# Patient Record
Sex: MALE | Race: WHITE | NOT HISPANIC OR LATINO | Employment: OTHER | URBAN - METROPOLITAN AREA
[De-identification: names, ages, dates, MRNs, and addresses within clinical notes are randomized per-mention and may not be internally consistent; named-entity substitution may affect disease eponyms.]

---

## 2017-01-27 DIAGNOSIS — K21.9 GASTROESOPHAGEAL REFLUX DISEASE: ICD-10-CM

## 2017-01-27 DIAGNOSIS — E61.1 IRON DEFICIENCY: ICD-10-CM

## 2017-01-27 DIAGNOSIS — R52 PAIN: ICD-10-CM

## 2017-01-27 DIAGNOSIS — G25.81 RESTLESS LEGS SYNDROME (RLS): ICD-10-CM

## 2017-01-27 DIAGNOSIS — E87.20 ACIDOSIS: ICD-10-CM

## 2017-01-27 DIAGNOSIS — I10 HTN (HYPERTENSION): ICD-10-CM

## 2017-01-27 DIAGNOSIS — E78.00 HYPERCHOLESTEREMIA: ICD-10-CM

## 2017-01-27 DIAGNOSIS — I12.9 RENAL HYPERTENSION, STAGE 1-4 OR UNSPECIFIED CHRONIC KIDNEY DISEASE: Primary | ICD-10-CM

## 2017-01-27 DIAGNOSIS — Z94.0 KIDNEY REPLACED BY TRANSPLANT: ICD-10-CM

## 2017-01-27 DIAGNOSIS — E29.1 TESTICULAR HYPOFUNCTION: ICD-10-CM

## 2017-01-30 DIAGNOSIS — E61.1 IRON DEFICIENCY: Primary | ICD-10-CM

## 2017-01-30 RX ORDER — SODIUM BICARBONATE 650 MG/1
650 TABLET ORAL
Qty: 180 TABLET | Refills: 3 | Status: SHIPPED | OUTPATIENT
Start: 2017-01-30 | End: 2018-02-16

## 2017-01-30 RX ORDER — GABAPENTIN 300 MG/1
300 CAPSULE ORAL AT BEDTIME
Qty: 270 CAPSULE | Refills: 3 | Status: SHIPPED | OUTPATIENT
Start: 2017-01-30

## 2017-01-30 RX ORDER — LOSARTAN POTASSIUM 100 MG/1
100 TABLET ORAL DAILY
Qty: 90 TABLET | Refills: 3 | Status: SHIPPED | OUTPATIENT
Start: 2017-01-30 | End: 2018-04-17

## 2017-01-30 RX ORDER — FUROSEMIDE 20 MG
TABLET ORAL
Qty: 360 TABLET | Refills: 3 | Status: SHIPPED | OUTPATIENT
Start: 2017-01-30 | End: 2017-12-11

## 2017-01-30 RX ORDER — VERAPAMIL HYDROCHLORIDE 180 MG/1
180 TABLET, EXTENDED RELEASE ORAL DAILY
Qty: 90 TABLET | Refills: 3 | Status: SHIPPED | OUTPATIENT
Start: 2017-01-30 | End: 2018-03-03

## 2017-01-30 RX ORDER — ACETAMINOPHEN 325 MG/1
650 TABLET ORAL EVERY 6 HOURS PRN
Qty: 250 TABLET | Refills: 11 | Status: SHIPPED | OUTPATIENT
Start: 2017-01-30 | End: 2023-05-19 | Stop reason: DRUGHIGH

## 2017-01-30 RX ORDER — PANTOPRAZOLE SODIUM 40 MG/1
40 TABLET, DELAYED RELEASE ORAL DAILY
Qty: 90 TABLET | Refills: 3 | Status: SHIPPED | OUTPATIENT
Start: 2017-01-30 | End: 2018-02-15

## 2017-01-30 RX ORDER — FERROUS SULFATE 325(65) MG
1 TABLET ORAL
Qty: 90 TABLET | Refills: 3 | Status: SHIPPED | OUTPATIENT
Start: 2017-01-30 | End: 2020-01-27

## 2017-01-30 RX ORDER — FERROUS SULFATE 325(65) MG
TABLET ORAL
Qty: 90 TABLET | Refills: 3 | Status: SHIPPED | OUTPATIENT
Start: 2017-01-30 | End: 2018-01-17

## 2017-01-30 NOTE — TELEPHONE ENCOUNTER
Last Office Visit with Nephrologist:  12/20/16.  Medication refilled per Nephrology Clinic protocol.     Jerilyn Flores RN

## 2017-09-28 ENCOUNTER — TELEPHONE (OUTPATIENT)
Dept: TRANSPLANT | Facility: CLINIC | Age: 62
End: 2017-09-28

## 2017-09-28 NOTE — TELEPHONE ENCOUNTER
Carlos,    Thank you for taking the time to speak with me today. I wanted to follow up with you about all your questions regarding the new nephrologist.    I found out that you continue to have a post-kidney coordinator here who will order you the labs they require. Her name is Aria Marie. Her phone number is: [phone].     Your wait-list coordinator for a new kidney is Radha De La Rosa. She will be checking in on you yearly. Her number is: [phone]    Your new [facility] Nephrologist is Dr. Goodwin. His number to make your initial appointment is: [phone]. Please call and make an appointment. We have a partnership with medical records, and they will be able to see all your records from there.     Finally, our nephrologist see post kidney transplant patients in [place]. Dr. Jackson Bunch is our nephrologist who sees the post kidney patients. Please schedule an appointment with him in the new year. I will send his , Pratibha, a message today asking her to reach out to you to make that appointment.      I hope this clarifies all the confusion. If you have any further questions, I am happy to help you. My information is included in my signature.         Zaira Lozada RN, BSN  Transplant Coordinator - Kidney   Solid Organ Transplant [address]WB [address]  [address] [address]  Phone [phone]  Fax [phone]  Pager [phone]  [email or web address]

## 2017-09-28 NOTE — TELEPHONE ENCOUNTER
Spoke with Carlos to day on a well check phone call. He stated he is feeling well this past year since he saw Dr. Pantoja. He stated that he is confused as to who or how he is to be seen by Dr. Goodwin at [facility]. He stated he looked him up on the web, however he emailed Dr. Pantoja to see if our center had forwarded his records. He did not get a response. He also stated he is having a left total knee replacement 10/10/2017, with a pre op the 9th. If we want him to have follow up labs, we can email or mail him the order. I stated I would get back to him about the above items. He said email is good too.

## 2017-09-29 ENCOUNTER — TELEPHONE (OUTPATIENT)
Dept: TRANSPLANT | Facility: CLINIC | Age: 62
End: 2017-09-29

## 2017-11-01 ENCOUNTER — TELEPHONE (OUTPATIENT)
Dept: TRANSPLANT | Facility: CLINIC | Age: 62
End: 2017-11-01

## 2017-11-01 DIAGNOSIS — Z94.0 KIDNEY REPLACED BY TRANSPLANT: ICD-10-CM

## 2017-11-01 RX ORDER — SULFAMETHOXAZOLE AND TRIMETHOPRIM 400; 80 MG/1; MG/1
TABLET ORAL
Qty: 40 TABLET | Refills: 2 | Status: SHIPPED | OUTPATIENT
Start: 2017-11-01 | End: 2018-07-21

## 2017-11-01 NOTE — TELEPHONE ENCOUNTER
"Call from Carlos who is concerned about a company called [employer] asking the patient to sign a release form to allow the [facility] to get records from [facility].  Instructed patient to phone [place] [facility] to better understand the request for records.  Patient also has an appt. In [place] in Feb. 2018 and will phone Pratibha Duenas about his appointment.  Patient stated, \"Pratibha may have requested the records.\"   "

## 2017-12-09 DIAGNOSIS — Z94.0 KIDNEY TRANSPLANTED: Primary | ICD-10-CM

## 2017-12-11 DIAGNOSIS — Z94.0 KIDNEY REPLACED BY TRANSPLANT: ICD-10-CM

## 2017-12-11 RX ORDER — FUROSEMIDE 20 MG
TABLET ORAL
Qty: 360 TABLET | Refills: 1 | Status: SHIPPED | OUTPATIENT
Start: 2017-12-11 | End: 2018-06-05

## 2017-12-11 RX ORDER — PREDNISONE 5 MG/1
5 TABLET ORAL DAILY
Qty: 90 TABLET | Refills: 3 | Status: SHIPPED | OUTPATIENT
Start: 2017-12-11 | End: 2018-12-01

## 2017-12-13 DIAGNOSIS — Z94.0 KIDNEY REPLACED BY TRANSPLANT: ICD-10-CM

## 2017-12-13 RX ORDER — MYCOPHENOLATE MOFETIL 250 MG/1
CAPSULE ORAL
Qty: 540 CAPSULE | Refills: 3 | Status: SHIPPED | OUTPATIENT
Start: 2017-12-13 | End: 2018-12-15

## 2018-01-13 ENCOUNTER — EXTERNAL HOSPITAL ADMISSION (OUTPATIENT)
Dept: TRANSPLANT | Facility: CLINIC | Age: 63
End: 2018-01-13

## 2018-01-17 ENCOUNTER — EXTERNAL ORDER RESULTS (OUTPATIENT)
Dept: TRANSPLANT | Facility: CLINIC | Age: 63
End: 2018-01-17

## 2018-01-17 DIAGNOSIS — E61.1 IRON DEFICIENCY: ICD-10-CM

## 2018-01-17 RX ORDER — FERROUS SULFATE 325(65) MG
TABLET ORAL
Qty: 90 TABLET | Refills: 3 | Status: SHIPPED | OUTPATIENT
Start: 2018-01-17 | End: 2019-01-12

## 2018-02-15 DIAGNOSIS — K21.9 GASTROESOPHAGEAL REFLUX DISEASE: ICD-10-CM

## 2018-02-15 RX ORDER — PANTOPRAZOLE SODIUM 40 MG/1
TABLET, DELAYED RELEASE ORAL
Qty: 30 TABLET | Refills: 1 | Status: SHIPPED | OUTPATIENT
Start: 2018-02-15

## 2018-02-16 ENCOUNTER — OFFICE VISIT (OUTPATIENT)
Dept: NEPHROLOGY | Facility: CLINIC | Age: 63
End: 2018-02-16
Payer: COMMERCIAL

## 2018-02-16 VITALS
WEIGHT: 270.8 LBS | HEART RATE: 76 BPM | BODY MASS INDEX: 39.99 KG/M2 | DIASTOLIC BLOOD PRESSURE: 82 MMHG | SYSTOLIC BLOOD PRESSURE: 162 MMHG

## 2018-02-16 DIAGNOSIS — E87.20 ACIDOSIS: ICD-10-CM

## 2018-02-16 DIAGNOSIS — I15.1 HYPERTENSION SECONDARY TO OTHER RENAL DISORDERS: ICD-10-CM

## 2018-02-16 DIAGNOSIS — E87.20 METABOLIC ACIDOSIS: ICD-10-CM

## 2018-02-16 DIAGNOSIS — E66.3 OVERWEIGHT: ICD-10-CM

## 2018-02-16 DIAGNOSIS — Z48.298 AFTERCARE FOLLOWING ORGAN TRANSPLANT: ICD-10-CM

## 2018-02-16 DIAGNOSIS — D84.9 IMMUNOSUPPRESSED STATUS (H): ICD-10-CM

## 2018-02-16 DIAGNOSIS — E55.9 VITAMIN D DEFICIENCY: ICD-10-CM

## 2018-02-16 DIAGNOSIS — Z94.0 KIDNEY REPLACED BY TRANSPLANT: Primary | ICD-10-CM

## 2018-02-16 DIAGNOSIS — G47.33 OSA (OBSTRUCTIVE SLEEP APNEA): ICD-10-CM

## 2018-02-16 RX ORDER — SULFAMETHOXAZOLE AND TRIMETHOPRIM 400; 80 MG/1; MG/1
1 TABLET ORAL
COMMUNITY
End: 2020-06-30

## 2018-02-16 RX ORDER — SODIUM BICARBONATE 650 MG/1
650 TABLET ORAL DAILY
Qty: 180 TABLET | Refills: 3 | COMMUNITY
Start: 2018-02-16 | End: 2019-02-15

## 2018-02-16 NOTE — PROGRESS NOTES
Per verbal orders of Dr Bunch, faxed [place] lab orders for a Vitamin D level with next set of labs.

## 2018-02-16 NOTE — LETTER
2/16/2018       RE: Gurjit Squires  [address]  [address]   [facility] [address]     Dear Colleague,    Thank you for referring your patient, Gurjit Squires, to the [facility] at [facility]. Please see a copy of my visit note below.    Assessment and Plan:  1. LDKT - baseline Cr ~ 1.8-2.2, which has remained stable.  Mild proteinuria.  No DSA when last checked.  Will make no changes in immunosuppression.  Patient has been on the kidney transplant waiting list since 2007 due to early kidney dysfunction and a couple of rejections, but kidney function has been stable and relatively good for the last several years.  With patient's very stable kidney function and last GFR over 45, would recommend he be removed from the kidney transplant waiting list.  2. HTN - okay control at target of less than 140/90.  No changes.  3. Elevated blood glucose - patient's blood glucose has been running ~ 100-120s.  Recommend increased exercise and weight loss.  4. Vitamin D deficiency - will check vitamin D level with next labs and continue on cholecalciferol.  5. Metabolic acidosis - normal bicarbonate level and with some edema, will decrease sodium bicarbonate supplement to once daily.  6. Overweight - significant increase in weight in the last year.  Recommend increased exercise and watching caloric intake.  7. Skin cancer risk - no new skin lesions.  Recommend regular follow up with Dermatology.  8. Medical noncompliance - patient has had infrequent labs.  Discussed importance of obtaining regular labs, taking medications and attending medical appointments.  9. Recommend return visit in 12 months.    Assessment and plan was discussed with patient and he voiced his understanding and agreement.    Reason for Visit:  Mr. Squires is here for routine follow up.    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         Transplant Hx:       Tx: LDKT  Date: 7/29/04       Present Maintenance IS: Mycophenolate mofetil and Prednisone       Baseline Creatinine: 1.8-2.2       Recent DSA: No  Date last checked: 1/2010       Biopsy: 1/28/10; borderline acute cellular-mediated rejection, mild interstitial fibrosis and tubular atrophy.         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  Mild interstitial fibrosis and tubular atrophy.         11/3/05; unremarkable.    Mr. Squires reports feeling good overall with some medical complaints.  Patient was last seen just over a year ago by Dr. Pantoja.  He recently underwent a left knee replacement and is doing well following this.  His energy level is okay, but not quite normal, although no recent change.  Patient retired last year, but recently started getting some hours part time.  He is active and does get some exercise.  Denies any chest pain or shortness of breath with exertion.  Appetite is \"too good\" and he has gained about 15-20 lbs in the last year.  No nausea, vomiting or diarrhea.  No fever, sweats or chills.  Some leg swelling by the end of the day.    Home BP: 130/80s..      ROS:   A comprehensive review of systems was obtained and negative, except as noted in the HPI or PMH.    Active Medical Problems:  Patient Active Problem List   Diagnosis     Testicular hypofunction     Kidney replaced by transplant     Erectile dysfunction     Hypertension secondary to other renal disorders     Immunosuppressed status (H)     Metabolic acidosis     Vitamin D deficiency     VANESA (obstructive sleep apnea)     Overweight     Aftercare following organ transplant       Personal Hx:  Social History     Social History     Marital status:      Spouse name: N/A     Number of children: N/A     Years of education: N/A     Occupational History     Not on file.     Social History Main Topics     Smoking " status: Never Smoker     Smokeless tobacco: Never Used     Alcohol use Yes      Comment: social     Drug use: No     Sexual activity: Yes     Partners: Female     Birth control/ protection: None     Other Topics Concern     Not on file     Social History Narrative       Allergies:  No Known Allergies    Medications:  Prior to Admission medications    Medication Sig Start Date End Date Taking? Authorizing Provider   sulfamethoxazole-trimethoprim (BACTRIM) 400-80 MG per tablet Take 1 tablet by mouth three times a week   Yes Reported, Patient   VERAPAMIL HCL PO Take 120 mg by mouth daily   Yes Reported, Patient   sodium bicarbonate 650 MG tablet Take 1 tablet (650 mg) by mouth daily 2/16/18  Yes Radu Bunch MD   pantoprazole (PROTONIX) 40 MG EC tablet TAKE 1 TABLET BY MOUTH DAILY  Patient taking differently: 20 mg daily 2/15/18  Yes Radu Bunch MD   ferrous sulfate (IRON) 325 (65 FE) MG tablet TAKE ONE TABLET BY MOUTH ONE TIME DAILY AT BREAKFAST 1/17/18  Yes Radu Bunch MD   mycophenolate (GENERIC EQUIVALENT) 250 MG capsule TAKE 3 CAPSULES (750 MG) BY MOUTH 2 TIMES DAILY 12/13/17  Yes Radu Bunch MD   predniSONE (DELTASONE) 5 MG tablet Take 1 tablet (5 mg) by mouth daily 12/11/17  Yes Radu Bunch MD   furosemide (LASIX) 20 MG tablet TAKE 2 TABLETS BY MOUTH IN THE MORNING AND IN THE EVENING 12/11/17  Yes Radu Bunch MD   losartan (COZAAR) 100 MG tablet Take 1 tablet (100 mg) by mouth daily 1/30/17  Yes Everette Pantoja MD   gabapentin (NEURONTIN) 300 MG capsule Take 1 capsule (300 mg) by mouth At Bedtime 1/30/17  Yes Everette Pantoja MD   ferrous sulfate (IRON) 325 (65 FE) MG tablet Take 1 tablet (325 mg) by mouth daily (with breakfast) 1/30/17  Yes Everette Pantoja MD   acetaminophen (TYLENOL) 325 MG tablet Take 2 tablets (650 mg) by mouth every 6 hours as needed for pain  Patient taking differently: Take 1,000 mg by mouth daily  1/30/17  Yes Scarlett  MD Everette   cholecalciferol (VITAMIN D) 1000 UNIT tablet Take 1 tablet (1,000 Units) by mouth daily 1/30/17  Yes Everette Pantoja MD   TraMADol HCl 200 MG TB24 Take 1 tablet by mouth daily   Yes Reported, Patient   Multiple Vitamins-Minerals (ONE-A-DAY MENS 50+ ADVANTAGE PO) Take 1 tablet by mouth every evening   Yes Reported, Patient   clonazePAM (KLONOPIN) 0.5 MG tablet Take 1 tablet (0.5 mg) by mouth daily  Patient taking differently: Take 0.25 mg by mouth every evening  7/10/15  Yes Doctor, MD Natalia   allopurinol (ZYLOPRIM) 100 MG tablet Take 3 tablets (300 mg) by mouth daily 7/10/15  Yes Doctor, MD Natalia   omega-3 fatty acids (FISH OIL) 1200 MG capsule Take 1 capsule by mouth 3 times daily. 5/21/12  Yes Everette Pantoja MD   Fexofenadine HCl (ALLEGRA PO) Take 1 tablet by mouth daily.    Yes Reported, Patient   sulfamethoxazole-trimethoprim (BACTRIM/SEPTRA) 400-80 MG per tablet TAKE 1 TABLET BY MOUTH EVERY MONDAY, WEDNESDAY, AND FRIDAY MORNING  Patient not taking: Reported on 2/16/2018 11/1/17   Radu Bunch MD   verapamil (CALAN-SR) 180 MG CR tablet Take 1 tablet (180 mg) by mouth daily  Patient not taking: Reported on 2/16/2018 1/30/17   Everette Pantoja MD   sulfamethoxazole-trimethoprim (BACTRIM DS,SEPTRA DS) 800-160 MG per tablet Take 1 tablet by mouth 2 times daily  Patient not taking: Reported on 2/16/2018 10/5/16   Nikki Lao MD   oxyCODONE (ROXICODONE) 5 MG immediate release tablet Take 1 tablet (5 mg) by mouth every 4 hours as needed for pain (maximum 6 tablets per day)  Patient not taking: Reported on 2/16/2018 10/4/16   Jaquan Matthew MD       Vitals:  /82 (BP Location: Left arm)  Pulse 76  Wt 122.8 kg (270 lb 12.8 oz)  BMI 39.99 kg/m2    Exam:   GENERAL APPEARANCE: alert and no distress  HENT: mouth without ulcers or lesions  LYMPHATICS: no cervical or supraclavicular nodes  RESP: lungs clear to auscultation - no rales, rhonchi or wheezes  CV: regular rhythm,  normal rate, no rub, no murmur  EDEMA: trace LE edema bilaterally  ABDOMEN: soft, nondistended, nontender, bowel sounds normal  MS: extremities normal - no gross deformities noted, no evidence of inflammation in joints, no muscle tenderness  SKIN: no rash  TX KIDNEY: normal    Results:   Recent Results (from the past 1008 hour(s))   Basic metabolic panel    Collection Time: 01/12/18  7:37 AM   Result Value Ref Range    Glucose (External) 115 (H) 74 - 106 mg/dL    Urea Nitrogen (External) 31 (H) 7 - 17 mg/dL    Creatinine (External) 1.80 (H) 0.80 - 1.50 mg/dL    BUN/Creatinine Ratio (External) 17.2 15.0 - 20.0    Sodium (External) 140 137 - 145 meq/L    Potassium (External) 4.3 3.5 - 5.1 meq/L    Chloride (External) 102 98 - 107 meq/L    CO2 (External) 26 22 - 30 meq/L    Anion Gap (External) 16 6 - 20 meq/L    Calcium (External) 9.1 8.4 - 10.2 mg/dL    GFR Estimated (External) 38 mL/min/1.73m2    GFR Est if Black (External) 47 mL/min/1.73m2   CBC with platelets    Collection Time: 01/12/18  7:37 AM   Result Value Ref Range    WBC Count (External) 7.5 4.0 - 11.0 K/uL    RBC Count (External) 4.92 4.40 - 5.80 M/uL    Hemoglobin (External) 15.4 13.5 - 17.5 g/dL    Hematocrit (External) 47.5 40.0 - 50.0 %    MCV (External) 96.5 80.0 - 98.0 fL    MCH (External) 31.3 25.5 - 34.0 pg    MCHC (External) 32.4 31.5 - 36.5 g/dL    RDW (External) 13.2 11.5 - 15.5 %    Platelet Count (External) 173 140 - 400 K/uL   Mycophenolic acid    Collection Time: 01/12/18  7:41 AM   Result Value Ref Range    Mycophenolic Acid  (External) 8.6 (H) 1.0 - 3.5 mcg/mL    MPA Glucuronide (External) 75 35 - 100 mcg/mL           Again, thank you for allowing me to participate in the care of your patient.      Sincerely,    Radu Bunch MD

## 2018-02-16 NOTE — LETTER
PHYSICIAN ORDERS      DATE & TIME ISSUED: 2018 11:51 AM  PATIENT NAME: Gurjit Squires   : 1955     [facility] MR# [if applicable]: [id number]     DIAGNOSIS:  Kidney Transplant  ICD-10 CODE: Z94.0     Please add Vitamin D at next lab visit.    Any questions please call: [phone]    Please fax these results to [phone].    .

## 2018-02-16 NOTE — LETTER
2/16/2018      RE: Gurjit Squires  [address] [place] [address]  [address]   [facility] [address]       Assessment and Plan:  1. LDKT - baseline Cr ~ 1.8-2.2, which has remained stable.  Mild proteinuria.  No DSA when last checked.  Will make no changes in immunosuppression.  Patient has been on the kidney transplant waiting list since 2007 due to early kidney dysfunction and a couple of rejections, but kidney function has been stable and relatively good for the last several years.  With patient's very stable kidney function and last GFR over 45, would recommend he be removed from the kidney transplant waiting list.  2. HTN - okay control at target of less than 140/90.  No changes.  3. Elevated blood glucose - patient's blood glucose has been running ~ 100-120s.  Recommend increased exercise and weight loss.  4. Vitamin D deficiency - will check vitamin D level with next labs and continue on cholecalciferol.  5. Metabolic acidosis - normal bicarbonate level and with some edema, will decrease sodium bicarbonate supplement to once daily.  6. Overweight - significant increase in weight in the last year.  Recommend increased exercise and watching caloric intake.  7. Skin cancer risk - no new skin lesions.  Recommend regular follow up with Dermatology.  8. Medical noncompliance - patient has had infrequent labs.  Discussed importance of obtaining regular labs, taking medications and attending medical appointments.  9. Recommend return visit in 12 months.    Assessment and plan was discussed with patient and he voiced his understanding and agreement.    Reason for Visit:  Mr. Squires is here for routine follow up.    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         Transplant Hx:       Tx: LDKT  Date: 7/29/04       Present Maintenance IS: Mycophenolate mofetil and Prednisone       Baseline Creatinine: 1.8-2.2       Recent DSA: No  Date last  "checked: 1/2010       Biopsy: 1/28/10; borderline acute cellular-mediated rejection, mild interstitial fibrosis and tubular atrophy.         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  Mild interstitial fibrosis and tubular atrophy.         11/3/05; unremarkable.    Mr. Squires reports feeling good overall with some medical complaints.  Patient was last seen just over a year ago by Dr. Pantoja.  He recently underwent a left knee replacement and is doing well following this.  His energy level is okay, but not quite normal, although no recent change.  Patient retired last year, but recently started getting some hours part time.  He is active and does get some exercise.  Denies any chest pain or shortness of breath with exertion.  Appetite is \"too good\" and he has gained about 15-20 lbs in the last year.  No nausea, vomiting or diarrhea.  No fever, sweats or chills.  Some leg swelling by the end of the day.    Home BP: 130/80s..      ROS:   A comprehensive review of systems was obtained and negative, except as noted in the HPI or PMH.    Active Medical Problems:  Patient Active Problem List   Diagnosis     Testicular hypofunction     Kidney replaced by transplant     Erectile dysfunction     Hypertension secondary to other renal disorders     Immunosuppressed status (H)     Metabolic acidosis     Vitamin D deficiency     VANESA (obstructive sleep apnea)     Overweight     Aftercare following organ transplant       Personal Hx:  Social History     Social History     Marital status:      Spouse name: N/A     Number of children: N/A     Years of education: N/A     Occupational History     Not on file.     Social History Main Topics     Smoking status: Never Smoker     Smokeless tobacco: Never Used     Alcohol use Yes      Comment: social     Drug use: No     Sexual activity: Yes     Partners: Female     Birth control/ protection: None     Other Topics " Concern     Not on file     Social History Narrative       Allergies:  No Known Allergies    Medications:  Prior to Admission medications    Medication Sig Start Date End Date Taking? Authorizing Provider   sulfamethoxazole-trimethoprim (BACTRIM) 400-80 MG per tablet Take 1 tablet by mouth three times a week   Yes Reported, Patient   VERAPAMIL HCL PO Take 120 mg by mouth daily   Yes Reported, Patient   sodium bicarbonate 650 MG tablet Take 1 tablet (650 mg) by mouth daily 2/16/18  Yes Radu Bunch MD   pantoprazole (PROTONIX) 40 MG EC tablet TAKE 1 TABLET BY MOUTH DAILY  Patient taking differently: 20 mg daily 2/15/18  Yes Radu Bunch MD   ferrous sulfate (IRON) 325 (65 FE) MG tablet TAKE ONE TABLET BY MOUTH ONE TIME DAILY AT BREAKFAST 1/17/18  Yes Radu Bunch MD   mycophenolate (GENERIC EQUIVALENT) 250 MG capsule TAKE 3 CAPSULES (750 MG) BY MOUTH 2 TIMES DAILY 12/13/17  Yes Radu Bunch MD   predniSONE (DELTASONE) 5 MG tablet Take 1 tablet (5 mg) by mouth daily 12/11/17  Yes Radu Bunch MD   furosemide (LASIX) 20 MG tablet TAKE 2 TABLETS BY MOUTH IN THE MORNING AND IN THE EVENING 12/11/17  Yes Radu Bunch MD   losartan (COZAAR) 100 MG tablet Take 1 tablet (100 mg) by mouth daily 1/30/17  Yes Everette Pantoja MD   gabapentin (NEURONTIN) 300 MG capsule Take 1 capsule (300 mg) by mouth At Bedtime 1/30/17  Yes Everette Pantoja MD   ferrous sulfate (IRON) 325 (65 FE) MG tablet Take 1 tablet (325 mg) by mouth daily (with breakfast) 1/30/17  Yes Everette Pantoja MD   acetaminophen (TYLENOL) 325 MG tablet Take 2 tablets (650 mg) by mouth every 6 hours as needed for pain  Patient taking differently: Take 1,000 mg by mouth daily  1/30/17  Yes Everette Pantoja MD   cholecalciferol (VITAMIN D) 1000 UNIT tablet Take 1 tablet (1,000 Units) by mouth daily 1/30/17  Yes Everette Pantoja MD   TraMADol HCl 200 MG TB24 Take 1 tablet by mouth daily   Yes Reported, Patient    Multiple Vitamins-Minerals (ONE-A-DAY MENS 50+ ADVANTAGE PO) Take 1 tablet by mouth every evening   Yes Reported, Patient   clonazePAM (KLONOPIN) 0.5 MG tablet Take 1 tablet (0.5 mg) by mouth daily  Patient taking differently: Take 0.25 mg by mouth every evening  7/10/15  Yes Doctor, MD Natalia   allopurinol (ZYLOPRIM) 100 MG tablet Take 3 tablets (300 mg) by mouth daily 7/10/15  Yes Doctor, None, MD   omega-3 fatty acids (FISH OIL) 1200 MG capsule Take 1 capsule by mouth 3 times daily. 5/21/12  Yes Everette Pantoja MD   Fexofenadine HCl (ALLEGRA PO) Take 1 tablet by mouth daily.    Yes Reported, Patient   sulfamethoxazole-trimethoprim (BACTRIM/SEPTRA) 400-80 MG per tablet TAKE 1 TABLET BY MOUTH EVERY MONDAY, WEDNESDAY, AND FRIDAY MORNING  Patient not taking: Reported on 2/16/2018 11/1/17   Radu Bunch MD   verapamil (CALAN-SR) 180 MG CR tablet Take 1 tablet (180 mg) by mouth daily  Patient not taking: Reported on 2/16/2018 1/30/17   Everette Pantoja MD   sulfamethoxazole-trimethoprim (BACTRIM DS,SEPTRA DS) 800-160 MG per tablet Take 1 tablet by mouth 2 times daily  Patient not taking: Reported on 2/16/2018 10/5/16   Nikki Lao MD   oxyCODONE (ROXICODONE) 5 MG immediate release tablet Take 1 tablet (5 mg) by mouth every 4 hours as needed for pain (maximum 6 tablets per day)  Patient not taking: Reported on 2/16/2018 10/4/16   Jaquan Matthew MD       Vitals:  /82 (BP Location: Left arm)  Pulse 76  Wt 122.8 kg (270 lb 12.8 oz)  BMI 39.99 kg/m2    Exam:   GENERAL APPEARANCE: alert and no distress  HENT: mouth without ulcers or lesions  LYMPHATICS: no cervical or supraclavicular nodes  RESP: lungs clear to auscultation - no rales, rhonchi or wheezes  CV: regular rhythm, normal rate, no rub, no murmur  EDEMA: trace LE edema bilaterally  ABDOMEN: soft, nondistended, nontender, bowel sounds normal  MS: extremities normal - no gross deformities noted, no evidence of inflammation in  joints, no muscle tenderness  SKIN: no rash  TX KIDNEY: normal    Results:   Recent Results (from the past 1008 hour(s))   Basic metabolic panel    Collection Time: 01/12/18  7:37 AM   Result Value Ref Range    Glucose (External) 115 (H) 74 - 106 mg/dL    Urea Nitrogen (External) 31 (H) 7 - 17 mg/dL    Creatinine (External) 1.80 (H) 0.80 - 1.50 mg/dL    BUN/Creatinine Ratio (External) 17.2 15.0 - 20.0    Sodium (External) 140 137 - 145 meq/L    Potassium (External) 4.3 3.5 - 5.1 meq/L    Chloride (External) 102 98 - 107 meq/L    CO2 (External) 26 22 - 30 meq/L    Anion Gap (External) 16 6 - 20 meq/L    Calcium (External) 9.1 8.4 - 10.2 mg/dL    GFR Estimated (External) 38 mL/min/1.73m2    GFR Est if Black (External) 47 mL/min/1.73m2   CBC with platelets    Collection Time: 01/12/18  7:37 AM   Result Value Ref Range    WBC Count (External) 7.5 4.0 - 11.0 K/uL    RBC Count (External) 4.92 4.40 - 5.80 M/uL    Hemoglobin (External) 15.4 13.5 - 17.5 g/dL    Hematocrit (External) 47.5 40.0 - 50.0 %    MCV (External) 96.5 80.0 - 98.0 fL    MCH (External) 31.3 25.5 - 34.0 pg    MCHC (External) 32.4 31.5 - 36.5 g/dL    RDW (External) 13.2 11.5 - 15.5 %    Platelet Count (External) 173 140 - 400 K/uL   Mycophenolic acid    Collection Time: 01/12/18  7:41 AM   Result Value Ref Range    Mycophenolic Acid  (External) 8.6 (H) 1.0 - 3.5 mcg/mL    MPA Glucuronide (External) 75 35 - 100 mcg/mL       Radu Bunch MD

## 2018-02-16 NOTE — PROGRESS NOTES
Assessment and Plan:  1. LDKT - baseline Cr ~ 1.8-2.2, which has remained stable.  Mild proteinuria.  No DSA when last checked.  Will make no changes in immunosuppression.  Patient has been on the kidney transplant waiting list since 2007 due to early kidney dysfunction and a couple of rejections, but kidney function has been stable and relatively good for the last several years.  With patient's very stable kidney function and last GFR over 45, would recommend he be removed from the kidney transplant waiting list.  2. HTN - okay control at target of less than 140/90.  No changes.  3. Elevated blood glucose - patient's blood glucose has been running ~ 100-120s.  Recommend increased exercise and weight loss.  4. Vitamin D deficiency - will check vitamin D level with next labs and continue on cholecalciferol.  5. Metabolic acidosis - normal bicarbonate level and with some edema, will decrease sodium bicarbonate supplement to once daily.  6. Overweight - significant increase in weight in the last year.  Recommend increased exercise and watching caloric intake.  7. Skin cancer risk - no new skin lesions.  Recommend regular follow up with Dermatology.  8. Medical noncompliance - patient has had infrequent labs.  Discussed importance of obtaining regular labs, taking medications and attending medical appointments.  9. Recommend return visit in 12 months.    Assessment and plan was discussed with patient and he voiced his understanding and agreement.    Reason for Visit:  Mr. Squires is here for routine follow up.    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         Transplant Hx:       Tx: LDKT  Date: 7/29/04       Present Maintenance IS: Mycophenolate mofetil and Prednisone       Baseline Creatinine: 1.8-2.2       Recent DSA: No  Date last checked: 1/2010       Biopsy: 1/28/10; borderline acute cellular-mediated rejection, mild interstitial  "fibrosis and tubular atrophy.         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  Mild interstitial fibrosis and tubular atrophy.         11/3/05; unremarkable.    Mr. Squires reports feeling good overall with some medical complaints.  Patient was last seen just over a year ago by Dr. Pantoja.  He recently underwent a left knee replacement and is doing well following this.  His energy level is okay, but not quite normal, although no recent change.  Patient retired last year, but recently started getting some hours part time.  He is active and does get some exercise.  Denies any chest pain or shortness of breath with exertion.  Appetite is \"too good\" and he has gained about 15-20 lbs in the last year.  No nausea, vomiting or diarrhea.  No fever, sweats or chills.  Some leg swelling by the end of the day.    Home BP: 130/80s..      ROS:   A comprehensive review of systems was obtained and negative, except as noted in the HPI or PMH.    Active Medical Problems:  Patient Active Problem List   Diagnosis     Testicular hypofunction     Kidney replaced by transplant     Erectile dysfunction     Hypertension secondary to other renal disorders     Immunosuppressed status (H)     Metabolic acidosis     Vitamin D deficiency     VANESA (obstructive sleep apnea)     Overweight     Aftercare following organ transplant       Personal Hx:  Social History     Social History     Marital status:      Spouse name: N/A     Number of children: N/A     Years of education: N/A     Occupational History     Not on file.     Social History Main Topics     Smoking status: Never Smoker     Smokeless tobacco: Never Used     Alcohol use Yes      Comment: social     Drug use: No     Sexual activity: Yes     Partners: Female     Birth control/ protection: None     Other Topics Concern     Not on file     Social History Narrative       Allergies:  No Known " Allergies    Medications:  Prior to Admission medications    Medication Sig Start Date End Date Taking? Authorizing Provider   sulfamethoxazole-trimethoprim (BACTRIM) 400-80 MG per tablet Take 1 tablet by mouth three times a week   Yes Reported, Patient   VERAPAMIL HCL PO Take 120 mg by mouth daily   Yes Reported, Patient   sodium bicarbonate 650 MG tablet Take 1 tablet (650 mg) by mouth daily 2/16/18  Yes Radu Bunch MD   pantoprazole (PROTONIX) 40 MG EC tablet TAKE 1 TABLET BY MOUTH DAILY  Patient taking differently: 20 mg daily 2/15/18  Yes Radu Bunch MD   ferrous sulfate (IRON) 325 (65 FE) MG tablet TAKE ONE TABLET BY MOUTH ONE TIME DAILY AT BREAKFAST 1/17/18  Yes Radu Bunch MD   mycophenolate (GENERIC EQUIVALENT) 250 MG capsule TAKE 3 CAPSULES (750 MG) BY MOUTH 2 TIMES DAILY 12/13/17  Yes Radu Bunch MD   predniSONE (DELTASONE) 5 MG tablet Take 1 tablet (5 mg) by mouth daily 12/11/17  Yes Radu Bunch MD   furosemide (LASIX) 20 MG tablet TAKE 2 TABLETS BY MOUTH IN THE MORNING AND IN THE EVENING 12/11/17  Yes Radu Bunch MD   losartan (COZAAR) 100 MG tablet Take 1 tablet (100 mg) by mouth daily 1/30/17  Yes Everette Pantoja MD   gabapentin (NEURONTIN) 300 MG capsule Take 1 capsule (300 mg) by mouth At Bedtime 1/30/17  Yes Everette Pantoja MD   ferrous sulfate (IRON) 325 (65 FE) MG tablet Take 1 tablet (325 mg) by mouth daily (with breakfast) 1/30/17  Yes Everette Pantoja MD   acetaminophen (TYLENOL) 325 MG tablet Take 2 tablets (650 mg) by mouth every 6 hours as needed for pain  Patient taking differently: Take 1,000 mg by mouth daily  1/30/17  Yes Everette Pantoja MD   cholecalciferol (VITAMIN D) 1000 UNIT tablet Take 1 tablet (1,000 Units) by mouth daily 1/30/17  Yes Everette Pantoja MD   TraMADol HCl 200 MG TB24 Take 1 tablet by mouth daily   Yes Reported, Patient   Multiple Vitamins-Minerals (ONE-A-DAY MENS 50+ ADVANTAGE PO) Take 1 tablet by  mouth every evening   Yes Reported, Patient   clonazePAM (KLONOPIN) 0.5 MG tablet Take 1 tablet (0.5 mg) by mouth daily  Patient taking differently: Take 0.25 mg by mouth every evening  7/10/15  Yes Doctor, MD Natalia   allopurinol (ZYLOPRIM) 100 MG tablet Take 3 tablets (300 mg) by mouth daily 7/10/15  Yes Doctor, None, MD   omega-3 fatty acids (FISH OIL) 1200 MG capsule Take 1 capsule by mouth 3 times daily. 5/21/12  Yes Everette Pantoja MD   Fexofenadine HCl (ALLEGRA PO) Take 1 tablet by mouth daily.    Yes Reported, Patient   sulfamethoxazole-trimethoprim (BACTRIM/SEPTRA) 400-80 MG per tablet TAKE 1 TABLET BY MOUTH EVERY MONDAY, WEDNESDAY, AND FRIDAY MORNING  Patient not taking: Reported on 2/16/2018 11/1/17   Radu Bunch MD   verapamil (CALAN-SR) 180 MG CR tablet Take 1 tablet (180 mg) by mouth daily  Patient not taking: Reported on 2/16/2018 1/30/17   Everette Pantoja MD   sulfamethoxazole-trimethoprim (BACTRIM DS,SEPTRA DS) 800-160 MG per tablet Take 1 tablet by mouth 2 times daily  Patient not taking: Reported on 2/16/2018 10/5/16   Nikki Lao MD   oxyCODONE (ROXICODONE) 5 MG immediate release tablet Take 1 tablet (5 mg) by mouth every 4 hours as needed for pain (maximum 6 tablets per day)  Patient not taking: Reported on 2/16/2018 10/4/16   Jaquan Matthew MD       Vitals:  /82 (BP Location: Left arm)  Pulse 76  Wt 122.8 kg (270 lb 12.8 oz)  BMI 39.99 kg/m2    Exam:   GENERAL APPEARANCE: alert and no distress  HENT: mouth without ulcers or lesions  LYMPHATICS: no cervical or supraclavicular nodes  RESP: lungs clear to auscultation - no rales, rhonchi or wheezes  CV: regular rhythm, normal rate, no rub, no murmur  EDEMA: trace LE edema bilaterally  ABDOMEN: soft, nondistended, nontender, bowel sounds normal  MS: extremities normal - no gross deformities noted, no evidence of inflammation in joints, no muscle tenderness  SKIN: no rash  TX KIDNEY: normal    Results:   Recent  Results (from the past 1008 hour(s))   Basic metabolic panel    Collection Time: 01/12/18  7:37 AM   Result Value Ref Range    Glucose (External) 115 (H) 74 - 106 mg/dL    Urea Nitrogen (External) 31 (H) 7 - 17 mg/dL    Creatinine (External) 1.80 (H) 0.80 - 1.50 mg/dL    BUN/Creatinine Ratio (External) 17.2 15.0 - 20.0    Sodium (External) 140 137 - 145 meq/L    Potassium (External) 4.3 3.5 - 5.1 meq/L    Chloride (External) 102 98 - 107 meq/L    CO2 (External) 26 22 - 30 meq/L    Anion Gap (External) 16 6 - 20 meq/L    Calcium (External) 9.1 8.4 - 10.2 mg/dL    GFR Estimated (External) 38 mL/min/1.73m2    GFR Est if Black (External) 47 mL/min/1.73m2   CBC with platelets    Collection Time: 01/12/18  7:37 AM   Result Value Ref Range    WBC Count (External) 7.5 4.0 - 11.0 K/uL    RBC Count (External) 4.92 4.40 - 5.80 M/uL    Hemoglobin (External) 15.4 13.5 - 17.5 g/dL    Hematocrit (External) 47.5 40.0 - 50.0 %    MCV (External) 96.5 80.0 - 98.0 fL    MCH (External) 31.3 25.5 - 34.0 pg    MCHC (External) 32.4 31.5 - 36.5 g/dL    RDW (External) 13.2 11.5 - 15.5 %    Platelet Count (External) 173 140 - 400 K/uL   Mycophenolic acid    Collection Time: 01/12/18  7:41 AM   Result Value Ref Range    Mycophenolic Acid  (External) 8.6 (H) 1.0 - 3.5 mcg/mL    MPA Glucuronide (External) 75 35 - 100 mcg/mL

## 2018-02-19 ENCOUNTER — TELEPHONE (OUTPATIENT)
Dept: TRANSPLANT | Facility: CLINIC | Age: 63
End: 2018-02-19

## 2018-02-19 NOTE — LETTER
February 19, 2018    Gurjit Squires  [address] [place] [address]   [facility] [address]      Dear Mr. Squires,    The purpose of this letter is to let you know the [facility] Multi-Disciplinary Selection Team made the decision to remove you from the kidney transplant list.  This is because your GFR has been stable in the 40s for several years. You are currently too well for a transplant.  Important things you should know:    If you would like to discuss the decision, or if your medical status changes, you may call [phone] and ask to speak to your transplant coordinator.    We recommend that you continue to follow up with your primary care and referring physicians in order to manage your health concerns.  Enclosed is a letter from [employer] which describes the services offered to patients by [facility] and the [facility].  Thank you for allowing us to participate in your care.  We wish you well.    Sincerely,    Kidney Transplant Team  Enclosure:  [employer] Letter     CC:   Gurjit Goodwin MD

## 2018-02-19 NOTE — TELEPHONE ENCOUNTER
Coordinator received staff msg from Dr. Bunch that he met with pt in [place] and that his GFR has been over 40 for many years. Dr. Bunch and pt discussed pt coming off of our wait list. Pt aware.    Coordinator took pt off of kidney wait list. Left msg with pt. Provided contact information if pt has questions.    Immunology, PFR notified. Letter routed to admin team to send out.

## 2018-02-21 ENCOUNTER — TEAM CONFERENCE (OUTPATIENT)
Dept: TRANSPLANT | Facility: CLINIC | Age: 63
End: 2018-02-21

## 2018-02-21 NOTE — TELEPHONE ENCOUNTER
Team Conference:      Attendees: Burke Bunch Keys, Dunn, Schumacher, Schenk,   Coordinator, , Dietician, Pharmacy    Discussion and Outcome: Patient status changed to removed from Kidney Transplant List per Dr. Bunch recommendation. Patient has been too well for many years.

## 2018-03-03 DIAGNOSIS — E87.20 ACIDOSIS: ICD-10-CM

## 2018-03-03 DIAGNOSIS — I12.9 RENAL HYPERTENSION, STAGE 1-4 OR UNSPECIFIED CHRONIC KIDNEY DISEASE: ICD-10-CM

## 2018-03-05 RX ORDER — SODIUM BICARBONATE 650 MG/1
650 TABLET ORAL DAILY
Qty: 90 TABLET | Refills: 3 | Status: SHIPPED | OUTPATIENT
Start: 2018-03-05 | End: 2019-05-09

## 2018-03-05 RX ORDER — VERAPAMIL HYDROCHLORIDE 180 MG/1
TABLET, EXTENDED RELEASE ORAL
Qty: 90 TABLET | Refills: 3 | Status: SHIPPED | OUTPATIENT
Start: 2018-03-05 | End: 2019-02-21

## 2018-03-05 NOTE — TELEPHONE ENCOUNTER
Last Office Visit with Nephrologist:  2/16/18.  Medication refilled per Nephrology Clinic protocol.     Per 2/16 appointment note: 5. Metabolic acidosis - normal bicarbonate level and with some edema, will decrease sodium bicarbonate supplement to once daily.    Jerilyn Flores RN

## 2018-03-14 DIAGNOSIS — G25.81 RESTLESS LEGS SYNDROME (RLS): ICD-10-CM

## 2018-03-14 RX ORDER — GABAPENTIN 300 MG/1
CAPSULE ORAL
Qty: 270 CAPSULE | Refills: 2 | OUTPATIENT
Start: 2018-03-14

## 2018-03-18 DIAGNOSIS — K21.9 GASTROESOPHAGEAL REFLUX DISEASE: ICD-10-CM

## 2018-03-19 RX ORDER — PANTOPRAZOLE SODIUM 40 MG/1
TABLET, DELAYED RELEASE ORAL
Qty: 30 TABLET | Refills: 1 | OUTPATIENT
Start: 2018-03-19

## 2018-04-03 ENCOUNTER — TELEPHONE (OUTPATIENT)
Dept: TRANSPLANT | Facility: CLINIC | Age: 63
End: 2018-04-03

## 2018-04-03 NOTE — TELEPHONE ENCOUNTER
Issue:  Pt called last week asking for update on his care coordinator and had some other questions.     Plan:   Left pt voicemail to call back to discuss any questions he has

## 2018-04-03 NOTE — TELEPHONE ENCOUNTER
Spoke with pt. Discussed with pt that his coordinators work as a team unit in his care.   Explained to pt that orders will be resent to his lab to have labs q3 months. Lab order sent.   Pt also asked about his status of being wait listed for a kidney. He was taken off the list b/c his renal function is currently stable. However, he has anxiety about potentially having to go back on the list, and at this time he is wondering if he will go back to the bottom of the list.

## 2018-04-17 DIAGNOSIS — I10 HTN (HYPERTENSION): ICD-10-CM

## 2018-04-17 RX ORDER — LOSARTAN POTASSIUM 100 MG/1
TABLET ORAL
Qty: 90 TABLET | Refills: 3 | Status: SHIPPED | OUTPATIENT
Start: 2018-04-17 | End: 2019-03-17

## 2018-05-15 ENCOUNTER — TRANSFERRED RECORDS (OUTPATIENT)
Dept: HEALTH INFORMATION MANAGEMENT | Facility: CLINIC | Age: 63
End: 2018-05-15

## 2018-05-15 ENCOUNTER — TELEPHONE (OUTPATIENT)
Dept: TRANSPLANT | Facility: CLINIC | Age: 63
End: 2018-05-15

## 2018-05-15 NOTE — LETTER
PHYSICIAN ORDERS      DATE & TIME ISSUED: May 16, 2018 11:55 AM  PATIENT NAME: Gurjit Squires   : 1955     [facility] MR# [if applicable]: [id number]     DIAGNOSIS:  Kidney Transplant  ICD-10 CODE: Z94.0     Please repeat the following lab in 1-2 weeks:  BMP    Any questions please call: [phone]  Please send lab results to [phone].    .

## 2018-05-15 NOTE — TELEPHONE ENCOUNTER
ISSUE:  Pt's K elevated at 5.4    PLAN/TASK:  Please call pt and educate about low K diet and to please recheck BMP in the next few weeks if he can.   Enter orders.

## 2018-05-16 NOTE — TELEPHONE ENCOUNTER
Left message for patient recommending eating a low potassium diet.  Gave patient examples of high potassium foods to limit intake.  Instructed patient repeat labs in 1-2 weeks.  Updated lab orders faxed to patients lab.

## 2018-05-21 ENCOUNTER — TELEPHONE (OUTPATIENT)
Dept: TRANSPLANT | Facility: CLINIC | Age: 63
End: 2018-05-21

## 2018-05-21 NOTE — TELEPHONE ENCOUNTER
Provider Call: General  Route to LPN    Reason for call: Patient in the hospital due to infection on his leg and his blood stream.    Call back needed? Yes    Return Call Needed  Same as documented in contacts section  When to return call?: Same day: Route High Priority

## 2018-05-21 NOTE — TELEPHONE ENCOUNTER
Call placed back to Jackie.  Was admitted to [facility] with sepsis 5/17/18, states that pt is stable and hopefully will be discharged today.   To f/u with pt later this week to obtain weekly blood work for 1 month. Orders placed.

## 2018-05-21 NOTE — LETTER
PHYSICIAN ORDERS      DATE & TIME ISSUED: May 21, 2018 9:40 AM  PATIENT NAME: Gurjit Squires   : 1955     [facility] MR# [if applicable]: [id number]     DIAGNOSIS:  post kidney transplant  ICD-10 CODE: Z94.0     Collect weekly for 1 month beginning the week of 18:  CBC  BMP    Any questions please call: [phone]     Please fax these results to [phone]    .

## 2018-05-22 NOTE — TELEPHONE ENCOUNTER
Call placed to pt  States he was d/c from hospital yesterday, on a 10 day course of IV abx  Explained to pt that he should obtain weekly transplant labs for 1 month  Pt verbalizes understanding, orders in place.

## 2018-05-31 NOTE — TELEPHONE ENCOUNTER
Pt called back confirmed for [facility] appt on Fri, Feb 16 at 10am   Cheiloplasty (Complex) Text: A decision was made to reconstruct the defect with a  cheiloplasty.  The defect was undermined extensively.  Additional obicularis oris muscle was excised with a 15 blade scalpel.  The defect was converted into a full thickness wedge to facilite a better cosmetic result.  Small vessels were then tied off with 5-0 monocyrl. The obicularis oris, superficial fascia, adipose and dermis were then reapproximated.  After the deeper layers were approximated the epidermis was reapproximated with particular care given to realign the vermilion border.

## 2018-06-05 DIAGNOSIS — Z94.0 KIDNEY REPLACED BY TRANSPLANT: ICD-10-CM

## 2018-06-05 RX ORDER — FUROSEMIDE 20 MG
TABLET ORAL
Qty: 360 TABLET | Refills: 1 | Status: SHIPPED | OUTPATIENT
Start: 2018-06-05 | End: 2018-12-01

## 2018-07-21 DIAGNOSIS — Z94.0 KIDNEY REPLACED BY TRANSPLANT: ICD-10-CM

## 2018-07-23 RX ORDER — SULFAMETHOXAZOLE AND TRIMETHOPRIM 400; 80 MG/1; MG/1
TABLET ORAL
Qty: 40 TABLET | Refills: 1 | Status: SHIPPED | OUTPATIENT
Start: 2018-07-23 | End: 2019-01-16

## 2018-12-01 DIAGNOSIS — Z94.0 KIDNEY REPLACED BY TRANSPLANT: ICD-10-CM

## 2018-12-01 DIAGNOSIS — Z94.0 KIDNEY TRANSPLANTED: ICD-10-CM

## 2018-12-03 RX ORDER — PREDNISONE 5 MG/1
5 TABLET ORAL DAILY
Qty: 30 TABLET | Refills: 0 | Status: SHIPPED | OUTPATIENT
Start: 2018-12-03 | End: 2019-01-01

## 2018-12-03 RX ORDER — FUROSEMIDE 20 MG
TABLET ORAL
Qty: 360 TABLET | Refills: 1 | Status: SHIPPED | OUTPATIENT
Start: 2018-12-03 | End: 2019-05-30

## 2018-12-15 DIAGNOSIS — Z94.0 KIDNEY REPLACED BY TRANSPLANT: Primary | ICD-10-CM

## 2018-12-17 RX ORDER — MYCOPHENOLATE MOFETIL 250 MG/1
750 CAPSULE ORAL 2 TIMES DAILY
Qty: 540 CAPSULE | Refills: 0 | Status: SHIPPED | OUTPATIENT
Start: 2018-12-17 | End: 2019-03-15

## 2019-01-01 DIAGNOSIS — Z94.0 KIDNEY TRANSPLANTED: Primary | ICD-10-CM

## 2019-01-02 RX ORDER — PREDNISONE 5 MG/1
5 TABLET ORAL DAILY
Qty: 30 TABLET | Refills: 0 | Status: SHIPPED | OUTPATIENT
Start: 2019-01-02 | End: 2019-02-02

## 2019-01-07 ENCOUNTER — TELEPHONE (OUTPATIENT)
Dept: TRANSPLANT | Facility: CLINIC | Age: 64
End: 2019-01-07

## 2019-01-07 NOTE — LETTER
[facility]  Room 2-200  [facility],  [facility] [address] [place] [address]  [id number]  Tel [phone]  Toll Free [phone]                OUTPATIENT LABORATORY TEST ORDER    Patient Name: Gurjit Squires  Transplant Date: 7/29/2004 (Kidney)  YOB: 1955  Issue Date & Time:January 7, 20194:36 PM    [facility] MR:  [id number]  Exp. Date (1 year after date issued)      Diagnoses: Kidney Transplant (ICD-9  V42.0)   Long term use of medications (ICD-9  V58.69)     Lab results to be available on the same day drawn.   Patient should release information to the [facility], [facility].  Please fax to the Transplant Center at [phone].    Monthly   ?Hemogram and Platelet  ?Basic Metabolic Panel (Sodium, Potassium, Chloride, CO2, Creatinine, Urea Nitrogen, Glucose, Calcium)    Every 6 Months Due:             ?Urine for protein/creatinine             HIV, HBsAb, HBcAb, HCV  If you have any questions, please call [facility] at [phone] or [phone].    Please fax labs to [phone]  .

## 2019-01-07 NOTE — TELEPHONE ENCOUNTER
Incoming call from pt. He sates that he has an upcoming nephrology appt, and is asking for updated lab order. Order sent to lab and to pt. He has been educated about the importance of quarterly labs.

## 2019-01-12 DIAGNOSIS — E61.1 IRON DEFICIENCY: ICD-10-CM

## 2019-01-14 RX ORDER — FERROUS SULFATE 325(65) MG
TABLET ORAL
Qty: 90 TABLET | Refills: 3 | Status: SHIPPED | OUTPATIENT
Start: 2019-01-14 | End: 2019-02-15

## 2019-01-16 DIAGNOSIS — Z94.0 KIDNEY REPLACED BY TRANSPLANT: Primary | ICD-10-CM

## 2019-01-16 RX ORDER — SULFAMETHOXAZOLE AND TRIMETHOPRIM 400; 80 MG/1; MG/1
1 TABLET ORAL
Qty: 40 TABLET | Refills: 0 | Status: SHIPPED | OUTPATIENT
Start: 2019-01-16 | End: 2019-02-15

## 2019-02-02 DIAGNOSIS — Z94.0 KIDNEY TRANSPLANTED: Primary | ICD-10-CM

## 2019-02-04 ENCOUNTER — TELEPHONE (OUTPATIENT)
Dept: TRANSPLANT | Facility: CLINIC | Age: 64
End: 2019-02-04

## 2019-02-04 RX ORDER — PREDNISONE 5 MG/1
5 TABLET ORAL DAILY
Qty: 30 TABLET | Refills: 11 | Status: SHIPPED | OUTPATIENT
Start: 2019-02-04 | End: 2020-01-14

## 2019-02-04 NOTE — LETTER
2019  PHYSICIAN ORDERS      DATE & TIME ISSUED: 2019 12:58 PM  PATIENT NAME: Gurjit Squires   : 1955     [facility] MR# [if applicable]: [id number]     DIAGNOSIS:  post kidney transplant  ICD-10 CODE: Z94.0     Please collect the following the week of 19:  Urine protein/creatinine ratio  Microalbuminuria    Any questions please call: [phone]    Please fax these results to [phone].    .

## 2019-02-04 NOTE — TELEPHONE ENCOUNTER
Call returned from pt. He states that BP last checked was 2 days ago - 140/90.   Current BP regimen:  Losartan 100mg daily  Verapamil 180mg daily  Lasix 40mg BID    He states that weight fluctuates a lot, currently feels slightly puffy.     To review with nephrology and get back to him with plan.

## 2019-02-04 NOTE — TELEPHONE ENCOUNTER
ISSUE:  Increased proteinuria.     PLAN:  Call placed to pt. No answer. Left detailed vm asking that he return call.

## 2019-02-06 NOTE — TELEPHONE ENCOUNTER
Per review with Dr. Bunch - to repeat UPC and microalbuminuria prior to office visit on 2/15. Can discuss potential for biopsy at that point.    PLAN:  Call placed to pt. He voices understanding to repeat urine sample next week. Order sent.  Pt verbalizes understanding of plan

## 2019-02-15 ENCOUNTER — OFFICE VISIT (OUTPATIENT)
Dept: NEPHROLOGY | Facility: CLINIC | Age: 64
End: 2019-02-15
Payer: COMMERCIAL

## 2019-02-15 VITALS
SYSTOLIC BLOOD PRESSURE: 150 MMHG | HEIGHT: 69 IN | RESPIRATION RATE: 20 BRPM | DIASTOLIC BLOOD PRESSURE: 90 MMHG | HEART RATE: 80 BPM | BODY MASS INDEX: 39.84 KG/M2 | WEIGHT: 269 LBS

## 2019-02-15 DIAGNOSIS — I15.1 HTN, KIDNEY TRANSPLANT RELATED: Primary | ICD-10-CM

## 2019-02-15 DIAGNOSIS — Z94.0 HTN, KIDNEY TRANSPLANT RELATED: Primary | ICD-10-CM

## 2019-02-15 RX ORDER — CARVEDILOL 6.25 MG/1
6.25 TABLET ORAL 2 TIMES DAILY WITH MEALS
Qty: 180 TABLET | Refills: 3 | Status: SHIPPED | OUTPATIENT
Start: 2019-02-15 | End: 2020-02-03

## 2019-02-15 ASSESSMENT — MIFFLIN-ST. JEOR: SCORE: 2005.56

## 2019-02-15 NOTE — PROGRESS NOTES
CHRONIC TRANSPLANT NEPHROLOGY VISIT    Assessment & Plan   # LDKT: Stable serum creatinine, but marked increase in proteinuria.  Patient has previous kidney transplant biopsies suggestive or recurrent IgA nephropathy, but only noted on IF and no significant mesangial proliferation.  Last kidney transplant biopsy was 2010.  With nephrotic range proteinuria, would recommend a kidney transplant biopsy to determine etiology of proteinuria.  Differential diagnosis would include progressive IgA nephropathy, but kidney function is stable.  He could also have denovo GN, such as membranous nephropathy.  In addition, patient has had mildly elevated blood glucose and although less likely, he could have diabetic nephropathy.  No recent DSA and cannot rule out transplant glomerulopathy.   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  Evidence of recurrent IgA nephropathy.  Mild interstitial fibrosis and tubular atrophy.             9/6/07; Borderline acute cellular-mediated rejection.  Evidence of recurrent IgA nephropathy.  Nodular arteriolar hyalinosis, consistent with CNI toxicity.  Mild interstitial fibrosis and tubular atrophy.             11/3/05; Unremarkable with no evidence or acute rejection.    # Immunosuppression: Mycophenolate mofetil (goal  1-3.5) and Prednisone (dose  5 mg daily)   - Changes: No    # Hypertension: Borderline control; Goal BP: < 130/80   - Changes: Yes - will start carvedilol 6.25 mg bid.    # Elevated Blood Glucose: Generally running in 100-120s.  Will check HbA1c with next labs.   - Recommend weight loss and will continue on ARB.    # Mineral Bone Disorder:    - Vitamin D; level is: Normal and will continue on cholecalciferol.   - Calcium; level is:  Normal     # Electrolytes:   - Potassium; level: Normal  - Bicarbonate; level: Normal and will continue on sodium bicarbonate.    # Overweight: Stable weight, but elevated.   - Recommend increased exercise and watch caloric intake.    # Gout: No recent flares.  Patient is on allopurinol.    # Skin Cancer Risk:    - Discussed sun protection and recommend regular follow up with Dermatology.    # Medical Compliance: No, due to infrequent labs.   - Discussed importance of checking labs regularly as recommended, taking medications as prescribed and attending scheduled medical appointments    Return visit: Return in about 1 year (around 2/15/2020).    # Transplant History:  Etiology of kidney failure: IgA nephropathy  Tx: LDKT  Transplant: 7/29/2004 (Kidney)  Donor Type: Living Donor Class: Standard Criteria Donor  Significant changes in immunosuppression: Taken off CNI due to evidence of CNI toxicity and BK viremia.  Significant transplant-related complications: BK viremia    Transplant Office Phone Number: [phone]    Assessment and plan was discussed with the patient and he voiced his understanding and agreement.    Radu Bunch MD    Chief Complaint   Mr. Squires is a 63 year old here for routine follow up.  He is now retired, but doing some work part time to keep busy.  Since last clinic visit, patient reports no new medical complaints and has been doing well overall.  Patient did have a hospital admission 5/2018 for right leg cellulitis, but this resolved with antibiotics.  His energy level is good and remains normal.  He is active and does get some exercise.  Denies any chest pain or shortness of breath with exertion.  Appetite is good and weight has been stable.  No nausea, vomiting or diarrhea.  No fever, sweats or chills.  Slight leg swelling by the end of the day.    History of Present Illness    Mr. Squires reports feeling good overall     Recent Hospitalizations:  [] No [x] Yes Admitted for  cellulitis last May.   New Medical Issues: [x] No [] Yes    Decreased energy: [x] No [] Yes    Chest pain or SOB with exertion:  [x] No [] Yes    Appetite change or weight change: [x] No [] Yes    Nausea, vomiting or diarrhea:  [x] No [] Yes    Fever, sweats or chills: [x] No [] Yes    Leg swelling: [] No [x] Yes      Home BP: 130-150/80s    Review of Systems   A comprehensive review of systems was obtained and negative, except as noted in the HPI or PMH.    Problem List   Patient Active Problem List   Diagnosis     Testicular hypofunction     Kidney replaced by transplant     Erectile dysfunction     HTN, kidney transplant related     Immunosuppression (H)     Metabolic acidosis     Vitamin D deficiency     VANESA (obstructive sleep apnea)     Overweight     Aftercare following organ transplant       Social History   Social History     Tobacco Use     Smoking status: Never Smoker     Smokeless tobacco: Never Used   Substance Use Topics     Alcohol use: Yes     Comment: social     Drug use: No       Allergies   No Known Allergies    Medications   Current Outpatient Medications   Medication Sig     acetaminophen (TYLENOL) 325 MG tablet Take 2 tablets (650 mg) by mouth every 6 hours as needed for pain (Patient taking differently: Take 1,000 mg by mouth daily )     allopurinol (ZYLOPRIM) 100 MG tablet Take 3 tablets (300 mg) by mouth daily     carvedilol (COREG) 6.25 MG tablet Take 1 tablet (6.25 mg) by mouth 2 times daily (with meals)     cholecalciferol (VITAMIN D) 1000 UNIT tablet Take 1 tablet (1,000 Units) by mouth daily     clonazePAM (KLONOPIN) 0.5 MG tablet Take 1 tablet (0.5 mg) by mouth daily (Patient taking differently: Take 0.25 mg by mouth every evening )     ferrous sulfate (IRON) 325 (65 FE) MG tablet Take 1 tablet (325 mg) by mouth daily (with breakfast)     Fexofenadine HCl (ALLEGRA PO) Take 1 tablet by mouth daily.      furosemide (LASIX) 20 MG tablet TAKE 2 TABLETS BY MOUTH IN THE MORNING AND IN THE  "EVENING     gabapentin (NEURONTIN) 300 MG capsule Take 1 capsule (300 mg) by mouth At Bedtime     losartan (COZAAR) 100 MG tablet TAKE 1 TABLET BY MOUTH DAILY     Multiple Vitamins-Minerals (ONE-A-DAY MENS 50+ ADVANTAGE PO) Take 1 tablet by mouth every evening     mycophenolate (GENERIC EQUIVALENT) 250 MG capsule Take 3 capsules (750 mg) by mouth 2 times daily     omega-3 fatty acids (FISH OIL) 1200 MG capsule Take 1 capsule by mouth 3 times daily.     pantoprazole (PROTONIX) 40 MG EC tablet TAKE 1 TABLET BY MOUTH DAILY (Patient taking differently: 20 mg daily)     predniSONE (DELTASONE) 5 MG tablet Take 5 mg by mouth daily.     sodium bicarbonate 650 MG tablet Take 1 tablet (650 mg) by mouth daily     sulfamethoxazole-trimethoprim (BACTRIM) 400-80 MG per tablet Take 1 tablet by mouth three times a week     TraMADol HCl 200 MG TB24 Take 1 tablet by mouth daily     verapamil (CALAN-SR) 180 MG CR tablet TAKE 1 TABLET BY MOUTH DAILY     oxyCODONE (ROXICODONE) 5 MG immediate release tablet Take 1 tablet (5 mg) by mouth every 4 hours as needed for pain (maximum 6 tablets per day) (Patient not taking: Reported on 2/16/2018)     No current facility-administered medications for this visit.      Medications Discontinued During This Encounter   Medication Reason     sodium bicarbonate 650 MG tablet Medication Reconciliation Clean Up     sulfamethoxazole-trimethoprim (BACTRIM DS,SEPTRA DS) 800-160 MG per tablet Medication Reconciliation Clean Up     ferrous sulfate (FEROSUL) 325 (65 Fe) MG tablet Medication Reconciliation Clean Up     sulfamethoxazole-trimethoprim (BACTRIM/SEPTRA) 400-80 MG tablet Medication Reconciliation Clean Up     VERAPAMIL HCL PO Medication Reconciliation Clean Up       Physical Exam   Vital Signs: /90 (BP Location: Left arm, Patient Position: Sitting, Cuff Size: Adult Large)   Pulse 80   Resp 20   Ht 1.753 m (5' 9\")   Wt 122 kg (269 lb)   BMI 39.72 kg/m      GENERAL APPEARANCE: alert and no " distress  HENT: mouth without ulcers or lesions  LYMPHATICS: no cervical or supraclavicular nodes  RESP: lungs clear to auscultation - no rales, rhonchi or wheezes  CV: regular rhythm, normal rate, no rub, no murmur  EDEMA: no LE edema bilaterally  ABDOMEN: soft, nondistended, nontender, bowel sounds normal  MS: extremities normal - no gross deformities noted, no evidence of inflammation in joints, no muscle tenderness  SKIN: no rash  TX KIDNEY: normal  DIALYSIS ACCESS:  None      Data     Renal Latest Ref Rng & Units 2/1/2019 6/14/2018 6/7/2018   Na 133 - 144 mmol/L - - -   Na (external) 136 - 145 meq/L 138 146(H) 144   K 3.4 - 5.3 mmol/L - - -   K (external) 3.5 - 5.1 meq/L 4.4 4.7 4.3   Cl 94 - 109 mmol/L - - -   Cl (external) 98 - 109 meq/L 102 104 103   CO2 20 - 32 mmol/L - - -   CO2 (external) 20 - 29 meq/L 25 27 24   BUN 7 - 30 mg/dL - - -   BUN (external) 6 - 22 mg/dL 31(H) 38(H) 38(H)   Cr 0.66 - 1.25 mg/dL - - -   Cr (external) 0.70 - 1.30 mg/dL 1.88(H) 1.82(H) 1.83(H)   Glucose 60 - 99 mg/dL - - -   Glucose (external) 70 - 99 mg/dL 107(H) 111(H) 112(H)   Ca  8.5 - 10.4 mg/dL - - -   Ca (external) 8.5 - 10.5 mg/dL 9.4 9.2 9.2   Mg 1.6 - 2.3 mg/dL - - -     Bone Health Latest Ref Rng & Units 5/29/2018 1/28/2010 9/6/2007   Phos 2.5 - 4.5 mg/dL - 3.5 3.0   Vit D Def (external) See scan ng/ml 46 - -     Heme Latest Ref Rng & Units 2/1/2019 6/14/2018 6/7/2018   WBC 4.0 - 11.0 10e9/L - - -   WBC (external) 4.0 - 11.0 K/uL 9.6 8.9 7.9   Hgb 13.3 - 17.7 g/dL - - -   Hgb (external) 13.5 - 17.5 g/dL 14.8 15.0 14.6   Plt 150 - 450 10e9/L - - -   Plt (external) 140 - 400 K/uL 151 144 151     Liver Latest Ref Rng & Units 5/14/2018 6/15/2016 1/28/2010   AP 40 - 150 U/L - - 116   AP (external) 38 - 126 U/L 118 105 -   TBili 0.2 - 1.3 mg/dL - - 0.6   TBili (external) 0.2 - 1.3 mg/dL 0.4 0.8 -   ALT 0 - 70 U/L - - 37   ALT (external) 21 - 72 U/L 55 35 -   AST 0 - 55 U/L - - 27   AST (external) 17 - 59 U/L 43 26 -   Tot  Protein 6.8 - 8.8 g/dL - - 7.8   Tot Protein (external) 6.3 - 8.2 g/dL 7.0 7.0 -   Albumin 3.9 - 5.1 g/dL - - 4.7   Albumin (external) 3.5 - 5.0 g/dL 4.1 4.1 -        Iron studies Latest Ref Rng & Units 12/14/2015   Ferritin 26 - 388 ng/mL 220     [facility] Txp Virology Latest Ref Rng & Units 5/21/2015 8/8/2011 3/30/2011   CVM DNA Quant - - - -   CMV DNA Quant Ext Not Detected NOT DETECTED - -   CMV Quant <100 Copies/mL - - -   CMV QT Log <2.0 Log copies/mL - - -   BK Spec - - Plasma, EDTA anticoagulant Plasma, EDTA anticoagulant   BK Res <1000 copies/mL - <1000 <1000   BK Log <3.0 Log copies/mL - <3.0  The lower limit of detection for this assay is 1000 copies/mL.  Real-time TaqMan   PCR was performed using BK primers and probe for the detection of a 90 bp   portion of the  1 gene.  The performance characteristics were validated by   the [facility] acute [facility].   It has not been cleared or approved by the U.S. Food and Drug Administration. <3.0  The lower limit of detection for this assay is 1000 copies/mL.  Real-time TaqMan   PCR was performed using BK primers and probe for the detection of a 90 bp   portion of the  1 gene.  The performance characteristics were validated by   the General acute hospital.   It has not been cleared or approved by the U.S. Food and Drug Administration.            Recent Labs   Lab Test 08/08/11  0749 09/20/13  0800   [facility] [id number]  2000 Not Provided   MPACID  --  Unsatisfactory specimen - too old for testing   MPAG 56.6 Unsatisfactory specimen - too old for testing

## 2019-02-15 NOTE — LETTER
2/15/2019       RE: Gurjit Squires  [address]  [address]  [place] [address]     Dear Colleague,    Thank you for referring your patient, Gurjit Squires, to the [facility] at [facility]. Please see a copy of my visit note below.    CHRONIC TRANSPLANT NEPHROLOGY VISIT    Assessment & Plan   # LDKT: Stable serum creatinine, but marked increase in proteinuria.  Patient has previous kidney transplant biopsies suggestive or recurrent IgA nephropathy, but only noted on IF and no significant mesangial proliferation.  Last kidney transplant biopsy was 2010.  With nephrotic range proteinuria, would recommend a kidney transplant biopsy to determine etiology of proteinuria.  Differential diagnosis would include progressive IgA nephropathy, but kidney function is stable.  He could also have denovo GN, such as membranous nephropathy.  In addition, patient has had mildly elevated blood glucose and although less likely, he could have diabetic nephropathy.  No recent DSA and cannot rule out transplant glomerulopathy.   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  Evidence of recurrent IgA nephropathy.  Mild interstitial fibrosis and tubular atrophy.             9/6/07; Borderline acute cellular-mediated rejection.  Evidence of recurrent IgA nephropathy.  Nodular arteriolar hyalinosis, consistent with CNI toxicity.  Mild interstitial fibrosis and tubular atrophy.             11/3/05; Unremarkable with no evidence or acute rejection.    # Immunosuppression: Mycophenolate mofetil (goal  1-3.5) and Prednisone (dose  5 mg daily)   - Changes: No    # Hypertension: Borderline control; Goal BP: < 130/80   -  Changes: Yes - will start carvedilol 6.25 mg bid.    # Elevated Blood Glucose: Generally running in 100-120s.  Will check HbA1c with next labs.   - Recommend weight loss and will continue on ARB.    # Mineral Bone Disorder:    - Vitamin D; level is: Normal and will continue on cholecalciferol.   - Calcium; level is: Normal     # Electrolytes:   - Potassium; level: Normal  - Bicarbonate; level: Normal and will continue on sodium bicarbonate.    # Overweight: Stable weight, but elevated.   - Recommend increased exercise and watch caloric intake.    # Gout: No recent flares.  Patient is on allopurinol.    # Skin Cancer Risk:    - Discussed sun protection and recommend regular follow up with Dermatology.    # Medical Compliance: No, due to infrequent labs.   - Discussed importance of checking labs regularly as recommended, taking medications as prescribed and attending scheduled medical appointments    Return visit: Return in about 1 year (around 2/15/2020).    # Transplant History:  Etiology of kidney failure: IgA nephropathy  Tx: LDKT  Transplant: 7/29/2004 (Kidney)  Donor Type: Living Donor Class: Standard Criteria Donor  Significant changes in immunosuppression: Taken off CNI due to evidence of CNI toxicity and BK viremia.  Significant transplant-related complications: BK viremia    Transplant Office Phone Number: [phone]    Assessment and plan was discussed with the patient and he voiced his understanding and agreement.    Radu Bunch MD    Chief Complaint   Mr. Squires is a 63 year old here for routine follow up.  He is now retired, but doing some work part time to keep busy.  Since last clinic visit, patient reports no new medical complaints and has been doing well overall.  Patient did have a hospital admission 5/2018 for right leg cellulitis, but this resolved with antibiotics.  His energy level is good and remains normal.  He is active and does get some exercise.  Denies any chest pain or  shortness of breath with exertion.  Appetite is good and weight has been stable.  No nausea, vomiting or diarrhea.  No fever, sweats or chills.  Slight leg swelling by the end of the day.    History of Present Illness    Mr. Squires reports feeling good overall     Recent Hospitalizations:  [] No [x] Yes Admitted for cellulitis last May.   New Medical Issues: [x] No [] Yes    Decreased energy: [x] No [] Yes    Chest pain or SOB with exertion:  [x] No [] Yes    Appetite change or weight change: [x] No [] Yes    Nausea, vomiting or diarrhea:  [x] No [] Yes    Fever, sweats or chills: [x] No [] Yes    Leg swelling: [] No [x] Yes      Home BP: 130-150/80s    Review of Systems   A comprehensive review of systems was obtained and negative, except as noted in the HPI or PMH.    Problem List   Patient Active Problem List   Diagnosis     Testicular hypofunction     Kidney replaced by transplant     Erectile dysfunction     HTN, kidney transplant related     Immunosuppression (H)     Metabolic acidosis     Vitamin D deficiency     VANESA (obstructive sleep apnea)     Overweight     Aftercare following organ transplant       Social History   Social History     Tobacco Use     Smoking status: Never Smoker     Smokeless tobacco: Never Used   Substance Use Topics     Alcohol use: Yes     Comment: social     Drug use: No       Allergies   No Known Allergies    Medications   Current Outpatient Medications   Medication Sig     acetaminophen (TYLENOL) 325 MG tablet Take 2 tablets (650 mg) by mouth every 6 hours as needed for pain (Patient taking differently: Take 1,000 mg by mouth daily )     allopurinol (ZYLOPRIM) 100 MG tablet Take 3 tablets (300 mg) by mouth daily     carvedilol (COREG) 6.25 MG tablet Take 1 tablet (6.25 mg) by mouth 2 times daily (with meals)     cholecalciferol (VITAMIN D) 1000 UNIT tablet Take 1 tablet (1,000 Units) by mouth daily     clonazePAM (KLONOPIN) 0.5 MG tablet Take 1 tablet (0.5 mg) by mouth daily  (Patient taking differently: Take 0.25 mg by mouth every evening )     ferrous sulfate (IRON) 325 (65 FE) MG tablet Take 1 tablet (325 mg) by mouth daily (with breakfast)     Fexofenadine HCl (ALLEGRA PO) Take 1 tablet by mouth daily.      furosemide (LASIX) 20 MG tablet TAKE 2 TABLETS BY MOUTH IN THE MORNING AND IN THE EVENING     gabapentin (NEURONTIN) 300 MG capsule Take 1 capsule (300 mg) by mouth At Bedtime     losartan (COZAAR) 100 MG tablet TAKE 1 TABLET BY MOUTH DAILY     Multiple Vitamins-Minerals (ONE-A-DAY MENS 50+ ADVANTAGE PO) Take 1 tablet by mouth every evening     mycophenolate (GENERIC EQUIVALENT) 250 MG capsule Take 3 capsules (750 mg) by mouth 2 times daily     omega-3 fatty acids (FISH OIL) 1200 MG capsule Take 1 capsule by mouth 3 times daily.     pantoprazole (PROTONIX) 40 MG EC tablet TAKE 1 TABLET BY MOUTH DAILY (Patient taking differently: 20 mg daily)     predniSONE (DELTASONE) 5 MG tablet Take 5 mg by mouth daily.     sodium bicarbonate 650 MG tablet Take 1 tablet (650 mg) by mouth daily     sulfamethoxazole-trimethoprim (BACTRIM) 400-80 MG per tablet Take 1 tablet by mouth three times a week     TraMADol HCl 200 MG TB24 Take 1 tablet by mouth daily     verapamil (CALAN-SR) 180 MG CR tablet TAKE 1 TABLET BY MOUTH DAILY     oxyCODONE (ROXICODONE) 5 MG immediate release tablet Take 1 tablet (5 mg) by mouth every 4 hours as needed for pain (maximum 6 tablets per day) (Patient not taking: Reported on 2/16/2018)     No current facility-administered medications for this visit.      Medications Discontinued During This Encounter   Medication Reason     sodium bicarbonate 650 MG tablet Medication Reconciliation Clean Up     sulfamethoxazole-trimethoprim (BACTRIM DS,SEPTRA DS) 800-160 MG per tablet Medication Reconciliation Clean Up     ferrous sulfate (FEROSUL) 325 (65 Fe) MG tablet Medication Reconciliation Clean Up     sulfamethoxazole-trimethoprim (BACTRIM/SEPTRA) 400-80 MG tablet Medication  "Reconciliation Clean Up     VERAPAMIL HCL PO Medication Reconciliation Clean Up       Physical Exam   Vital Signs: /90 (BP Location: Left arm, Patient Position: Sitting, Cuff Size: Adult Large)   Pulse 80   Resp 20   Ht 1.753 m (5' 9\")   Wt 122 kg (269 lb)   BMI 39.72 kg/m       GENERAL APPEARANCE: alert and no distress  HENT: mouth without ulcers or lesions  LYMPHATICS: no cervical or supraclavicular nodes  RESP: lungs clear to auscultation - no rales, rhonchi or wheezes  CV: regular rhythm, normal rate, no rub, no murmur  EDEMA: no LE edema bilaterally  ABDOMEN: soft, nondistended, nontender, bowel sounds normal  MS: extremities normal - no gross deformities noted, no evidence of inflammation in joints, no muscle tenderness  SKIN: no rash  TX KIDNEY: normal  DIALYSIS ACCESS:  None      Data     Renal Latest Ref Rng & Units 2/1/2019 6/14/2018 6/7/2018   Na 133 - 144 mmol/L - - -   Na (external) 136 - 145 meq/L 138 146(H) 144   K 3.4 - 5.3 mmol/L - - -   K (external) 3.5 - 5.1 meq/L 4.4 4.7 4.3   Cl 94 - 109 mmol/L - - -   Cl (external) 98 - 109 meq/L 102 104 103   CO2 20 - 32 mmol/L - - -   CO2 (external) 20 - 29 meq/L 25 27 24   BUN 7 - 30 mg/dL - - -   BUN (external) 6 - 22 mg/dL 31(H) 38(H) 38(H)   Cr 0.66 - 1.25 mg/dL - - -   Cr (external) 0.70 - 1.30 mg/dL 1.88(H) 1.82(H) 1.83(H)   Glucose 60 - 99 mg/dL - - -   Glucose (external) 70 - 99 mg/dL 107(H) 111(H) 112(H)   Ca  8.5 - 10.4 mg/dL - - -   Ca (external) 8.5 - 10.5 mg/dL 9.4 9.2 9.2   Mg 1.6 - 2.3 mg/dL - - -     Bone Health Latest Ref Rng & Units 5/29/2018 1/28/2010 9/6/2007   Phos 2.5 - 4.5 mg/dL - 3.5 3.0   Vit D Def (external) See scan ng/ml 46 - -     Heme Latest Ref Rng & Units 2/1/2019 6/14/2018 6/7/2018   WBC 4.0 - 11.0 10e9/L - - -   WBC (external) 4.0 - 11.0 K/uL 9.6 8.9 7.9   Hgb 13.3 - 17.7 g/dL - - -   Hgb (external) 13.5 - 17.5 g/dL 14.8 15.0 14.6   Plt 150 - 450 10e9/L - - -   Plt (external) 140 - 400 K/uL 151 144 151     Liver " Latest Ref Rng & Units 5/14/2018 6/15/2016 1/28/2010   AP 40 - 150 U/L - - 116   AP (external) 38 - 126 U/L 118 105 -   TBili 0.2 - 1.3 mg/dL - - 0.6   TBili (external) 0.2 - 1.3 mg/dL 0.4 0.8 -   ALT 0 - 70 U/L - - 37   ALT (external) 21 - 72 U/L 55 35 -   AST 0 - 55 U/L - - 27   AST (external) 17 - 59 U/L 43 26 -   Tot Protein 6.8 - 8.8 g/dL - - 7.8   Tot Protein (external) 6.3 - 8.2 g/dL 7.0 7.0 -   Albumin 3.9 - 5.1 g/dL - - 4.7   Albumin (external) 3.5 - 5.0 g/dL 4.1 4.1 -        Iron studies Latest Ref Rng & Units 12/14/2015   Ferritin 26 - 388 ng/mL 220     [facility] Txp Virology Latest Ref Rng & Units 5/21/2015 8/8/2011 3/30/2011   CVM DNA Quant - - - -   CMV DNA Quant Ext Not Detected NOT DETECTED - -   CMV Quant <100 Copies/mL - - -   CMV QT Log <2.0 Log copies/mL - - -   BK Spec - - Plasma, EDTA anticoagulant Plasma, EDTA anticoagulant   BK Res <1000 copies/mL - <1000 <1000   BK Log <3.0 Log copies/mL - <3.0  The lower limit of detection for this assay is 1000 copies/mL.  Real-time TaqMan   PCR was performed using BK primers and probe for the detection of a 90 bp   portion of the  1 gene.  The performance characteristics were validated by   the [facility].   It has not been cleared or approved by the U.S. Food and Drug Administration. <3.0  The lower limit of detection for this assay is 1000 copies/mL.  Real-time TaqMan   PCR was performed using BK primers and probe for the detection of a 90 bp   portion of the  1 gene.  The performance characteristics were validated by   the [facility].   It has not been cleared or approved by the U.S. Food and Drug Administration.            Recent Labs   Lab Test 08/08/11  0749 09/20/13  0800   [facility] [id number]  2000 Not Provided   MPACID  --  Unsatisfactory specimen - too old for testing   MPAG 56.6 Unsatisfactory specimen - too old for testing       Again, thank you for allowing me to participate  in the care of your patient.      Sincerely,    Radu Bunch MD

## 2019-02-15 NOTE — LETTER
2/15/2019    RE: Gurjit Squires  [address]  [address]  [place] [address]     CHRONIC TRANSPLANT NEPHROLOGY VISIT    Assessment & Plan   # LDKT: Stable serum creatinine, but marked increase in proteinuria.  Patient has previous kidney transplant biopsies suggestive or recurrent IgA nephropathy, but only noted on IF and no significant mesangial proliferation.  Last kidney transplant biopsy was 2010.  With nephrotic range proteinuria, would recommend a kidney transplant biopsy to determine etiology of proteinuria.  Differential diagnosis would include progressive IgA nephropathy, but kidney function is stable.  He could also have denovo GN, such as membranous nephropathy.  In addition, patient has had mildly elevated blood glucose and although less likely, he could have diabetic nephropathy.  No recent DSA and cannot rule out transplant glomerulopathy.   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  Evidence of recurrent IgA nephropathy.  Mild interstitial fibrosis and tubular atrophy.             9/6/07; Borderline acute cellular-mediated rejection.  Evidence of recurrent IgA nephropathy.  Nodular arteriolar hyalinosis, consistent with CNI toxicity.  Mild interstitial fibrosis and tubular atrophy.             11/3/05; Unremarkable with no evidence or acute rejection.    # Immunosuppression: Mycophenolate mofetil (goal  1-3.5) and Prednisone (dose  5 mg daily)   - Changes: No    # Hypertension: Borderline control; Goal BP: < 130/80   - Changes: Yes - will start carvedilol 6.25 mg bid.    # Elevated Blood Glucose: Generally running in 100-120s.  Will check HbA1c with next labs.   - Recommend weight loss and will continue on ARB.    # Mineral Bone Disorder:     - Vitamin D; level is: Normal and will continue on cholecalciferol.   - Calcium; level is: Normal     # Electrolytes:   - Potassium; level: Normal  - Bicarbonate; level: Normal and will continue on sodium bicarbonate.    # Overweight: Stable weight, but elevated.   - Recommend increased exercise and watch caloric intake.    # Gout: No recent flares.  Patient is on allopurinol.    # Skin Cancer Risk:    - Discussed sun protection and recommend regular follow up with Dermatology.    # Medical Compliance: No, due to infrequent labs.   - Discussed importance of checking labs regularly as recommended, taking medications as prescribed and attending scheduled medical appointments    Return visit: Return in about 1 year (around 2/15/2020).    # Transplant History:  Etiology of kidney failure: IgA nephropathy  Tx: LDKT  Transplant: 7/29/2004 (Kidney)  Donor Type: Living Donor Class: Standard Criteria Donor  Significant changes in immunosuppression: Taken off CNI due to evidence of CNI toxicity and BK viremia.  Significant transplant-related complications: BK viremia    Transplant Office Phone Number: [phone]    Assessment and plan was discussed with the patient and he voiced his understanding and agreement.    Radu Bunch MD    Chief Complaint   Mr. Squires is a 63 year old here for routine follow up.  He is now retired, but doing some work part time to keep busy.  Since last clinic visit, patient reports no new medical complaints and has been doing well overall.  Patient did have a hospital admission 5/2018 for right leg cellulitis, but this resolved with antibiotics.  His energy level is good and remains normal.  He is active and does get some exercise.  Denies any chest pain or shortness of breath with exertion.  Appetite is good and weight has been stable.  No nausea, vomiting or diarrhea.  No fever, sweats or chills.  Slight leg swelling by the end of the day.    History of Present Illness      Tavia reports feeling good overall     Recent Hospitalizations:  [] No [x] Yes Admitted for cellulitis last May.   New Medical Issues: [x] No [] Yes    Decreased energy: [x] No [] Yes    Chest pain or SOB with exertion:  [x] No [] Yes    Appetite change or weight change: [x] No [] Yes    Nausea, vomiting or diarrhea:  [x] No [] Yes    Fever, sweats or chills: [x] No [] Yes    Leg swelling: [] No [x] Yes      Home BP: 130-150/80s    Review of Systems   A comprehensive review of systems was obtained and negative, except as noted in the HPI or PMH.    Problem List   Patient Active Problem List   Diagnosis     Testicular hypofunction     Kidney replaced by transplant     Erectile dysfunction     HTN, kidney transplant related     Immunosuppression (H)     Metabolic acidosis     Vitamin D deficiency     VANESA (obstructive sleep apnea)     Overweight     Aftercare following organ transplant       Social History   Social History     Tobacco Use     Smoking status: Never Smoker     Smokeless tobacco: Never Used   Substance Use Topics     Alcohol use: Yes     Comment: social     Drug use: No       Allergies   No Known Allergies    Medications   Current Outpatient Medications   Medication Sig     acetaminophen (TYLENOL) 325 MG tablet Take 2 tablets (650 mg) by mouth every 6 hours as needed for pain (Patient taking differently: Take 1,000 mg by mouth daily )     allopurinol (ZYLOPRIM) 100 MG tablet Take 3 tablets (300 mg) by mouth daily     carvedilol (COREG) 6.25 MG tablet Take 1 tablet (6.25 mg) by mouth 2 times daily (with meals)     cholecalciferol (VITAMIN D) 1000 UNIT tablet Take 1 tablet (1,000 Units) by mouth daily     clonazePAM (KLONOPIN) 0.5 MG tablet Take 1 tablet (0.5 mg) by mouth daily (Patient taking differently: Take 0.25 mg by mouth every evening )     ferrous sulfate (IRON) 325 (65 FE) MG tablet Take 1 tablet (325 mg) by mouth daily (with breakfast)     Fexofenadine HCl (ALLEGRA PO) Take 1 tablet by mouth  daily.      furosemide (LASIX) 20 MG tablet TAKE 2 TABLETS BY MOUTH IN THE MORNING AND IN THE EVENING     gabapentin (NEURONTIN) 300 MG capsule Take 1 capsule (300 mg) by mouth At Bedtime     losartan (COZAAR) 100 MG tablet TAKE 1 TABLET BY MOUTH DAILY     Multiple Vitamins-Minerals (ONE-A-DAY MENS 50+ ADVANTAGE PO) Take 1 tablet by mouth every evening     mycophenolate (GENERIC EQUIVALENT) 250 MG capsule Take 3 capsules (750 mg) by mouth 2 times daily     omega-3 fatty acids (FISH OIL) 1200 MG capsule Take 1 capsule by mouth 3 times daily.     pantoprazole (PROTONIX) 40 MG EC tablet TAKE 1 TABLET BY MOUTH DAILY (Patient taking differently: 20 mg daily)     predniSONE (DELTASONE) 5 MG tablet Take 5 mg by mouth daily.     sodium bicarbonate 650 MG tablet Take 1 tablet (650 mg) by mouth daily     sulfamethoxazole-trimethoprim (BACTRIM) 400-80 MG per tablet Take 1 tablet by mouth three times a week     TraMADol HCl 200 MG TB24 Take 1 tablet by mouth daily     verapamil (CALAN-SR) 180 MG CR tablet TAKE 1 TABLET BY MOUTH DAILY     oxyCODONE (ROXICODONE) 5 MG immediate release tablet Take 1 tablet (5 mg) by mouth every 4 hours as needed for pain (maximum 6 tablets per day) (Patient not taking: Reported on 2/16/2018)     No current facility-administered medications for this visit.      Medications Discontinued During This Encounter   Medication Reason     sodium bicarbonate 650 MG tablet Medication Reconciliation Clean Up     sulfamethoxazole-trimethoprim (BACTRIM DS,SEPTRA DS) 800-160 MG per tablet Medication Reconciliation Clean Up     ferrous sulfate (FEROSUL) 325 (65 Fe) MG tablet Medication Reconciliation Clean Up     sulfamethoxazole-trimethoprim (BACTRIM/SEPTRA) 400-80 MG tablet Medication Reconciliation Clean Up     VERAPAMIL HCL PO Medication Reconciliation Clean Up       Physical Exam   Vital Signs: /90 (BP Location: Left arm, Patient Position: Sitting, Cuff Size: Adult Large)   Pulse 80   Resp 20   Ht  "1.753 m (5' 9\")   Wt 122 kg (269 lb)   BMI 39.72 kg/m       GENERAL APPEARANCE: alert and no distress  HENT: mouth without ulcers or lesions  LYMPHATICS: no cervical or supraclavicular nodes  RESP: lungs clear to auscultation - no rales, rhonchi or wheezes  CV: regular rhythm, normal rate, no rub, no murmur  EDEMA: no LE edema bilaterally  ABDOMEN: soft, nondistended, nontender, bowel sounds normal  MS: extremities normal - no gross deformities noted, no evidence of inflammation in joints, no muscle tenderness  SKIN: no rash  TX KIDNEY: normal  DIALYSIS ACCESS:  None      Data     Renal Latest Ref Rng & Units 2/1/2019 6/14/2018 6/7/2018   Na 133 - 144 mmol/L - - -   Na (external) 136 - 145 meq/L 138 146(H) 144   K 3.4 - 5.3 mmol/L - - -   K (external) 3.5 - 5.1 meq/L 4.4 4.7 4.3   Cl 94 - 109 mmol/L - - -   Cl (external) 98 - 109 meq/L 102 104 103   CO2 20 - 32 mmol/L - - -   CO2 (external) 20 - 29 meq/L 25 27 24   BUN 7 - 30 mg/dL - - -   BUN (external) 6 - 22 mg/dL 31(H) 38(H) 38(H)   Cr 0.66 - 1.25 mg/dL - - -   Cr (external) 0.70 - 1.30 mg/dL 1.88(H) 1.82(H) 1.83(H)   Glucose 60 - 99 mg/dL - - -   Glucose (external) 70 - 99 mg/dL 107(H) 111(H) 112(H)   Ca  8.5 - 10.4 mg/dL - - -   Ca (external) 8.5 - 10.5 mg/dL 9.4 9.2 9.2   Mg 1.6 - 2.3 mg/dL - - -     Bone Health Latest Ref Rng & Units 5/29/2018 1/28/2010 9/6/2007   Phos 2.5 - 4.5 mg/dL - 3.5 3.0   Vit D Def (external) See scan ng/ml 46 - -     Heme Latest Ref Rng & Units 2/1/20191/2019 6/14/2018 6/7/2018   WBC 4.0 - 11.0 10e9/L - - -   WBC (external) 4.0 - 11.0 K/uL 9.6 8.9 7.9   Hgb 13.3 - 17.7 g/dL - - -   Hgb (external) 13.5 - 17.5 g/dL 14.8 15.0 14.6   Plt 150 - 450 10e9/L - - -   Plt (external) 140 - 400 K/uL 151 144 151     Liver Latest Ref Rng & Units 5/14/2018 6/15/2016 1/28/2010   AP 40 - 150 U/L - - 116   AP (external) 38 - 126 U/L 118 105 -   TBili 0.2 - 1.3 mg/dL - - 0.6   TBili (external) 0.2 - 1.3 mg/dL 0.4 0.8 -   ALT 0 - 70 U/L - - 37   ALT " (external) 21 - 72 U/L 55 35 -   AST 0 - 55 U/L - - 27   AST (external) 17 - 59 U/L 43 26 -   Tot Protein 6.8 - 8.8 g/dL - - 7.8   Tot Protein (external) 6.3 - 8.2 g/dL 7.0 7.0 -   Albumin 3.9 - 5.1 g/dL - - 4.7   Albumin (external) 3.5 - 5.0 g/dL 4.1 4.1 -        Iron studies Latest Ref Rng & Units 12/14/2015   Ferritin 26 - 388 ng/mL 220     [facility] Txp Virology Latest Ref Rng & Units 5/21/2015 8/8/2011 3/30/2011   CVM DNA Quant - - - -   CMV DNA Quant Ext Not Detected NOT DETECTED - -   CMV Quant <100 Copies/mL - - -   CMV QT Log <2.0 Log copies/mL - - -   BK Spec - - Plasma, EDTA anticoagulant Plasma, EDTA anticoagulant   BK Res <1000 copies/mL - <1000 <1000   BK Log <3.0 Log copies/mL - <3.0  The lower limit of detection for this assay is 1000 copies/mL.  Real-time TaqMan   PCR was performed using BK primers and probe for the detection of a 90 bp   portion of the  1 gene.  The performance characteristics were validated by   the [facility].   It has not been cleared or approved by the U.S. Food and Drug Administration. <3.0  The lower limit of detection for this assay is 1000 copies/mL.  Real-time TaqMan   PCR was performed using BK primers and probe for the detection of a 90 bp   portion of the  1 gene.  The performance characteristics were validated by   the [facility].   It has not been cleared or approved by the U.S. Food and Drug Administration.            Recent Labs   Lab Test 08/08/11  0749 09/20/13  0800   DOSA [id number]  2000 Not Provided   MPACID  --  Unsatisfactory specimen - too old for testing   MPAG 56.6 Unsatisfactory specimen - too old for testing       Radu Bunch MD

## 2019-02-20 ENCOUNTER — TELEPHONE (OUTPATIENT)
Dept: TRANSPLANT | Facility: CLINIC | Age: 64
End: 2019-02-20

## 2019-02-20 NOTE — TELEPHONE ENCOUNTER
Leatha Watkins LPN [place], Angela, HERRERA             Good Morning!  Patient was seen in [place] today by Dr. Bunch.  Dr. Bunch recommends a kidney biopsy due to new proteinuria.  Patient is aware.  Thanks!      Call placed to pt. He is going to [place] for 10 days and wishes to schedule this once he is back. To follow-up with Carlos at that time.

## 2019-02-21 DIAGNOSIS — I12.9 RENAL HYPERTENSION, STAGE 1-4 OR UNSPECIFIED CHRONIC KIDNEY DISEASE: ICD-10-CM

## 2019-02-21 RX ORDER — VERAPAMIL HYDROCHLORIDE 180 MG/1
TABLET, EXTENDED RELEASE ORAL
Qty: 90 TABLET | Refills: 3 | Status: SHIPPED | OUTPATIENT
Start: 2019-02-21 | End: 2020-02-12

## 2019-02-21 NOTE — TELEPHONE ENCOUNTER
Last Office Visit with Nephrologist:  2/15/19.  Medication refilled per Nephrology Clinic protocol.     Jerilyn Flores RN

## 2019-03-05 ENCOUNTER — TELEPHONE (OUTPATIENT)
Dept: TRANSPLANT | Facility: CLINIC | Age: 64
End: 2019-03-05

## 2019-03-05 DIAGNOSIS — Z94.0 KIDNEY REPLACED BY TRANSPLANT: Primary | ICD-10-CM

## 2019-03-05 NOTE — TELEPHONE ENCOUNTER
Call placed to pt in an effort to schedule biopsy.     Transplant Coordinator Renal Biopsy Communication    Call placed to Gurjit Squires to discuss indication for kidney transplant biopsy per Dr. Bunch.     Indication for transplant renal biopsy: proteinuria   Laterality: right  Date of biopsy: 3/12/19    Patient location within 70 miles of [facility]: No.  If no, must stay overnight locally. Pt verbalizes staying YES.     Gurjit Weeks Tavia's medication list was reviewed.   Anticoagulant: none   Ibuprofen: No.  Fish Oil:  Yes.  Medications held: fish oil    Recent blood pressure readings are WNL or not applicable. Instructed to take medication, especially blood pressure medications, before [facility] at 0600 day of procedure.     Procedure expectations and duration of stay discussed. Expressed pt can expect a phone call from LPN/MA to confirm biopsy date/time/location/directions/review of medications. Pt has no additional questions at this time. Transplant Office phone number given to pt for future questions.      Deidre Maria  Kidney/Pancreas Transplant Coordinator  [phone] option 5

## 2019-03-08 DIAGNOSIS — Z94.0 KIDNEY REPLACED BY TRANSPLANT: Primary | ICD-10-CM

## 2019-03-08 NOTE — TELEPHONE ENCOUNTER
LPN/MA  Renal Biopsy Communication    Call to pt to confirm renal biopsy procedure. Biopsy orders entered and patient aware of date 3-, [place] [facility] [place] to arrive at 6:00AM.     No need to be NPO.      Discussed anticoagulants (i.e. fish oil, ASA, Plavix, Coumadin, Ibuprofen). Pt denies use of anticoagulants. Patient is holding fish oil    Take all medicine before arrival for biopsy and bring all medicine bottles with.      Report to 1st floor lab, then 5th floor for biopsy.      Use Elementum and bring form of entertainment.     Discussed with patient need to stay overnight locally evening of procedure if live more than 70 miles away.    Call placed to scheduling at [phone] to schedule and confirm biopsy date/time    Lab appointment scheduled for morning of biopsy.      Patient v\u of instructions listed above. Orders placed.

## 2019-03-12 ENCOUNTER — HOSPITAL ENCOUNTER (OUTPATIENT)
Facility: AMBULATORY SURGERY CENTER | Age: 64
End: 2019-03-12
Attending: INTERNAL MEDICINE
Payer: COMMERCIAL

## 2019-03-12 ENCOUNTER — RESULTS ONLY (OUTPATIENT)
Dept: OTHER | Facility: CLINIC | Age: 64
End: 2019-03-12

## 2019-03-12 ENCOUNTER — ANCILLARY PROCEDURE (OUTPATIENT)
Dept: RADIOLOGY | Facility: AMBULATORY SURGERY CENTER | Age: 64
End: 2019-03-12
Attending: INTERNAL MEDICINE
Payer: COMMERCIAL

## 2019-03-12 VITALS
DIASTOLIC BLOOD PRESSURE: 83 MMHG | OXYGEN SATURATION: 100 % | RESPIRATION RATE: 16 BRPM | TEMPERATURE: 98.5 F | SYSTOLIC BLOOD PRESSURE: 162 MMHG

## 2019-03-12 DIAGNOSIS — Z94.0 KIDNEY REPLACED BY TRANSPLANT: ICD-10-CM

## 2019-03-12 LAB
ABO + RH BLD: NORMAL
ABO + RH BLD: NORMAL
ALBUMIN UR-MCNC: 100 MG/DL
ANION GAP SERPL CALCULATED.3IONS-SCNC: 8 MMOL/L (ref 3–14)
APPEARANCE UR: CLEAR
BASOPHILS # BLD AUTO: 0.1 10E9/L (ref 0–0.2)
BASOPHILS NFR BLD AUTO: 0.7 %
BILIRUB UR QL STRIP: NEGATIVE
BLD GP AB SCN SERPL QL: NORMAL
BLOOD BANK CMNT PATIENT-IMP: NORMAL
BUN SERPL-MCNC: 48 MG/DL (ref 7–30)
CALCIUM SERPL-MCNC: 8 MG/DL (ref 8.5–10.1)
CHLORIDE SERPL-SCNC: 108 MMOL/L (ref 94–109)
CO2 SERPL-SCNC: 26 MMOL/L (ref 20–32)
COLOR UR AUTO: ABNORMAL
CREAT SERPL-MCNC: 2.35 MG/DL (ref 0.66–1.25)
CREAT UR-MCNC: 34 MG/DL
DIFFERENTIAL METHOD BLD: ABNORMAL
EOSINOPHIL # BLD AUTO: 0.1 10E9/L (ref 0–0.7)
EOSINOPHIL NFR BLD AUTO: 1.4 %
ERYTHROCYTE [DISTWIDTH] IN BLOOD BY AUTOMATED COUNT: 13.2 % (ref 10–15)
GFR SERPL CREATININE-BSD FRML MDRD: 28 ML/MIN/{1.73_M2}
GLUCOSE SERPL-MCNC: 96 MG/DL (ref 70–99)
GLUCOSE UR STRIP-MCNC: NEGATIVE MG/DL
HCT VFR BLD AUTO: 42.4 % (ref 40–53)
HGB BLD-MCNC: 13.2 G/DL (ref 13.3–17.7)
HGB BLD-MCNC: 13.4 G/DL (ref 13.3–17.7)
HGB UR QL STRIP: NEGATIVE
IMM GRANULOCYTES # BLD: 0.1 10E9/L (ref 0–0.4)
IMM GRANULOCYTES NFR BLD: 1.1 %
INR PPP: 0.91 (ref 0.86–1.14)
KETONES UR STRIP-MCNC: NEGATIVE MG/DL
LEUKOCYTE ESTERASE UR QL STRIP: NEGATIVE
LYMPHOCYTES # BLD AUTO: 1.5 10E9/L (ref 0.8–5.3)
LYMPHOCYTES NFR BLD AUTO: 17.6 %
MCH RBC QN AUTO: 31.3 PG (ref 26.5–33)
MCHC RBC AUTO-ENTMCNC: 31.1 G/DL (ref 31.5–36.5)
MCV RBC AUTO: 101 FL (ref 78–100)
MICROALBUMIN UR-MCNC: 593 MG/L
MICROALBUMIN/CREAT UR: 1718.84 MG/G CR (ref 0–17)
MONOCYTES # BLD AUTO: 0.7 10E9/L (ref 0–1.3)
MONOCYTES NFR BLD AUTO: 8.7 %
NEUTROPHILS # BLD AUTO: 6 10E9/L (ref 1.6–8.3)
NEUTROPHILS NFR BLD AUTO: 70.5 %
NITRATE UR QL: NEGATIVE
NRBC # BLD AUTO: 0 10*3/UL
NRBC BLD AUTO-RTO: 0 /100
PH UR STRIP: 6 PH (ref 5–7)
PLATELET # BLD AUTO: 152 10E9/L (ref 150–450)
POTASSIUM SERPL-SCNC: 4.1 MMOL/L (ref 3.4–5.3)
PROT UR-MCNC: 0.78 G/L
PROT/CREAT 24H UR: 2.25 G/G CR (ref 0–0.2)
RBC # BLD AUTO: 4.22 10E12/L (ref 4.4–5.9)
RBC #/AREA URNS AUTO: 0 /HPF (ref 0–2)
SODIUM SERPL-SCNC: 142 MMOL/L (ref 133–144)
SOURCE: ABNORMAL
SP GR UR STRIP: 1.01 (ref 1–1.03)
SPECIMEN EXP DATE BLD: NORMAL
UROBILINOGEN UR STRIP-MCNC: 0 MG/DL (ref 0–2)
WBC # BLD AUTO: 8.5 10E9/L (ref 4–11)
WBC #/AREA URNS AUTO: <1 /HPF (ref 0–5)

## 2019-03-12 RX ADMIN — Medication 10 ML: at 08:05

## 2019-03-12 NOTE — PROCEDURES
Kidney Transplant Biopsy Procedure Note    Indication/Diagnosis:  Kidney transplant with kidney transplant and proteinuria    Procedure:  The indications and risks of the kidney biopsy, including bleeding severe enough to require hospitalization, transfusion, surgery, or even loss of the kidney or death, were explained, understood and agreed.  Consent was signed.  The kidney, located in the right lower quadrant, was visualized under ultrasound and biopsy site marked.  Patient was prepped and draped in the usual sterile manner.  Biopsy site was anesthetized with 1% lidocaine.  Biopsy consisted of 4 passes with a 18 ga needle under direct ultrasound guidance were made and tissue was sent to pathology.  There were no immediate complications.  Pressure was held over biopsy site and patient will be observed for signs of bleeding or other complications.  Patient remained hemodynamically stable during and early post procedure.

## 2019-03-14 ENCOUNTER — TELEPHONE (OUTPATIENT)
Dept: NEPHROLOGY | Facility: CLINIC | Age: 64
End: 2019-03-14

## 2019-03-14 DIAGNOSIS — Z94.0 KIDNEY REPLACED BY TRANSPLANT: Primary | ICD-10-CM

## 2019-03-14 DIAGNOSIS — Z94.0 KIDNEY REPLACED BY TRANSPLANT: ICD-10-CM

## 2019-03-14 LAB — COPATH REPORT: NORMAL

## 2019-03-14 NOTE — TELEPHONE ENCOUNTER
I left a message for the patient regarding his kidney transplant biopsy.  There was no evidence of rejection.  There were chronic changes with arteriolar hyalinosis.  Electron microscopy has not been done but I anticipate the progressive proteinuria is due to the IgA that was present on his prior biopsy.    I made no changes to his immunosuppression regimen.

## 2019-03-15 DIAGNOSIS — Z94.0 KIDNEY REPLACED BY TRANSPLANT: ICD-10-CM

## 2019-03-15 LAB
A* LOCUS: NORMAL
A*: NORMAL
ABTEST METHOD: NORMAL
B* LOCUS: NORMAL
B*: NORMAL
BKV DNA # SPEC NAA+PROBE: <500 COPIES/ML
BKV DNA SPEC NAA+PROBE-LOG#: <2.7 LOG COPIES/ML
BW-1: NORMAL
C* LOCUS: NORMAL
DPA1* NMDP: NORMAL
DPA1*: NORMAL
DPB1* LOCUS NMDP: NORMAL
DPB1* NMDP: NORMAL
DPB1*: NORMAL
DPB1*LOCUS: NORMAL
DQA1*LOCUS: NORMAL
DQB1* LOCUS: NORMAL
DQB1*: NORMAL
DRB1* LOCUS: NORMAL
DRB1*: NORMAL
DRB3* LOCUS: NORMAL
DRB3*: NORMAL
DRSSO TEST METHOD: NORMAL
SPECIMEN SOURCE: ABNORMAL

## 2019-03-15 RX ORDER — MYCOPHENOLATE MOFETIL 250 MG/1
CAPSULE ORAL
Qty: 540 CAPSULE | Refills: 11 | Status: SHIPPED | OUTPATIENT
Start: 2019-03-15 | End: 2020-04-06

## 2019-03-17 DIAGNOSIS — I10 HTN (HYPERTENSION): ICD-10-CM

## 2019-03-18 LAB
DONOR IDENTIFICATION: NORMAL
DSA COMMENTS: NORMAL
DSA PRESENT: NO
DSA TEST METHOD: NORMAL
ORGAN: NORMAL
SA1 CELL: NORMAL
SA1 COMMENTS: NORMAL
SA1 HI RISK ABY: NORMAL
SA1 MOD RISK ABY: NORMAL
SA1 TEST METHOD: NORMAL
SA2 CELL: NORMAL
SA2 COMMENTS: NORMAL
SA2 HI RISK ABY UA: NORMAL
SA2 MOD RISK ABY: NORMAL
SA2 TEST METHOD: NORMAL
UNACCEPTABLE ANTIGEN: NORMAL
UNOS CPRA: 66

## 2019-03-18 RX ORDER — LOSARTAN POTASSIUM 100 MG/1
TABLET ORAL
Qty: 90 TABLET | Refills: 2 | Status: SHIPPED | OUTPATIENT
Start: 2019-03-18 | End: 2019-12-09

## 2019-05-09 DIAGNOSIS — E87.20 ACIDOSIS: ICD-10-CM

## 2019-05-09 RX ORDER — SODIUM BICARBONATE 650 MG/1
650 TABLET ORAL DAILY
Qty: 90 TABLET | Refills: 1 | Status: SHIPPED | OUTPATIENT
Start: 2019-05-09 | End: 2019-11-03

## 2019-05-09 NOTE — TELEPHONE ENCOUNTER
Medication: Nephrology RN Care Coordinator received prescription refill  for Sodium Bicarb take one tablet 650mg daily by mouth    Last office visit: 2/15/19 Dr. Bunch  Upcoming office visit: None Scheduled    Labs:   Last Comprehensive Metabolic Panel:  Sodium   Date Value Ref Range Status   03/12/2019 142 133 - 144 mmol/L Final     Potassium   Date Value Ref Range Status   03/12/2019 4.1 3.4 - 5.3 mmol/L Final     Chloride   Date Value Ref Range Status   03/12/2019 108 94 - 109 mmol/L Final     Carbon Dioxide   Date Value Ref Range Status   03/12/2019 26 20 - 32 mmol/L Final     Anion Gap   Date Value Ref Range Status   03/12/2019 8 3 - 14 mmol/L Final     Glucose   Date Value Ref Range Status   03/12/2019 96 70 - 99 mg/dL Final     Urea Nitrogen   Date Value Ref Range Status   03/12/2019 48 (H) 7 - 30 mg/dL Final     Creatinine   Date Value Ref Range Status   03/12/2019 2.35 (H) 0.66 - 1.25 mg/dL Final     GFR Estimate   Date Value Ref Range Status   03/12/2019 28 (L) >60 mL/min/[1.73_m2] Final     Comment:     Non  GFR Calc  Starting 12/18/2018, serum creatinine based estimated GFR (eGFR) will be   calculated using the Chronic Kidney Disease Epidemiology Collaboration   (CKD-EPI) equation.       Calcium   Date Value Ref Range Status   03/12/2019 8.0 (L) 8.5 - 10.1 mg/dL Final     Calcium   Date Value Ref Range Status   03/12/2019 8.0 (L) 8.5 - 10.1 mg/dL Final     Phosphorus   Date Value Ref Range Status   01/28/2010 3.5 2.5 - 4.5 mg/dL Final         Medication refilled per Nephrology medication protocol    Tiana Hooker, RN, BSN  Nephrology Care Coordinator  [facility]  [email or web address]  [phone]

## 2019-05-30 DIAGNOSIS — Z94.0 KIDNEY REPLACED BY TRANSPLANT: ICD-10-CM

## 2019-05-30 RX ORDER — FUROSEMIDE 20 MG
TABLET ORAL
Qty: 360 TABLET | Refills: 1 | Status: SHIPPED | OUTPATIENT
Start: 2019-05-30 | End: 2019-11-24

## 2019-06-18 DIAGNOSIS — Z94.0 KIDNEY REPLACED BY TRANSPLANT: Primary | ICD-10-CM

## 2019-06-18 RX ORDER — SULFAMETHOXAZOLE AND TRIMETHOPRIM 400; 80 MG/1; MG/1
1 TABLET ORAL
Qty: 40 TABLET | Refills: 3 | Status: SHIPPED | OUTPATIENT
Start: 2019-06-19 | End: 2020-02-21

## 2019-11-03 DIAGNOSIS — E87.20 ACIDOSIS: ICD-10-CM

## 2019-11-04 RX ORDER — SODIUM BICARBONATE 650 MG/1
TABLET ORAL
Qty: 90 TABLET | Refills: 3 | Status: SHIPPED | OUTPATIENT
Start: 2019-11-04 | End: 2020-12-03

## 2019-11-05 ENCOUNTER — HEALTH MAINTENANCE LETTER (OUTPATIENT)
Age: 64
End: 2019-11-05

## 2019-11-24 DIAGNOSIS — Z94.0 KIDNEY REPLACED BY TRANSPLANT: ICD-10-CM

## 2019-11-25 RX ORDER — FUROSEMIDE 20 MG
TABLET ORAL
Qty: 360 TABLET | Refills: 1 | Status: SHIPPED | OUTPATIENT
Start: 2019-11-25 | End: 2020-05-20

## 2019-12-03 ENCOUNTER — TELEPHONE (OUTPATIENT)
Dept: TRANSPLANT | Facility: CLINIC | Age: 64
End: 2019-12-03

## 2019-12-03 NOTE — TELEPHONE ENCOUNTER
Called patient to schedule [facility] Feb 2020 appt and he confirmed for Feb 21 at 930am

## 2019-12-09 DIAGNOSIS — I10 HTN (HYPERTENSION): ICD-10-CM

## 2019-12-09 RX ORDER — LOSARTAN POTASSIUM 100 MG/1
TABLET ORAL
Qty: 90 TABLET | Refills: 2 | Status: SHIPPED | OUTPATIENT
Start: 2019-12-09 | End: 2023-05-02

## 2020-01-14 DIAGNOSIS — Z94.0 KIDNEY TRANSPLANTED: Primary | ICD-10-CM

## 2020-01-14 RX ORDER — PREDNISONE 5 MG/1
5 TABLET ORAL DAILY
Qty: 30 TABLET | Refills: 11 | Status: SHIPPED | OUTPATIENT
Start: 2020-01-14 | End: 2021-02-04

## 2020-01-27 DIAGNOSIS — E61.1 IRON DEFICIENCY: ICD-10-CM

## 2020-01-27 RX ORDER — FERROUS SULFATE 325(65) MG
TABLET ORAL
Qty: 90 TABLET | Refills: 3 | Status: SHIPPED | OUTPATIENT
Start: 2020-01-27 | End: 2021-02-17

## 2020-02-02 DIAGNOSIS — I15.1 HTN, KIDNEY TRANSPLANT RELATED: ICD-10-CM

## 2020-02-02 DIAGNOSIS — Z94.0 HTN, KIDNEY TRANSPLANT RELATED: ICD-10-CM

## 2020-02-03 RX ORDER — CARVEDILOL 6.25 MG/1
TABLET ORAL
Qty: 180 TABLET | Refills: 3 | Status: SHIPPED | OUTPATIENT
Start: 2020-02-03 | End: 2020-02-21

## 2020-02-12 DIAGNOSIS — I12.9 RENAL HYPERTENSION, STAGE 1-4 OR UNSPECIFIED CHRONIC KIDNEY DISEASE: ICD-10-CM

## 2020-02-12 RX ORDER — VERAPAMIL HYDROCHLORIDE 180 MG/1
180 TABLET, EXTENDED RELEASE ORAL DAILY
Qty: 90 TABLET | Refills: 3 | Status: SHIPPED | OUTPATIENT
Start: 2020-02-12 | End: 2021-01-19

## 2020-02-17 ENCOUNTER — TELEPHONE (OUTPATIENT)
Dept: TRANSPLANT | Facility: CLINIC | Age: 65
End: 2020-02-17

## 2020-02-17 NOTE — LETTER
OUTPATIENT LABORATORY TEST ORDER    Patient Name: Gurjit Squires  Transplant Date: 7/29/2004 (Kidney)  YOB: 1955  Issue Date & Time: 2/17/2020 9:48AM    [facility] MR:  [id number]  Exp. Date (1 year after date issued)      Diagnoses: Kidney Transplant (ICD-9  V42.0)   Long term use of medications (ICD-9  V58.69)     Lab results to be available on the same day drawn.   Patient should release information to the [facility] Transplant Center.  Please fax to the Transplant Center at [phone].    Monthly   ?Hemogram and Platelet  ?Basic Metabolic Panel (Sodium, Potassium, Chloride, CO2, Creatinine, Urea Nitrogen, Glucose, Calcium)            ?Urine for protein/creatinine             HIV, HBsAb, HBcAb, HCV  If you have any questions, please call The Transplant Center at [phone] or [phone].    Please fax labs to [phone]  .

## 2020-02-17 NOTE — TELEPHONE ENCOUNTER
Standing lab orders faxed to [facility] (#[phone]) and sent to patient via [employer]. Patient to have labs drawn on Friday at 7am and has appt with Dr Bunch at 9:30.

## 2020-02-21 ENCOUNTER — OFFICE VISIT (OUTPATIENT)
Dept: NEPHROLOGY | Facility: CLINIC | Age: 65
End: 2020-02-21
Payer: COMMERCIAL

## 2020-02-21 VITALS
WEIGHT: 276 LBS | SYSTOLIC BLOOD PRESSURE: 186 MMHG | BODY MASS INDEX: 40.88 KG/M2 | DIASTOLIC BLOOD PRESSURE: 96 MMHG | HEART RATE: 65 BPM | HEIGHT: 69 IN

## 2020-02-21 DIAGNOSIS — I15.1 HTN, KIDNEY TRANSPLANT RELATED: Primary | ICD-10-CM

## 2020-02-21 DIAGNOSIS — Z94.0 KIDNEY REPLACED BY TRANSPLANT: ICD-10-CM

## 2020-02-21 DIAGNOSIS — D84.9 IMMUNOSUPPRESSION (H): ICD-10-CM

## 2020-02-21 DIAGNOSIS — E87.20 METABOLIC ACIDOSIS: ICD-10-CM

## 2020-02-21 DIAGNOSIS — Z94.0 HTN, KIDNEY TRANSPLANT RELATED: Primary | ICD-10-CM

## 2020-02-21 DIAGNOSIS — Z48.298 AFTERCARE FOLLOWING ORGAN TRANSPLANT: ICD-10-CM

## 2020-02-21 DIAGNOSIS — E55.9 VITAMIN D DEFICIENCY: ICD-10-CM

## 2020-02-21 RX ORDER — CARVEDILOL 12.5 MG/1
12.5 TABLET ORAL 2 TIMES DAILY WITH MEALS
Qty: 90 TABLET | Refills: 3 | Status: SHIPPED | OUTPATIENT
Start: 2020-02-21

## 2020-02-21 ASSESSMENT — MIFFLIN-ST. JEOR: SCORE: 2032.31

## 2020-02-21 NOTE — PROGRESS NOTES
CHRONIC TRANSPLANT NEPHROLOGY VISIT    Assessment & Plan   # LDKT: Stable   - Baseline Cr ~ 1.8-2.2   - Proteinuria: Mild (0.5-1.0 grams)   - Date DSA Last Checked: Mar/2019      Latest DSA: No   - BK Viremia: Yes, detectable, but less than quantifiable   - Kidney Tx Biopsy: Mar 12, 2019; Result: No diagnostic evidence of acute rejection.  Mild interstitial fibrosis and tubular atrophy.  Moderate arterio- and moderate to severe arteriolosclerosis.  There is 60-70% foot process effacement.             Jan 28, 2010; Result: No diagnostic evidence of acute rejection.  Mild interstitial fibrosis and tubular atrophy.  Some evidence of IgA deposits, suggestive of recurrence.    # Immunosuppression: Mycophenolate mofetil (goal not followed) and Prednisone (dose 5 mg daily)   - Changes: No    # Infection Prophylaxis:   - PJP: Sulfa/TMP (Bactrim)    # Hypertension: Borderline control;  Goal BP: < 130/80   - Changes: Yes - Will increase carvedilol to 12.5 mg bid.  Also recommended low salt diet.    # Elevated Blood Glucose: Glucose generally running ~ 90-110s   - Management as per primary care.    # Anemia in Chronic Renal Disease: Hgb: Stable, near normal      MIKE: No   - Iron studies: Not checked recently    # Mineral Bone Disorder:   - Vitamin D; level: Not checked recently        On Supplement: Yes  - Calcium; level: Normal        On Supplement: No    # Electrolytes:   - Potassium; level: Normal        On Supplement: No  - Bicarbonate; level: Normal        On Supplement: Yes    # Obesity: Patient with weight gain of ~ 7-8 lbs.   - Recommend weight loss for overall health by increasing exercise and watching caloric intake.    # Gout: No recent flares and will continue on allopurinol.     # GERD: Minimal symptoms on PPI.     # Skin Cancer Risk:    - Discussed sun protection and recommend regular follow up with Dermatology.    # Medical Compliance: No.  Evidence of labs less than recommended.  - Discussed importance of  "checking labs regularly as recommended, taking medications as prescribed and attending scheduled medical appointments.      # Transplant History:  Etiology of Kidney Failure: IgA nephropathy  Tx: LDKT  Transplant: 7/29/2004 (Kidney)  Donor Type: Living Donor Class: Standard Criteria Donor  Significant changes in immunosuppression: CNI stopped due to CNI toxicity on biopsy  Significant transplant-related complications: None    Transplant Office Phone Number: [phone]    Assessment and plan was discussed with the patient and he voiced his understanding and agreement.    Return visit: Return in about 1 year (around 2/21/2021).    Radu Bunch MD    Chief Complaint   Mr. Squires is a 64 year old here for routine follow up.    History of Present Illness    Mr. Squires reports feeling good overall with some medical complaints.  Since last clinic visit, patient reports no hospitalizations or new medical complaints and has been doing well overall.  His energy level is good and pretty much normal.  He reports not always sleeping that well as he will wake up to use the bathroom early in the morning and has difficulty getting back to sleep.  He is active, still working part time, and also gets some exercise.  Patient can be a bit limited at times due to arthritis in his feet and ankles after working on concrete his whole life.  Denies any chest pain or shortness of breath with exertion.  Appetite is \"too good\" and he has gained a~ 7-8 lbs.  No nausea, vomiting or diarrhea.  No fever, sweats or chills.  He has a little leg swelling, but better.    Recent Hospitalizations:  [x] No [] Yes    New Medical Issues: [x] No [] Yes    Decreased energy: [x] No [] Yes    Chest pain or SOB with exertion:  [x] No [] Yes    Appetite change or weight change: [] No [x] Yes Weight is up ~ 7-8 lbs.   Nausea, vomiting or diarrhea:  [x] No [] Yes    Fever, sweats or chills: [x] No [] Yes    Leg swelling: [] No [x] Yes A little leg " swelling     Home BP: 130/80s    Review of Systems   A comprehensive review of systems was obtained and negative, except as noted in the HPI or PMH.    Problem List   Patient Active Problem List   Diagnosis     Testicular hypofunction     Kidney replaced by transplant     Erectile dysfunction     HTN, kidney transplant related     Immunosuppression (H)     Metabolic acidosis     Vitamin D deficiency     VANESA (obstructive sleep apnea)     Overweight     Aftercare following organ transplant       Social History   Social History     Tobacco Use     Smoking status: Never Smoker     Smokeless tobacco: Never Used   Substance Use Topics     Alcohol use: Yes     Comment: social     Drug use: No       Allergies   No Known Allergies    Medications   Current Outpatient Medications   Medication Sig     acetaminophen (TYLENOL) 325 MG tablet Take 2 tablets (650 mg) by mouth every 6 hours as needed for pain (Patient taking differently: Take 1,000 mg by mouth daily )     allopurinol (ZYLOPRIM) 100 MG tablet Take 3 tablets (300 mg) by mouth daily     carvedilol 12.5 MG PO tablet Take 1 tablet (12.5 mg) by mouth 2 times daily (with meals)     cholecalciferol (VITAMIN D) 1000 UNIT tablet Take 1 tablet (1,000 Units) by mouth daily     clonazePAM (KLONOPIN) 0.5 MG tablet Take 1 tablet (0.5 mg) by mouth daily (Patient taking differently: Take 0.25 mg by mouth every evening )     ferrous sulfate (FEROSUL) 325 (65 Fe) MG tablet TAKE 1 TABLET BY MOUTH ONCE DAILY AT BREAKFAST     Fexofenadine HCl (ALLEGRA PO) Take 1 tablet by mouth daily.      furosemide (LASIX) 20 MG tablet TAKE 2 TABLETS BY MOUTH IN THE MORNING AND IN THE EVENING     gabapentin (NEURONTIN) 300 MG capsule Take 1 capsule (300 mg) by mouth At Bedtime     losartan (COZAAR) 100 MG tablet TAKE 1 TABLET BY MOUTH EVERY DAY     Multiple Vitamins-Minerals (ONE-A-DAY MENS 50+ ADVANTAGE PO) Take 1 tablet by mouth every evening     mycophenolate (GENERIC EQUIVALENT) 250 MG capsule TAKE  "3 CAPSULES BY MOUTH 2 TIMES DAILY     omega-3 fatty acids (FISH OIL) 1200 MG capsule Take 1 capsule by mouth 3 times daily.     pantoprazole (PROTONIX) 40 MG EC tablet TAKE 1 TABLET BY MOUTH DAILY (Patient taking differently: 20 mg daily)     predniSONE (DELTASONE) 5 MG tablet Take 1 tablet (5 mg) by mouth daily     sodium bicarbonate 650 MG tablet TAKE 1 TABLET BY MOUTH DAILY     sulfamethoxazole-trimethoprim (BACTRIM) 400-80 MG per tablet Take 1 tablet by mouth three times a week     TraMADol HCl 200 MG TB24 Take 1 tablet by mouth daily     verapamil ER (CALAN-SR) 180 MG CR tablet Take 1 tablet (180 mg) by mouth daily     No current facility-administered medications for this visit.      Medications Discontinued During This Encounter   Medication Reason     sulfamethoxazole-trimethoprim (BACTRIM/SEPTRA) 400-80 MG tablet Medication Reconciliation Clean Up     carvedilol (COREG) 6.25 MG tablet        Physical Exam   Vital Signs: BP (!) 186/96 (BP Location: Left arm, Patient Position: Chair, Cuff Size: Adult Regular)   Pulse 65   Ht 1.753 m (5' 9\")   Wt 125.2 kg (276 lb)   BMI 40.76 kg/m      GENERAL APPEARANCE: alert and no distress  HENT: mouth without ulcers or lesions  LYMPHATICS: no cervical or supraclavicular nodes  RESP: lungs clear to auscultation - no rales, rhonchi or wheezes  CV: regular rhythm, normal rate, no rub, no murmur  EDEMA: trace LE edema bilaterally  ABDOMEN: soft, nondistended, nontender, bowel sounds normal, overweight  MS: extremities normal - no gross deformities noted, no evidence of inflammation in joints, no muscle tenderness  SKIN: no rash  TX KIDNEY: normal  DIALYSIS ACCESS:  None      Data     Renal Latest Ref Rng & Units 2/21/2020 11/11/2019 3/12/2019   Na 133 - 144 mmol/L - - 142   Na (external) 136 - 146 mmol/L 139 141 -   K 3.4 - 5.3 mmol/L - - 4.1   K (external) 3.5 - 5.1 mmol/L 4.6 4.4 -   Cl 94 - 109 mmol/L - - 108   Cl (external) 98 - 107 mmol/L 103 105 -   CO2 20 - 32 " mmol/L - - 26   CO2 (external) 22 - 29 mmol/L 25 25 -   BUN 7 - 30 mg/dL - - 48(H)   BUN (external) 10.0 - 20.0 mg/dL 38.2(H) 36(H) -   Cr 0.66 - 1.25 mg/dL - - 2.35(H)   Cr (external) 0.72 - 1.25 mg/dL 2.01(H) 2.05(H) -   Glucose 70 - 99 mg/dL - - 96   Glucose (external) 70 - 100 mg/dL 103(H) 119(H) -   Ca  8.5 - 10.1 mg/dL - - 8.0(L)   Ca (external) 8.6 - 10.5 mg/dL 8.8 8.9 -   Mg 1.6 - 2.3 mg/dL - - -     Bone Health Latest Ref Rng & Units 5/29/2018 1/28/2010 9/6/2007   Phos 2.5 - 4.5 mg/dL - 3.5 3.0   Vit D Def (external) See scan ng/ml 46 - -     Heme Latest Ref Rng & Units 2/21/2020 11/11/2019 3/12/2019   WBC 4.0 - 11.0 10e9/L - - -   WBC (external) 3.9 - 11.9 10(3)/uL 7.6 8.1 -   Hgb 13.3 - 17.7 g/dL - - 13.4   Hgb (external) 13.1 - 17.1 g/dL 13.8 13.4(L) -   Plt 150 - 450 10e9/L - - -   Plt (external) 179 - 450 10(3)/uL 135(L) 146 -     Liver Latest Ref Rng & Units 5/14/2018 6/15/2016 1/28/2010   AP 40 - 150 U/L - - 116   AP (external) 38 - 126 U/L 118 105 -   TBili 0.2 - 1.3 mg/dL - - 0.6   TBili (external) 0.2 - 1.3 mg/dL 0.4 0.8 -   ALT 0 - 70 U/L - - 37   ALT (external) 21 - 72 U/L 55 35 -   AST 0 - 55 U/L - - 27   AST (external) 17 - 59 U/L 43 26 -   Tot Protein 6.8 - 8.8 g/dL - - 7.8   Tot Protein (external) 6.3 - 8.2 g/dL 7.0 7.0 -   Albumin 3.9 - 5.1 g/dL - - 4.7   Albumin (external) 3.5 - 5.0 g/dL 4.1 4.1 -        Iron studies Latest Ref Rng & Units 12/14/2015   Ferritin 26 - 388 ng/mL 220     [facility] Txp Virology Latest Ref Rng & Units 3/12/2019 5/21/2015 8/8/2011   CVM DNA Quant - - - -   CMV DNA Quant Ext Not Detected - NOT DETECTED -   CMV Quant <100 Copies/mL - - -   CMV QT Log <2.0 Log copies/mL - - -   BK Spec - Plasma - Plasma, EDTA anticoagulant   BK Res BKNEG:BK Virus DNA Not Detected copies/mL <500(A) - <1000   BK Log <2.7 Log copies/mL <2.7 - <3.0  The lower limit of detection for this assay is 1000 copies/mL.  Real-time TaqMan   PCR was performed using BK primers and probe for the detection  of a 90 bp   portion of the  1 gene.  The performance characteristics were validated by   the [facility], [place].   It has not been cleared or approved by the U.S. Food and Drug Administration.            Recent Labs   Lab Test 09/20/13  0800   DOSMPA Not Provided   MPACID Unsatisfactory specimen - too old for testing   MPAG Unsatisfactory specimen - too old for testing

## 2020-02-21 NOTE — LETTER
2/21/2020      RE: Gurjit Squires  [address]  [address]  [place] [address]       CHRONIC TRANSPLANT NEPHROLOGY VISIT    Assessment & Plan   # LDKT: Stable   - Baseline Cr ~ 1.8-2.2   - Proteinuria: Mild (0.5-1.0 grams)   - Date DSA Last Checked: Mar/2019      Latest DSA: No   - BK Viremia: Yes, detectable, but less than quantifiable   - Kidney Tx Biopsy: Mar 12, 2019; Result: No diagnostic evidence of acute rejection.  Mild interstitial fibrosis and tubular atrophy.  Moderate arterio- and moderate to severe arteriolosclerosis.  There is 60-70% foot process effacement.             Jan 28, 2010; Result: No diagnostic evidence of acute rejection.  Mild interstitial fibrosis and tubular atrophy.  Some evidence of IgA deposits, suggestive of recurrence.    # Immunosuppression: Mycophenolate mofetil (goal not followed) and Prednisone (dose 5 mg daily)   - Changes: No    # Infection Prophylaxis:   - PJP: Sulfa/TMP (Bactrim)    # Hypertension: Borderline control;  Goal BP: < 130/80   - Changes: Yes - Will increase carvedilol to 12.5 mg bid.  Also recommended low salt diet.    # Elevated Blood Glucose: Glucose generally running ~ 90-110s   - Management as per primary care.    # Anemia in Chronic Renal Disease: Hgb: Stable, near normal      MIKE: No   - Iron studies: Not checked recently    # Mineral Bone Disorder:   - Vitamin D; level: Not checked recently        On Supplement: Yes  - Calcium; level: Normal        On Supplement: No    # Electrolytes:   - Potassium; level: Normal        On Supplement: No  - Bicarbonate; level: Normal        On Supplement: Yes    # Obesity: Patient with weight gain of ~ 7-8 lbs.   - Recommend weight loss for overall health by increasing exercise and watching caloric intake.    # Gout: No recent flares and will continue on allopurinol.     # GERD: Minimal symptoms on PPI.     # Skin Cancer Risk:    - Discussed sun protection and recommend regular follow up with Dermatology.    #  "Medical Compliance: No.  Evidence of labs less than recommended.  - Discussed importance of checking labs regularly as recommended, taking medications as prescribed and attending scheduled medical appointments.      # Transplant History:  Etiology of Kidney Failure: IgA nephropathy  Tx: LDKT  Transplant: 7/29/2004 (Kidney)  Donor Type: Living Donor Class: Standard Criteria Donor  Significant changes in immunosuppression: CNI stopped due to CNI toxicity on biopsy  Significant transplant-related complications: None    Transplant Office Phone Number: [phone]    Assessment and plan was discussed with the patient and he voiced his understanding and agreement.    Return visit: Return in about 1 year (around 2/21/2021).    Radu Bunch MD    Chief Complaint   Mr. Squires is a 64 year old here for routine follow up.    History of Present Illness    Mr. Squires reports feeling good overall with some medical complaints.  Since last clinic visit, patient reports no hospitalizations or new medical complaints and has been doing well overall.  His energy level is good and pretty much normal.  He reports not always sleeping that well as he will wake up to use the bathroom early in the morning and has difficulty getting back to sleep.  He is active, still working part time, and also gets some exercise.  Patient can be a bit limited at times due to arthritis in his feet and ankles after working on concrete his whole life.  Denies any chest pain or shortness of breath with exertion.  Appetite is \"too good\" and he has gained a~ 7-8 lbs.  No nausea, vomiting or diarrhea.  No fever, sweats or chills.  He has a little leg swelling, but better.    Recent Hospitalizations:  [x] No [] Yes    New Medical Issues: [x] No [] Yes    Decreased energy: [x] No [] Yes    Chest pain or SOB with exertion:  [x] No [] Yes    Appetite change or weight change: [] No [x] Yes Weight is up ~ 7-8 lbs.   Nausea, vomiting or diarrhea:  [x] No [] " Yes    Fever, sweats or chills: [x] No [] Yes    Leg swelling: [] No [x] Yes A little leg swelling     Home BP: 130/80s    Review of Systems   A comprehensive review of systems was obtained and negative, except as noted in the HPI or PMH.    Problem List   Patient Active Problem List   Diagnosis     Testicular hypofunction     Kidney replaced by transplant     Erectile dysfunction     HTN, kidney transplant related     Immunosuppression (H)     Metabolic acidosis     Vitamin D deficiency     VANESA (obstructive sleep apnea)     Overweight     Aftercare following organ transplant       Social History   Social History     Tobacco Use     Smoking status: Never Smoker     Smokeless tobacco: Never Used   Substance Use Topics     Alcohol use: Yes     Comment: social     Drug use: No       Allergies   No Known Allergies    Medications   Current Outpatient Medications   Medication Sig     acetaminophen (TYLENOL) 325 MG tablet Take 2 tablets (650 mg) by mouth every 6 hours as needed for pain (Patient taking differently: Take 1,000 mg by mouth daily )     allopurinol (ZYLOPRIM) 100 MG tablet Take 3 tablets (300 mg) by mouth daily     carvedilol 12.5 MG PO tablet Take 1 tablet (12.5 mg) by mouth 2 times daily (with meals)     cholecalciferol (VITAMIN D) 1000 UNIT tablet Take 1 tablet (1,000 Units) by mouth daily     clonazePAM (KLONOPIN) 0.5 MG tablet Take 1 tablet (0.5 mg) by mouth daily (Patient taking differently: Take 0.25 mg by mouth every evening )     ferrous sulfate (FEROSUL) 325 (65 Fe) MG tablet TAKE 1 TABLET BY MOUTH ONCE DAILY AT BREAKFAST     Fexofenadine HCl (ALLEGRA PO) Take 1 tablet by mouth daily.      furosemide (LASIX) 20 MG tablet TAKE 2 TABLETS BY MOUTH IN THE MORNING AND IN THE EVENING     gabapentin (NEURONTIN) 300 MG capsule Take 1 capsule (300 mg) by mouth At Bedtime     losartan (COZAAR) 100 MG tablet TAKE 1 TABLET BY MOUTH EVERY DAY     Multiple Vitamins-Minerals (ONE-A-DAY MENS 50+ ADVANTAGE PO) Take 1  "tablet by mouth every evening     mycophenolate (GENERIC EQUIVALENT) 250 MG capsule TAKE 3 CAPSULES BY MOUTH 2 TIMES DAILY     omega-3 fatty acids (FISH OIL) 1200 MG capsule Take 1 capsule by mouth 3 times daily.     pantoprazole (PROTONIX) 40 MG EC tablet TAKE 1 TABLET BY MOUTH DAILY (Patient taking differently: 20 mg daily)     predniSONE (DELTASONE) 5 MG tablet Take 1 tablet (5 mg) by mouth daily     sodium bicarbonate 650 MG tablet TAKE 1 TABLET BY MOUTH DAILY     sulfamethoxazole-trimethoprim (BACTRIM) 400-80 MG per tablet Take 1 tablet by mouth three times a week     TraMADol HCl 200 MG TB24 Take 1 tablet by mouth daily     verapamil ER (CALAN-SR) 180 MG CR tablet Take 1 tablet (180 mg) by mouth daily     No current facility-administered medications for this visit.      Medications Discontinued During This Encounter   Medication Reason     sulfamethoxazole-trimethoprim (BACTRIM/SEPTRA) 400-80 MG tablet Medication Reconciliation Clean Up     carvedilol (COREG) 6.25 MG tablet        Physical Exam   Vital Signs: BP (!) 186/96 (BP Location: Left arm, Patient Position: Chair, Cuff Size: Adult Regular)   Pulse 65   Ht 1.753 m (5' 9\")   Wt 125.2 kg (276 lb)   BMI 40.76 kg/m       GENERAL APPEARANCE: alert and no distress  HENT: mouth without ulcers or lesions  LYMPHATICS: no cervical or supraclavicular nodes  RESP: lungs clear to auscultation - no rales, rhonchi or wheezes  CV: regular rhythm, normal rate, no rub, no murmur  EDEMA: trace LE edema bilaterally  ABDOMEN: soft, nondistended, nontender, bowel sounds normal, overweight  MS: extremities normal - no gross deformities noted, no evidence of inflammation in joints, no muscle tenderness  SKIN: no rash  TX KIDNEY: normal  DIALYSIS ACCESS:  None      Data     Renal Latest Ref Rng & Units 2/21/2020 11/11/2019 3/12/2019   Na 133 - 144 mmol/L - - 142   Na (external) 136 - 146 mmol/L 139 141 -   K 3.4 - 5.3 mmol/L - - 4.1   K (external) 3.5 - 5.1 mmol/L 4.6 4.4 - "   Cl 94 - 109 mmol/L - - 108   Cl (external) 98 - 107 mmol/L 103 105 -   CO2 20 - 32 mmol/L - - 26   CO2 (external) 22 - 29 mmol/L 25 25 -   BUN 7 - 30 mg/dL - - 48(H)   BUN (external) 10.0 - 20.0 mg/dL 38.2(H) 36(H) -   Cr 0.66 - 1.25 mg/dL - - 2.35(H)   Cr (external) 0.72 - 1.25 mg/dL 2.01(H) 2.05(H) -   Glucose 70 - 99 mg/dL - - 96   Glucose (external) 70 - 100 mg/dL 103(H) 119(H) -   Ca  8.5 - 10.1 mg/dL - - 8.0(L)   Ca (external) 8.6 - 10.5 mg/dL 8.8 8.9 -   Mg 1.6 - 2.3 mg/dL - - -     Bone Health Latest Ref Rng & Units 5/29/2018 1/28/2010 9/6/2007   Phos 2.5 - 4.5 mg/dL - 3.5 3.0   Vit D Def (external) See scan ng/ml 46 - -     Heme Latest Ref Rng & Units 2/21/2020 11/11/2019 3/12/2019   WBC 4.0 - 11.0 10e9/L - - -   WBC (external) 3.9 - 11.9 10(3)/uL 7.6 8.1 -   Hgb 13.3 - 17.7 g/dL - - 13.4   Hgb (external) 13.1 - 17.1 g/dL 13.8 13.4(L) -   Plt 150 - 450 10e9/L - - -   Plt (external) 179 - 450 10(3)/uL 135(L) 146 -     Liver Latest Ref Rng & Units 5/14/2018 6/15/2016 1/28/2010   AP 40 - 150 U/L - - 116   AP (external) 38 - 126 U/L 118 105 -   TBili 0.2 - 1.3 mg/dL - - 0.6   TBili (external) 0.2 - 1.3 mg/dL 0.4 0.8 -   ALT 0 - 70 U/L - - 37   ALT (external) 21 - 72 U/L 55 35 -   AST 0 - 55 U/L - - 27   AST (external) 17 - 59 U/L 43 26 -   Tot Protein 6.8 - 8.8 g/dL - - 7.8   Tot Protein (external) 6.3 - 8.2 g/dL 7.0 7.0 -   Albumin 3.9 - 5.1 g/dL - - 4.7   Albumin (external) 3.5 - 5.0 g/dL 4.1 4.1 -        Iron studies Latest Ref Rng & Units 12/14/2015   Ferritin 26 - 388 ng/mL 220     [facility] Txp Virology Latest Ref Rng & Units 3/12/2019 5/21/2015 8/8/2011   CVM DNA Quant - - - -   CMV DNA Quant Ext Not Detected - NOT DETECTED -   CMV Quant <100 Copies/mL - - -   CMV QT Log <2.0 Log copies/mL - - -   BK Spec - Plasma - Plasma, EDTA anticoagulant   BK Res BKNEG:BK Virus DNA Not Detected copies/mL <500(A) - <1000   BK Log <2.7 Log copies/mL <2.7 - <3.0  The lower limit of detection for this assay is 1000  copies/mL.  Real-time TaqMan   PCR was performed using BK primers and probe for the detection of a 90 bp   portion of the  1 gene.  The performance characteristics were validated by   the [facility], [place].   It has not been cleared or approved by the U.S. Food and Drug Administration.            Recent Labs   Lab Test 09/20/13  0800   DOSMPA Not Provided   MPACID Unsatisfactory specimen - too old for testing   MPAG Unsatisfactory specimen - too old for testing       Radu Bunch MD

## 2020-02-21 NOTE — LETTER
2/21/2020       RE: Gurjit Squires  [address]  [address]  [facility] [address]     Dear Colleague,    Thank you for referring your patient, Gurjit Squires, to the [facility] at [facility]. Please see a copy of my visit note below.    CHRONIC TRANSPLANT NEPHROLOGY VISIT    Assessment & Plan   # LDKT: Stable   - Baseline Cr ~ 1.8-2.2   - Proteinuria: Mild (0.5-1.0 grams)   - Date DSA Last Checked: Mar/2019      Latest DSA: No   - BK Viremia: Yes, detectable, but less than quantifiable   - Kidney Tx Biopsy: Mar 12, 2019; Result: No diagnostic evidence of acute rejection.  Mild interstitial fibrosis and tubular atrophy.  Moderate arterio- and moderate to severe arteriolosclerosis.  There is 60-70% foot process effacement.             Jan 28, 2010; Result: No diagnostic evidence of acute rejection.  Mild interstitial fibrosis and tubular atrophy.  Some evidence of IgA deposits, suggestive of recurrence.    # Immunosuppression: Mycophenolate mofetil (goal not followed) and Prednisone (dose 5 mg daily)   - Changes: No    # Infection Prophylaxis:   - PJP: Sulfa/TMP (Bactrim)    # Hypertension: Borderline control;  Goal BP: < 130/80   - Changes: Yes - Will increase carvedilol to 12.5 mg bid.  Also recommended low salt diet.    # Elevated Blood Glucose: Glucose generally running ~ 90-110s   - Management as per primary care.    # Anemia in Chronic Renal Disease: Hgb: Stable, near normal      MIKE: No   - Iron studies: Not checked recently    # Mineral Bone Disorder:   - Vitamin D; level: Not checked recently        On Supplement: Yes  - Calcium; level: Normal        On Supplement: No    # Electrolytes:   - Potassium; level: Normal        On Supplement: No  - Bicarbonate; level: Normal        On Supplement: Yes    # Obesity: Patient with weight gain of ~ 7-8 lbs.   - Recommend weight loss for overall health by increasing exercise and watching caloric  "intake.    # Gout: No recent flares and will continue on allopurinol.     # GERD: Minimal symptoms on PPI.     # Skin Cancer Risk:    - Discussed sun protection and recommend regular follow up with Dermatology.    # Medical Compliance: No.  Evidence of labs less than recommended.  - Discussed importance of checking labs regularly as recommended, taking medications as prescribed and attending scheduled medical appointments.      # Transplant History:  Etiology of Kidney Failure: IgA nephropathy  Tx: LDKT  Transplant: 7/29/2004 (Kidney)  Donor Type: Living Donor Class: Standard Criteria Donor  Significant changes in immunosuppression: CNI stopped due to CNI toxicity on biopsy  Significant transplant-related complications: None    Transplant Office Phone Number: [phone]    Assessment and plan was discussed with the patient and he voiced his understanding and agreement.    Return visit: Return in about 1 year (around 2/21/2021).    Radu Bunch MD    Chief Complaint   Mr. Squires is a 64 year old here for routine follow up.    History of Present Illness    Mr. Squires reports feeling good overall with some medical complaints.  Since last clinic visit, patient reports no hospitalizations or new medical complaints and has been doing well overall.  His energy level is good and pretty much normal.  He reports not always sleeping that well as he will wake up to use the bathroom early in the morning and has difficulty getting back to sleep.  He is active, still working part time, and also gets some exercise.  Patient can be a bit limited at times due to arthritis in his feet and ankles after working on concrete his whole life.  Denies any chest pain or shortness of breath with exertion.  Appetite is \"too good\" and he has gained a~ 7-8 lbs.  No nausea, vomiting or diarrhea.  No fever, sweats or chills.  He has a little leg swelling, but better.    Recent Hospitalizations:  [x] No [] Yes    New Medical Issues: " [x] No [] Yes    Decreased energy: [x] No [] Yes    Chest pain or SOB with exertion:  [x] No [] Yes    Appetite change or weight change: [] No [x] Yes Weight is up ~ 7-8 lbs.   Nausea, vomiting or diarrhea:  [x] No [] Yes    Fever, sweats or chills: [x] No [] Yes    Leg swelling: [] No [x] Yes A little leg swelling     Home BP: 130/80s    Review of Systems   A comprehensive review of systems was obtained and negative, except as noted in the HPI or PMH.    Problem List   Patient Active Problem List   Diagnosis     Testicular hypofunction     Kidney replaced by transplant     Erectile dysfunction     HTN, kidney transplant related     Immunosuppression (H)     Metabolic acidosis     Vitamin D deficiency     VANESA (obstructive sleep apnea)     Overweight     Aftercare following organ transplant       Social History   Social History     Tobacco Use     Smoking status: Never Smoker     Smokeless tobacco: Never Used   Substance Use Topics     Alcohol use: Yes     Comment: social     Drug use: No       Allergies   No Known Allergies    Medications   Current Outpatient Medications   Medication Sig     acetaminophen (TYLENOL) 325 MG tablet Take 2 tablets (650 mg) by mouth every 6 hours as needed for pain (Patient taking differently: Take 1,000 mg by mouth daily )     allopurinol (ZYLOPRIM) 100 MG tablet Take 3 tablets (300 mg) by mouth daily     carvedilol 12.5 MG PO tablet Take 1 tablet (12.5 mg) by mouth 2 times daily (with meals)     cholecalciferol (VITAMIN D) 1000 UNIT tablet Take 1 tablet (1,000 Units) by mouth daily     clonazePAM (KLONOPIN) 0.5 MG tablet Take 1 tablet (0.5 mg) by mouth daily (Patient taking differently: Take 0.25 mg by mouth every evening )     ferrous sulfate (FEROSUL) 325 (65 Fe) MG tablet TAKE 1 TABLET BY MOUTH ONCE DAILY AT BREAKFAST     Fexofenadine HCl (ALLEGRA PO) Take 1 tablet by mouth daily.      furosemide (LASIX) 20 MG tablet TAKE 2 TABLETS BY MOUTH IN THE MORNING AND IN THE EVENING      "gabapentin (NEURONTIN) 300 MG capsule Take 1 capsule (300 mg) by mouth At Bedtime     losartan (COZAAR) 100 MG tablet TAKE 1 TABLET BY MOUTH EVERY DAY     Multiple Vitamins-Minerals (ONE-A-DAY MENS 50+ ADVANTAGE PO) Take 1 tablet by mouth every evening     mycophenolate (GENERIC EQUIVALENT) 250 MG capsule TAKE 3 CAPSULES BY MOUTH 2 TIMES DAILY     omega-3 fatty acids (FISH OIL) 1200 MG capsule Take 1 capsule by mouth 3 times daily.     pantoprazole (PROTONIX) 40 MG EC tablet TAKE 1 TABLET BY MOUTH DAILY (Patient taking differently: 20 mg daily)     predniSONE (DELTASONE) 5 MG tablet Take 1 tablet (5 mg) by mouth daily     sodium bicarbonate 650 MG tablet TAKE 1 TABLET BY MOUTH DAILY     sulfamethoxazole-trimethoprim (BACTRIM) 400-80 MG per tablet Take 1 tablet by mouth three times a week     TraMADol HCl 200 MG TB24 Take 1 tablet by mouth daily     verapamil ER (CALAN-SR) 180 MG CR tablet Take 1 tablet (180 mg) by mouth daily     No current facility-administered medications for this visit.      Medications Discontinued During This Encounter   Medication Reason     sulfamethoxazole-trimethoprim (BACTRIM/SEPTRA) 400-80 MG tablet Medication Reconciliation Clean Up     carvedilol (COREG) 6.25 MG tablet        Physical Exam   Vital Signs: BP (!) 186/96 (BP Location: Left arm, Patient Position: Chair, Cuff Size: Adult Regular)   Pulse 65   Ht 1.753 m (5' 9\")   Wt 125.2 kg (276 lb)   BMI 40.76 kg/m       GENERAL APPEARANCE: alert and no distress  HENT: mouth without ulcers or lesions  LYMPHATICS: no cervical or supraclavicular nodes  RESP: lungs clear to auscultation - no rales, rhonchi or wheezes  CV: regular rhythm, normal rate, no rub, no murmur  EDEMA: trace LE edema bilaterally  ABDOMEN: soft, nondistended, nontender, bowel sounds normal, overweight  MS: extremities normal - no gross deformities noted, no evidence of inflammation in joints, no muscle tenderness  SKIN: no rash  TX KIDNEY: normal  DIALYSIS ACCESS:  " None      Data     Renal Latest Ref Rng & Units 2/21/2020 11/11/2019 3/12/2019   Na 133 - 144 mmol/L - - 142   Na (external) 136 - 146 mmol/L 139 141 -   K 3.4 - 5.3 mmol/L - - 4.1   K (external) 3.5 - 5.1 mmol/L 4.6 4.4 -   Cl 94 - 109 mmol/L - - 108   Cl (external) 98 - 107 mmol/L 103 105 -   CO2 20 - 32 mmol/L - - 26   CO2 (external) 22 - 29 mmol/L 25 25 -   BUN 7 - 30 mg/dL - - 48(H)   BUN (external) 10.0 - 20.0 mg/dL 38.2(H) 36(H) -   Cr 0.66 - 1.25 mg/dL - - 2.35(H)   Cr (external) 0.72 - 1.25 mg/dL 2.01(H) 2.05(H) -   Glucose 70 - 99 mg/dL - - 96   Glucose (external) 70 - 100 mg/dL 103(H) 119(H) -   Ca  8.5 - 10.1 mg/dL - - 8.0(L)   Ca (external) 8.6 - 10.5 mg/dL 8.8 8.9 -   Mg 1.6 - 2.3 mg/dL - - -     Bone Health Latest Ref Rng & Units 5/29/2018 1/28/2010 9/6/2007   Phos 2.5 - 4.5 mg/dL - 3.5 3.0   Vit D Def (external) See scan ng/ml 46 - -     Heme Latest Ref Rng & Units 2/21/2020 11/11/2019 3/12/2019   WBC 4.0 - 11.0 10e9/L - - -   WBC (external) 3.9 - 11.9 10(3)/uL 7.6 8.1 -   Hgb 13.3 - 17.7 g/dL - - 13.4   Hgb (external) 13.1 - 17.1 g/dL 13.8 13.4(L) -   Plt 150 - 450 10e9/L - - -   Plt (external) 179 - 450 10(3)/uL 135(L) 146 -     Liver Latest Ref Rng & Units 5/14/2018 6/15/2016 1/28/2010   AP 40 - 150 U/L - - 116   AP (external) 38 - 126 U/L 118 105 -   TBili 0.2 - 1.3 mg/dL - - 0.6   TBili (external) 0.2 - 1.3 mg/dL 0.4 0.8 -   ALT 0 - 70 U/L - - 37   ALT (external) 21 - 72 U/L 55 35 -   AST 0 - 55 U/L - - 27   AST (external) 17 - 59 U/L 43 26 -   Tot Protein 6.8 - 8.8 g/dL - - 7.8   Tot Protein (external) 6.3 - 8.2 g/dL 7.0 7.0 -   Albumin 3.9 - 5.1 g/dL - - 4.7   Albumin (external) 3.5 - 5.0 g/dL 4.1 4.1 -        Iron studies Latest Ref Rng & Units 12/14/2015   Ferritin 26 - 388 ng/mL 220     [facility] Txp Virology Latest Ref Rng & Units 3/12/2019 5/21/2015 8/8/2011   CVM DNA Quant - - - -   CMV DNA Quant Ext Not Detected - NOT DETECTED -   CMV Quant <100 Copies/mL - - -   CMV QT Log <2.0 Log copies/mL  - - -   BK Spec - Plasma - Plasma, EDTA anticoagulant   BK Res BKNEG:BK Virus DNA Not Detected copies/mL <500(A) - <1000   BK Log <2.7 Log copies/mL <2.7 - <3.0  The lower limit of detection for this assay is 1000 copies/mL.  Real-time TaqMan   PCR was performed using BK primers and probe for the detection of a 90 bp   portion of the  1 gene.  The performance characteristics were validated by   the [facility].   It has not been cleared or approved by the U.S. Food and Drug Administration.            Recent Labs   Lab Test 09/20/13  0800   DOSMPA Not Provided   MPACID Unsatisfactory specimen - too old for testing   MPAG Unsatisfactory specimen - too old for testing       Again, thank you for allowing me to participate in the care of your patient.      Sincerely,    Radu Bunch MD

## 2020-04-05 DIAGNOSIS — Z94.0 KIDNEY REPLACED BY TRANSPLANT: Primary | ICD-10-CM

## 2020-04-06 RX ORDER — MYCOPHENOLATE MOFETIL 250 MG/1
750 CAPSULE ORAL 2 TIMES DAILY
Qty: 540 CAPSULE | Refills: 3 | Status: SHIPPED | OUTPATIENT
Start: 2020-04-06 | End: 2021-03-29

## 2020-04-16 ENCOUNTER — TELEPHONE (OUTPATIENT)
Dept: TRANSPLANT | Facility: CLINIC | Age: 65
End: 2020-04-16

## 2020-04-16 NOTE — TELEPHONE ENCOUNTER
Prior Authorization Specialty Medication Request    Medication/Dose:   mycophenolate (GENERIC EQUIVALENT) 250 MG capsule  Take 3 capsules (750 mg) by mouth 2 times daily      predniSONE (DELTASONE) 5 MG tablet  Take 1 tablet (5 mg) by mouth daily      sulfamethoxazole-trimethoprim (BACTRIM) 400-80 MG per tablet  Take 1 tablet by mouth three times a week      ICD code (if different than what is on RX):  Z94.0  Previously Tried and Failed:      Important Lab Values:   Rationale:     Insurance Name: Medicare  Insurance ID:   Insurance Phone Number:     Pharmacy Information (if different than what is on RX)  Name:  [facility]  Phone:  [phone]

## 2020-04-17 ENCOUNTER — TELEPHONE (OUTPATIENT)
Dept: TRANSPLANT | Facility: CLINIC | Age: 65
End: 2020-04-17

## 2020-04-17 NOTE — TELEPHONE ENCOUNTER
PA Initiation    Medication: mycophenolate (GENERIC EQUIVALENT) 250 MG capsule - PA INTIIATED  Insurance Company: [employer] - Phone [phone] Fax [phone]  Pharmacy Filling the Rx:    Filling Pharmacy Phone:    Filling Pharmacy Fax:    Start Date: 4/17/2020

## 2020-04-17 NOTE — TELEPHONE ENCOUNTER
From: Raimundo Squires [[email or web address]]  Sent: Thursday, April 16, 2020 4:28 PM  To: Lorna Sanchez  Subject: Transplant coordinator    I have lost any other contact numbers and am not even sure who is my coordinator anymore. Anyway I started Medicare and my supplements on April 1st and I just tried to fill my immune suppressant drugs and they were denied saying I needed a clinical review by Dr Bunch to get them OK ed by [employer] (my med supplement).    Can you please let me know who I should talk to and how to get ahold of them. I only have enough cellcept to get me by until Monday    Carlos Squires     PLAN:  Send Message via [facility] number.

## 2020-04-17 NOTE — TELEPHONE ENCOUNTER
Three medications sent in one encounter. Separate encounters have been created for the non-specialty medications and they have been routed to the appropriate team. Mycophenolate will be routed to our Transplant Specialist.

## 2020-04-20 ENCOUNTER — TRANSFERRED RECORDS (OUTPATIENT)
Dept: HEALTH INFORMATION MANAGEMENT | Facility: CLINIC | Age: 65
End: 2020-04-20

## 2020-04-20 NOTE — TELEPHONE ENCOUNTER
PRIOR AUTHORIZATION DENIED    Medication: mycophenolate (GENERIC EQUIVALENT) 250 MG capsule - PA Denied    Denial Date: 4/17/2020    Denial Rational: Prior auth was denied through [employer] Part D benefits stating that patient was eligible for Medicare at the time of their transplant.  Per insurance portal, Medicare Part A and Part B show it was effective 4/1/20 due to Age-related eligibility. Liaison is looking into this further and confirm whether or not patient had Medicare on 7/29/04.  If that was not the case we can appeal the request with [employer].    Appeal Information:

## 2020-04-20 NOTE — TELEPHONE ENCOUNTER
Patient states they paid for 10 days supply out-of-pocket around $50+. They state their Medicare B wasn't active until after they had their transplant back in 2004. Was told that info from Medicare as well and that immunos should be billed to Part D.  However, patient did not have the exact date that Medicare A/B was originally effective back in 2004. Patient is aware that liaison is working on getting this info and working on the prior auth.

## 2020-04-21 ENCOUNTER — TELEPHONE (OUTPATIENT)
Dept: TRANSPLANT | Facility: CLINIC | Age: 65
End: 2020-04-21

## 2020-04-21 NOTE — TELEPHONE ENCOUNTER
Patient Call: Voicemail  Date/Time: 4/21/20 @ 11:15 AM  Reason for call: patient called to touch base with coordinator. No details given

## 2020-04-21 NOTE — TELEPHONE ENCOUNTER
Gurjit Squires 1 hour ago (11:28 AM)         I tried to call you but got put to a message center. I have a couple of questions if you could call be please.            Gurjit Squires 3 days ago         Thanks for replying. I sent a message for Dr. Bunch regarding my Cellcept or Mycophenolate and how Medicare B was refusing to pay for it and I am having trouble getting my [employer] to cover it as well and I was told it required input from Dr. Bunch for this to be rectified. I only have enough in my tray to last until Tuesday before I am out. The Pharmacist at [facility] is working on getting this fixed but it still is going to need help from MARGIE Bunch. I was told either [employer] or the Pharmacist will be contacting him but anything he can do to make sure I am not going to run out would be greatly appreciated.     Called Gurjit Squires who reports that [employer] is saying it is a Medicare issue and Medicare says it is a [employer] issue.  Made aware Pharmacy Liaalejandra Diaz is working on this issue.  Also discussed immunizations PCP is requiring. However he can't remember which one.  Mentioned Shingles and discussed he may get the Shingrix shot because it is a non-live vaccine.  States he will find out which ones and will send RNCC a message via [employer].

## 2020-04-21 NOTE — TELEPHONE ENCOUNTER
Spoke to Medicare Coordination of Benefits (phone [phone])  Silvina Ref#[id number].     Patient qualified for Dialysis MedIcare Part A effective 06/03/04 to 07/01/04     Patient qualified for ESRD Medicare Part A  effective 07/01/04 to 7/31/07, then Disability Medicare Part A effective 08/01/07-08/31/17.    It was reactivated Age-Related Medicare Part A effective 04/01/20 to present.     Patient qualified for ESRD Medicare Part B effective 07/01/04 to 07/31/07. Then Disability Medicare Part B 08/01/07 to 08/31/13.   It was reactivated Age-Related Medicare Part B effective 04/01/20 to present.    Due to this, patient's immunosuppression Mycophenolate and Prednisone should be billed to Medicare Part B (pays %80) and then patient states they have a [employer] supplement insurance ID#[id number] (pays %20 coinsurance) (effective 4/1/20).    Patient is aware their final copay should be $0 with the [employer] supplement. Liaison is reaching out to pharmacy to advise them to rebill/refund patient for the 10 day supply that was purchased recently.    Spoke to [employer] Nancy, advised them that patient did have Medicare Part A during their transplant in 2004. They will have their pharmacist call Medicare to confirm this before they can rebill the claims.  They will call me back once they've spoken to Medicare.

## 2020-04-22 NOTE — TELEPHONE ENCOUNTER
Patient had Medicare at time of transplant. Both Prednisone and Mycophenolate are to be billed through Medicare Part B then [employer] supplement insurance. Spoke to [facility] IRMA Cristobal who will work on rebilling both Prednisone and Mycophenolate for this patient. States they need patient to provide the [employer] Supplemental insurance ID info to them when patient goes in to  the medication at the pharmacy.    Patient is aware and will work with [facility] to get medications billed correctly. Patient aware [facility] is able to get medication delivered if the patient continues to have any issues getting medications at [facility].

## 2020-05-20 DIAGNOSIS — Z94.0 KIDNEY REPLACED BY TRANSPLANT: ICD-10-CM

## 2020-05-20 RX ORDER — FUROSEMIDE 20 MG
TABLET ORAL
Qty: 360 TABLET | Refills: 1 | Status: SHIPPED | OUTPATIENT
Start: 2020-05-20 | End: 2020-10-22

## 2020-06-30 DIAGNOSIS — Z94.0 KIDNEY TRANSPLANTED: Primary | ICD-10-CM

## 2020-06-30 RX ORDER — SULFAMETHOXAZOLE AND TRIMETHOPRIM 400; 80 MG/1; MG/1
1 TABLET ORAL
Qty: 13 TABLET | Refills: 11 | Status: SHIPPED | OUTPATIENT
Start: 2020-07-01 | End: 2020-07-01

## 2020-07-01 ENCOUNTER — MYC REFILL (OUTPATIENT)
Dept: TRANSPLANT | Facility: CLINIC | Age: 65
End: 2020-07-01

## 2020-07-01 DIAGNOSIS — Z94.0 KIDNEY TRANSPLANTED: Primary | ICD-10-CM

## 2020-07-02 RX ORDER — SULFAMETHOXAZOLE AND TRIMETHOPRIM 400; 80 MG/1; MG/1
1 TABLET ORAL
Qty: 13 TABLET | Refills: 11 | Status: SHIPPED | OUTPATIENT
Start: 2020-07-03 | End: 2021-07-15

## 2020-07-28 ENCOUNTER — TELEPHONE (OUTPATIENT)
Dept: TRANSPLANT | Facility: CLINIC | Age: 65
End: 2020-07-28

## 2020-07-28 NOTE — TELEPHONE ENCOUNTER
Patient Call: Voicemail  Date/Time: 7/25/2020 @ 12:36pm  Reason for call: patient left voicemail he had a stroke and needs to discuss with RNCC or Dr. Bunch. Providers want to start him on aspirin.

## 2020-07-28 NOTE — TELEPHONE ENCOUNTER
Left VM with Carlos. Ok to take ASA prescribed by MD after stroke.   Asked patient to call back for further discussion.

## 2020-07-28 NOTE — TELEPHONE ENCOUNTER
Patient Call: Voicemail  Date/Time: 7/28/2020 @ 1:09pm  Reason for call: Patient left voicemail requesting call back from RNCC. No further information given.

## 2020-10-22 DIAGNOSIS — Z94.0 KIDNEY REPLACED BY TRANSPLANT: ICD-10-CM

## 2020-10-22 RX ORDER — FUROSEMIDE 20 MG
TABLET ORAL
Qty: 360 TABLET | Refills: 1 | Status: SHIPPED | OUTPATIENT
Start: 2020-10-22 | End: 2021-05-11

## 2020-11-20 ENCOUNTER — MYC MEDICAL ADVICE (OUTPATIENT)
Dept: TRANSPLANT | Facility: CLINIC | Age: 65
End: 2020-11-20

## 2020-11-22 ENCOUNTER — HEALTH MAINTENANCE LETTER (OUTPATIENT)
Age: 65
End: 2020-11-22

## 2020-11-24 ENCOUNTER — TELEPHONE (OUTPATIENT)
Dept: TRANSPLANT | Facility: CLINIC | Age: 65
End: 2020-11-24

## 2020-11-24 NOTE — TELEPHONE ENCOUNTER
Date tested positive for Covid 19: 10/27/20    Symptoms: started 10/18  Temp 99 -  101 (4-5 days)  Chest congestion  Fatigue  NATACHA called and was told he can stop isolating    INITIAL (Pre Covid-19) IMMUNOSUPPRESSION   mg BID  Pred 5 mg daily    --- no changes were made to immunosuppression, transplant team just found out of positive result    UPDATE:  Feels fine now. Just gets fatigue real easy.  Mentioned had a stroke in July, is back on PT.  Instructed to get retested for COVID-19      Covid 19 Review Meeting Recommendations:  Complete COVID 19 PCR every 2 weeks until 1 negative result.    LPN task:  Please call Gurjit Squires and discuss recommendations to get COVID 19 PCR checked every 2 weeks until 1 negative result.

## 2020-12-03 DIAGNOSIS — E87.20 ACIDOSIS: ICD-10-CM

## 2020-12-03 RX ORDER — SODIUM BICARBONATE 650 MG/1
650 TABLET ORAL 2 TIMES DAILY
Qty: 180 TABLET | Refills: 3 | Status: SHIPPED | OUTPATIENT
Start: 2020-12-03 | End: 2021-10-11

## 2020-12-08 ENCOUNTER — DOCUMENTATION ONLY (OUTPATIENT)
Dept: TRANSPLANT | Facility: CLINIC | Age: 65
End: 2020-12-08

## 2020-12-08 NOTE — PROGRESS NOTES
Gurjit Squires Richard Steven, MD 39 minutes ago (11:06 AM)        I have been getting requests to get a covid test until I get a negative one. I had covid very mildly for about 5-7 days back in October. I did my quarantine time and was told by both the State and my regular doctor I was good to go and not contagious anymore. I did have another test that did come back positive but was told that is pretty normal and that the antibodies will do that. I have no symptoms at this time. When I requested to get another covid test I was told I cannot go to the lab for my INR test because I am getting a covid test and have to do it in the area that they do the covid testing. If it is going to make it hard or impossible for me to use their facilities because of having to take test over and over again until I get a negative one, I am questioning the good that keeping to get these tests is. I am assuming that these tests would be better off going to someone with symptoms. Plus I cannot see the positive in racking up bills unnecessarily.

## 2020-12-22 ENCOUNTER — TELEPHONE (OUTPATIENT)
Dept: NEPHROLOGY | Facility: CLINIC | Age: 65
End: 2020-12-22

## 2021-01-05 ENCOUNTER — VIRTUAL VISIT (OUTPATIENT)
Dept: NEPHROLOGY | Facility: CLINIC | Age: 66
End: 2021-01-05
Attending: INTERNAL MEDICINE
Payer: MEDICARE

## 2021-01-05 ENCOUNTER — TELEPHONE (OUTPATIENT)
Dept: TRANSPLANT | Facility: CLINIC | Age: 66
End: 2021-01-05

## 2021-01-05 DIAGNOSIS — Z48.298 AFTERCARE FOLLOWING ORGAN TRANSPLANT: Primary | ICD-10-CM

## 2021-01-05 DIAGNOSIS — E61.1 IRON DEFICIENCY: ICD-10-CM

## 2021-01-05 DIAGNOSIS — I63.532 CEREBROVASCULAR ACCIDENT (CVA) DUE TO OCCLUSION OF LEFT POSTERIOR CEREBRAL ARTERY (H): ICD-10-CM

## 2021-01-05 DIAGNOSIS — U07.1 2019 NOVEL CORONAVIRUS DISEASE (COVID-19): ICD-10-CM

## 2021-01-05 DIAGNOSIS — I15.1 HTN, KIDNEY TRANSPLANT RELATED: ICD-10-CM

## 2021-01-05 DIAGNOSIS — Z94.0 HTN, KIDNEY TRANSPLANT RELATED: ICD-10-CM

## 2021-01-05 DIAGNOSIS — D84.9 IMMUNOSUPPRESSION (H): ICD-10-CM

## 2021-01-05 PROCEDURE — 99215 OFFICE O/P EST HI 40 MIN: CPT | Mod: 95

## 2021-01-05 RX ORDER — WARFARIN SODIUM 5 MG/1
5 TABLET ORAL
COMMUNITY
Start: 2020-09-17 | End: 2021-09-17

## 2021-01-05 RX ORDER — ATORVASTATIN CALCIUM 20 MG/1
1 TABLET, FILM COATED ORAL
COMMUNITY
Start: 2020-04-20

## 2021-01-05 NOTE — TELEPHONE ENCOUNTER
Jose Goetz MD Ututalum, Teresa, RN             Please add hba1c to next labs     thx        LPN task:  Please send lab orders to add Hemoglobin A1c with next lab draw.

## 2021-01-05 NOTE — PROGRESS NOTES
Gurjit Squires is a 65 year old male who is being evaluated via a billable video visit.      How would you like to obtain your AVS? Mail a copy  If the video visit is dropped, the invitation should be resent by: Send to e-mail at: [email or web address]  Will anyone else be joining your video visit? 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  Evidence of recurrent IgA nephropathy.  Mild interstitial fibrosis and tubular atrophy.             9/6/07; Borderline acute cellular-mediated rejection.  Evidence of recurrent IgA nephropathy.  Nodular arteriolar hyalinosis, consistent with CNI toxicity.  Mild interstitial fibrosis and tubular atrophy.             11/3/05; Unremarkable with no evidence or acute rejection.    # Immunosuppression: Mycophenolate mofetil (goal  1-3.5) and Prednisone (dose  5 mg daily)   - Changes: No   monitoring for long term toxicity and complications including infections and malignancies while on lifelong immunosuppression      # Prophylaxis: PCP none- add CD4 count    # Hypertension: ; Goal BP: < 130/80   - Changes: none    # Mineral Bone Disorder:    -  "Vitamin D; level is: Normal and will continue on cholecalciferol.   - Calcium; level is: Normal     # Electrolytes:   - Potassium; level: Normal  - Bicarbonate; level: Normal and will continue on sodium bicarbonate.    # Overweight: Stable weight, but elevated.   - Recommend increased exercise and watch caloric intake.    # Gout: No recent flares.  Patient is on allopurinol.    # Skin Cancer Risk:    - Discussed sun protection and recommend regular follow up with Dermatology.    # CVA: L ischemic stroke,     #COVID-19: recovered & cleared the virus, managed as OP and didn't require any changes to IS    # Medical Compliance: No, due to infrequent labs.   - Discussed importance of checking labs regularly as recommended, taking medications as prescribed and attending scheduled medical appointments    Return visit: No follow-ups on file.    # Transplant History:  Etiology of kidney failure: IgA nephropathy  Tx: LDKT  Transplant: 7/29/2004 (Kidney)  Donor Type: Living Donor Class: Standard Criteria Donor  Significant changes in immunosuppression: Taken off CNI due to evidence of CNI toxicity and BK viremia.  Significant transplant-related complications: BK viremia    Transplant Office Phone Number: [phone]    Assessment and plan was discussed with the patient and he voiced his understanding and agreement.    Jose Goetz MD    Chief Complaint   Mr. Squires is a 65 year old here for routine follow up.  .    History of Present Illness    Mr. Squires reports feeling good overall but had several health issues over the past couple months. He had an ischemic L CVA in July when he presented with R sided weakness & numbness found to have \" subacute ischemic infarct in the left periventricular white matter extending into the posterior left basal ganglia\". Echo showed G! Diastolic dysfunction & concentric LVH, no valvular abnormalities, duplex carotid showed no significant stenosis , 1 week Holter monitor was neg for any " arrhythmia. Given his previous hx of transient Afib postop postop LDKTx, he was prescribed coumadin for secondary stroke prevention. He was alsodiagnosed with COVID 19 in October: low grade fevers x several days without any respiratory illness or GI illness & no IS changes. He reports minimal leg swelling but denies any weight gain or shortness of breath.  bp better controlled 130s/70-80s. He got flu shot , booster shingrix. Colonoscopy due next year.He is up to date cancer screening & saw dermatology.       Recent Hospitalizations:  [] No [x] Yes CVA 7/2020  COVID19 10/2020   New Medical Issues: [x] No [] Yes    Decreased energy: [x] No [] Yes    Chest pain or SOB with exertion:  [x] No [] Yes    Appetite change or weight change: [x] No [] Yes    Nausea, vomiting or diarrhea:  [x] No [] Yes    Fever, sweats or chills: [x] No [] Yes    Leg swelling: [x] No [] Yes      Home BP: 130/70-80s    Review of Systems   A comprehensive review of systems was obtained and negative, except as noted in the HPI or PMH.    Problem List   Patient Active Problem List   Diagnosis     Testicular hypofunction     Kidney replaced by transplant     Erectile dysfunction     HTN, kidney transplant related     Immunosuppression (H)     Metabolic acidosis     Vitamin D deficiency     VANESA (obstructive sleep apnea)     Overweight     Aftercare following organ transplant       Social History   Social History     Tobacco Use     Smoking status: Never Smoker     Smokeless tobacco: Never Used   Substance Use Topics     Alcohol use: Yes     Comment: social     Drug use: No       Allergies   No Known Allergies    Medications   Current Outpatient Medications   Medication Sig     acetaminophen (TYLENOL) 325 MG tablet Take 2 tablets (650 mg) by mouth every 6 hours as needed for pain (Patient taking differently: Take 1,000 mg by mouth daily )     allopurinol (ZYLOPRIM) 100 MG tablet Take 3 tablets (300 mg) by mouth daily     atorvastatin (LIPITOR) 20 MG  tablet Take 1 tablet by mouth     carvedilol 12.5 MG PO tablet Take 1 tablet (12.5 mg) by mouth 2 times daily (with meals)     cholecalciferol (VITAMIN D) 1000 UNIT tablet Take 1 tablet (1,000 Units) by mouth daily     clonazePAM (KLONOPIN) 0.5 MG tablet Take 1 tablet (0.5 mg) by mouth daily (Patient taking differently: Take 0.25 mg by mouth every evening )     ferrous sulfate (FEROSUL) 325 (65 Fe) MG tablet TAKE 1 TABLET BY MOUTH ONCE DAILY AT BREAKFAST     Fexofenadine HCl (ALLEGRA PO) Take 1 tablet by mouth daily.      furosemide (LASIX) 20 MG tablet TAKE 2 TABLETS BY MOUTH IN THE MORNING AND IN THE EVENING     gabapentin (NEURONTIN) 300 MG capsule Take 1 capsule (300 mg) by mouth At Bedtime     losartan (COZAAR) 100 MG tablet TAKE 1 TABLET BY MOUTH EVERY DAY     Multiple Vitamins-Minerals (ONE-A-DAY MENS 50+ ADVANTAGE PO) Take 1 tablet by mouth every evening     mycophenolate (GENERIC EQUIVALENT) 250 MG capsule Take 3 capsules (750 mg) by mouth 2 times daily     omega-3 fatty acids (FISH OIL) 1200 MG capsule Take 1 capsule by mouth 3 times daily.     pantoprazole (PROTONIX) 40 MG EC tablet TAKE 1 TABLET BY MOUTH DAILY (Patient taking differently: 20 mg daily)     predniSONE (DELTASONE) 5 MG tablet Take 1 tablet (5 mg) by mouth daily     sodium bicarbonate 650 MG tablet Take 1 tablet (650 mg) by mouth 2 times daily     sulfamethoxazole-trimethoprim (BACTRIM) 400-80 MG tablet Take 1 tablet by mouth three times a week     TraMADol HCl 200 MG TB24 Take 1 tablet by mouth daily     verapamil ER (CALAN-SR) 180 MG CR tablet Take 1 tablet (180 mg) by mouth daily     warfarin ANTICOAGULANT (COUMADIN) 5 MG tablet Take 5 mg by mouth     No current facility-administered medications for this visit.      There are no discontinued medications.    Physical Exam   Vital Signs: There were no vitals taken for this visit.    GENERAL APPEARANCE: alert and no distress  Lungs: unlabored breathing  Neuro: alert      Data     Renal  Latest Ref Rng & Units 11/25/2020 2/21/2020 11/11/2019   Na 133 - 144 mmol/L - - -   Na (external) 136 - 145 meq/L 144 139 141   K 3.4 - 5.3 mmol/L - - -   K (external) 3.5 - 5.1 meq/L 4.6 4.6 4.4   Cl 94 - 109 mmol/L - - -   Cl (external) 98 - 109 meq/L 109 103 105   CO2 20 - 32 mmol/L - - -   CO2 (external) 20 - 29 meq/L 24 25 25   BUN 7 - 30 mg/dL - - -   BUN (external) 6 - 22 mg/dL 29(H) 38.2(H) 36(H)   Cr 0.66 - 1.25 mg/dL - - -   Cr (external) 0.70 - 1.30 mg/dL 1.87(H) 2.01(H) 2.05(H)   Glucose 70 - 99 mg/dL - - -   Glucose (external) 70 - 99 mg/dL 139(H) 103(H) 119(H)   Ca  8.5 - 10.1 mg/dL - - -   Ca (external) 8.5 - 10.5 mg/dL 8.6 8.8 8.9   Mg 1.6 - 2.3 mg/dL - - -     Bone Health Latest Ref Rng & Units 2/21/2020 5/29/2018 1/28/2010   Phos 2.5 - 4.5 mg/dL - - 3.5   Vit D Def (external) 30.0 - 100.0 ng/mL 41.3 46 -     Heme Latest Ref Rng & Units 11/25/2020 2/21/2020 11/11/2019   WBC 4.0 - 11.0 10e9/L - - -   WBC (external) 4.0 - 11.0 K/uL 10.5 7.6 8.1   Hgb 13.3 - 17.7 g/dL - - -   Hgb (external) 13.5 - 17.5 g/dL 11.1(L) 13.8 13.4(L)   Plt 150 - 450 10e9/L - - -   Plt (external) 140 - 400 K/uL 226 135(L) 146   ABSOLUTE NEUTROPHIL 1.6 - 8.3 10e9/L - - -   ABSOLUTE NEUTROPHILS (EXTERNAL) 1.8 - 8.0 K/uL - - 5.7   ABSOLUTE LYMPHOCYTES 0.8 - 5.3 10e9/L - - -   ABSOLUTE LYMPHOCYTES (EXTERNAL) 0.8 - 4.1 K/uL - - 1.3   ABSOLUTE MONOCYTES 0.0 - 1.3 10e9/L - - -   ABSOLUTE MONOCYTES (EXTERNAL) 0.0 - 1.0 K/uL - - 0.9   ABSOLUTE EOSINOPHILS 0.0 - 0.7 10e9/L - - -   ABSOLUTE EOSINOPHILS (EXTERNAL) 0.0 - 0.7 K/uL - - 0.2   ABSOLUTE BASOPHILS 0.0 - 0.2 10e9/L - - -   ABSOLUTE BASOPHILS (EXTERNAL) 0.0 - 0.2 K/uL - - 0.0   ABS IMMATURE GRANULOCYTES 0 - 0.4 10e9/L - - -   ABSOLUTE NUCLEATED RBC - - - -     Liver Latest Ref Rng & Units 5/14/2018 6/15/2016 1/28/2010   AP 40 - 150 U/L - - 116   AP (external) 38 - 126 U/L 118 105 -   TBili 0.2 - 1.3 mg/dL - - 0.6   TBili (external) 0.2 - 1.3 mg/dL 0.4 0.8 -   ALT 0 - 70 U/L - -  37   ALT (external) 21 - 72 U/L 55 35 -   AST 0 - 55 U/L - - 27   AST (external) 17 - 59 U/L 43 26 -   Tot Protein 6.8 - 8.8 g/dL - - 7.8   Tot Protein (external) 6.3 - 8.2 g/dL 7.0 7.0 -   Albumin 3.9 - 5.1 g/dL - - 4.7   Albumin (external) 3.5 - 5.0 g/dL 4.1 4.1 -        Iron studies Latest Ref Rng & Units 12/14/2015   Ferritin 26 - 388 ng/mL 220     [facility] Txp Virology Latest Ref Rng & Units 3/12/2019 5/21/2015 8/8/2011   CVM DNA Quant - - - -   CMV DNA Quant Ext Not Detected - NOT DETECTED -   CMV Quant <100 Copies/mL - - -   CMV QT Log <2.0 Log copies/mL - - -   BK Spec - Plasma - Plasma, EDTA anticoagulant   BK Res BKNEG:BK Virus DNA Not Detected copies/mL <500(A) - <1000   BK Log <2.7 Log copies/mL <2.7 - <3.0  The lower limit of detection for this assay is 1000 copies/mL.  Real-time TaqMan   PCR was performed using BK primers and probe for the detection of a 90 bp   portion of the  1 gene.  The performance characteristics were validated by   the [facility].   It has not been cleared or approved by the U.S. Food and Drug Administration.   EBV DNA COPIES/ML <1000 Copies/mL - - -   EBV DNA LOG OF COPIES <3.0 Log copies/mL - - -            Recent Labs   Lab Test 09/20/13  0800   DOSMPA Not Provided   MPACID Unsatisfactory specimen - too old for testing   MPAG Unsatisfactory specimen - too old for testing     MRI Brain 7/2020  1. Acute to early subacute ischemic infarct in the left periventricular white matter extending   into the posterior left basal ganglia likely accounting for the patient's clinical symptoms.     2.  No acute intracranial hemorrhage or mass.     3.  Additional scattered foci of supratentorial increased T2/FLAIR signal which are   nonspecific, although commonly seen with chronic small vessel disease.

## 2021-01-05 NOTE — LETTER
PHYSICIAN ORDERS      DATE & TIME ISSUED: 2021 11:35 AM  PATIENT NAME: Gurjit Squires   : 1955     [facility] MR# [if applicable]: [id number]     DIAGNOSIS:  Kidney transplant   ICD-10 CODE: Z94.0      Please complete the following lab with next lab draw  Hemoglobin A1c     Any questions please call: [phone] option 5    Please fax results to [phone].      Jose Goetz MD

## 2021-01-05 NOTE — LETTER
1/5/2021       RE: Gurjit Squires  [address]  [address]  [facility] [address]     Dear Colleague,    Thank you for referring your patient, Gurjit Squires, to the [facility] [place] [facility]. Please see a copy of my visit note below.    Gurjit Squires is a 65 year old male who is being evaluated via a billable video visit.      How would you like to obtain your AVS? Mail a copy  If the video visit is dropped, the invitation should be resent by: Send to e-mail at: [email or web address]  Will anyone else be joining your video visit? 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  Evidence of recurrent IgA nephropathy.  Mild interstitial fibrosis and tubular atrophy.             9/6/07; Borderline acute cellular-mediated rejection.  Evidence of recurrent IgA nephropathy.  Nodular arteriolar hyalinosis, consistent with CNI toxicity.  Mild interstitial fibrosis and tubular atrophy.             11/3/05; Unremarkable with no evidence or acute rejection.    # Immunosuppression: Mycophenolate mofetil  (goal  1-3.5) and Prednisone (dose  5 mg daily)   - Changes: No   monitoring for long term toxicity and complications including infections and malignancies while on lifelong immunosuppression      # Prophylaxis: PCP none- add CD4 count    # Hypertension: ; Goal BP: < 130/80   - Changes: none    # Mineral Bone Disorder:    - Vitamin D; level is: Normal and will continue on cholecalciferol.   - Calcium; level is: Normal     # Electrolytes:   - Potassium; level: Normal  - Bicarbonate; level: Normal and will continue on sodium bicarbonate.    # Overweight: Stable weight, but elevated.   - Recommend increased exercise and watch caloric intake.    # Gout: No recent flares.  Patient is on allopurinol.    # Skin Cancer Risk:    - Discussed sun protection and recommend regular follow up with Dermatology.    # CVA: L ischemic stroke,     #COVID-19: recovered & cleared the virus, managed as OP and didn't require any changes to IS    # Medical Compliance: No, due to infrequent labs.   - Discussed importance of checking labs regularly as recommended, taking medications as prescribed and attending scheduled medical appointments    Return visit: No follow-ups on file.    # Transplant History:  Etiology of kidney failure: IgA nephropathy  Tx: LDKT  Transplant: 7/29/2004 (Kidney)  Donor Type: Living Donor Class: Standard Criteria Donor  Significant changes in immunosuppression: Taken off CNI due to evidence of CNI toxicity and BK viremia.  Significant transplant-related complications: BK viremia    Transplant Office Phone Number: [phone]    Assessment and plan was discussed with the patient and he voiced his understanding and agreement.    Jose Goetz MD    Chief Complaint   Mr. Squires is a 65 year old here for routine follow up.  .    History of Present Illness    Mr. Squires reports feeling good overall but had several health issues over the past couple months. He had an ischemic L CVA in July when he presented with R  "sided weakness & numbness found to have \" subacute ischemic infarct in the left periventricular white matter extending into the posterior left basal ganglia\". Echo showed G! Diastolic dysfunction & concentric LVH, no valvular abnormalities, duplex carotid showed no significant stenosis , 1 week Holter monitor was neg for any arrhythmia. Given his previous hx of transient Afib postop postop LDKTx, he was prescribed coumadin for secondary stroke prevention. He was alsodiagnosed with COVID 19 in October: low grade fevers x several days without any respiratory illness or GI illness & no IS changes. He reports minimal leg swelling but denies any weight gain or shortness of breath.  bp better controlled 130s/70-80s. He got flu shot , booster shingrix. Colonoscopy due next year.He is up to date cancer screening & saw dermatology.       Recent Hospitalizations:  [] No [x] Yes CVA 7/2020  COVID19 10/2020   New Medical Issues: [x] No [] Yes    Decreased energy: [x] No [] Yes    Chest pain or SOB with exertion:  [x] No [] Yes    Appetite change or weight change: [x] No [] Yes    Nausea, vomiting or diarrhea:  [x] No [] Yes    Fever, sweats or chills: [x] No [] Yes    Leg swelling: [x] No [] Yes      Home BP: 130/70-80s    Review of Systems   A comprehensive review of systems was obtained and negative, except as noted in the HPI or PMH.    Problem List   Patient Active Problem List   Diagnosis     Testicular hypofunction     Kidney replaced by transplant     Erectile dysfunction     HTN, kidney transplant related     Immunosuppression (H)     Metabolic acidosis     Vitamin D deficiency     VANESA (obstructive sleep apnea)     Overweight     Aftercare following organ transplant       Social History   Social History     Tobacco Use     Smoking status: Never Smoker     Smokeless tobacco: Never Used   Substance Use Topics     Alcohol use: Yes     Comment: social     Drug use: No       Allergies   No Known Allergies    Medications "   Current Outpatient Medications   Medication Sig     acetaminophen (TYLENOL) 325 MG tablet Take 2 tablets (650 mg) by mouth every 6 hours as needed for pain (Patient taking differently: Take 1,000 mg by mouth daily )     allopurinol (ZYLOPRIM) 100 MG tablet Take 3 tablets (300 mg) by mouth daily     atorvastatin (LIPITOR) 20 MG tablet Take 1 tablet by mouth     carvedilol 12.5 MG PO tablet Take 1 tablet (12.5 mg) by mouth 2 times daily (with meals)     cholecalciferol (VITAMIN D) 1000 UNIT tablet Take 1 tablet (1,000 Units) by mouth daily     clonazePAM (KLONOPIN) 0.5 MG tablet Take 1 tablet (0.5 mg) by mouth daily (Patient taking differently: Take 0.25 mg by mouth every evening )     ferrous sulfate (FEROSUL) 325 (65 Fe) MG tablet TAKE 1 TABLET BY MOUTH ONCE DAILY AT BREAKFAST     Fexofenadine HCl (ALLEGRA PO) Take 1 tablet by mouth daily.      furosemide (LASIX) 20 MG tablet TAKE 2 TABLETS BY MOUTH IN THE MORNING AND IN THE EVENING     gabapentin (NEURONTIN) 300 MG capsule Take 1 capsule (300 mg) by mouth At Bedtime     losartan (COZAAR) 100 MG tablet TAKE 1 TABLET BY MOUTH EVERY DAY     Multiple Vitamins-Minerals (ONE-A-DAY MENS 50+ ADVANTAGE PO) Take 1 tablet by mouth every evening     mycophenolate (GENERIC EQUIVALENT) 250 MG capsule Take 3 capsules (750 mg) by mouth 2 times daily     omega-3 fatty acids (FISH OIL) 1200 MG capsule Take 1 capsule by mouth 3 times daily.     pantoprazole (PROTONIX) 40 MG EC tablet TAKE 1 TABLET BY MOUTH DAILY (Patient taking differently: 20 mg daily)     predniSONE (DELTASONE) 5 MG tablet Take 1 tablet (5 mg) by mouth daily     sodium bicarbonate 650 MG tablet Take 1 tablet (650 mg) by mouth 2 times daily     sulfamethoxazole-trimethoprim (BACTRIM) 400-80 MG tablet Take 1 tablet by mouth three times a week     TraMADol HCl 200 MG TB24 Take 1 tablet by mouth daily     verapamil ER (CALAN-SR) 180 MG CR tablet Take 1 tablet (180 mg) by mouth daily     warfarin ANTICOAGULANT  (COUMADIN) 5 MG tablet Take 5 mg by mouth     No current facility-administered medications for this visit.      There are no discontinued medications.    Physical Exam   Vital Signs: There were no vitals taken for this visit.    GENERAL APPEARANCE: alert and no distress  Lungs: unlabored breathing  Neuro: alert      Data     Renal Latest Ref Rng & Units 11/25/2020 2/21/2020 11/11/2019   Na 133 - 144 mmol/L - - -   Na (external) 136 - 145 meq/L 144 139 141   K 3.4 - 5.3 mmol/L - - -   K (external) 3.5 - 5.1 meq/L 4.6 4.6 4.4   Cl 94 - 109 mmol/L - - -   Cl (external) 98 - 109 meq/L 109 103 105   CO2 20 - 32 mmol/L - - -   CO2 (external) 20 - 29 meq/L 24 25 25   BUN 7 - 30 mg/dL - - -   BUN (external) 6 - 22 mg/dL 29(H) 38.2(H) 36(H)   Cr 0.66 - 1.25 mg/dL - - -   Cr (external) 0.70 - 1.30 mg/dL 1.87(H) 2.01(H) 2.05(H)   Glucose 70 - 99 mg/dL - - -   Glucose (external) 70 - 99 mg/dL 139(H) 103(H) 119(H)   Ca  8.5 - 10.1 mg/dL - - -   Ca (external) 8.5 - 10.5 mg/dL 8.6 8.8 8.9   Mg 1.6 - 2.3 mg/dL - - -     Bone Health Latest Ref Rng & Units 2/21/2020 5/29/2018 1/28/2010   Phos 2.5 - 4.5 mg/dL - - 3.5   Vit D Def (external) 30.0 - 100.0 ng/mL 41.3 46 -     Heme Latest Ref Rng & Units 11/25/2020 2/21/2020 11/11/2019   WBC 4.0 - 11.0 10e9/L - - -   WBC (external) 4.0 - 11.0 K/uL 10.5 7.6 8.1   Hgb 13.3 - 17.7 g/dL - - -   Hgb (external) 13.5 - 17.5 g/dL 11.1(L) 13.8 13.4(L)   Plt 150 - 450 10e9/L - - -   Plt (external) 140 - 400 K/uL 226 135(L) 146   ABSOLUTE NEUTROPHIL 1.6 - 8.3 10e9/L - - -   ABSOLUTE NEUTROPHILS (EXTERNAL) 1.8 - 8.0 K/uL - - 5.7   ABSOLUTE LYMPHOCYTES 0.8 - 5.3 10e9/L - - -   ABSOLUTE LYMPHOCYTES (EXTERNAL) 0.8 - 4.1 K/uL - - 1.3   ABSOLUTE MONOCYTES 0.0 - 1.3 10e9/L - - -   ABSOLUTE MONOCYTES (EXTERNAL) 0.0 - 1.0 K/uL - - 0.9   ABSOLUTE EOSINOPHILS 0.0 - 0.7 10e9/L - - -   ABSOLUTE EOSINOPHILS (EXTERNAL) 0.0 - 0.7 K/uL - - 0.2   ABSOLUTE BASOPHILS 0.0 - 0.2 10e9/L - - -   ABSOLUTE BASOPHILS  (EXTERNAL) 0.0 - 0.2 K/uL - - 0.0   ABS IMMATURE GRANULOCYTES 0 - 0.4 10e9/L - - -   ABSOLUTE NUCLEATED RBC - - - -     Liver Latest Ref Rng & Units 5/14/2018 6/15/2016 1/28/2010   AP 40 - 150 U/L - - 116   AP (external) 38 - 126 U/L 118 105 -   TBili 0.2 - 1.3 mg/dL - - 0.6   TBili (external) 0.2 - 1.3 mg/dL 0.4 0.8 -   ALT 0 - 70 U/L - - 37   ALT (external) 21 - 72 U/L 55 35 -   AST 0 - 55 U/L - - 27   AST (external) 17 - 59 U/L 43 26 -   Tot Protein 6.8 - 8.8 g/dL - - 7.8   Tot Protein (external) 6.3 - 8.2 g/dL 7.0 7.0 -   Albumin 3.9 - 5.1 g/dL - - 4.7   Albumin (external) 3.5 - 5.0 g/dL 4.1 4.1 -        Iron studies Latest Ref Rng & Units 12/14/2015   Ferritin 26 - 388 ng/mL 220     [facility] Txp Virology Latest Ref Rng & Units 3/12/2019 5/21/2015 8/8/2011   CVM DNA Quant - - - -   CMV DNA Quant Ext Not Detected - NOT DETECTED -   CMV Quant <100 Copies/mL - - -   CMV QT Log <2.0 Log copies/mL - - -   BK Spec - Plasma - Plasma, EDTA anticoagulant   BK Res BKNEG:BK Virus DNA Not Detected copies/mL <500(A) - <1000   BK Log <2.7 Log copies/mL <2.7 - <3.0  The lower limit of detection for this assay is 1000 copies/mL.  Real-time TaqMan   PCR was performed using BK primers and probe for the detection of a 90 bp   portion of the  1 gene.  The performance characteristics were validated by   the [facility] [place].   It has not been cleared or approved by the U.S. Food and Drug Administration.   EBV DNA COPIES/ML <1000 Copies/mL - - -   EBV DNA LOG OF COPIES <3.0 Log copies/mL - - -            Recent Labs   Lab Test 09/20/13  0800   DOSMPA Not Provided   MPACID Unsatisfactory specimen - too old for testing   MPAG Unsatisfactory specimen - too old for testing     MRI Brain 7/2020  1. Acute to early subacute ischemic infarct in the left periventricular white matter extending   into the posterior left basal ganglia likely accounting for the patient's clinical symptoms.     2.  No acute  intracranial hemorrhage or mass.     3.  Additional scattered foci of supratentorial increased T2/FLAIR signal which are   nonspecific, although commonly seen with chronic small vessel disease.           Again, thank you for allowing me to participate in the care of your patient.      Sincerely,    Kidney/Pancreas Recipient

## 2021-01-07 ENCOUNTER — DOCUMENTATION ONLY (OUTPATIENT)
Dept: TRANSPLANT | Facility: CLINIC | Age: 66
End: 2021-01-07

## 2021-01-07 NOTE — PROGRESS NOTES
Kala Colón, [facility]  Gayla Mcleod RN             [facility], [place],     I read his cardiology notes from [place]. He is using warfarin for secondary stroke prevention in [place]. His cardiologist stated he must use warfarin because he has CKD but did not cite what she felt his degree of impairment was. Using his most recent SCr (1.87), his eCrCl is around 40 ml/min (eGFR around 36). If his renal function stays stable, he can use Xarelto 15 mg daily (safe for use 15-50 ml/min at 15 mg daily), Eliquis 5 mg twice daily (only 25% renally removed), Pradaxa 150 mg twice daily (as long as CrCl >30).     He is correct in saying that the bleed risk is less for DOACs and they require no routine monitoring.     If possible, I'd have his nephrologist verify that my assessment of his renal function is accurate and share their opinion about safety of a DOAC in his situation. From my view, it looks safe and reasonable to make the switch.     Madi Gaitan message sent to Gurjit Squires.

## 2021-01-12 ENCOUNTER — TELEPHONE (OUTPATIENT)
Dept: TRANSPLANT | Facility: CLINIC | Age: 66
End: 2021-01-12

## 2021-01-12 NOTE — PROGRESS NOTES
Radu Bunch MD Ututalum, Teresa, RN             What the pharmacist says is true.  However, he should know there is no easily (inexpensive) reversal agent for these new medications should he require a biopsy or something.  He likely won't have that as an issue, but he should understand this risk.     Jackson Gaitan message sent to discuss with Provider at [place].

## 2021-01-19 DIAGNOSIS — I12.9 RENAL HYPERTENSION, STAGE 1-4 OR UNSPECIFIED CHRONIC KIDNEY DISEASE: ICD-10-CM

## 2021-01-19 RX ORDER — VERAPAMIL HYDROCHLORIDE 180 MG/1
TABLET, EXTENDED RELEASE ORAL
Qty: 90 TABLET | Refills: 3 | Status: SHIPPED | OUTPATIENT
Start: 2021-01-19 | End: 2021-12-27

## 2021-02-04 DIAGNOSIS — Z94.0 KIDNEY TRANSPLANTED: Primary | ICD-10-CM

## 2021-02-04 RX ORDER — PREDNISONE 5 MG/1
5 TABLET ORAL DAILY
Qty: 30 TABLET | Refills: 11 | Status: SHIPPED | OUTPATIENT
Start: 2021-02-04 | End: 2022-01-04

## 2021-02-17 DIAGNOSIS — E61.1 IRON DEFICIENCY: ICD-10-CM

## 2021-02-17 RX ORDER — FERROUS SULFATE 325(65) MG
325 TABLET ORAL
Qty: 90 TABLET | Refills: 3 | Status: SHIPPED | OUTPATIENT
Start: 2021-02-17 | End: 2022-01-28

## 2021-03-27 DIAGNOSIS — Z48.298 AFTERCARE FOLLOWING ORGAN TRANSPLANT: ICD-10-CM

## 2021-03-27 DIAGNOSIS — Z94.0 KIDNEY REPLACED BY TRANSPLANT: Primary | ICD-10-CM

## 2021-03-27 DIAGNOSIS — T86.10 COMPLICATIONS, KIDNEY TRANSPLANT: ICD-10-CM

## 2021-03-27 NOTE — LETTER
PHYSICIAN ORDERS    DATE & TIME ISSUED: 2021 12:21 PM  PATIENT NAME: Gurjit Squires   : 1955     [facility] MR# [if applicable]: [id number]     DIAGNOSIS / ICD - 10 CODES    Kidney Transplanted (Z94.0)    After Care Following Organ Transplant (Z48.298)    Long Term Use of Medication (Z79.899)    Immunosuppressed Status (Z92.25)      Monthly x 6 months    Hemogram and Platelet    Basic Metabolic Panel (Sodium,potassium,chloride,CO2,creatinine,urea,nitrogen,glucose,calcium)    Every 6 months    Urine for protein creatinine ratio      Patient should release information to the [facility].   Please fax results to the Transplant Center at [phone].  Any questions please call [phone] or [phone].      .

## 2021-03-27 NOTE — LETTER
PHYSICIAN ORDERS    DATE & TIME ISSUED: 2021 10:45 AM  PATIENT NAME: Gurjit Squires   : 1955     [facility] MR# [if applicable]: [id number]     DIAGNOSIS / ICD - 10 CODES    Kidney Transplanted (Z94.0)    After Care Following Organ Transplant (Z48.298)    Long Term Use of Medication (Z79.899)    Immunosuppressed Status (Z92.25)    Complications Kidney Transplant (T86.10)    Every 3 months    Hemogram and Platelet    Basic Metabolic Panel (Sodium,potassium,chloride,CO2,creatinine,urea,nitrogen,glucose,calcium)    Every 6 months    Urine for protein creatinine ratio      Patient should release information to the [facility].   Please fax results to the Transplant Center at [phone].  Any questions please call [phone] or [phone].      .

## 2021-03-29 RX ORDER — MYCOPHENOLATE MOFETIL 250 MG/1
CAPSULE ORAL
Qty: 180 CAPSULE | Refills: 11 | Status: SHIPPED | OUTPATIENT
Start: 2021-03-29 | End: 2022-03-01

## 2021-03-29 NOTE — TELEPHONE ENCOUNTER
Last Transplant lab Nov 2020  Seen by Dr. Goetz Jan 2021  Refills sent.  Updated Epic lab orders.  Updated standing lab order.  [employer] message sent which lab he prefers to use.

## 2021-04-19 ENCOUNTER — TELEPHONE (OUTPATIENT)
Dept: TRANSPLANT | Facility: CLINIC | Age: 66
End: 2021-04-19

## 2021-04-19 NOTE — LETTER
PHYSICIAN ORDERS      DATE & TIME ISSUED: 2021 11:51 AM  PATIENT NAME: Gurjit Squires   : 1955     [facility] MR# [if applicable]: [id number]     DIAGNOSIS:  Kidney Transplant  ICD-10 CODE: Z94.0     Please repeat the following lab next week:  BMP    Any questions please call: [phone]  Please fax lab results to [phone].    .

## 2021-04-19 NOTE — TELEPHONE ENCOUNTER
Creatinine = 2.64 (4/16/21)  Baseline  1.8-2.2      PLAN:  Call Gurjit Squires and check how patient is feeling?   Having cough/cold/congestion?   Nausea/Vomiting?  Diarrhea?   Dehydration?   Missed medication?  Changes in medication, (josefina diuretics)?  If not on fluid restriction, instruct to improve hydration and recheck BMP next week.

## 2021-04-19 NOTE — TELEPHONE ENCOUNTER
Left message and sent mychart message to patient regarding:  Creatinine = 2.64 (4/16/21)  Baseline  1.8-2.2        PLAN:  Call Gurjit Squires and check how patient is feeling?   Having cough/cold/congestion?   Nausea/Vomiting?  Diarrhea?   Dehydration?   Missed medication?  Changes in medication, (josefina diuretics)?  If not on fluid restriction, instruct to improve hydration and recheck BMP next week

## 2021-04-19 NOTE — TELEPHONE ENCOUNTER
Spoke to patient regarding:  Creatinine = 2.64 (4/16/21)  Baseline  1.8-2.2  Patient states that he is feeling good.   Patient denies any cough/cold/congestion, no nausea/vomiting, no diarrhea, no dehydration or missed medications.  Patient denies any changes in medication.    Instructed patient to improve hydration and recheck BMP next week, patient agrees to repeat labs on 4/30/2021.

## 2021-04-29 ENCOUNTER — TELEPHONE (OUTPATIENT)
Dept: TRANSPLANT | Facility: CLINIC | Age: 66
End: 2021-04-29

## 2021-04-29 NOTE — TELEPHONE ENCOUNTER
Carlos qSuires Linda K, LPN 3 hours ago (12:09 PM)        I see that my creatinine level is still up some but was heading down in the right direction. I tried to drink plenty of water especially this morning. I normally have some water during the day and almost always have a few glasses watching TV at night. I can still usually sleep thru the night or get up only once. I feel fine if that has any bearing on anything.       Will assess for diarrhea, dehydration, cough/cold/congestion. Changes in meds. Missed meds.--- [employer] message sent.

## 2021-04-30 ENCOUNTER — TELEPHONE (OUTPATIENT)
Dept: TRANSPLANT | Facility: CLINIC | Age: 66
End: 2021-04-30

## 2021-04-30 NOTE — TELEPHONE ENCOUNTER
Carlos Squires  to Me      9:04 PM  The only real change has been the warfarin.     No diarrea, no dehydration (that I know of. I try and drink a lot of water) maybe a little congestion that I always blame on allergies,  I did find a couple of cellcept on the floor that I may or may not  missed a week or two ago. Wasn't sure so I did not take them. No infections.  Feel fine like I said in earlier message. I am doing my INR again in 2-3 weeks. Should I do labs for you again?    Me  to Carlos Squires      3:45 PM  Hi Carlos,     I see your creatinine is 2.54. Still well above your baseline.  Please confirm if you have:  Diarrhea  Dehydration  Cough, cold, congestion  New medication  Missed medication (anti-rejection)  Other symptoms of an infection  What is different from last November? That was the last time your creatinine was within your baseline.  Let me know.     Thanks,  Gayla Mcleod  Post Kidney / Pancreas Transplant Coordinator  [facility]  T: [phone]    Message sent to Dr. Goetz.

## 2021-05-04 ENCOUNTER — TELEPHONE (OUTPATIENT)
Dept: TRANSPLANT | Facility: CLINIC | Age: 66
End: 2021-05-04

## 2021-05-04 NOTE — TELEPHONE ENCOUNTER
Jose Goetz MD   5/3/2021  5:00 PM CDT      Any symptoms.new meds, bp change. Check US kidney. If dehydrated, repeat BMP, UA, UPC, check CD4 count, hold bactrim for now, if CD4>200 don't resume    Shani, HERRERA Shukla   4/30/2021  4:19 PM CDT      Cr (2.54) remains elevated from baseline (1.8-2.2).   Staff message sent to Dr. Goetz.     PLAN:  Call Gurjit Squires and discuss Dr. Goetz's recommendations to:  1. Complete Kidney Transplant ultrasound  2. Labs:    Repeat BMP    UA/UC    UPC    CD4 count  3. Hold Bactrim    MyChart message sent.

## 2021-05-05 ENCOUNTER — TELEPHONE (OUTPATIENT)
Dept: TRANSPLANT | Facility: CLINIC | Age: 66
End: 2021-05-05

## 2021-05-05 NOTE — LETTER
PHYSICIAN ORDERS    DATE & TIME ISSUED: May 5, 2021 12:05 PM  PATIENT NAME: Gurjit Squires   : 1955     [facility] MR# [if applicable]: [id number]     DIAGNOSIS / ICD - 10 CODES    Kidney Transplanted (Z94.0)    After Care Following Organ Transplant (Z48.298)    Long Term Use of Medication (Z79.899)    Complications Kidney Transplant (T86.10)    Creatinine Elevation (R79.89)      Please check the following labs:    Repeat BMP    Urinalysis with microscopic    Urine culture    Urine protein / creatinine ratio    CD4 count      Patient should release information to the [facility].   Please fax results to the Transplant Center at [phone].  Any questions please call [phone].          Jose Goetz MD

## 2021-05-05 NOTE — LETTER
PHYSICIAN ORDERS    DATE & TIME ISSUED: May 5, 2021 12:28 PM  PATIENT NAME: Gurjit Squires   : 1955     [facility] MR# [if applicable]: [id number]     DIAGNOSIS / ICD - 10 CODES    Kidney Transplanted (Z94.0)    After Care Following Organ Transplant (Z48.298)    Long Term Use of Medication (Z79.899)    Immunosuppressed Status (Z92.25)    Complications Kidney Transplant (T86.10)    Creatinine Elevation (R79.89)      PROCEDURE:    Ultrasound of Transplant Kidney, Right      INDICATION:  Elevated serum creatinine       Patient should release information to the [facility].   Please fax results to the Transplant Center at [phone].  Any questions please call [phone].          Jose Goetz MD

## 2021-05-05 NOTE — LETTER
PHYSICIAN ORDERS    DATE & TIME ISSUED: May 5, 2021 12:28 PM  PATIENT NAME: Gurjit Squires   : 1955     [facility] MR# [if applicable]: [id number]     DIAGNOSIS / ICD - 10 CODES    Kidney Transplanted (Z94.0)    After Care Following Organ Transplant (Z48.298)    Long Term Use of Medication (Z79.899)    Immunosuppressed Status (Z92.25)    Complications Kidney Transplant (T86.10)    Creatinine Elevation (R79.89)      PROCEDURE:    Ultrasound of Transplant Kidney, Right      INDICATION:  Elevated serum creatinine       Patient should release information to the [facility] Transplant [facility].   Please fax results to the Transplant Center at [phone].  Any questions please call [phone].          Jose Goetz MD

## 2021-05-07 ENCOUNTER — TRANSFERRED RECORDS (OUTPATIENT)
Dept: HEALTH INFORMATION MANAGEMENT | Facility: CLINIC | Age: 66
End: 2021-05-07

## 2021-05-11 DIAGNOSIS — Z94.0 KIDNEY REPLACED BY TRANSPLANT: ICD-10-CM

## 2021-05-11 RX ORDER — FUROSEMIDE 20 MG
TABLET ORAL
Qty: 360 TABLET | Refills: 1 | Status: SHIPPED | OUTPATIENT
Start: 2021-05-11 | End: 2021-11-10

## 2021-05-14 ENCOUNTER — TELEPHONE (OUTPATIENT)
Dept: TRANSPLANT | Facility: CLINIC | Age: 66
End: 2021-05-14

## 2021-05-14 NOTE — TELEPHONE ENCOUNTER
Radu Bunch MD Ututalum, Teresa, RN             At the time of having COVID, you can get kidney involvement and worsening function, but rare.  However, if there was no change in kidney function at the time of COVID, having the infection shouldn't change your kidney function later on.     Jackson    Previous Messages    ----- Message -----   From: Gayla Mcleod, RN   Sent: 5/13/2021   3:30 PM CDT   To: Radu Bunch MD   Subject: Kandy msg re: vaccines and Cr                     I am having lab work done tomorrow for my INR and am having labs done for you as well for my creatinine. I was told the ultra sound on my kidney showed good blood flow and no masses. I brought up in an earlier message that I had covid in Oct and have gotten both vaccines and was wondering if that could affect my kidney function and was told no. I met a lady that just told me when she got covid her kidneys completely shut down and was after getting admitted they started to function again so it was confusing to get a reply that it cannot affect them when there is so little known about this disease and the vaccines and what the after effects might be.       Dr. Bunch,     I have already told him what you told me regarding this and that there is no correlation.   What do you want me to tell him this time.     Thanks,   Nj Gaitan message sent.

## 2021-05-17 ENCOUNTER — DOCUMENTATION ONLY (OUTPATIENT)
Dept: TRANSPLANT | Facility: CLINIC | Age: 66
End: 2021-05-17

## 2021-05-17 NOTE — LETTER
PHYSICIAN ORDERS    DATE & TIME ISSUED: May 17, 2021 4:29 PM  PATIENT NAME: Gurjit Squires   : 1955     [facility] MR# [if applicable]: [id number]     DIAGNOSIS / ICD - 10 CODES    Kidney Transplanted (Z94.0)    After Care Following Organ Transplant (Z48.298)    Long Term Use of Medication (Z79.899)    Immunosuppressed Status (Z92.25)    Complications Kidney Transplant (T86.10)    Creatinine Elevation (R79.89)        Please recheck:    BMP      Patient should release information to the [facility].   Please fax results to the Transplant Center at [phone].  Any questions please call [phone].            Jose Goetz MD

## 2021-05-17 NOTE — PROGRESS NOTES
Jose Goetz MD Ututalum, Teresa, HERRERA             Ok let's see it it continues to improve on subsequent check this week     Radu Bunch MD Ututalum, Teresa, RN             Slightly elevated serum creatinine above baseline with slight trend down.  Would continue to follow closely and consider a kidney transplant biopsy.      MyChart message sent, urine test and CD4 count not resulted.  Rechecking BMP. --- Lab order sent.  [facility]        [phone] (Phone)        [phone] (Fax)

## 2021-05-17 NOTE — LETTER
PHYSICIAN ORDERS    DATE & TIME ISSUED: May 17, 2021 4:29 PM  PATIENT NAME: Gurjit Squires   : 1955     [facility] MR# [if applicable]: [id number]     DIAGNOSIS / ICD - 10 CODES    Kidney Transplanted (Z94.0)    After Care Following Organ Transplant (Z48.298)    Long Term Use of Medication (Z79.899)    Immunosuppressed Status (Z92.25)    Complications Kidney Transplant (T86.10)    Creatinine Elevation (R79.89)        Please recheck:    BMP      Patient should release information to the [facility]plant [facility].   Please fax results to the Transplant Center at [phone].  Any questions please call [phone].            Jose Goetz MD

## 2021-05-20 ENCOUNTER — DOCUMENTATION ONLY (OUTPATIENT)
Dept: TRANSPLANT | Facility: CLINIC | Age: 66
End: 2021-05-20

## 2021-05-25 ENCOUNTER — TELEPHONE (OUTPATIENT)
Dept: TRANSPLANT | Facility: CLINIC | Age: 66
End: 2021-05-25

## 2021-05-25 DIAGNOSIS — T86.10 COMPLICATIONS, KIDNEY TRANSPLANT: ICD-10-CM

## 2021-05-25 DIAGNOSIS — Z48.298 AFTERCARE FOLLOWING ORGAN TRANSPLANT: ICD-10-CM

## 2021-05-25 DIAGNOSIS — Z94.0 KIDNEY TRANSPLANTED: Primary | ICD-10-CM

## 2021-05-25 NOTE — TELEPHONE ENCOUNTER
Jose Goetz MD Ututalum, Teresa, RN             Recheck labs Mon    Previous Messages    ----- Message -----   From: Gayla Mcleod RN   Sent: 5/21/2021  12:37 PM CDT   To: Jose Goetz MD     Creatinine trending back up.         Jose Goetz MD Ututalum, Teresa, RN             Agree he needs a biopsy    Previous Messages    ----- Message -----   From: Gayla Mcleod RN   Sent: 5/25/2021   9:00 AM CDT   To: Jose Goetz MD   Subject: elev Cr                                           Kidney Txp #1: 7/29/2004 (16y 9m)     Cr trend back up = 2.67  Baseline 1.8 - 2.2   % helper CD4 = 56   [place] absolute = 550   UC no growth   US was normal     Dr. Bunch recommending bx last level of 2.47     Let me know what you recommend.     Thanks,   Nj         Called Gurjit Squires and discussed biopsy.  Discussed required to complete a COVID-19 test 4 days prior.    Requesting COVID-19 PCR orders sent to  [facility]        [phone] (Phone)        [phone] (Fax)

## 2021-05-27 NOTE — TELEPHONE ENCOUNTER
Transplant Coordinator Renal Biopsy Communication    Call placed to Gurjit Rossalonso to discuss indication for kidney transplant biopsy per Dr. Bunch.     Indication for transplant renal biopsy: elevated creatinine   Laterality: right  Date and time of biopsy: TBD   Date and time of covid swab: TBD    Patient location within 70 miles of [facility]: No.  If no, must stay overnight locally. Pt verbalizes staying [place] or family.     Gurjit Squires's medication list was reviewed.   Anticoagulant: aspirin and Coumadin (Coumadin prescribed by Dr. Narvaez)  Ibuprofen: No.  Fish Oil:  Yes.  Medications held: aspirin, fish oil.  Coumadin --- instructions to be given by [place] [facility].    Recent blood pressure readings are WNL or not applicable. Instructed to take medication, especially blood pressure medications, before arriving.      Gayla Mcleod RN  Kidney/Pancreas Transplant Coordinator  [phone] option 5      Call Dr. Narvaez and make aware planning to hold blood thinners for biopsy.

## 2021-05-27 NOTE — TELEPHONE ENCOUNTER
Solange, [place] [address]    [phone]        Call Dr. Narvaez and make aware planning to hold blood thinners for biopsy  Hold Aspirin and Fish oil x 5 days.  Hold Coumadin x 3, 5 days prior?, no bridging needed?  Left VM at [place].

## 2021-06-01 ENCOUNTER — TELEPHONE (OUTPATIENT)
Dept: TRANSPLANT | Facility: CLINIC | Age: 66
End: 2021-06-01

## 2021-06-01 NOTE — LETTER
PHYSICIAN ORDERS    DATE & TIME ISSUED: 2021 1:03 PM  PATIENT NAME: Gurjit Squires   : 1955     [facility] MR# [if applicable]: [id number]     DIAGNOSIS / ICD - 10 CODES    Kidney Transplanted (Z94.0)    Pancreas Transplant (Z94.83)    After Care Following Organ Transplant (Z48.298)    Long Term Use of Medication (Z79.899)    Immunosuppressed Status (Z92.25)    Complications Kidney Transplant (T86.10)    Patient needs COVID-19 PCR 4 days prior to Procedure.    Please check on 21:    COVID-19 PCR      Patient should release information to the [facility]plant [facility].   Please fax results to the Transplant Center at [phone].  Any questions please call [phone].      .

## 2021-06-01 NOTE — TELEPHONE ENCOUNTER
Called Dr. Narvaez's office again for Coumadin recommendations.  Patient on Fish oil, aspirin and coumadin, need to schedule Kid Txp Biopsy  Spoke with Qi who reports patient was given instructions by [facility] ([phone]).  RNCC called Coumadin Clinic for recommendations / instructions.  Per [facility] to just let them know when the Procedure will be and let them know they will instruct patient and recommend bridging if needed.

## 2021-06-01 NOTE — LETTER
PHYSICIAN ORDERS    DATE & TIME ISSUED: 2021 1:03 PM  PATIENT NAME: Gurjit Squires   : 1955     [facility] MR# [if applicable]: [id number]     DIAGNOSIS / ICD - 10 CODES    Kidney Transplanted (Z94.0)    Pancreas Transplant (Z94.83)    After Care Following Organ Transplant (Z48.298)    Long Term Use of Medication (Z79.899)    Immunosuppressed Status (Z92.25)    Complications Kidney Transplant (T86.10)    Patient needs COVID-19 PCR 4 days prior to Procedure.    Please check on 21:    COVID-19 PCR      Patient should release information to the [facility].   Please fax results to the Transplant Center at [phone].  Any questions please call [phone].      .

## 2021-06-01 NOTE — TELEPHONE ENCOUNTER
Gurjit Squires requesting Biopsy scheduled after 16th of June as he is going on a trip June 12 - 16.  Message sent to Dr. Bunch.  IR Renal Biopsy orders placed.    Radu Bunch MD Ututalum, Teresa, RN             I don't think he needs to change the trip, but would like to get it done prior to trip at the end of this week or early next week.      Called IR to schedule.    Spoke with Mo. Kidney transplant biopsy scheduled for Tuesday 6/8/21.  Arrival time 9:30AM for 11:00 AM procedure    Called Gurjit Squires and made aware of date and time of Procedure.  He will call [facility] for instructions regarding his Coumadin.  Instructed to start holding aspirin and fish oil tomorrow.  [facility] instructed him to start holding coumadin tonight.  Address to [facility] provided and instructed to go to the [facility] waiting room.  COVID-19 PCR orders sent to:  [facility]        [phone] (Phone)        [phone] (Fax)          Information discussed sent via [employer] as well.

## 2021-06-02 ENCOUNTER — TELEPHONE (OUTPATIENT)
Dept: TRANSPLANT | Facility: CLINIC | Age: 66
End: 2021-06-02

## 2021-06-02 DIAGNOSIS — Z11.59 ENCOUNTER FOR SCREENING FOR OTHER VIRAL DISEASES: ICD-10-CM

## 2021-06-02 NOTE — TELEPHONE ENCOUNTER
Patient Call:Carlos need a date to let his infusion ([facility]) know he has to stop War[facility]       Call back needed? Yes    Return Call Needed  Same as documented in contacts section  When to return call?: Same day: [facility]

## 2021-06-04 ENCOUNTER — MYC MEDICAL ADVICE (OUTPATIENT)
Dept: INTERVENTIONAL RADIOLOGY/VASCULAR | Facility: CLINIC | Age: 66
End: 2021-06-04

## 2021-06-08 ENCOUNTER — APPOINTMENT (OUTPATIENT)
Dept: INTERVENTIONAL RADIOLOGY/VASCULAR | Facility: CLINIC | Age: 66
End: 2021-06-08
Attending: INTERNAL MEDICINE
Payer: MEDICARE

## 2021-06-08 ENCOUNTER — APPOINTMENT (OUTPATIENT)
Dept: MEDSURG UNIT | Facility: CLINIC | Age: 66
End: 2021-06-08
Attending: INTERNAL MEDICINE
Payer: MEDICARE

## 2021-06-08 ENCOUNTER — RESULTS ONLY (OUTPATIENT)
Dept: OTHER | Facility: CLINIC | Age: 66
End: 2021-06-08

## 2021-06-08 ENCOUNTER — HOSPITAL ENCOUNTER (OUTPATIENT)
Facility: CLINIC | Age: 66
Discharge: HOME | End: 2021-06-08
Attending: INTERNAL MEDICINE | Admitting: RADIOLOGY
Payer: MEDICARE

## 2021-06-08 VITALS
TEMPERATURE: 98.5 F | RESPIRATION RATE: 16 BRPM | SYSTOLIC BLOOD PRESSURE: 144 MMHG | HEART RATE: 67 BPM | DIASTOLIC BLOOD PRESSURE: 76 MMHG | HEIGHT: 68 IN | BODY MASS INDEX: 42.44 KG/M2 | OXYGEN SATURATION: 95 % | WEIGHT: 280 LBS

## 2021-06-08 DIAGNOSIS — Z48.298 AFTERCARE FOLLOWING ORGAN TRANSPLANT: ICD-10-CM

## 2021-06-08 DIAGNOSIS — Z94.0 KIDNEY TRANSPLANTED: ICD-10-CM

## 2021-06-08 DIAGNOSIS — T86.10 COMPLICATIONS, KIDNEY TRANSPLANT: ICD-10-CM

## 2021-06-08 LAB
ALBUMIN UR-MCNC: 30 MG/DL
ANION GAP SERPL CALCULATED.3IONS-SCNC: 6 MMOL/L (ref 3–14)
APPEARANCE UR: CLEAR
BACTERIA #/AREA URNS HPF: ABNORMAL /HPF
BASOPHILS # BLD AUTO: 0.1 10E9/L (ref 0–0.2)
BASOPHILS NFR BLD AUTO: 0.6 %
BILIRUB UR QL STRIP: NEGATIVE
BUN SERPL-MCNC: 53 MG/DL (ref 7–30)
CALCIUM SERPL-MCNC: 9.1 MG/DL (ref 8.5–10.1)
CHLORIDE SERPL-SCNC: 107 MMOL/L (ref 94–109)
CO2 SERPL-SCNC: 28 MMOL/L (ref 20–32)
COLOR UR AUTO: ABNORMAL
CREAT SERPL-MCNC: 2.92 MG/DL (ref 0.66–1.25)
DIFFERENTIAL METHOD BLD: ABNORMAL
EOSINOPHIL # BLD AUTO: 0.1 10E9/L (ref 0–0.7)
EOSINOPHIL NFR BLD AUTO: 1.4 %
ERYTHROCYTE [DISTWIDTH] IN BLOOD BY AUTOMATED COUNT: 13.4 % (ref 10–15)
GFR SERPL CREATININE-BSD FRML MDRD: 21 ML/MIN/{1.73_M2}
GLUCOSE SERPL-MCNC: 131 MG/DL (ref 70–99)
GLUCOSE UR STRIP-MCNC: NEGATIVE MG/DL
HCT VFR BLD AUTO: 38.6 % (ref 40–53)
HGB BLD-MCNC: 12.2 G/DL (ref 13.3–17.7)
HGB UR QL STRIP: NEGATIVE
IMM GRANULOCYTES # BLD: 0.1 10E9/L (ref 0–0.4)
IMM GRANULOCYTES NFR BLD: 0.6 %
INR PPP: 1.02 (ref 0.86–1.14)
KETONES UR STRIP-MCNC: NEGATIVE MG/DL
LEUKOCYTE ESTERASE UR QL STRIP: NEGATIVE
LYMPHOCYTES # BLD AUTO: 0.8 10E9/L (ref 0.8–5.3)
LYMPHOCYTES NFR BLD AUTO: 8.9 %
MCH RBC QN AUTO: 31.5 PG (ref 26.5–33)
MCHC RBC AUTO-ENTMCNC: 31.6 G/DL (ref 31.5–36.5)
MCV RBC AUTO: 100 FL (ref 78–100)
MONOCYTES # BLD AUTO: 0.7 10E9/L (ref 0–1.3)
MONOCYTES NFR BLD AUTO: 7.5 %
NEUTROPHILS # BLD AUTO: 7.4 10E9/L (ref 1.6–8.3)
NEUTROPHILS NFR BLD AUTO: 81 %
NITRATE UR QL: NEGATIVE
NRBC # BLD AUTO: 0 10*3/UL
NRBC BLD AUTO-RTO: 0 /100
PH UR STRIP: 6.5 PH (ref 5–7)
PLATELET # BLD AUTO: 167 10E9/L (ref 150–450)
POTASSIUM SERPL-SCNC: 4.5 MMOL/L (ref 3.4–5.3)
PROT UR-MCNC: 0.44 G/L
PROT/CREAT 24H UR: 1.78 G/G CR (ref 0–0.2)
RBC # BLD AUTO: 3.87 10E12/L (ref 4.4–5.9)
RBC #/AREA URNS AUTO: <1 /HPF (ref 0–2)
SODIUM SERPL-SCNC: 140 MMOL/L (ref 133–144)
SOURCE: ABNORMAL
SP GR UR STRIP: 1.01 (ref 1–1.03)
SQUAMOUS #/AREA URNS AUTO: 0 /HPF (ref 0–1)
UROBILINOGEN UR STRIP-MCNC: NORMAL MG/DL (ref 0–2)
WBC # BLD AUTO: 9.1 10E9/L (ref 4–11)
WBC #/AREA URNS AUTO: 0 /HPF (ref 0–5)

## 2021-06-08 PROCEDURE — 88350 IMFLUOR EA ADDL 1ANTB STN PX: CPT | Mod: TC,59 | Performed by: INTERNAL MEDICINE

## 2021-06-08 PROCEDURE — 86832 HLA CLASS I HIGH DEFIN QUAL: CPT | Performed by: INTERNAL MEDICINE

## 2021-06-08 PROCEDURE — 93005 ELECTROCARDIOGRAM TRACING: CPT

## 2021-06-08 PROCEDURE — 50200 RENAL BIOPSY PERQ: CPT | Mod: RT | Performed by: RADIOLOGY

## 2021-06-08 PROCEDURE — 85025 COMPLETE CBC W/AUTO DIFF WBC: CPT | Performed by: INTERNAL MEDICINE

## 2021-06-08 PROCEDURE — 999N000133 HC STATISTIC PP CARE STAGE 2

## 2021-06-08 PROCEDURE — 88305 TISSUE EXAM BY PATHOLOGIST: CPT | Mod: 26 | Performed by: PATHOLOGY

## 2021-06-08 PROCEDURE — 999N001070 HC STATISTIC R-RUSH PROCESSING: Performed by: INTERNAL MEDICINE

## 2021-06-08 PROCEDURE — 88350 IMFLUOR EA ADDL 1ANTB STN PX: CPT | Mod: 26 | Performed by: PATHOLOGY

## 2021-06-08 PROCEDURE — 80048 BASIC METABOLIC PNL TOTAL CA: CPT | Performed by: INTERNAL MEDICINE

## 2021-06-08 PROCEDURE — 87086 URINE CULTURE/COLONY COUNT: CPT | Mod: GZ | Performed by: INTERNAL MEDICINE

## 2021-06-08 PROCEDURE — 88348 ELECTRON MICROSCOPY DX: CPT | Mod: 26 | Performed by: PATHOLOGY

## 2021-06-08 PROCEDURE — 272N000653 IR RENAL BIOPSY RIGHT

## 2021-06-08 PROCEDURE — 999N000038 HC STATISTIC CONSCIOUS SEDATION < 10 MINUTES

## 2021-06-08 PROCEDURE — 88313 SPECIAL STAINS GROUP 2: CPT | Mod: TC | Performed by: INTERNAL MEDICINE

## 2021-06-08 PROCEDURE — 250N000011 HC RX IP 250 OP 636: Performed by: STUDENT IN AN ORGANIZED HEALTH CARE EDUCATION/TRAINING PROGRAM

## 2021-06-08 PROCEDURE — 86833 HLA CLASS II HIGH DEFIN QUAL: CPT | Performed by: INTERNAL MEDICINE

## 2021-06-08 PROCEDURE — 88313 SPECIAL STAINS GROUP 2: CPT | Mod: TC,91 | Performed by: INTERNAL MEDICINE

## 2021-06-08 PROCEDURE — 88305 TISSUE EXAM BY PATHOLOGIST: CPT | Mod: TC | Performed by: INTERNAL MEDICINE

## 2021-06-08 PROCEDURE — 85610 PROTHROMBIN TIME: CPT | Performed by: INTERNAL MEDICINE

## 2021-06-08 PROCEDURE — 93010 ELECTROCARDIOGRAM REPORT: CPT | Mod: 59 | Performed by: INTERNAL MEDICINE

## 2021-06-08 PROCEDURE — 999N000054 HC STATISTIC EKG NON-CHARGEABLE

## 2021-06-08 PROCEDURE — 250N000009 HC RX 250: Performed by: STUDENT IN AN ORGANIZED HEALTH CARE EDUCATION/TRAINING PROGRAM

## 2021-06-08 PROCEDURE — 81001 URINALYSIS AUTO W/SCOPE: CPT | Performed by: INTERNAL MEDICINE

## 2021-06-08 PROCEDURE — 258N000003 HC RX IP 258 OP 636: Performed by: NURSE PRACTITIONER

## 2021-06-08 PROCEDURE — 84156 ASSAY OF PROTEIN URINE: CPT | Performed by: INTERNAL MEDICINE

## 2021-06-08 PROCEDURE — 87799 DETECT AGENT NOS DNA QUANT: CPT | Performed by: INTERNAL MEDICINE

## 2021-06-08 PROCEDURE — 76942 ECHO GUIDE FOR BIOPSY: CPT | Mod: 26 | Performed by: RADIOLOGY

## 2021-06-08 PROCEDURE — 88346 IMFLUOR 1ST 1ANTB STAIN PX: CPT | Mod: TC | Performed by: INTERNAL MEDICINE

## 2021-06-08 PROCEDURE — 88346 IMFLUOR 1ST 1ANTB STAIN PX: CPT | Mod: 26 | Performed by: PATHOLOGY

## 2021-06-08 PROCEDURE — 88313 SPECIAL STAINS GROUP 2: CPT | Mod: 26 | Performed by: PATHOLOGY

## 2021-06-08 RX ORDER — LIDOCAINE 40 MG/G
CREAM TOPICAL
Status: DISCONTINUED | OUTPATIENT
Start: 2021-06-08 | End: 2021-06-08 | Stop reason: HOSPADM

## 2021-06-08 RX ORDER — ASPIRIN 81 MG/1
81 TABLET ORAL DAILY
COMMUNITY

## 2021-06-08 RX ORDER — FENTANYL CITRATE 50 UG/ML
25-50 INJECTION, SOLUTION INTRAMUSCULAR; INTRAVENOUS EVERY 5 MIN PRN
Status: DISCONTINUED | OUTPATIENT
Start: 2021-06-08 | End: 2021-06-08 | Stop reason: HOSPADM

## 2021-06-08 RX ORDER — SODIUM CHLORIDE 9 MG/ML
INJECTION, SOLUTION INTRAVENOUS CONTINUOUS
Status: DISCONTINUED | OUTPATIENT
Start: 2021-06-08 | End: 2021-06-08 | Stop reason: HOSPADM

## 2021-06-08 RX ORDER — NALOXONE HYDROCHLORIDE 0.4 MG/ML
0.4 INJECTION, SOLUTION INTRAMUSCULAR; INTRAVENOUS; SUBCUTANEOUS
Status: DISCONTINUED | OUTPATIENT
Start: 2021-06-08 | End: 2021-06-08 | Stop reason: HOSPADM

## 2021-06-08 RX ORDER — NALOXONE HYDROCHLORIDE 0.4 MG/ML
0.2 INJECTION, SOLUTION INTRAMUSCULAR; INTRAVENOUS; SUBCUTANEOUS
Status: DISCONTINUED | OUTPATIENT
Start: 2021-06-08 | End: 2021-06-08 | Stop reason: HOSPADM

## 2021-06-08 RX ORDER — FLUMAZENIL 0.1 MG/ML
0.2 INJECTION, SOLUTION INTRAVENOUS
Status: DISCONTINUED | OUTPATIENT
Start: 2021-06-08 | End: 2021-06-08 | Stop reason: HOSPADM

## 2021-06-08 RX ADMIN — LIDOCAINE HYDROCHLORIDE 8 ML: 10 INJECTION, SOLUTION EPIDURAL; INFILTRATION; INTRACAUDAL; PERINEURAL at 11:03

## 2021-06-08 RX ADMIN — FENTANYL CITRATE 50 MCG: 50 INJECTION, SOLUTION INTRAMUSCULAR; INTRAVENOUS at 11:02

## 2021-06-08 RX ADMIN — MIDAZOLAM 1 MG: 1 INJECTION INTRAMUSCULAR; INTRAVENOUS at 11:03

## 2021-06-08 RX ADMIN — SODIUM CHLORIDE: 9 INJECTION, SOLUTION INTRAVENOUS at 10:27

## 2021-06-08 ASSESSMENT — MIFFLIN-ST. JEOR: SCORE: 2024.57

## 2021-06-08 NOTE — IR NOTE
Patient Name: Gurjit Squires  Medical Record Number: [id number]  Today's Date: 6/8/2021    Procedure: Image Guided Renal  Transplant Biopsy  Proceduralist: DEREK Roman    Sedation start time: 1102  Sedation end time: 1107  Sedation medications administered: 1 mg versed, 50 mcg fentanyl  Total sedation time: 5 min  Sedation Notes: 2A     Procedure start time: 1102    Procedure end time: 1107    Report given to: 2A RN   : n/a    Other Notes: Pt arrived to IR room 6 from . Consent reviewed, pt confirmed. Pt denies any questions or concerns regarding procedure. Pt positioned supine and monitored per protocol. Site cleansed and dressed per protocol. Pt tolerated procedure without any noted complications. Pt transferred back to .

## 2021-06-08 NOTE — H&P
Interventional Radiology Pre-Procedure Focused H&P Assessment     Reason for procedure: Elevated creatinine    History: Patient with renal transplant in 2004, now with elevated creatinine (2.7, baseline 1.8-2.2). Here for US-guided biopsy of his RLQ renal transplant. See Epic for medical history    Surgical: see Epic for surgical history    Allergies: see Epic for updated allergy list    Medications: See Epic for current medication list    Family History: See Epic.    Social History: See Epic.    ROS: Bilateral lower extremity venous stasis dermatitis. 10 point ROS is otherwise negative.    Exam:  General: Pleasant, in no apparent distress  Skin: Bilateral lower extremity venous stasis dermatitis.  Mallampati: Grade 2:  Soft palate, base of uvula, tonsillar pillars, and portion of posterior pharyngeal wall visible  Lungs: Lungs Clear with good breath sounds bilaterally  Heart: Normal heart sounds and rate    Lola Mandel MD   Radiology Resident, PGY-3    I agree with the assessment and plan documented by Dr. Mandel.    Eden Thompson MD  Interventional Radiology   Pager [phone]

## 2021-06-08 NOTE — IP AVS SNAPSHOT
[facility] [address] [address]                                    After Visit Summary   6/8/2021    Gurjit Squires    MRN: [id number]           After Visit Summary Signature Page    I have received my discharge instructions, and my questions have been answered. I have discussed any challenges I see with this plan with the nurse or doctor.    ..........................................................................................................................................  Patient/Patient Representative Signature      ..........................................................................................................................................  Patient Representative Print Name and Relationship to Patient    ..................................................               ................................................  Date                                   Time    ..........................................................................................................................................  Reviewed by Signature/Title    ...................................................              ..............................................  Date                                               Time          22EPIC Rev 08/18

## 2021-06-08 NOTE — PROGRESS NOTES
Patient returned to 2A post transplant renal biopsy.  VSS from baseline - ongoing HTN.  Right low abdomen site intact with small shadow of bloody drainage.  Will continue to monitor.  PO food and fluids at bedside.  2 hr monitoring - patient aware.

## 2021-06-08 NOTE — PROCEDURES
[facility]    Procedure: IR Procedure Note    Date/Time: 6/8/2021 11:10 AM  Performed by: Cory Roman MD  Authorized by: Cory Roman MD     UNIVERSAL PROTOCOL   Site Marked: NA  Prior Images Obtained and Reviewed:  Yes  Required items: Required blood products, implants, devices and special equipment available    Patient identity confirmed:  Verbally with patient, arm band, provided demographic data and hospital-assigned identification number  Patient was reevaluated immediately before administering moderate or deep sedation or anesthesia  Confirmation Checklist:  Patient's identity using two indicators, relevant allergies, procedure was appropriate and matched the consent or emergent situation and correct equipment/implants were available  Time out: Immediately prior to the procedure a time out was called    Universal Protocol: the Joint Commission Universal Protocol was followed    Preparation: Patient was prepped and draped in usual sterile fashion           ANESTHESIA    Anesthesia: Local infiltration  Local Anesthetic:  Lidocaine 1% without epinephrine      SEDATION    Patient Sedated: Yes    Sedation Type:  Moderate (conscious) sedation  Vital signs: Vital signs monitored during sedation    See dictated procedure note for full details.  Findings: -Successful RLQ tx kidney biopsy.     Specimens: core needle biopsy specimens sent for pathological analysis    Complications: None    Condition: Stable    Plan: -Path pending.     PROCEDURE   Patient Tolerance:  Patient tolerated the procedure well with no immediate complications    Length of time physician/provider present for 1:1 monitoring during sedation: 5

## 2021-06-08 NOTE — IR NOTE
Renal Pathology Paged for procedure.  They are unable to come for biopsy.  Dr JAIME Bunch text paged to inform him of no renal pathology availability.

## 2021-06-08 NOTE — DISCHARGE INSTRUCTIONS
[facility]    Interventional Radiology  Patient Instructions Following Biopsy    AFTER YOU GO HOME  ? If you were given sedation DO NOT drive or operate machinery at home or at work for at least 24 hours  ? DO relax and take it easy for 48 hours, no strenuous activity for 24 hours  ? DO drink plenty of fluids  ? DO resume your regular diet, unless otherwise instructed by your Primary Physician  ? Keep the dressing dry and in place for 24 hours.  ? DO NOT SMOKE FOR AT LEAST 24 HOURS, if you have been given any medications that were to help you relax or sedate you during your procedure  ? DO NOT drink alcoholic beverages the day of your procedure  ? DO NOT do any strenuous exercise or lifting (> 10 lbs) for at least 7 days following your procedure  ? DO NOT take a bath or shower for at least 12 hours following your procedure  ? Remove dressing after shower the next day. Replace with Band aid for 2 days.  Never leave a wet dressing in place.  ? DO NOT make any important or legal decisions for 24 hours following your procedure  ? There should be minimum drainage from the biopsy site    CALL THE PHYSICIAN IF:  ? You start bleeding from the procedure site.  If you do start to bleed from that site, lie down flat and hold pressure on the site for a minimum of 10 minutes.  Your physician will tell you if you need to return to the hospital  ? You develop nausea or vomiting  ? You have excessive swelling, redness, or tenderness at the site  ? You have drainage that looks like it is infected.  ? You experience severe pain  ? You develop hives or a rash or unexplained itching  ? You develop shortness of breath  ? You develop a temperature of 101 degrees F or greater  ? You develop bloody clots or red urine after you are discharged  ? You develop chest pain or cough up blood, lightheadedness or fainting    ADDITIONAL INSTRUCTIONS  If you are taking Coumadin, restart tomorrow.  Follow up with your Coumadin  Clinic or Primary Care MD to have your INR rechecked.    [facility] INTERVENTIONAL RADIOLOGY DEPARTMENT  Procedure Physician: Dr Roman                                     Date of procedure: June 8, 2021  Telephone Numbers: [phone] Monday-Friday 8:00 am to 4:30 pm  [phone] After 4:30 pm Monday-Friday, Weekends & Holidays.   Ask for the Interventional Radiologist on call.  Someone is on call 24 hrs/day  [facility] toll free number: [phone] Monday-Friday 8:00 am to 4:30 pm  [facility] Emergency Dept: [phone]

## 2021-06-08 NOTE — PROGRESS NOTES
Pt tolerated po well.  Pt tolerated ambulation to the bathroom and hallway.  Urine clear yellow.  RLQ dressing remains intact.  Discharge instructions went over with and given to pt and his family, all questions answered.  PIV D/C'ed, catheter intact.  1325-Pt D/C'ed to home with his family.

## 2021-06-08 NOTE — IP AVS SNAPSHOT
After Visit Summary Template Not Found    This Print Group is only intended to be used in the After Visit Summary and can only be used in a report that uses a released After Visit Summary Template.                       MRN:[id number]                      After Visit Summary   6/8/2021    Gurjit Squires    MRN: [id number]           Visit Information        Department      6/8/2021  8:18 AM [facility] [address] [place]          Review of your medicines      UNREVIEWED medicines. Ask your doctor about these medicines       Dose / Directions   acetaminophen 325 MG tablet  Commonly known as: Tylenol  Used for: Pain      Dose: 650 mg  Take 2 tablets (650 mg) by mouth every 6 hours as needed for pain  Quantity: 250 tablet  Refills: 11     ALLEGRA PO      Dose: 1 tablet  Take 1 tablet by mouth daily.  Refills: 0     allopurinol 100 MG tablet  Commonly known as: ZYLOPRIM  Used for: Kidney replaced by transplant      Dose: 300 mg  Take 3 tablets (300 mg) by mouth daily  Quantity: 30 tablet  Refills: 0     aspirin 81 MG EC tablet      Dose: 81 mg  Take 81 mg by mouth daily  Refills: 0     atorvastatin 20 MG tablet  Commonly known as: LIPITOR      Dose: 1 tablet  Take 1 tablet by mouth  Refills: 0     carvedilol 12.5 MG tablet  Commonly known as: COREG  Used for: HTN, kidney transplant related      Dose: 12.5 mg  Take 1 tablet (12.5 mg) by mouth 2 times daily (with meals)  Quantity: 90 tablet  Refills: 3     clonazePAM 0.5 MG tablet  Commonly known as: klonoPIN  Used for: Restless leg syndrome      Dose: 0.5 mg  Take 1 tablet (0.5 mg) by mouth daily  Quantity: 60 tablet  Refills: 2     ferrous sulfate 325 (65 Fe) MG tablet  Commonly known as: FEROSUL  Used for: Iron deficiency      Dose: 325 mg  Take 1 tablet (325 mg) by mouth daily (with breakfast) Absorbed best on an empty stomach. If stomach upset occurs, can take with meals.  Quantity: 90 tablet  Refills: 3     furosemide 20 MG tablet  Commonly known  as: LASIX  Used for: Kidney replaced by transplant      TAKE 2 TABLETS BY MOUTH IN THE MORNING AND IN THE EVENING  Quantity: 360 tablet  Refills: 1     gabapentin 300 MG capsule  Commonly known as: NEURONTIN  Used for: Restless legs syndrome (RLS)      Dose: 300 mg  Take 1 capsule (300 mg) by mouth At Bedtime  Quantity: 270 capsule  Refills: 3     losartan 100 MG tablet  Commonly known as: COZAAR  Used for: HTN (hypertension)      TAKE 1 TABLET BY MOUTH EVERY DAY  Quantity: 90 tablet  Refills: 2     mycophenolate 250 MG capsule  Commonly known as: GENERIC EQUIVALENT  Used for: Kidney replaced by transplant      TAKE 3 CAPSULES BY MOUTH TWICE A DAY  Quantity: 180 capsule  Refills: 11     ONE-A-DAY MENS 50+ ADVANTAGE PO      Dose: 1 tablet  Take 1 tablet by mouth every evening  Refills: 0     pantoprazole 40 MG EC tablet  Commonly known as: PROTONIX  Used for: Gastroesophageal reflux disease      TAKE 1 TABLET BY MOUTH DAILY  Quantity: 30 tablet  Refills: 1     predniSONE 5 MG tablet  Commonly known as: DELTASONE  Used for: Kidney transplanted      Dose: 5 mg  Take 1 tablet (5 mg) by mouth daily  Quantity: 30 tablet  Refills: 11     sodium bicarbonate 650 MG tablet  Used for: Acidosis      Dose: 650 mg  Take 1 tablet (650 mg) by mouth 2 times daily  Quantity: 180 tablet  Refills: 3     sulfamethoxazole-trimethoprim 400-80 MG tablet  Commonly known as: Bactrim  Used for: Kidney transplanted      Dose: 1 tablet  Take 1 tablet by mouth three times a week  Quantity: 13 tablet  Refills: 11     TraMADol HCl 200 MG Tb24      Dose: 1 tablet  Take 1 tablet by mouth daily  Refills: 0     verapamil  MG CR tablet  Commonly known as: CALAN-SR  Used for: Renal hypertension, stage 1-4 or unspecified chronic kidney disease      TAKE 1 TABLET BY MOUTH EVERY DAY  Quantity: 90 tablet  Refills: 3     vitamin D3 1000 units (25 mcg) tablet  Commonly known as: CHOLECALCIFEROL  Used for: Testicular hypofunction      Dose: 1,000  Units  Take 1 tablet (1,000 Units) by mouth daily  Quantity: 90 tablet  Refills: 3     warfarin ANTICOAGULANT 5 MG tablet  Commonly known as: COUMADIN      Dose: 5 mg  Take 5 mg by mouth  Refills: 0        CONTINUE these medicines which have NOT CHANGED       Dose / Directions   omega-3 fatty acids 1200 MG capsule  Used for: Hypercholesteremia      Dose: 1 capsule  Take 1 capsule by mouth 3 times daily.  Quantity: 180 capsule  Refills: 11              Protect others around you: Learn how to safely use, store and throw away your medicines at www.disposemymeds.org.       Follow-ups after your visit       Care Instructions       Further instructions from your care team       [facility]    Interventional Radiology  Patient Instructions Following Biopsy    AFTER YOU GO HOME  ? If you were given sedation DO NOT drive or operate machinery at home or at work for at least 24 hours  ? DO relax and take it easy for 48 hours, no strenuous activity for 24 hours  ? DO drink plenty of fluids  ? DO resume your regular diet, unless otherwise instructed by your Primary Physician  ? Keep the dressing dry and in place for 24 hours.  ? DO NOT SMOKE FOR AT LEAST 24 HOURS, if you have been given any medications that were to help you relax or sedate you during your procedure  ? DO NOT drink alcoholic beverages the day of your procedure  ? DO NOT do any strenuous exercise or lifting (> 10 lbs) for at least 7 days following your procedure  ? DO NOT take a bath or shower for at least 12 hours following your procedure  ? Remove dressing after shower the next day. Replace with Band aid for 2 days.  Never leave a wet dressing in place.  ? DO NOT make any important or legal decisions for 24 hours following your procedure  ? There should be minimum drainage from the biopsy site    CALL THE PHYSICIAN IF:  ? You start bleeding from the procedure site.  If you do start to bleed from that site, lie down flat and hold pressure on  the site for a minimum of 10 minutes.  Your physician will tell you if you need to return to the hospital  ? You develop nausea or vomiting  ? You have excessive swelling, redness, or tenderness at the site  ? You have drainage that looks like it is infected.  ? You experience severe pain  ? You develop hives or a rash or unexplained itching  ? You develop shortness of breath  ? You develop a temperature of 101 degrees F or greater  ? You develop bloody clots or red urine after you are discharged  ? You develop chest pain or cough up blood, lightheadedness or fainting    ADDITIONAL INSTRUCTIONS  If you are taking Coumadin, restart tomorrow.  Follow up with your Coumadin Clinic or Primary Care MD to have your INR rechecked.    [facility] INTERVENTIONAL RADIOLOGY DEPARTMENT  Procedure Physician: Dr Roman                                     Date of procedure: June 8, 2021  Telephone Numbers: [phone] Monday-Friday 8:00 am to 4:30 pm  [phone] After 4:30 pm Monday-Friday, Weekends & Holidays.   Ask for the Interventional Radiologist on call.  Someone is on call 24 hrs/day  [facility] toll free number: [phone] Monday-Friday 8:00 am to 4:30 pm  [facility] Emergency Dept: [phone]          Additional Information About Your Visit       [facility] Information    [employer] gives you secure access to your electronic health record. If you see a primary care provider, you can also send messages to your care team and make appointments. If you have questions, please call your primary care clinic.  If you do not have a primary care provider, please call [phone] and they will assist you.       Care EveryWhere ID    This is your Care EveryWhere ID. This could be used by other organizations to access your [facility] medical records  [id number]       Your Vitals Were  Most recent update: 6/8/2021 11:38 AM    Blood Pressure   155/83   (Cuff Size: Adult Large)          Pulse   73          Temperature   98.5  F (36.9  C) (Oral)           "Respirations   16          Height   1.727 m (5' 8\")             Weight   127 kg (280 lb)    Pulse Oximetry   96%    BMI (Body Mass Index)   42.57 kg/m           Primary Care Provider Office Phone # Fax #    Jaquan Narvaez [phone]      Equal Access to Services    [facility]IN [facility] : Hadii aad ku hadasho Soomaali, waaxda luqadaha, qaybta kaalmada adeegyada, waxay idiin hayaan adeeg kharash la'aan . So [facility] [phone].    ATENCIÓN: Si habla español, tiene a buckner disposición servicios gratuitos de asistencia lingüística. Llame al [phone].    We comply with applicable federal and state civil rights laws, including the [place] Human Rights Act. We do not discriminate on the basis of race, color, creed, Yazidism, national origin, marital status, age, disability, sex, sexual orientation, or gender identity.    If you would like an itemization of your charges they will now be available in [employer] 30 days after discharge. To access the itemized statements in [employer] go to billing/billing summary. From there select view account. There will be multiple tabs showing an overview of your account, detail, payments, and communications. From the communications tab you can see your monthly statements, your itemized statements, and any billing letters generated for your account. If you do not have a [employer] account and need help getting access please contact [employer] support at [phone].  If you would prefer to have your itemized statements mailed please contact our automated itemized bill request line at [phone] option  2.       Thank you!    Thank you for choosing [facility] for your care. Our goal is always to provide you with excellent care. Hearing back from our patients is one way we can continue to improve our services. Please take a few minutes to complete the written survey that you may receive in the mail after you visit with us. Thank you!            Medication List      Medications          " Morning Afternoon Evening Bedtime As Needed    omega-3 fatty acids 1200 MG capsule  INSTRUCTIONS: Take 1 capsule by mouth 3 times daily.                       ASK your doctor about these medications          Morning Afternoon Evening Bedtime As Needed    mycophenolate 250 MG capsule  Also known as: GENERIC EQUIVALENT  INSTRUCTIONS: TAKE 3 CAPSULES BY MOUTH TWICE A DAY  This medication is very important: It prevents organ rejection.                     predniSONE 5 MG tablet  Also known as: DELTASONE  INSTRUCTIONS: Take 1 tablet (5 mg) by mouth daily  This medication is very important: It prevents organ rejection.                     sulfamethoxazole-trimethoprim 400-80 MG tablet  Also known as: Bactrim  INSTRUCTIONS: Take 1 tablet by mouth three times a week  Doctor's comments: Labs needed  This medication is very important: It prevents dangerous infection.                     acetaminophen 325 MG tablet  Also known as: Tylenol  INSTRUCTIONS: Take 2 tablets (650 mg) by mouth every 6 hours as needed for pain                     ALLEGRA PO  INSTRUCTIONS: Take 1 tablet by mouth daily.                     allopurinol 100 MG tablet  Also known as: ZYLOPRIM  INSTRUCTIONS: Take 3 tablets (300 mg) by mouth daily                     aspirin 81 MG EC tablet  INSTRUCTIONS: Take 81 mg by mouth daily                     atorvastatin 20 MG tablet  Also known as: LIPITOR  INSTRUCTIONS: Take 1 tablet by mouth                     carvedilol 12.5 MG tablet  Also known as: COREG  INSTRUCTIONS: Take 1 tablet (12.5 mg) by mouth 2 times daily (with meals)                     clonazePAM 0.5 MG tablet  Also known as: klonoPIN  INSTRUCTIONS: Take 1 tablet (0.5 mg) by mouth daily                     ferrous sulfate 325 (65 Fe) MG tablet  Also known as: FEROSUL  INSTRUCTIONS: Take 1 tablet (325 mg) by mouth daily (with breakfast) Absorbed best on an empty stomach. If stomach upset occurs, can take with meals.  Doctor's comments: DX Code  Needed  .                     furosemide 20 MG tablet  Also known as: LASIX  INSTRUCTIONS: TAKE 2 TABLETS BY MOUTH IN THE MORNING AND IN THE EVENING                     gabapentin 300 MG capsule  Also known as: NEURONTIN  INSTRUCTIONS: Take 1 capsule (300 mg) by mouth At Bedtime                     losartan 100 MG tablet  Also known as: COZAAR  INSTRUCTIONS: TAKE 1 TABLET BY MOUTH EVERY DAY                     ONE-A-DAY MENS 50+ ADVANTAGE PO  INSTRUCTIONS: Take 1 tablet by mouth every evening                     pantoprazole 40 MG EC tablet  Also known as: PROTONIX  INSTRUCTIONS: TAKE 1 TABLET BY MOUTH DAILY  Doctor's comments: Please follow up with PCP for further refills                     sodium bicarbonate 650 MG tablet  INSTRUCTIONS: Take 1 tablet (650 mg) by mouth 2 times daily                     TraMADol HCl 200 MG Tb24  INSTRUCTIONS: Take 1 tablet by mouth daily                     verapamil  MG CR tablet  Also known as: CALAN-SR  INSTRUCTIONS: TAKE 1 TABLET BY MOUTH EVERY DAY                     vitamin D3 1000 units (25 mcg) tablet  Also known as: CHOLECALCIFEROL  INSTRUCTIONS: Take 1 tablet (1,000 Units) by mouth daily                     warfarin ANTICOAGULANT 5 MG tablet  Also known as: COUMADIN  INSTRUCTIONS: Take 5 mg by mouth

## 2021-06-08 NOTE — PRE-PROCEDURE
GENERAL PRE-PROCEDURE:   Procedure:  US-guided renal transplant biopsy  Date/Time:  6/8/2021 10:28 AM    Verbal consent obtained?: Yes    Written consent obtained?: Yes    Risks and benefits: Risks, benefits and alternatives were discussed    Consent given by:  Patient  Patient states understanding of procedure being performed: Yes    Patient's understanding of procedure matches consent: Yes    Procedure consent matches procedure scheduled: Yes    Expected level of sedation:  Moderate  Appropriately NPO:  Yes  ASA Class:  Class 2- mild systemic disease, no acute problems, no functional limitations  Mallampati  :  Grade 2- soft palate, base of uvula, tonsillar pillars, and portion of posterior pharyngeal wall visible  Lungs:  Lungs clear with good breath sounds bilaterally  Heart:  Normal heart sounds and rate  History & Physical reviewed:  History and physical reviewed and no updates needed  Statement of review:  I have reviewed the lab findings, diagnostic data, medications, and the plan for sedation

## 2021-06-09 LAB
BACTERIA SPEC CULT: NO GROWTH
BKV DNA # SPEC NAA+PROBE: NORMAL COPIES/ML
BKV DNA SPEC NAA+PROBE-LOG#: NORMAL LOG COPIES/ML
INTERPRETATION ECG - MUSE: NORMAL
Lab: NORMAL
SPECIMEN SOURCE: NORMAL
SPECIMEN SOURCE: NORMAL

## 2021-06-10 LAB
COPATH REPORT: NORMAL
DONOR IDENTIFICATION: NORMAL
DSA COMMENTS: NORMAL
DSA PRESENT: NO
DSA TEST METHOD: NORMAL
ORGAN: NORMAL
SA1 CELL: NORMAL
SA1 COMMENTS: NORMAL
SA1 HI RISK ABY: NORMAL
SA1 MOD RISK ABY: NORMAL
SA1 TEST METHOD: NORMAL
SA2 CELL: NORMAL
SA2 COMMENTS: NORMAL
SA2 HI RISK ABY UA: NORMAL
SA2 MOD RISK ABY: NORMAL
SA2 TEST METHOD: NORMAL
UNACCEPTABLE ANTIGEN: NORMAL
UNOS CPRA: 66

## 2021-06-11 ENCOUNTER — REFERRAL (OUTPATIENT)
Dept: TRANSPLANT | Facility: CLINIC | Age: 66
End: 2021-06-11

## 2021-06-11 ENCOUNTER — DOCUMENTATION ONLY (OUTPATIENT)
Dept: TRANSPLANT | Facility: CLINIC | Age: 66
End: 2021-06-11

## 2021-06-11 DIAGNOSIS — I10 ESSENTIAL HYPERTENSION: ICD-10-CM

## 2021-06-11 DIAGNOSIS — N18.6 ESRD (END STAGE RENAL DISEASE) (H): Primary | ICD-10-CM

## 2021-06-11 DIAGNOSIS — Z87.891 HISTORY OF TOBACCO USE: ICD-10-CM

## 2021-06-11 DIAGNOSIS — G47.33 OBSTRUCTIVE SLEEP APNEA: ICD-10-CM

## 2021-06-11 DIAGNOSIS — I12.9 HYPERTENSIVE CHRONIC KIDNEY DISEASE WITH STAGE 1 THROUGH STAGE 4 CHRONIC KIDNEY DISEASE, OR UNSPECIFIED CHRONIC KIDNEY DISEASE: ICD-10-CM

## 2021-06-11 DIAGNOSIS — N02.B9 IGA NEPHROPATHY: ICD-10-CM

## 2021-06-11 DIAGNOSIS — Z76.82 ORGAN TRANSPLANT CANDIDATE: ICD-10-CM

## 2021-06-11 DIAGNOSIS — Z01.818 PRE-TRANSPLANT EVALUATION FOR KIDNEY TRANSPLANT: ICD-10-CM

## 2021-06-11 DIAGNOSIS — N18.9 CHRONIC RENAL FAILURE: ICD-10-CM

## 2021-06-11 NOTE — Clinical Note
Pt has an STD of 8/11/21, please cancel d/t BMI. Pt needs to see weight management, dietician, and surgeon in person. Pt is a pre-K, not on dialysis. Pt would like to schedule for early September.  Thanks, Kath

## 2021-06-11 NOTE — LETTER
Gurjit Squires  [address] [facility] [address]  [place] [address]      Dear Gurjit,    Thank you for your interest in the Transplant Center at [facility]. We look forward to being a part of your care team and assisting you through the transplant process.    As we discussed, your transplant coordinator is Radu Bunch (Kidney).  You may call your coordinator at any time with questions or concerns call [phone].    Please complete the following.    1. Fill out and return the enclosed forms    Authorization for Electronic Communication    Authorization to Discuss Protected Health Information    Authorization for Release of Protected Health Information    Authorization for Care Everywhere Release of Information    2. Sign up for:    [employer], access to your electronic medical record (see enclosed pamphlet)    [email or web address], a transplant education website    You can use these tools to learn more about your transplant, communicate with your care team, and track your medical details      Sincerely,  Solid Organ Transplant  [facility]    CC: Jaquan Narvaez MD (PCP)

## 2021-06-11 NOTE — PROGRESS NOTES
Radu Bunch MD   6/11/2021  7:33 AM CDT      No evidence of acute rejection with mild chronic changes.     --- [employer] message sent.        Radu Bunch MD Ututalum, Teresa, RN; Shauna Barker, RN             Patient's biopsy shows only chronic changes with no acute rejection.  I talked to the patient about this.  His last GFR was 21 ml/min and I recommended to him we start the retransplant process to get him waiting time.  He was in agreement with this.     Shauna, can you have the team give him a call to start the evaluation.  He is going on a short vacation next week, but should be able to be reached after that.     Thanks     Jackson

## 2021-06-21 VITALS — HEIGHT: 68 IN | BODY MASS INDEX: 42.44 KG/M2 | WEIGHT: 280 LBS

## 2021-06-21 PROBLEM — M79.641 RIGHT HAND PAIN: Status: ACTIVE | Noted: 2019-09-11

## 2021-06-21 PROBLEM — M17.12 PRIMARY LOCALIZED OSTEOARTHRITIS OF LEFT KNEE: Status: ACTIVE | Noted: 2017-10-16

## 2021-06-21 PROBLEM — R78.81 BACTEREMIA: Status: ACTIVE | Noted: 2018-05-18

## 2021-06-21 PROBLEM — N17.9 AKI (ACUTE KIDNEY INJURY) (H): Status: ACTIVE | Noted: 2018-05-17

## 2021-06-21 PROBLEM — I48.91 ATRIAL FIBRILLATION (H): Status: ACTIVE | Noted: 2020-09-18

## 2021-06-21 PROBLEM — F17.200 CURRENT SMOKER: Status: ACTIVE | Noted: 2018-05-17

## 2021-06-21 PROBLEM — L97.909 LEG ULCER (H): Status: ACTIVE | Noted: 2018-05-17

## 2021-06-21 PROBLEM — G89.29 CHRONIC BILATERAL LOW BACK PAIN WITHOUT SCIATICA: Status: ACTIVE | Noted: 2019-06-03

## 2021-06-21 PROBLEM — M54.50 CHRONIC BILATERAL LOW BACK PAIN WITHOUT SCIATICA: Status: ACTIVE | Noted: 2019-06-03

## 2021-06-21 PROBLEM — Z79.01 LONG TERM CURRENT USE OF ANTICOAGULANT: Status: ACTIVE | Noted: 2020-09-18

## 2021-06-21 PROBLEM — I10 HYPERTENSION: Status: ACTIVE | Noted: 2021-06-21

## 2021-06-21 PROBLEM — S81.811A LACERATION OF RIGHT LOWER EXTREMITY: Status: ACTIVE | Noted: 2018-06-05

## 2021-06-21 PROBLEM — G56.00 CARPAL TUNNEL SYNDROME: Status: ACTIVE | Noted: 2019-10-10

## 2021-06-21 PROBLEM — S80.812D: Status: ACTIVE | Noted: 2018-08-10

## 2021-06-21 PROBLEM — I63.9 ISCHEMIC STROKE (H): Status: ACTIVE | Noted: 2020-09-08

## 2021-06-21 SDOH — HEALTH STABILITY: MENTAL HEALTH: HOW OFTEN DO YOU HAVE 6 OR MORE DRINKS ON ONE OCCASION?: NOT ASKED

## 2021-06-21 SDOH — HEALTH STABILITY: MENTAL HEALTH: HOW OFTEN DO YOU HAVE A DRINK CONTAINING ALCOHOL?: 2-4 TIMES A MONTH

## 2021-06-21 SDOH — HEALTH STABILITY: MENTAL HEALTH: HOW MANY STANDARD DRINKS CONTAINING ALCOHOL DO YOU HAVE ON A TYPICAL DAY?: NOT ASKED

## 2021-06-21 ASSESSMENT — MIFFLIN-ST. JEOR: SCORE: 2024.57

## 2021-06-21 NOTE — TELEPHONE ENCOUNTER
PCP: Dr. Jaquan Narvaez   Referring Provider: Dr. Radu Bunch   Referring Diagnosis: Kidney replaced by transplant;   Pancreas replaced by transplant    Is patient under the age of 65? No  Is patient diabetic? No  Is patient on insulin? No  Was patient offered a pancreas transplant referral? No    Is patient in a group home/assisted living? No  Does patient have a guardian? No    Referral intake process completed.  Patient is aware that after financial approval is received, medical records will be requested.   Patient confirmed for a callback from transplant coordinator on 7/5/21. (within 2 weeks)  Tentative evaluation date 8/11/21. (within 4 weeks)    Confirmed coordinator will discuss evaluation process in more detail at the time of their call.   Patient is aware of the need to arrange age appropriate cancer screening, vaccinations, and dental care.  Reminded patient to complete questionnaire, complete medical records release, and review packet prior to evaluation visit .  Assessed patient for special needs (ie--wheelchair, assistance, guardian, and ):  None  Patient instructed to call [phone] with questions.     Patient gave verbal consent during intake call to obtain medical records and documents outside of [facility]:  Yes     HALEIGH Estrella, LPN   Solid Organ Transplant

## 2021-07-02 NOTE — TELEPHONE ENCOUNTER
Called pt for referral call- explained that I will be out of office on 7/5/21 when our call is scheduled.  He stated that right now he is getting ready to go out of town and didn't have time to talk. He would like to be called in the morning next week.  Will call him back then.

## 2021-07-06 ENCOUNTER — TELEPHONE (OUTPATIENT)
Dept: TRANSPLANT | Facility: CLINIC | Age: 66
End: 2021-07-06

## 2021-07-06 NOTE — TELEPHONE ENCOUNTER
Contacted patient and introduced myself as their Transplant Coordinator, also introduced the role of the Transplant Coordinator in the transplant process.  Explained the purpose of this call including reviewing next steps and answering questions.    Confirmed Referring Provider, Dialysis Center, and Primary Care Physician. Notified patient of the importance of continued communication with referring providers and primary care physicians.    Reviewed components of transplant evaluation process including necessary appointments, tests, and procedures.    Answered questions for patient regarding evaluation, provided my name and contact information and requested they call with any additional questions.    Determined that patient would like additional information regarding transplant by:     Drop Down choices: Mail, Email, MyChart, Phone Call   Encourage MyChart   Notified patients that they will hear from a Transplant  to schedule evaluation.       Reviewed pt's chart for pre-kidney transplant evaluation planning. Pt lives in [place], [place]. Pt has hx of IgA nephropathy and is s/p LDKT 2004, now declining. GFR 21 on 6/8/21, we discussed the GFR requirements for listing and expressed good understanding. Pt is not on dialysis, reports he was on dialysis for a few months prior to his first transplant. Pt is not diabetic. Other hx includes HTN, VANESA (uses CPAP), Gout, and chronic back pain (reports it's fairly well controlled, uses Tramadol).  Heart hx had episode of a-fib following previous transplant, ischemic L CVA in 7/2020- reports some balance issues but does not use a cane or walker. Surgical hx includes bilat knee replacements, bilat rotator cuff replacements, and penile implant.  BMI 43, discussed BMI requirements and explained we will have him see weight-loss clinic, dietician, and transplant surgeon first.  Once his BMI is closer to 38 he will be eligible for full PKE.   Has a colonoscopy scheduled next week.   Dental: reports he is UTD.  Derm: looking for a new provider, but goes q6-12 months.  Pt is a former smoker- quite prior to first transplant, consumes alcohol socially, and denies recreational drug use. Pt is independent w/ ADLs.     I also introduced [employer] and asked pt to create an account and view pre-kidney transplant videos for review with me following evaluation. Confirmed that we will cancel STD on 8/11/21 d/t BMI. Informed pt they will hear from scheduling to arrange the evaluation. Smartset orders entered, chart routed to scheduling pool.

## 2021-07-06 NOTE — TELEPHONE ENCOUNTER
Called patient to schedule for Wt Mgmt appts, he confirmed for Mon Sept 13 in the afternoon for Cesia Borden and Mikayla Rahman, emailing schedule to patient.

## 2021-07-15 DIAGNOSIS — Z94.0 KIDNEY TRANSPLANTED: Primary | ICD-10-CM

## 2021-07-15 RX ORDER — SULFAMETHOXAZOLE AND TRIMETHOPRIM 400; 80 MG/1; MG/1
1 TABLET ORAL
Qty: 13 TABLET | Refills: 11 | Status: SHIPPED | OUTPATIENT
Start: 2021-07-16 | End: 2022-06-21

## 2021-07-20 ENCOUNTER — TRANSFERRED RECORDS (OUTPATIENT)
Dept: HEALTH INFORMATION MANAGEMENT | Facility: CLINIC | Age: 66
End: 2021-07-20

## 2021-07-27 ENCOUNTER — TELEPHONE (OUTPATIENT)
Dept: TRANSPLANT | Facility: CLINIC | Age: 66
End: 2021-07-27

## 2021-07-27 NOTE — LETTER
PHYSICIAN ORDERS    DATE & TIME ISSUED: 2021 12:21 PM  PATIENT NAME: Gurjit Squires   : 1955     [facility] MR# [if applicable]: [id number]     DIAGNOSIS / ICD - 10 CODES    Kidney Transplanted (Z94.0)    After Care Following Organ Transplant (Z48.298)    Long Term Use of Medication (Z79.899)    Immunosuppressed Status (Z92.25)      Monthly x 6 months    Hemogram and Platelet    Basic Metabolic Panel (Sodium,potassium,chloride,CO2,creatinine,urea,nitrogen,glucose,calcium)    Every 6 months    Urine for protein creatinine ratio      Patient should release information to the [facility].   Please fax results to the Transplant Center at [phone].  Any questions please call [phone] or [phone].      .

## 2021-08-25 ENCOUNTER — TELEPHONE (OUTPATIENT)
Dept: TRANSPLANT | Facility: CLINIC | Age: 66
End: 2021-08-25

## 2021-08-25 NOTE — TELEPHONE ENCOUNTER
From:Gurjit Squires       Sent:8/24/2021  3:06 PM CDT         To:Radu Bunch MD    Subject:Question about medications    I have fungus on my toes which has spread to my legs and wrist. I was just at my dermatologist and she suggested taking ..terbinafine (Lamisil 250 mg) one tablet for 7 days. I said I wanted to confirm with you that it is OK She did lab work to check liver function. The doctor is Dr Carisa Vgiil at [facility] in [facility]. The number she can be reached at is [phone] and ask for her nurse? Or II am guessing you have a more direct way of looking at it.    Pablo Nolan, [facility]  Gayla Mcleod, RN  Phone Number: [phone]   No DDIx, may want to monitor LFTs. While there is no dose adjustments for low CrCl, we do know that the clearance is decreased.     Pablo Nolan, PharmD   [facility] Pharmacist     Phone: [phone]       OUTCOME:  Called Dr Carisa Vigil at [facility] [phone]. Spoke to nurse who reports they always monitor the LFT.

## 2021-08-31 ENCOUNTER — TELEPHONE (OUTPATIENT)
Dept: TRANSPLANT | Facility: CLINIC | Age: 66
End: 2021-08-31

## 2021-08-31 NOTE — TELEPHONE ENCOUNTER
Carlos Squires Teresa, HERRERA  Phone Number: [phone]   The exact date of my booster was 08/21/21.   My colonoscopy was 07/20/21 @ [facility] in [place]     Updated Immunization record.  Colonoscopy updated.

## 2021-09-10 NOTE — PROGRESS NOTES
[facility] Solid Organ Transplant  Outpatient MNT: Kidney Transplant Evaluation    Current BMI: 39.9 (HT 68 in,  lbs/119 kg)  BMI is outside recommendation of <35 for kidney transplant  Goal weight for kidney transplant <230 lbs (32 lb loss)     Time Spent: 30 minutes  Visit Type: Initial   Referring Physician: Finger   Pt accompanied by: self    History of previous txp: kidney 2004  Dialysis: no     Nutrition Assessment  Limits salt. On Weight Watchers x 1 month. Prev has done programs where you buy shakes and bars, Profile by [employer]. He reports times in the past when he's successfully lost weight have in part been due to being ill.     This RD visit can be counted as 1st required RD visit for bariatric surgery/weight management if pt were to proceed with this program    Appetite: baseline   Food allergies/intolerances: none   Meal prep & grocery shopping: pt does   Previous RD education: in the past, prior to txp  Issues chewing or swallowing: no   N/V/D/C: no   Food access concerns: no    Vitamins, Supplements, Pertinent Meds: vit D, fish oil, MVI  Herbal Medicines/Supplements: none     Edema: seldom    Weight hx:   - reports losing 25 lbs x 1 month; has been losing 1-3 lbs/week average; also lost 15 lbs w/ colonoscopy   - he reports being down to 225 lbs around time of txp    Diet Recall  Breakfast Eggs with toast   Lunch Light- deli turkey s/w and grapes    Dinner Chicken (less beef) w/ veggies    Snacks Occasional light popcorn   Beverages Water, Diet Coke (2/day)   Alcohol 2-3 drinks/week    Dining out 2x/month      Physical Activity  Stroke last July, which has left him w/ some R leg residual weakness  Walks as much as he can, 45 min/day (either on treadmill or outside)  Swimming - decreased in past month d/t being busy with grand kids  PT restarting tomorrow after stroke      Labs  8/16 K 4.5     Nutrition Diagnosis  Obesity r/t positive energy balance and inadequate physical activity AEB BMI  39.9.    Nutrition Intervention  Nutrition education provided:  Discussed strategies for weight loss. Continue on Weight Watchers. We reviewed what to expect in terms of 0.5-2 lbs/week and that weight loss likely will be slower in the coming months. Reach out if weight is not progressing in next 1-2 months. Encouraged him to call txp coordinator when he has made some progress or when BMI is near 38.     Discussed sodium intake (low sodium foods and drinks, seasoning food without salt and tips for low sodium diet).  Reviewed wnl K level.     Did not review post txp nutrition ed at this time     Patient Understanding: Pt verbalized understanding of education provided.  Expected Engagement: Good  Follow-Up Plans: PRN     Nutrition Goals  BMI <35 or <230 lbs for kidney transplant     Julia Keyes, RD, LD, CCTD

## 2021-09-13 ENCOUNTER — OFFICE VISIT (OUTPATIENT)
Dept: TRANSPLANT | Facility: CLINIC | Age: 66
End: 2021-09-13
Attending: PHYSICIAN ASSISTANT
Payer: MEDICARE

## 2021-09-13 VITALS
OXYGEN SATURATION: 97 % | HEART RATE: 78 BPM | WEIGHT: 262.57 LBS | SYSTOLIC BLOOD PRESSURE: 170 MMHG | BODY MASS INDEX: 39.92 KG/M2 | DIASTOLIC BLOOD PRESSURE: 92 MMHG

## 2021-09-13 VITALS
HEIGHT: 68 IN | DIASTOLIC BLOOD PRESSURE: 92 MMHG | WEIGHT: 262.6 LBS | SYSTOLIC BLOOD PRESSURE: 170 MMHG | HEART RATE: 78 BPM | OXYGEN SATURATION: 97 % | BODY MASS INDEX: 39.8 KG/M2

## 2021-09-13 DIAGNOSIS — I10 ESSENTIAL HYPERTENSION: ICD-10-CM

## 2021-09-13 DIAGNOSIS — G47.33 OBSTRUCTIVE SLEEP APNEA: ICD-10-CM

## 2021-09-13 DIAGNOSIS — N18.4 CHRONIC RENAL FAILURE, STAGE 4 (SEVERE) (H): ICD-10-CM

## 2021-09-13 DIAGNOSIS — Z76.82 ORGAN TRANSPLANT CANDIDATE: ICD-10-CM

## 2021-09-13 DIAGNOSIS — N02.B9 IGA NEPHROPATHY: ICD-10-CM

## 2021-09-13 DIAGNOSIS — I12.9 HYPERTENSIVE CHRONIC KIDNEY DISEASE WITH STAGE 1 THROUGH STAGE 4 CHRONIC KIDNEY DISEASE, OR UNSPECIFIED CHRONIC KIDNEY DISEASE: ICD-10-CM

## 2021-09-13 DIAGNOSIS — Z01.818 PRE-TRANSPLANT EVALUATION FOR KIDNEY TRANSPLANT: ICD-10-CM

## 2021-09-13 DIAGNOSIS — N18.6 ESRD (END STAGE RENAL DISEASE) (H): ICD-10-CM

## 2021-09-13 DIAGNOSIS — N18.9 CHRONIC RENAL FAILURE: ICD-10-CM

## 2021-09-13 DIAGNOSIS — Z87.891 HISTORY OF TOBACCO USE: ICD-10-CM

## 2021-09-13 PROCEDURE — 99202 OFFICE O/P NEW SF 15 MIN: CPT | Performed by: PHYSICIAN ASSISTANT

## 2021-09-13 PROCEDURE — 99203 OFFICE O/P NEW LOW 30 MIN: CPT | Performed by: TRANSPLANT SURGERY

## 2021-09-13 ASSESSMENT — MIFFLIN-ST. JEOR: SCORE: 1945.65

## 2021-09-13 NOTE — LETTER
2021         RE: Gurjit Squires  [address]  [address]  [facility] [address]        Dear Colleague,    Thank you for referring your patient, Gurjit Squires, to the [facility] TRANSPLANT [facility]. Please see a copy of my visit note below.    PATIENT:  Gurjit Squires  :          1955  FELIX:          2021    The patient is seen at the consultation request of Dr. Hobson for obesity associated with Patient is pursuing transplant of kidney and needs to lose 32 pounds prior to surgery.    He describes his weight history as a gradual gain.    Kidney transplant  IgA nephropathy  Stroke 1 year ago with some residual right leg weakness      Today's weight: 262 lbs 9.6 oz  Current Body Mass Index:  Body mass index is 39.93 kg/m .     Current BMI: 39.9 (HT 68 in,  lbs/119 kg)  BMI is outside recommendation of <35 for kidney transplant  Goal weight for kidney transplant <230 lbs (32 lb loss)    Most weight loss:  25 lbs.  Lowest weight since an adult:  200 lbs.  Highest weight:  295 lbs.    Lost 25 lb recently with WW which is working well for him.    Nephrologist Dr Bunch    Work/Social History: Gurjit retired as . He describes his home life as stable. Resides in an independent environment. His marital status is . He has an overweight/obese spouse. He reports that he has quit smoking. His smoking use included cigarettes. He has never used smokeless tobacco. He reports current alcohol use. He reports that he does not use drugs.     Diet Recall  Breakfast Eggs with toast   Lunch Light- deli turkey s/w and grapes    Dinner Chicken (less beef) w/ veggies    Snacks Occasional light popcorn   Beverages Water, Diet Coke (2/day)   Alcohol 2-3 drinks/week    Dining out 2x/month          Gurjit has an activity pattern that is moderately active but has no structured exercise program.     ASSESSMENT/PLAN:    66 y.o. male with hx of kidney transplant  "2004 due to IgA nephropathy. He is seeing Dr Hobson today to discuss a 2nd kidney transplant and BMI 39.  Needs BMI 35 to qualify.  He started WW 1 month ago and is doing well and continuing to lose weight.  We discussed possible weight loss medications if needed for increased hunger/cravings but at this point he isn't struggling to follow WW and is continuing to lose weight.      Follow up PRN. [phone] to schedule    Watch online seminar if interested in sleeve gastrectomy.  Please view our Weight Loss Surgery Seminar at the following website.   [email or web address]    It is the seminar near the bottom of the page.  \"[facility] Weight Loss Surgery Video\"    It starts with Dr Mcintyre speaking.  Please take the quiz after you view it.    When you take the quiz, it shows us that you watched it.          Cesia Lomeli PA-C             Again, thank you for allowing me to participate in the care of your patient.        Sincerely,        Cesia Lomeli PA-C    "

## 2021-09-13 NOTE — LETTER
2021         RE: Gurjit Squires  [address]  [address]  [facility] [address]        Dear Colleague,    Thank you for referring your patient, Gurjit Squires, to the [facility] TRANSPLANT [facility]INIC. Please see a copy of my visit note below.      Transplant Surgery      Reason For Visit: Kidney transplant referral    History of Present Illness:  Prior kidney transplant .  Patient referred for kidney transplant but BMI exceeds criteria.  Currently at BMI ~42.     Has had prior kidney transplant. Last biopsy showed chronic changes without acute rejection.  GFR 21-24.    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  Cr stabilized around 2.3 for several years but recently had a rise, and subsequent improvement. Advised to consider re-listing.    May have donors.    Not on dialysis.    Had a stroke in the last year.    Exam:   BP (!) 170/92   Pulse 78   Wt 119.1 kg (262 lb 9.1 oz)   SpO2 97%   BMI 39.92 kg/m    Well appearing, no apparent distress   Abd obese    Impression:  Recommend weight loss plan.  The patient has made progress towards BMI 35 (achieved 25 lb weight loss). Based on this progress I recommend listing inactive (assuming we have documented GFR < 20).  Would defer remainder of workup/eval until close to target BMI 35.    Plan: Eval by weight management.  List inactive.  Full eval when BMI near 35.    Discussion:  Mr. Squires appears to be a fair candidate for kidney transplantation and has a good understanding of the risks and benefits of this approach to the management of renal failure.     The majority of our visit was spent in counselling, discussing the medical and surgical risks of kidney transplantation. We discussed approximate wait time and how that is influenced by issues such as blood type and sensitization (PRA) and access to a living donor. I contrasted potential waiting time for living vs   donor kidneys from  normal (0-85%) or higher (%) kidney donor profile index (KDPI) donors and their associated outcomes. I would recommend this individual to consider kidneys from high KDPI donors (if living donor not found). Potential surgical complications of kidney transplantation include bleeding, superficial or deep wound complications (infection, hernia, lymphocele), ureteral anastomotic failure (leak or stenosis), graft thrombosis, need for reoperation and other issues such as cardiac complications, pneumonia, deep venous thrombosis, pulmonary embolism, post transplant diabetes and death. The potential for recurrent disease or need for retransplantation was also addressed. We discussed the possible need for ureteral stent (and subsequent removal), and the utility of protocol biopsy and laboratory studies to evaluate for rejection or recurrent disease. We discussed the risk of graft rejection, our center's average graft and patient survival rates, immunosuppression protocols, as well as the potential opportunity to participate in clinical trials.  We also discussed the average length of stay, recovery process, and posttransplant lab and monitoring protocol.  I emphasized the need for strict immunosuppression medication adherence and the potential for complications of immunosuppression such as skin cancer or lymphoma, as well as a very low but not zero risk of donor-derived disease transmission risks (infection, cancer). Mr. Squires asked good questions and his candidacy will be reviewed at our Multidisciplinary Selection Committee. Thank you for the opportunity to participate in Mr. Squires's care.    Aaron Hobson MD, PhD  Transplant Surgery    Total time charting and in person:  43 min.      Again, thank you for allowing me to participate in the care of your patient.        Sincerely,        Aaron Hobson MD

## 2021-09-13 NOTE — PROGRESS NOTES
Transplant Surgery      Reason For Visit: Kidney transplant referral    History of Present Illness:  Prior kidney transplant .  Patient referred for kidney transplant but BMI exceeds criteria.  Currently at BMI ~42.     Has had prior kidney transplant. Last biopsy showed chronic changes without acute rejection.  GFR 21-24.    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  Cr stabilized around 2.3 for several years but recently had a rise, and subsequent improvement. Advised to consider re-listing.    May have donors.    Not on dialysis.    Had a stroke in the last year.    Exam:   BP (!) 170/92   Pulse 78   Wt 119.1 kg (262 lb 9.1 oz)   SpO2 97%   BMI 39.92 kg/m    Well appearing, no apparent distress   Abd obese    Impression:  Recommend weight loss plan.  The patient has made progress towards BMI 35 (achieved 25 lb weight loss). Based on this progress I recommend listing inactive (assuming we have documented GFR < 20).  Would defer remainder of workup/eval until close to target BMI 35.    Plan: Eval by weight management.  List inactive.  Full eval when BMI near 35.    Discussion:  Mr. Squires appears to be a fair candidate for kidney transplantation and has a good understanding of the risks and benefits of this approach to the management of renal failure.     The majority of our visit was spent in counselling, discussing the medical and surgical risks of kidney transplantation. We discussed approximate wait time and how that is influenced by issues such as blood type and sensitization (PRA) and access to a living donor. I contrasted potential waiting time for living vs  donor kidneys from  normal (0-85%) or higher (%) kidney donor profile index (KDPI) donors and their associated outcomes. I would recommend this individual to consider kidneys from high KDPI donors (if living donor not found). Potential surgical complications of kidney  transplantation include bleeding, superficial or deep wound complications (infection, hernia, lymphocele), ureteral anastomotic failure (leak or stenosis), graft thrombosis, need for reoperation and other issues such as cardiac complications, pneumonia, deep venous thrombosis, pulmonary embolism, post transplant diabetes and death. The potential for recurrent disease or need for retransplantation was also addressed. We discussed the possible need for ureteral stent (and subsequent removal), and the utility of protocol biopsy and laboratory studies to evaluate for rejection or recurrent disease. We discussed the risk of graft rejection, our center's average graft and patient survival rates, immunosuppression protocols, as well as the potential opportunity to participate in clinical trials.  We also discussed the average length of stay, recovery process, and posttransplant lab and monitoring protocol.  I emphasized the need for strict immunosuppression medication adherence and the potential for complications of immunosuppression such as skin cancer or lymphoma, as well as a very low but not zero risk of donor-derived disease transmission risks (infection, cancer). Mr. Squires asked good questions and his candidacy will be reviewed at our Multidisciplinary Selection Committee. Thank you for the opportunity to participate in Mr. Squires's care.    Aaron Hobson MD, PhD  Transplant Surgery    Total time charting and in person:  43 min.

## 2021-09-13 NOTE — PATIENT INSTRUCTIONS
[phone] to schedule with medical weight management clinic.    Could consider topiramate for weight loss if needed.

## 2021-09-13 NOTE — NURSING NOTE
"Chief Complaint   Patient presents with     Consult     WT MGMT     Blood pressure (!) 170/92, pulse 78, height 1.727 m (5' 8\"), weight 119.1 kg (262 lb 9.6 oz), SpO2 97 %.    Rehana Collins on 9/13/2021 at 12:20 PM    "

## 2021-09-13 NOTE — PROGRESS NOTES
PATIENT:  Gurjit Squires  :          1955  FELIX:          2021    The patient is seen at the consultation request of Dr. Hobson for obesity associated with Patient is pursuing transplant of kidney and needs to lose 32 pounds prior to surgery.    He describes his weight history as a gradual gain.    Kidney transplant  IgA nephropathy  Stroke 1 year ago with some residual right leg weakness      Today's weight: 262 lbs 9.6 oz  Current Body Mass Index:  Body mass index is 39.93 kg/m .     Current BMI: 39.9 (HT 68 in,  lbs/119 kg)  BMI is outside recommendation of <35 for kidney transplant  Goal weight for kidney transplant <230 lbs (32 lb loss)    Most weight loss:  25 lbs.  Lowest weight since an adult:  200 lbs.  Highest weight:  295 lbs.    Lost 25 lb recently with WW which is working well for him.    Nephrologist Dr Bunch    Work/Social History: Gurjit retired as . He describes his home life as stable. Resides in an independent environment. His marital status is . He has an overweight/obese spouse. He reports that he has quit smoking. His smoking use included cigarettes. He has never used smokeless tobacco. He reports current alcohol use. He reports that he does not use drugs.     Diet Recall  Breakfast Eggs with toast   Lunch Light- deli turkey s/w and grapes    Dinner Chicken (less beef) w/ veggies    Snacks Occasional light popcorn   Beverages Water, Diet Coke (2/day)   Alcohol 2-3 drinks/week    Dining out 2x/month          Gurjit has an activity pattern that is moderately active but has no structured exercise program.     ASSESSMENT/PLAN:    66 y.o. male with hx of kidney transplant  due to IgA nephropathy. He is seeing Dr Hobson today to discuss a 2nd kidney transplant and BMI 39.  Needs BMI 35 to qualify.  He started WW 1 month ago and is doing well and continuing to lose weight.  We discussed possible weight loss medications if needed for  "increased hunger/cravings but at this point he isn't struggling to follow WW and is continuing to lose weight.      Follow up PRN. [phone] to schedule    Watch online seminar if interested in sleeve gastrectomy.  Please view our Weight Loss Surgery Seminar at the following website.   [email or web address]    It is the seminar near the bottom of the page.  \"[facility] Weight Loss Surgery Video\"    It starts with Dr Mcintyre speaking.  Please take the quiz after you view it.    When you take the quiz, it shows us that you watched it.          Cesia Lomeli PA-C         "

## 2021-09-15 ENCOUNTER — COMMITTEE REVIEW (OUTPATIENT)
Dept: TRANSPLANT | Facility: CLINIC | Age: 66
End: 2021-09-15

## 2021-09-15 NOTE — COMMITTEE REVIEW
Abdominal Committee Review Note     Evaluation Date: 9/14/2021  Committee Review Date: 9/15/2021    Organ being evaluated for: Kidney    Transplant Phase: Evaluation  Transplant Status: Active    Transplant Coordinator: Kath Torers  Transplant Surgeon: Dr. Aaron Hobson     Referring Physician: Radu Bunch    Primary Diagnosis: Malignant Hypertension  Secondary Diagnosis: CKD    Committee Review Members:  Vj, Johan Keyes RD   Nephrology Singh Beltran, APRN CNP, Jony Montes MD, Radu Bunch MD, Gareth Turk MD   Pharmacy [address], [facility]    - Clinical Izzy Gabby Olsen, [facility]   Transplant Shona Mane, HERRERA, Nora Kovacs, RN, Aaron Hobson MD, Kath Elliott, RN, Pratibha Banks, MARK, Pratibha Pavon, RN, Leann Jimenez, RN, Meaghan Simmons, RN, Flavia Cleaning, RN   Transplant Surgery Aaron Hobson MD, Tiana Redman MD, MD       Transplant Eligibility: Irreversible chronic kidney disease treated w/dialysis or expected need for dialysis    Committee Review Decision: Approved    Relative Contraindications: None    Absolute Contraindications: Current BMI <16 or >33; or severe malnutrition    Committee Chair Aaron Hobson MD verbally attested to the committee's decision.    Committee Discussion Details: Reviewed pt's medical status and evaluation results to date.  Pt is determined to be an approved candidate for kidney transplant. Dr. Hobson recommends the pt continues to work on weight loss, once he reaches a BMI of 38 or less, he will be seen for a full evaluation.  Once he reached a qualifying GFR of 20 or less, he is approved to be listed INACTIVE status on the kidney wait list.  Pt will be called and summary letter generated.

## 2021-09-17 ENCOUNTER — TELEPHONE (OUTPATIENT)
Dept: TRANSPLANT | Facility: CLINIC | Age: 66
End: 2021-09-17

## 2021-09-17 NOTE — LETTER
"09/17/21        Gurjit Squires  [address]  [address]  [place] [address]        Dear Gurjit,    It was a pleasure to see you recently for consideration of kidney transplantation. Your pre-transplant \"mini\" evaluation results were reviewed at our Multidisciplinary Selection Committee on 9/15/21. The Committee has approved you for INACTIVE listing status once you reach a qualifying GFR of 20 or less.  Please let me know when this occurs.  Additionally, you will be eligible for a full pre-kidney transplant evaluation once you are closer to your goal weight of 230 lbs, please keep me updated on your progress.    For any questions, please contact the Transplant Office at [phone] or you may reach me directly at [phone].      Sincerely,  Kath Elliott RN, Pre-Kidney/Pancreas Transplant Coordinator  Solid Organ Transplant  [facility], [facility]    CC: Dr. Jaquan Narvaez, Dr. Gurjit Goodwin  "

## 2021-09-17 NOTE — TELEPHONE ENCOUNTER
Called pt to review outcome of selection committee.  Explained that once he reaches a GFR of 20 or less he will be eligible for INACTIVE listing status.  I will keep an eye on his labs, but I have also asked him to call me if he sees that number in his results.  Once he is closer to his goal weight of 230 lbs, he will be eligible for full evaluation, he will let me know when he is ready.  He had no further questions and was in good agreement w/ the plan.

## 2021-09-19 ENCOUNTER — HEALTH MAINTENANCE LETTER (OUTPATIENT)
Age: 66
End: 2021-09-19

## 2021-10-11 ENCOUNTER — MYC MEDICAL ADVICE (OUTPATIENT)
Dept: NEPHROLOGY | Facility: CLINIC | Age: 66
End: 2021-10-11

## 2021-10-11 DIAGNOSIS — E87.20 ACIDOSIS: ICD-10-CM

## 2021-10-11 RX ORDER — SODIUM BICARBONATE 650 MG/1
TABLET ORAL
Qty: 180 TABLET | Refills: 3 | Status: SHIPPED | OUTPATIENT
Start: 2021-10-11 | End: 2022-09-18

## 2021-10-11 NOTE — LETTER
PHYSICIAN ORDERS      DATE & TIME ISSUED: 2021 1:53 PM  PATIENT NAME: Gurjit Squires   : 1955     [facility] MR# [if applicable]: [id number]     DIAGNOSIS:  Kidney Transplant  ICD-10 CODE: Z94.0     Please perform the following lab draw: BMP    Any questions please call: [phone]    Please fax results to [phone].    .

## 2021-10-26 ENCOUNTER — TELEPHONE (OUTPATIENT)
Dept: TRANSPLANT | Facility: CLINIC | Age: 66
End: 2021-10-26

## 2021-10-26 NOTE — LETTER
PHYSICIAN ORDERS    DATE & TIME ISSUED: 2021 10:37 AM  PATIENT NAME: Gurjit Squires   : 1955     [facility] MR# [if applicable]: [id number]     DIAGNOSIS / ICD - 10 CODES    Kidney Transplanted (Z94.0)    After Care Following Organ Transplant (Z48.298)    Long Term Use of Medication (Z79.899)    Complications Kidney Transplant (T86.10)    Creatinine Elevation (R79.89)      Please repeat the following in 2 weeks:    BMP        Patient should release information to the [facility].   Please fax results to the Transplant Center at [phone].  Any questions please call [phone].      .

## 2021-10-26 NOTE — LETTER
PHYSICIAN ORDERS    DATE & TIME ISSUED: 2021 10:37 AM  PATIENT NAME: Gurjit Squires   : 1955     [facility] MR# [if applicable]: [id number]     DIAGNOSIS / ICD - 10 CODES    Kidney Transplanted (Z94.0)    After Care Following Organ Transplant (Z48.298)    Long Term Use of Medication (Z79.899)    Complications Kidney Transplant (T86.10)    Creatinine Elevation (R79.89)      Please repeat the following in 2 weeks:    BMP        Patient should release information to the [facility]plant [facility].   Please fax results to the Transplant Center at [phone].  Any questions please call [phone].      .

## 2021-10-26 NOTE — TELEPHONE ENCOUNTER
From:Gurjit Squires       Sent:10/26/2021 10:03 AM CDT         To:Gayla CARLOS    Subject:Updates about my health    I had labs done this morning and my creatinine was down to 2.09. My next INR is scheduled for 11/22. Should I do it again then or spread it out?      Result not in [facility] or [facility] at this time.    OUTCOME:  Recheck BMP one more time in 2 weeks to confirm Cr remains at baseline.--- [employer] message sent.  Lab order sent to:  [facility]   Phone:  [phone]   Fax:  [phone]

## 2021-11-10 DIAGNOSIS — Z94.0 KIDNEY REPLACED BY TRANSPLANT: ICD-10-CM

## 2021-11-10 RX ORDER — FUROSEMIDE 20 MG
TABLET ORAL
Qty: 360 TABLET | Refills: 1 | Status: SHIPPED | OUTPATIENT
Start: 2021-11-10 | End: 2022-05-05

## 2021-12-24 DIAGNOSIS — I12.9 RENAL HYPERTENSION, STAGE 1-4 OR UNSPECIFIED CHRONIC KIDNEY DISEASE: ICD-10-CM

## 2021-12-27 RX ORDER — VERAPAMIL HYDROCHLORIDE 180 MG/1
TABLET, EXTENDED RELEASE ORAL
Qty: 90 TABLET | Refills: 3 | Status: SHIPPED | OUTPATIENT
Start: 2021-12-27 | End: 2023-01-05

## 2022-01-04 DIAGNOSIS — Z94.0 KIDNEY TRANSPLANTED: Primary | ICD-10-CM

## 2022-01-04 RX ORDER — PREDNISONE 5 MG/1
5 TABLET ORAL DAILY
Qty: 30 TABLET | Refills: 11 | Status: SHIPPED | OUTPATIENT
Start: 2022-01-04 | End: 2023-01-11

## 2022-01-09 ENCOUNTER — HEALTH MAINTENANCE LETTER (OUTPATIENT)
Age: 67
End: 2022-01-09

## 2022-01-28 DIAGNOSIS — E61.1 IRON DEFICIENCY: ICD-10-CM

## 2022-01-28 RX ORDER — FERROUS SULFATE 325(65) MG
TABLET ORAL
Qty: 90 TABLET | Refills: 3 | Status: SHIPPED | OUTPATIENT
Start: 2022-01-28 | End: 2023-01-26

## 2022-02-18 ENCOUNTER — MYC MEDICAL ADVICE (OUTPATIENT)
Dept: NEPHROLOGY | Facility: CLINIC | Age: 67
End: 2022-02-18
Payer: COMMERCIAL

## 2022-02-18 DIAGNOSIS — Z94.0 KIDNEY REPLACED BY TRANSPLANT: Primary | ICD-10-CM

## 2022-03-01 ENCOUNTER — TELEPHONE (OUTPATIENT)
Dept: TRANSPLANT | Facility: CLINIC | Age: 67
End: 2022-03-01
Payer: COMMERCIAL

## 2022-03-01 DIAGNOSIS — Z94.0 KIDNEY REPLACED BY TRANSPLANT: Primary | ICD-10-CM

## 2022-03-01 RX ORDER — MYCOPHENOLATE MOFETIL 250 MG/1
750 CAPSULE ORAL 2 TIMES DAILY
Qty: 180 CAPSULE | Refills: 0 | Status: SHIPPED | OUTPATIENT
Start: 2022-03-01 | End: 2022-03-29

## 2022-03-01 NOTE — TELEPHONE ENCOUNTER
ISSUE: received refill request for IS, labs overdue    LPN TASK:  Please call patient and remind him to please obtain his transplant labs.      Shari Talley RN   Transplant Coordinator  [phone]

## 2022-03-01 NOTE — TELEPHONE ENCOUNTER
Call placed to patient. Patient note that he recently completed labs. He will contact [place] to have them send results to [facility].

## 2022-03-29 DIAGNOSIS — Z94.0 KIDNEY REPLACED BY TRANSPLANT: Primary | ICD-10-CM

## 2022-03-29 RX ORDER — MYCOPHENOLATE MOFETIL 250 MG/1
750 CAPSULE ORAL 2 TIMES DAILY
Qty: 180 CAPSULE | Refills: 11 | Status: SHIPPED | OUTPATIENT
Start: 2022-03-29 | End: 2023-03-12

## 2022-04-04 ENCOUNTER — VIRTUAL VISIT (OUTPATIENT)
Dept: NEPHROLOGY | Facility: CLINIC | Age: 67
End: 2022-04-04
Attending: INTERNAL MEDICINE
Payer: MEDICARE

## 2022-04-04 DIAGNOSIS — I63.532 CEREBROVASCULAR ACCIDENT (CVA) DUE TO OCCLUSION OF LEFT POSTERIOR CEREBRAL ARTERY (H): ICD-10-CM

## 2022-04-04 DIAGNOSIS — Z94.0 KIDNEY REPLACED BY TRANSPLANT: Primary | ICD-10-CM

## 2022-04-04 DIAGNOSIS — Z94.0 HTN, KIDNEY TRANSPLANT RELATED: ICD-10-CM

## 2022-04-04 DIAGNOSIS — D84.9 IMMUNOSUPPRESSION (H): ICD-10-CM

## 2022-04-04 DIAGNOSIS — I15.1 HTN, KIDNEY TRANSPLANT RELATED: ICD-10-CM

## 2022-04-04 PROCEDURE — 99214 OFFICE O/P EST MOD 30 MIN: CPT | Performed by: INTERNAL MEDICINE

## 2022-04-04 NOTE — LETTER
4/4/2022       RE: Gurjit Squires  [address]  [address]  [place] [facility] [place] [address]     Dear Colleague,    Thank you for referring your patient, Gurjit Squires, to the [facility]PH[facility] [place] at [facility]. Please see a copy of my visit note below.    CHRONIC TRANSPLANT NEPHROLOGY VISIT      Assessment & Plan      Recommendations:   -Labs every 2-3 months     -Return to clinic in 6 months virtually.         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  Evidence of recurrent IgA nephropathy.  Mild interstitial fibrosis and tubular atrophy.             9/6/07; Borderline acute cellular-mediated rejection.  Evidence of recurrent IgA nephropathy.  Nodular arteriolar hyalinosis, consistent with CNI toxicity.  Mild interstitial fibrosis and tubular atrophy.             11/3/05; Unremarkable with no evidence or acute rejection.    # Immunosuppression: Mycophenolate mofetil (goal  1-3.5) and Prednisone (dose  5 mg daily)   - Changes: No   monitoring for long term toxicity and complications including infections and malignancies while on lifelong immunosuppression    # Prophylaxis: PCP none- add CD4 count    # Hypertension: ; Goal BP: < 130/80; blood pressure at home    - Changes:  none    # Mineral Bone Disorder:    - Vitamin D; level is: Normal and will continue on cholecalciferol.   - Calcium; level is: Normal     #Blood glucose    -A1c with next labs; 5.3 back in January     # Electrolytes:   - Potassium; level: Normal  - Bicarbonate; level: Normal and will continue on sodium bicarbonate.    # Overweight: Stable weight, but elevated.   - Recommend increased exercise and watch caloric intake.    - Weight Watchers; 25 lb weight loss in 12 months.     # Gout: No recent flares.  Patient is on allopurinol.    # Skin Cancer Risk:    - Discussed sun protection and recommend regular follow up with Dermatology.   - He just saw [facility] doctor. He has an appointment with a Dermatology. Has had some precancerous lesions frozen off before.    -He is seeing Derm in April.     # CVA: L ischemic stroke. On warfarin and ASA.     # COVID-19 Virus Review: Discussed COVID-19 virus and the potential medical risks.  Reviewed preventative health recommendations, including wearing a mask where appropriate.  Recommended COVID vaccination should be up to date with either an initial vaccination or booster shot when appropriate.  Asked the patient to inform the transplant center if they are exposed or diagnosed with this virus.    # COVID Vaccination Up To Date: Yes; update additional vaccination shots per CDC.   COVID vaccine 4 shots.     # Medical Compliance: No, due to infrequent labs.   - Discussed importance of checking labs regularly as recommended, taking medications as prescribed and attending scheduled medical appointments    Return visit: Return in about 6 months (around 10/4/2022).    # Transplant History:  Etiology of kidney failure: IgA nephropathy  Tx: LDKT  Transplant: 7/29/2004 (Kidney)  Donor Type: Living Donor Class: Standard Criteria Donor  Significant changes in immunosuppression: Taken off CNI due to evidence of CNI toxicity and BK viremia.  Significant transplant-related complications: BK  [facility]    Transplant Office Phone Number: [phone]    Assessment and plan was discussed with the patient and he voiced his understanding and agreement.    Jony Montes MD  Transplant Nephrology   Pager: [phone]     Chief Complaint   Mr. Squires is a 66 year old here for routine follow up.  .    History of Present Illness    Mr. Squires reports feeling good overall but had several health issues over the past couple months. Last seen by Dr. Goetz in 1/2021. Since that visit, he is doing well. Feels well. He has a lot of pain in his feet. He is having surgery July 11 in the [facility]. He is having fusion and plates placed to alleviate pain. BP well controlled. Denies orthostasis, lightheadedness. He is on warfarin for stroke 2 years ago in July. He is on lasix 40mg BID, which is controlling his edema.       Questions/concerns  None today.     He had labs in [facility] that show stable proteinuria/allograft functi    Today,       Home BP: 120s/80s    Review of Systems   A comprehensive review of systems was obtained and negative, except as noted in the HPI or PMH.    Problem List   Patient Active Problem List   Diagnosis     Testicular hypofunction     Kidney replaced by transplant     Erectile dysfunction     HTN, kidney transplant related     Immunosuppression (H)     Metabolic acidosis     Vitamin D deficiency     VANESA (obstructive sleep apnea)     Overweight     Aftercare following organ transplant     Abrasion of anterior lower leg, left, subsequent encounter     MAHNAZ (acute kidney injury) (H)     Allergic rhinitis, seasonal     Arthritis of knee, right     Atrial fibrillation (H)     Bacteremia     Benign essential HTN     Carpal tunnel syndrome     Chronic bilateral low back pain without sciatica     Chronic gout     Disorder of bursae and tendons in shoulder region     Chronic renal failure syndrome     Current smoker     History of dysplastic nevus     S/P total knee replacement using cement      History of immunosuppressive therapy     Ischemic stroke (H)     Hypertriglyceridemia     Hypertension     Leg ulcer (H)     Laceration of right lower extremity     Obese     Long term current use of anticoagulant     Right hand pain     Primary localized osteoarthritis of left knee     Osteoarthritis of carpometacarpal joint of thumb       Social History   Social History     Tobacco Use     Smoking status: Former Smoker     Types: Cigarettes     Smokeless tobacco: Never Used     Tobacco comment: Quit 2004   Substance Use Topics     Alcohol use: Yes     Comment: social     Drug use: No       Allergies   No Known Allergies    Medications   Current Outpatient Medications   Medication Sig     acetaminophen (TYLENOL) 325 MG tablet Take 2 tablets (650 mg) by mouth every 6 hours as needed for pain (Patient taking differently: Take 1,000 mg by mouth daily )     allopurinol (ZYLOPRIM) 100 MG tablet Take 3 tablets (300 mg) by mouth daily     aspirin 81 MG EC tablet Take 81 mg by mouth daily     atorvastatin (LIPITOR) 20 MG tablet Take 1 tablet by mouth     carvedilol 12.5 MG PO tablet Take 1 tablet (12.5 mg) by mouth 2 times daily (with meals)     cholecalciferol (VITAMIN D) 1000 UNIT tablet Take 1 tablet (1,000 Units) by mouth daily     clonazePAM (KLONOPIN) 0.5 MG tablet Take 1 tablet (0.5 mg) by mouth daily (Patient taking differently: Take 0.25 mg by mouth every evening )     ferrous sulfate (FEROSUL) 325 (65 Fe) MG tablet TAKE 1 TAB DAILY W/ BREAKFAST OR ON AN EMPTY STOMACH. IF STOMACH UPSET OCCURS, CAN TAKE WITH MEALS.     Fexofenadine HCl (ALLEGRA PO) Take 1 tablet by mouth daily.      furosemide (LASIX) 20 MG tablet TAKE 2 TABLETS BY MOUTH IN THE MORNING AND IN THE EVENING     gabapentin (NEURONTIN) 300 MG capsule Take 1 capsule (300 mg) by mouth At Bedtime     losartan (COZAAR) 100 MG tablet TAKE 1 TABLET BY MOUTH EVERY DAY     Multiple Vitamins-Minerals (ONE-A-DAY MENS 50+ ADVANTAGE PO) Take 1 tablet by mouth  every evening     mycophenolate (GENERIC EQUIVALENT) 250 MG capsule Take 3 capsules (750 mg) by mouth 2 times daily     omega-3 fatty acids (FISH OIL) 1200 MG capsule Take 1 capsule by mouth 3 times daily.     pantoprazole (PROTONIX) 40 MG EC tablet TAKE 1 TABLET BY MOUTH DAILY (Patient taking differently: 20 mg daily)     predniSONE (DELTASONE) 5 MG tablet Take 1 tablet (5 mg) by mouth daily     sodium bicarbonate 650 MG tablet TAKE 1 TABLET BY MOUTH TWICE A DAY     sulfamethoxazole-trimethoprim (BACTRIM) 400-80 MG tablet Take 1 tablet by mouth Every Mon, Wed, Fri Morning     TraMADol HCl 200 MG TB24 Take 1 tablet by mouth daily     verapamil ER (CALAN-SR) 180 MG CR tablet TAKE 1 TABLET BY MOUTH EVERY DAY     No current facility-administered medications for this visit.     There are no discontinued medications.    Physical Exam   Vital Signs: There were no vitals taken for this visit.    GENERAL APPEARANCE: alert and no distress  HENT: no obvious abnormalities on appearance  RESP: breathing appears unremarkable with normal rate, no audible wheezing or cough and no apparent shortness of breath with conversation  MS: extremities normal - no gross deformities noted  SKIN: no apparent rash and normal skin tone  NEURO: speech is clear with no obvious neurological deficits  PSYCH: mentation appears normal and affect normal      Data     Renal Latest Ref Rng & Units 11/23/2021 10/25/2021 9/27/2021   Na 133 - 144 mmol/L - - -   Na (external) 136 - 145 meq/L 140 140 139   K 3.4 - 5.3 mmol/L - - -   K (external) 3.5 - 5.1 meq/L 4.9 4.1 4.5   Cl 98 - 109 meq/L 105 102 103   CO2 20 - 32 mmol/L - - -   CO2 (external) 20 - 29 meq/L 24 25 26   BUN 7 - 30 mg/dL - - -   BUN (external) 6 - 22 mg/dL 62(H) 42(H) 45(H)   Cr 0.66 - 1.25 mg/dL - - -   Cr (external) 0.70 - 1.30 mg/dL 2.47(H) 2.09(H) 2.33(H)   Glucose 70 - 99 mg/dL - - -   Glucose (external) 70 - 99 mg/dL 123(H) 110(H) 111(H)   Ca  8.5 - 10.1 mg/dL - - -   Ca (external)  8.5 - 10.5 mg/dL 8.7 8.7 9.0   Mg 1.6 - 2.3 mg/dL - - -     Bone Health Latest Ref Rng & Units 2/21/2020 5/29/2018 1/28/2010   Phos 2.5 - 4.5 mg/dL - - 3.5   Vit D Def (external) 30.0 - 100.0 ng/mL 41.3 46 -     Heme Latest Ref Rng & Units 11/23/2021 10/25/2021 9/27/2021   WBC 4.0 - 11.0 10e9/L - - -   WBC (external) 4.0 - 11.0 K/uL 9.0 8.0 7.2   Hgb 13.3 - 17.7 g/dL - - -   Hgb (external) 13.5 - 17.5 g/dL 13.2(L) 12.0(L) 11.5(L)   Plt 150 - 450 10e9/L - - -   Plt (external) 140 - 400 K/uL 137(L) 168 158   ABSOLUTE NEUTROPHIL 1.6 - 8.3 10e9/L - - -   ABSOLUTE NEUTROPHILS (EXTERNAL) 1.8 - 8.0 K/uL - - -   ABSOLUTE LYMPHOCYTES 0.8 - 5.3 10e9/L - - -   ABSOLUTE LYMPHOCYTES (EXTERNAL) 0.8 - 4.1 K/uL - - -   ABSOLUTE MONOCYTES 0.0 - 1.3 10e9/L - - -   ABSOLUTE MONOCYTES (EXTERNAL) 0.0 - 1.0 K/uL - - -   ABSOLUTE EOSINOPHILS 0.0 - 0.7 10e9/L - - -   ABSOLUTE EOSINOPHILS (EXTERNAL) 0.0 - 0.7 K/uL - - -   ABSOLUTE BASOPHILS 0.0 - 0.2 10e9/L - - -   ABSOLUTE BASOPHILS (EXTERNAL) 0.0 - 0.2 K/uL - - -   ABS IMMATURE GRANULOCYTES 0 - 0.4 10e9/L - - -   ABSOLUTE NUCLEATED RBC - - - -     Liver Latest Ref Rng & Units 5/14/2018 6/15/2016 1/28/2010   AP 40 - 150 U/L - - 116   AP (external) 38 - 126 U/L 118 105 -   TBili 0.2 - 1.3 mg/dL - - 0.6   TBili (external) 0.2 - 1.3 mg/dL 0.4 0.8 -   ALT 0 - 70 U/L - - 37   ALT (external) 21 - 72 U/L 55 35 -   AST 0 - 55 U/L - - 27   AST (external) 17 - 59 U/L 43 26 -   Tot Protein 6.8 - 8.8 g/dL - - 7.8   Tot Protein (external) 6.3 - 8.2 g/dL 7.0 7.0 -   Albumin 3.9 - 5.1 g/dL - - 4.7   Albumin (external) 3.5 - 5.0 g/dL 4.1 4.1 -     Pancreas Latest Ref Rng & Units 1/6/2021   A1C (external) <5.7 % 5.3     Iron studies Latest Ref Rng & Units 12/14/2015   Ferritin 26 - 388 ng/mL 220     [facility] Txp Virology Latest Ref Rng & Units 6/8/2021 3/12/2019 5/21/2015   CVM DNA Quant - - - -   CMV DNA Quant Ext Not Detected - - NOT DETECTED   CMV Quant <100 Copies/mL - - -   CMV QT Log <2.0 Log copies/mL -  - -   BK Spec - Plasma Plasma -   BK Res BKNEG:BK Virus DNA Not Detected copies/mL BK Virus DNA Not Detected <500(A) -   BK Log <2.7 Log copies/mL Not Calculated <2.7 -   EBV DNA COPIES/ML <1000 Copies/mL - - -   EBV DNA LOG OF COPIES <3.0 Log copies/mL - - -                MRI Brain 7/2020  1. Acute to early subacute ischemic infarct in the left periventricular white matter extending   into the posterior left basal ganglia likely accounting for the patient's clinical symptoms.     2.  No acute intracranial hemorrhage or mass.     3.  Additional scattered foci of supratentorial increased T2/FLAIR signal which are   nonspecific, although commonly seen with chronic small vessel disease.         Again, thank you for allowing me to participate in the care of your patient.      Sincerely,    Jony Montes MD

## 2022-04-04 NOTE — CONFIDENTIAL NOTE
Carlos is a 66 year old who is being evaluated via a billable video visit.      PT stated that he is on Warfarin which is not on medication list.       How would you like to obtain your AVS? MyChart  If the video visit is dropped, the invitation should be resent by: Send to e-mail at: [email or web address]  Will anyone else be joining your video visit? No        18 minutes   1531  1549     Video Start Time: 1531  Video-Visit Details    Type of service:  Video Visit    Video End Time:1549    Originating Location (pt. Location): Home    Distant Location (provider location):  [facility] [place]     Platform used for Video Visit: [employer]

## 2022-04-04 NOTE — PROGRESS NOTES
CHRONIC TRANSPLANT NEPHROLOGY VISIT      Assessment & Plan      Recommendations:   -Labs every 2-3 months     -Return to clinic in 6 months virtually.         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  Evidence of recurrent IgA nephropathy.  Mild interstitial fibrosis and tubular atrophy.             9/6/07; Borderline acute cellular-mediated rejection.  Evidence of recurrent IgA nephropathy.  Nodular arteriolar hyalinosis, consistent with CNI toxicity.  Mild interstitial fibrosis and tubular atrophy.             11/3/05; Unremarkable with no evidence or acute rejection.    # Immunosuppression: Mycophenolate mofetil (goal  1-3.5) and Prednisone (dose  5 mg daily)   - Changes: No   monitoring for long term toxicity and complications including infections and malignancies while on lifelong immunosuppression    # Prophylaxis: PCP none- add CD4 count    # Hypertension: ; Goal BP: < 130/80; blood pressure at home    - Changes: none    # Mineral Bone Disorder:    - Vitamin D; level is: Normal and will continue on cholecalciferol.   - Calcium; level is: Normal     #Blood glucose    -A1c with next labs; 5.3 back in January     # Electrolytes:   - Potassium; level: Normal  - Bicarbonate; level: Normal and will continue on sodium bicarbonate.    # Overweight: Stable  weight, but elevated.   - Recommend increased exercise and watch caloric intake.    - Weight Watchers; 25 lb weight loss in 12 months.     # Gout: No recent flares.  Patient is on allopurinol.    # Skin Cancer Risk:    - Discussed sun protection and recommend regular follow up with Dermatology.   - He just saw [facility] doctor. He has an appointment with a Dermatology. Has had some precancerous lesions frozen off before.    -He is seeing Derm in April.     # CVA: L ischemic stroke. On warfarin and ASA.     # COVID-19 Virus Review: Discussed COVID-19 virus and the potential medical risks.  Reviewed preventative health recommendations, including wearing a mask where appropriate.  Recommended COVID vaccination should be up to date with either an initial vaccination or booster shot when appropriate.  Asked the patient to inform the transplant center if they are exposed or diagnosed with this virus.    # COVID Vaccination Up To Date: Yes; update additional vaccination shots per CDC.   COVID vaccine 4 shots.     # Medical Compliance: No, due to infrequent labs.   - Discussed importance of checking labs regularly as recommended, taking medications as prescribed and attending scheduled medical appointments    Return visit: Return in about 6 months (around 10/4/2022).    # Transplant History:  Etiology of kidney failure: IgA nephropathy  Tx: LDKT  Transplant: 7/29/2004 (Kidney)  Donor Type: Living Donor Class: Standard Criteria Donor  Significant changes in immunosuppression: Taken off CNI due to evidence of CNI toxicity and BK viremia.  Significant transplant-related complications: BK viremia    Transplant Office Phone Number: [phone]    Assessment and plan was discussed with the patient and he voiced his understanding and agreement.    Jony Montes MD  Transplant Nephrology   Pager: [phone]     Chief Complaint   Mr. Squires is a 66 year old here for routine follow up.  .    History of Present Illness      Tavia reports feeling good overall but had several health issues over the past couple months. Last seen by Dr. Goetz in 1/2021. Since that visit, he is doing well. Feels well. He has a lot of pain in his feet. He is having surgery July 11 in the [facility]. He is having fusion and plates placed to alleviate pain. BP well controlled. Denies orthostasis, lightheadedness. He is on warfarin for stroke 2 years ago in July. He is on lasix 40mg BID, which is controlling his edema.       Questions/concerns  None today.     He had labs in [facility] that show stable proteinuria/allograft functi    Today,       Home BP: 120s/80s    Review of Systems   A comprehensive review of systems was obtained and negative, except as noted in the HPI or PMH.    Problem List   Patient Active Problem List   Diagnosis     Testicular hypofunction     Kidney replaced by transplant     Erectile dysfunction     HTN, kidney transplant related     Immunosuppression (H)     Metabolic acidosis     Vitamin D deficiency     VANESA (obstructive sleep apnea)     Overweight     Aftercare following organ transplant     Abrasion of anterior lower leg, left, subsequent encounter     MAHNAZ (acute kidney injury) (H)     Allergic rhinitis, seasonal     Arthritis of knee, right     Atrial fibrillation (H)     Bacteremia     Benign essential HTN     Carpal tunnel syndrome     Chronic bilateral low back pain without sciatica     Chronic gout     Disorder of bursae and tendons in shoulder region     Chronic renal failure syndrome     Current smoker     History of dysplastic nevus     S/P total knee replacement using cement     History of immunosuppressive therapy     Ischemic stroke (H)     Hypertriglyceridemia     Hypertension     Leg ulcer (H)     Laceration of right lower extremity     Obese     Long term current use of anticoagulant     Right hand pain     Primary localized osteoarthritis of left knee     Osteoarthritis of carpometacarpal joint of thumb        Social History   Social History     Tobacco Use     Smoking status: Former Smoker     Types: Cigarettes     Smokeless tobacco: Never Used     Tobacco comment: Quit 2004   Substance Use Topics     Alcohol use: Yes     Comment: social     Drug use: No       Allergies   No Known Allergies    Medications   Current Outpatient Medications   Medication Sig     acetaminophen (TYLENOL) 325 MG tablet Take 2 tablets (650 mg) by mouth every 6 hours as needed for pain (Patient taking differently: Take 1,000 mg by mouth daily )     allopurinol (ZYLOPRIM) 100 MG tablet Take 3 tablets (300 mg) by mouth daily     aspirin 81 MG EC tablet Take 81 mg by mouth daily     atorvastatin (LIPITOR) 20 MG tablet Take 1 tablet by mouth     carvedilol 12.5 MG PO tablet Take 1 tablet (12.5 mg) by mouth 2 times daily (with meals)     cholecalciferol (VITAMIN D) 1000 UNIT tablet Take 1 tablet (1,000 Units) by mouth daily     clonazePAM (KLONOPIN) 0.5 MG tablet Take 1 tablet (0.5 mg) by mouth daily (Patient taking differently: Take 0.25 mg by mouth every evening )     ferrous sulfate (FEROSUL) 325 (65 Fe) MG tablet TAKE 1 TAB DAILY W/ BREAKFAST OR ON AN EMPTY STOMACH. IF STOMACH UPSET OCCURS, CAN TAKE WITH MEALS.     Fexofenadine HCl (ALLEGRA PO) Take 1 tablet by mouth daily.      furosemide (LASIX) 20 MG tablet TAKE 2 TABLETS BY MOUTH IN THE MORNING AND IN THE EVENING     gabapentin (NEURONTIN) 300 MG capsule Take 1 capsule (300 mg) by mouth At Bedtime     losartan (COZAAR) 100 MG tablet TAKE 1 TABLET BY MOUTH EVERY DAY     Multiple Vitamins-Minerals (ONE-A-DAY MENS 50+ ADVANTAGE PO) Take 1 tablet by mouth every evening     mycophenolate (GENERIC EQUIVALENT) 250 MG capsule Take 3 capsules (750 mg) by mouth 2 times daily     omega-3 fatty acids (FISH OIL) 1200 MG capsule Take 1 capsule by mouth 3 times daily.     pantoprazole (PROTONIX) 40 MG EC tablet TAKE 1 TABLET BY MOUTH DAILY (Patient taking differently: 20 mg daily)     predniSONE  (DELTASONE) 5 MG tablet Take 1 tablet (5 mg) by mouth daily     sodium bicarbonate 650 MG tablet TAKE 1 TABLET BY MOUTH TWICE A DAY     sulfamethoxazole-trimethoprim (BACTRIM) 400-80 MG tablet Take 1 tablet by mouth Every Mon, Wed, Fri Morning     TraMADol HCl 200 MG TB24 Take 1 tablet by mouth daily     verapamil ER (CALAN-SR) 180 MG CR tablet TAKE 1 TABLET BY MOUTH EVERY DAY     No current facility-administered medications for this visit.     There are no discontinued medications.    Physical Exam   Vital Signs: There were no vitals taken for this visit.    GENERAL APPEARANCE: alert and no distress  HENT: no obvious abnormalities on appearance  RESP: breathing appears unremarkable with normal rate, no audible wheezing or cough and no apparent shortness of breath with conversation  MS: extremities normal - no gross deformities noted  SKIN: no apparent rash and normal skin tone  NEURO: speech is clear with no obvious neurological deficits  PSYCH: mentation appears normal and affect normal      Data     Renal Latest Ref Rng & Units 11/23/2021 10/25/2021 9/27/2021   Na 133 - 144 mmol/L - - -   Na (external) 136 - 145 meq/L 140 140 139   K 3.4 - 5.3 mmol/L - - -   K (external) 3.5 - 5.1 meq/L 4.9 4.1 4.5   Cl 98 - 109 meq/L 105 102 103   CO2 20 - 32 mmol/L - - -   CO2 (external) 20 - 29 meq/L 24 25 26   BUN 7 - 30 mg/dL - - -   BUN (external) 6 - 22 mg/dL 62(H) 42(H) 45(H)   Cr 0.66 - 1.25 mg/dL - - -   Cr (external) 0.70 - 1.30 mg/dL 2.47(H) 2.09(H) 2.33(H)   Glucose 70 - 99 mg/dL - - -   Glucose (external) 70 - 99 mg/dL 123(H) 110(H) 111(H)   Ca  8.5 - 10.1 mg/dL - - -   Ca (external) 8.5 - 10.5 mg/dL 8.7 8.7 9.0   Mg 1.6 - 2.3 mg/dL - - -     Bone Health Latest Ref Rng & Units 2/21/2020 5/29/2018 1/28/2010   Phos 2.5 - 4.5 mg/dL - - 3.5   Vit D Def (external) 30.0 - 100.0 ng/mL 41.3 46 -     Heme Latest Ref Rng & Units 11/23/2021 10/25/2021 9/27/2021   WBC 4.0 - 11.0 10e9/L - - -   WBC (external) 4.0 - 11.0 K/uL 9.0  8.0 7.2   Hgb 13.3 - 17.7 g/dL - - -   Hgb (external) 13.5 - 17.5 g/dL 13.2(L) 12.0(L) 11.5(L)   Plt 150 - 450 10e9/L - - -   Plt (external) 140 - 400 K/uL 137(L) 168 158   ABSOLUTE NEUTROPHIL 1.6 - 8.3 10e9/L - - -   ABSOLUTE NEUTROPHILS (EXTERNAL) 1.8 - 8.0 K/uL - - -   ABSOLUTE LYMPHOCYTES 0.8 - 5.3 10e9/L - - -   ABSOLUTE LYMPHOCYTES (EXTERNAL) 0.8 - 4.1 K/uL - - -   ABSOLUTE MONOCYTES 0.0 - 1.3 10e9/L - - -   ABSOLUTE MONOCYTES (EXTERNAL) 0.0 - 1.0 K/uL - - -   ABSOLUTE EOSINOPHILS 0.0 - 0.7 10e9/L - - -   ABSOLUTE EOSINOPHILS (EXTERNAL) 0.0 - 0.7 K/uL - - -   ABSOLUTE BASOPHILS 0.0 - 0.2 10e9/L - - -   ABSOLUTE BASOPHILS (EXTERNAL) 0.0 - 0.2 K/uL - - -   ABS IMMATURE GRANULOCYTES 0 - 0.4 10e9/L - - -   ABSOLUTE NUCLEATED RBC - - - -     Liver Latest Ref Rng & Units 5/14/2018 6/15/2016 1/28/2010   AP 40 - 150 U/L - - 116   AP (external) 38 - 126 U/L 118 105 -   TBili 0.2 - 1.3 mg/dL - - 0.6   TBili (external) 0.2 - 1.3 mg/dL 0.4 0.8 -   ALT 0 - 70 U/L - - 37   ALT (external) 21 - 72 U/L 55 35 -   AST 0 - 55 U/L - - 27   AST (external) 17 - 59 U/L 43 26 -   Tot Protein 6.8 - 8.8 g/dL - - 7.8   Tot Protein (external) 6.3 - 8.2 g/dL 7.0 7.0 -   Albumin 3.9 - 5.1 g/dL - - 4.7   Albumin (external) 3.5 - 5.0 g/dL 4.1 4.1 -     Pancreas Latest Ref Rng & Units 1/6/2021   A1C (external) <5.7 % 5.3     Iron studies Latest Ref Rng & Units 12/14/2015   Ferritin 26 - 388 ng/mL 220     [facility] Txp Virology Latest Ref Rng & Units 6/8/2021 3/12/2019 5/21/2015   CVM DNA Quant - - - -   CMV DNA Quant Ext Not Detected - - NOT DETECTED   CMV Quant <100 Copies/mL - - -   CMV QT Log <2.0 Log copies/mL - - -   BK Spec - Plasma Plasma -   BK Res BKNEG:BK Virus DNA Not Detected copies/mL BK Virus DNA Not Detected <500(A) -   BK Log <2.7 Log copies/mL Not Calculated <2.7 -   EBV DNA COPIES/ML <1000 Copies/mL - - -   EBV DNA LOG OF COPIES <3.0 Log copies/mL - - -                MRI Brain 7/2020  1. Acute to early subacute ischemic infarct in  the left periventricular white matter extending   into the posterior left basal ganglia likely accounting for the patient's clinical symptoms.     2.  No acute intracranial hemorrhage or mass.     3.  Additional scattered foci of supratentorial increased T2/FLAIR signal which are   nonspecific, although commonly seen with chronic small vessel disease.

## 2022-05-05 DIAGNOSIS — Z94.0 KIDNEY REPLACED BY TRANSPLANT: ICD-10-CM

## 2022-05-05 RX ORDER — FUROSEMIDE 20 MG
TABLET ORAL
Qty: 360 TABLET | Refills: 1 | Status: SHIPPED | OUTPATIENT
Start: 2022-05-05 | End: 2022-11-07

## 2022-06-21 DIAGNOSIS — Z94.0 KIDNEY TRANSPLANTED: Primary | ICD-10-CM

## 2022-06-21 RX ORDER — SULFAMETHOXAZOLE AND TRIMETHOPRIM 400; 80 MG/1; MG/1
1 TABLET ORAL
Qty: 13 TABLET | Refills: 0 | Status: SHIPPED | OUTPATIENT
Start: 2022-06-22 | End: 2022-07-19

## 2022-07-19 DIAGNOSIS — Z94.0 KIDNEY TRANSPLANTED: Primary | ICD-10-CM

## 2022-07-19 RX ORDER — SULFAMETHOXAZOLE AND TRIMETHOPRIM 400; 80 MG/1; MG/1
1 TABLET ORAL
Qty: 13 TABLET | Refills: 0 | Status: SHIPPED | OUTPATIENT
Start: 2022-07-20 | End: 2022-08-22

## 2022-08-20 DIAGNOSIS — Z94.0 KIDNEY TRANSPLANTED: ICD-10-CM

## 2022-08-22 ENCOUNTER — TELEPHONE (OUTPATIENT)
Dept: TRANSPLANT | Facility: CLINIC | Age: 67
End: 2022-08-22

## 2022-08-22 RX ORDER — SULFAMETHOXAZOLE AND TRIMETHOPRIM 400; 80 MG/1; MG/1
1 TABLET ORAL
Qty: 13 TABLET | Refills: 3 | Status: SHIPPED | OUTPATIENT
Start: 2022-08-22 | End: 2023-03-12

## 2022-08-22 RX ORDER — SULFAMETHOXAZOLE AND TRIMETHOPRIM 400; 80 MG/1; MG/1
1 TABLET ORAL
Qty: 13 TABLET | Refills: 0 | Status: SHIPPED | OUTPATIENT
Start: 2022-08-22 | End: 2022-08-22

## 2022-08-22 NOTE — LETTER
PHYSICIAN ORDERS    DATE & TIME ISSUED: 2022 1554  PATIENT NAME: Gurjit Squires   : 1955     [facility] MR# [if applicable]: [id number]     DIAGNOSIS / ICD - 10 CODES    Kidney Transplanted (Z94.0)    After Care Following Organ Transplant (Z48.298)    Long Term Use of Medication (Z79.899)    Immunosuppressed Status (Z92.25)    Please repeat the following labs in one week:  BMP  CBC  UA        Patient should release information to the [facility].   Please fax results to the Transplant Center at [phone].  Any questions please call [phone] or [phone].      .

## 2022-08-22 NOTE — TELEPHONE ENCOUNTER
ISSUE: creatinine: 2.89, baseline: 1.8-2.2    PLAN:  Call and assess hydration status.  How much water is he drinking per day?  Any recent illness, diarrhea, s/s of a UTI, or medication changes?  Any increased intake of alcohol or caffeine?  Recommend increasing hydration and repeating labs within 1 week.    OUTCOME:   See my chart message dated 8/22/2022    Shari Talley RN   Transplant Coordinator  [phone]

## 2022-09-08 ENCOUNTER — MYC MEDICAL ADVICE (OUTPATIENT)
Dept: OTHER | Age: 67
End: 2022-09-08

## 2022-09-08 DIAGNOSIS — Z94.0 KIDNEY TRANSPLANTED: Primary | ICD-10-CM

## 2022-09-17 DIAGNOSIS — E87.20 ACIDOSIS: Primary | ICD-10-CM

## 2022-09-19 RX ORDER — SODIUM BICARBONATE 650 MG/1
650 TABLET ORAL 2 TIMES DAILY
Qty: 180 TABLET | Refills: 0 | Status: SHIPPED | OUTPATIENT
Start: 2022-09-19 | End: 2022-12-09

## 2022-10-06 ENCOUNTER — MYC MEDICAL ADVICE (OUTPATIENT)
Dept: TRANSPLANT | Facility: CLINIC | Age: 67
End: 2022-10-06

## 2022-10-24 ENCOUNTER — TELEPHONE (OUTPATIENT)
Dept: TRANSPLANT | Facility: CLINIC | Age: 67
End: 2022-10-24

## 2022-10-24 NOTE — LETTER
PHYSICIAN ORDERS    DATE & TIME ISSUED: 10/24/2022 1300  PATIENT NAME: Gurjit Squires   : 1955     [facility] MR# [if applicable]: [id number]     DIAGNOSIS / ICD - 10 CODES    Kidney Transplanted (Z94.0)    After Care Following Organ Transplant (Z48.298)    Long Term Use of Medication (Z79.899)    Immunosuppressed Status (Z92.25)    Please obtain the following with next lab draw:   ionized calcium    serum albumin        Patient should release information to the [facility].   Please fax results to the Transplant Center at [phone].  Any questions please call [phone] or [phone].      .

## 2022-10-24 NOTE — TELEPHONE ENCOUNTER
----- Message -----   From: Emerson Gonzalez MD   Sent: 10/19/2022   1:13 PM CDT   To: Estela Dodson, RN     Please obtain ionized calcium and serum albumin with next labs.   ----- Message -----   From: Estela Trujillo, HERRERA   Sent: 10/19/2022   1:09 PM CDT   To: Emerson Gonzalez MD     Any recs with lower calcium? Or just monitor for now?     OUTCOME:     Left VM letting patient know lab orders were sent to [facility] to be obtained with next lab draw.  Asked for CB with any questions or concerns.    Shari Talley RN   Transplant Coordinator  [phone]

## 2022-11-07 DIAGNOSIS — Z94.0 KIDNEY REPLACED BY TRANSPLANT: ICD-10-CM

## 2022-11-10 RX ORDER — FUROSEMIDE 20 MG
TABLET ORAL
Qty: 360 TABLET | Refills: 0 | Status: SHIPPED | OUTPATIENT
Start: 2022-11-10 | End: 2023-03-06

## 2022-12-09 DIAGNOSIS — E87.20 ACIDOSIS: ICD-10-CM

## 2022-12-09 RX ORDER — SODIUM BICARBONATE 650 MG/1
650 TABLET ORAL 2 TIMES DAILY
Qty: 180 TABLET | Refills: 3 | Status: SHIPPED | OUTPATIENT
Start: 2022-12-09 | End: 2023-04-26

## 2022-12-16 ENCOUNTER — VIRTUAL VISIT (OUTPATIENT)
Dept: NEPHROLOGY | Facility: CLINIC | Age: 67
End: 2022-12-16
Attending: INTERNAL MEDICINE
Payer: MEDICARE

## 2022-12-16 DIAGNOSIS — Z29.89 NEED FOR PNEUMOCYSTIS PROPHYLAXIS: ICD-10-CM

## 2022-12-16 DIAGNOSIS — Z48.298 AFTERCARE FOLLOWING ORGAN TRANSPLANT: ICD-10-CM

## 2022-12-16 DIAGNOSIS — E87.20 METABOLIC ACIDOSIS: ICD-10-CM

## 2022-12-16 DIAGNOSIS — E55.9 VITAMIN D DEFICIENCY: ICD-10-CM

## 2022-12-16 DIAGNOSIS — I15.1 HTN, KIDNEY TRANSPLANT RELATED: ICD-10-CM

## 2022-12-16 DIAGNOSIS — N18.4 CKD (CHRONIC KIDNEY DISEASE) STAGE 4, GFR 15-29 ML/MIN (H): ICD-10-CM

## 2022-12-16 DIAGNOSIS — Z94.0 KIDNEY REPLACED BY TRANSPLANT: Primary | ICD-10-CM

## 2022-12-16 DIAGNOSIS — Z94.0 HTN, KIDNEY TRANSPLANT RELATED: ICD-10-CM

## 2022-12-16 DIAGNOSIS — D84.9 IMMUNOSUPPRESSION (H): ICD-10-CM

## 2022-12-16 DIAGNOSIS — Z94.0 KIDNEY TRANSPLANTED: ICD-10-CM

## 2022-12-16 PROCEDURE — 99214 OFFICE O/P EST MOD 30 MIN: CPT | Mod: 95 | Performed by: INTERNAL MEDICINE

## 2022-12-16 ASSESSMENT — PAIN SCALES - GENERAL: PAINLEVEL: NO PAIN (0)

## 2022-12-16 NOTE — LETTER
12/16/2022      [facility]  [address]  [address]  [place] [address]       Carlos is a 67 year old who is being evaluated via a billable video visit.      How would you like to obtain your AVS? MyChart  If the video visit is dropped, the invitation should be resent by: Text to cell phone: [phone]  Will anyone else be joining your video visit? No    Video-Visit Details    Video Start Time: 0942    Type of service:  Video Visit    Video End Time:1007    Originating Location (pt. Location): Home    Distant Location (provider location):  Off-site    Platform used for Video Visit: [facility]      TRANSPLANT NEPHROLOGY CHRONIC POST TRANSPLANT VISIT    Assessment & Plan   # LDKT: Stable creatinine in the ~ 2.5-2.9 range for the last couple of years, but slightly higher than previous baseline closer to ~ 1.8-2.2.  Kidney transplant biopsy 6/2021 showed no acute rejection, but moderate chronic changes.  Patient was referred for another kidney transplant, but likely will need to lose weight prior to being approved.  Would recommend he also establish care with a local Nephrologist.   - Baseline Creatinine:  ~ 2.5-2.9   - Proteinuria: Moderate (1-3 grams)   - Date DSA Last Checked: Jun/2021      Latest DSA: No   - BK Viremia: No   - Kidney Tx Biopsy: Jun 08, 2021; Result: No diagnostic evidence of acute rejection.  Mild acute tubular injury.  Moderate interstitial fibrosis and tubular atrophy.  Mild arteriosclerosis.             Mar 12, 2019; Result: No diagnostic evidence of acute rejection.  Mild interstitial fibrosis and tubular atrophy.  Moderate arterio- and moderate to severe arteriolosclerosis.             Jan 28, 2010; Result: No diagnostic evidence of acute rejection, but did have mild focal interstitial lymphoplasmacytic infiltrate and rare tubulitis.  Mild interstitial fibrosis and tubular atrophy.  Features suggestive of CNI toxicity.             Sep 06, 2007; Result: Borderline acute  cellular-mediated rejection.  Nodular arteriolar hyalinosis, suggestive of CNI toxicity.  Mesangial IgA deposits.             Nov 03, 2005; Result: No diagnostic evidence of acute rejection.  Unremarkable.    # Immunosuppression: Mycophenolate mofetil (dose 750 mg every 12 hours) and Prednisone (dose 5 mg daily)   - Continue with intensive monitoring of immunosuppression for efficacy and toxicity.   - Changes: No    # Infection Prophylaxis:   - PJP: Sulfa/TMP (Bactrim)    # Hypertension: Borderline control;  Goal BP: < 130/80   - Changes: Not at this time    - Recommend working on weight loss.    # Elevated Blood Glucose: Glucose generally running ~ 110-130s   - Management as per primary care.    # Anemia in Chronic Renal Disease: Hgb: Stable      MIKE: No   - Iron studies: Not checked recently    # Mineral Bone Disorder:   - Vitamin D; level: Not checked recently        On supplement: Yes  - Calcium; level: Normal        On supplement: No    # Electrolytes:   - Potassium; level: Normal        On supplement: No  - Bicarbonate; level: Normal        On supplement: Yes    # H/o CVA: Some residual right-sided weakness, but minimal.    # Chronic Pain: Patient with lower extremity, mostly feet, pain.  He has been on acetaminophen for many years, as well as Tramadol, up until stopping that recently.    # Obesity, Class II (BMI = 39.5): Stable weight.   - Recommend weight loss for overall health by increasing exercise and watching caloric intake.   - Patient was initially seen by Weight Management Program at time of Kidney Retransplant Evaluation, but did not follow up.  Would encourage him to do so either at [facility] or [facility].    # GERD: Asymptomatic on PPI.   - Recommend tapering off pantoprazole by starting to take it every other day.  If tolerated without significant symptoms, patient can stop pantoprazole and just take it or OTC famotidine as needed for heartburn symptoms.    # Gout: No recent flares on  allopurinol.    # Skin Cancer Risk:    - Discussed sun protection and recommend regular follow up with Dermatology.    # Medical Compliance: No.  Evidence of labs less than recommended.  - Discussed importance of checking labs regularly as recommended, taking medications as prescribed and attending scheduled medical appointments.    # COVID-19 Virus Review: Discussed COVID-19 virus and the potential medical risks.  Reviewed preventative health recommendations, including wearing a mask where appropriate.  Recommended COVID vaccination should be up to date with either an initial vaccination or booster shot when appropriate.  Asked the patient to inform the transplant center if they are exposed or diagnosed with this virus.    # COVID Vaccination Up To Date: Yes    # Transplant History:  Etiology of Kidney Failure: IgA nephropathy  Tx: LDKT  Transplant: 7/29/2004 (Kidney)  Significant changes in immunosuppression: Taken off CNI due to suggestion of CNI toxicity on biopsy.  Significant transplant-related complications: Acute cellular-mediated rejection    Transplant Office Phone Number: [phone]    Assessment and plan was discussed with the patient and he voiced his understanding and agreement.    Return visit: Return in about 1 year (around 12/16/2023).    Radu Bunch MD    Chief Complaint   Mr. Squires is a 67 year old here for kidney transplant and immunosuppression management.    History of Present Illness    Mr. Squires reports feeling okay overall with some medical complaints.  Since last clinic visit, patient reports no hospitalizations, but some ongoing medical complaints.  He has chronic pain for years with arthritis and general body aches and pains from his work and standing on concrete for so many years.  Patient was taking Tramadol for many years on a daily basis, but he somehow lost several dozen pills or feels they were stolen.  This created some concern or questioning by his prescribing  provider and patient just felt he didn't want to have that over his head so he decided to stop them cold about 6-8 weeks ago.  He reports going through significant withdrawal symptoms, but is not off Tramadol.  He has been taking Tylenol Arthritis.  Besides his ongoing chronic bilateral foot pain, patient also underwent surgery on his left foot about 6 months ago for arthritis issues.    His energy level is okay, but not quite normal.  He is active and does get regular exercise.  Denies any chest pain or shortness of breath with exertion.  He will get a little leg swelling by the end of the day, but generally has been better overall.    Appetite is good and weight has been stable at ~ 260 lbs.  No nausea or vomiting.  Occasional loose stools.  No heartburn symptoms on pantoprazole.  No fever, sweats or chills.  No night sweats.    Home BP: 120-130/70-80s    Problem List   Patient Active Problem List   Diagnosis     Testicular hypofunction     Kidney replaced by transplant     Erectile dysfunction     HTN, kidney transplant related     Immunosuppression (H)     Metabolic acidosis     Vitamin D deficiency     VANESA (obstructive sleep apnea)     Aftercare following organ transplant     Abrasion of anterior lower leg, left, subsequent encounter     Allergic rhinitis, seasonal     Arthritis of knee, right     Paroxysmal atrial fibrillation (H)     Bacteremia     Carpal tunnel syndrome     Chronic bilateral low back pain without sciatica     Chronic gout     Current smoker     History of dysplastic nevus     S/P total knee replacement using cement     Ischemic stroke (H)     Hypertriglyceridemia     Obese     Long term current use of anticoagulant     Right hand pain     Osteoarthritis of carpometacarpal joint of thumb     CKD (chronic kidney disease) stage 4, GFR 15-29 ml/min (H)     Need for pneumocystis prophylaxis     Anemia in chronic renal disease       Allergies   No Known Allergies    Medications   Current  Outpatient Medications   Medication Sig     acetaminophen (TYLENOL) 325 MG tablet Take 2 tablets (650 mg) by mouth every 6 hours as needed for pain (Patient taking differently: Take 1,000 mg by mouth daily)     allopurinol (ZYLOPRIM) 100 MG tablet Take 3 tablets (300 mg) by mouth daily     aspirin 81 MG EC tablet Take 81 mg by mouth daily     atorvastatin (LIPITOR) 20 MG tablet Take 1 tablet by mouth     carvedilol 12.5 MG PO tablet Take 1 tablet (12.5 mg) by mouth 2 times daily (with meals)     cholecalciferol (VITAMIN D) 1000 UNIT tablet Take 1 tablet (1,000 Units) by mouth daily     clonazePAM (KLONOPIN) 0.5 MG tablet Take 1 tablet (0.5 mg) by mouth daily (Patient taking differently: Take 0.25 mg by mouth every evening)     ferrous sulfate (FEROSUL) 325 (65 Fe) MG tablet TAKE 1 TAB DAILY W/ BREAKFAST OR ON AN EMPTY STOMACH. IF STOMACH UPSET OCCURS, CAN TAKE WITH MEALS.     Fexofenadine HCl (ALLEGRA PO) Take 1 tablet by mouth daily.      furosemide (LASIX) 20 MG tablet Take 2 tabs PO in the morning and in the evening     gabapentin (NEURONTIN) 300 MG capsule Take 1 capsule (300 mg) by mouth At Bedtime     losartan (COZAAR) 100 MG tablet TAKE 1 TABLET BY MOUTH EVERY DAY     Multiple Vitamins-Minerals (ONE-A-DAY MENS 50+ ADVANTAGE PO) Take 1 tablet by mouth every evening     mycophenolate (GENERIC EQUIVALENT) 250 MG capsule Take 3 capsules (750 mg) by mouth 2 times daily     omega-3 fatty acids (FISH OIL) 1200 MG capsule Take 1 capsule by mouth 3 times daily.     pantoprazole (PROTONIX) 40 MG EC tablet TAKE 1 TABLET BY MOUTH DAILY     predniSONE (DELTASONE) 5 MG tablet Take 1 tablet (5 mg) by mouth daily     sodium bicarbonate 650 MG tablet Take 1 tablet (650 mg) by mouth 2 times daily     sulfamethoxazole-trimethoprim (BACTRIM) 400-80 MG tablet Take 1 tablet by mouth Every Mon, Wed, Fri Morning     verapamil ER (CALAN-SR) 180 MG CR tablet TAKE 1 TABLET BY MOUTH EVERY DAY     No current facility-administered  medications for this visit.     Medications Discontinued During This Encounter   Medication Reason     TraMADol HCl 200 MG TB24        Physical Exam   Vital Signs: There were no vitals taken for this visit.    GENERAL APPEARANCE: alert and no distress  HENT: no obvious abnormalities on appearance  RESP: breathing appears unremarkable with normal rate, no audible wheezing or cough and no apparent shortness of breath with conversation  MS: extremities normal - no gross deformities noted  SKIN: no apparent rash and normal skin tone  NEURO: speech is clear with no obvious neurological deficits  PSYCH: mentation appears normal and affect normal    Data     Renal Latest Ref Rng & Units 11/29/2022 10/18/2022 8/22/2022   Na 133 - 144 mmol/L - - -   Na (external) 136 - 145 meq/L 140 138 140   K 3.4 - 5.3 mmol/L - - -   K (external) 3.5 - 5.1 meq/L 4.8 4.5 4.8   Cl 98 - 109 meq/L 106 106 103   CO2 20 - 32 mmol/L - - -   CO2 (external) 20 - 29 meq/L 22 21 25   BUN 7 - 30 mg/dL - - -   BUN (external) 6 - 22 mg/dL 41(H) 50(H) 56(H)   Cr 0.66 - 1.25 mg/dL - - -   Cr (external) 0.73 - 1.18 mg/dL 2.71(H) 2.45(H) 2.89(H)   Glucose 70 - 99 mg/dL - - -   Glucose (external) 70 - 99 mg/dL 121(H) 118(H) 114(H)   Ca  8.5 - 10.1 mg/dL - - -   Ca (external) 8.5 - 10.5 mg/dL 8.6 8.3(L) 8.8   Mg 1.6 - 2.3 mg/dL - - -     Bone Health Latest Ref Rng & Units 2/21/2020 5/29/2018 1/28/2010   Phos 2.5 - 4.5 mg/dL - - 3.5   Vit D Def (external) 30.0 - 100.0 ng/mL 41.3 46 -     Heme Latest Ref Rng & Units 12/13/2022 11/29/2022 10/18/2022   WBC 4.0 - 11.0 10e9/L - - -   WBC (external) 4.0 - 11.0 K/uL 8.3 9.9 10.0   Hgb 13.3 - 17.7 g/dL - - -   Hgb (external) 13.5 - 17.5 g/dL 12.3(L) 12.1(L) 11.6(L)   Plt 150 - 450 10e9/L - - -   Plt (external) 140 - 400 K/uL 169 173 197   ABSOLUTE NEUTROPHIL 1.6 - 8.3 10e9/L - - -   ABSOLUTE NEUTROPHILS (EXTERNAL) 1.8 - 8.0 K/uL - 7.8 7.4   ABSOLUTE LYMPHOCYTES 0.8 - 5.3 10e9/L - - -   ABSOLUTE LYMPHOCYTES (EXTERNAL)  0.8 - 4.1 K/uL - 0.9 1.2   ABSOLUTE MONOCYTES 0.0 - 1.3 10e9/L - - -   ABSOLUTE MONOCYTES (EXTERNAL) 0.0 - 1.0 K/uL - 0.7 0.7   ABSOLUTE EOSINOPHILS 0.0 - 0.7 10e9/L - - -   ABSOLUTE EOSINOPHILS (EXTERNAL) 0.0 - 0.7 K/uL - 0.5 0.6   ABSOLUTE BASOPHILS 0.0 - 0.2 10e9/L - - -   ABSOLUTE BASOPHILS (EXTERNAL) 0.0 - 0.2 K/uL - 0.0 0.0   ABS IMMATURE GRANULOCYTES 0 - 0.4 10e9/L - - -   ABSOLUTE NUCLEATED RBC - - - -     Liver Latest Ref Rng & Units 5/14/2018 6/15/2016 1/28/2010   AP 40 - 150 U/L - - 116   AP (external) 38 - 126 U/L 118 105 -   TBili 0.2 - 1.3 mg/dL - - 0.6   TBili (external) 0.2 - 1.3 mg/dL 0.4 0.8 -   ALT 0 - 70 U/L - - 37   ALT (external) 21 - 72 U/L 55 35 -   AST 0 - 55 U/L - - 27   AST (external) 17 - 59 U/L 43 26 -   Tot Protein 6.8 - 8.8 g/dL - - 7.8   Tot Protein (external) 6.3 - 8.2 g/dL 7.0 7.0 -   Albumin 3.9 - 5.1 g/dL - - 4.7   Albumin (external) 3.5 - 5.0 g/dL 4.1 4.1 -     Pancreas Latest Ref Rng & Units 1/6/2021   A1C (external) <5.7 % 5.3     Iron studies Latest Ref Rng & Units 12/14/2015   Ferritin 26 - 388 ng/mL 220     [facility] Txp Virology Latest Ref Rng & Units 6/8/2021 3/12/2019 5/21/2015   CVM DNA Quant - - - -   CMV DNA Quant Ext Not Detected - - NOT DETECTED   CMV Quant <100 Copies/mL - - -   CMV QT Log <2.0 Log copies/mL - - -   BK Spec - Plasma Plasma -   BK Res BKNEG:BK Virus DNA Not Detected copies/mL BK Virus DNA Not Detected <500(A) -   BK Log <2.7 Log copies/mL Not Calculated <2.7 -   EBV DNA COPIES/ML <1000 Copies/mL - - -   EBV DNA LOG OF COPIES <3.0 Log copies/mL - - -                    Radu Bunch MD

## 2022-12-16 NOTE — PATIENT INSTRUCTIONS
Patient Recommendations:  Recommend tapering off pantoprazole by starting to take it every other day.  If tolerated without significant symptoms, patient can stop pantoprazole and just take it or over-the-counter famotidine as needed for heartburn symptoms.   - Recommend establishing care with primary Nephrologist via [facility] in 3-6 months or so to resume care with a goal of ongoing co-management between your primary Nephrologist and the Transplant Team.  - Recommend weight loss for overall health by increasing exercise and watching caloric intake.  This is likely to be required prior to another kidney transplant.  Would recommend establishing care with a Weight Management Program either at [facility] ([phone] to schedule) or [facility].  - Discuss referral to Pain Clinic from your Primary Care Provider.    Transplant Patient Information  Your Post Transplant Coordinator is: Shari Talley  For non urgent items, we encourage you to contact your coordinator/care team online via [employer]  You and your care team can also contact your transplant coordinator Monday - Friday, 8am - 5pm at [phone] (Option 2 to reach the coordinator or Option 4 to schedule an appointment).  After hours for urgent matters, please call [facility] at [phone].

## 2022-12-16 NOTE — LETTER
12/16/2022       RE: Gurjit Squires  [address]  [address]  [facility] [address]     Dear Colleague,    Thank you for referring your patient, Gurjit Squires, to the [facility] KIDNEY SERVICES at [facility]. Please see a copy of my visit note below.    Carlos is a 67 year old who is being evaluated via a billable video visit.      How would you like to obtain your AVS? MyChart  If the video visit is dropped, the invitation should be resent by: Text to cell phone: [phone]  Will anyone else be joining your video visit? No    Video-Visit Details    Video Start Time: 0942    Type of service:  Video Visit    Video End Time:1007    Originating Location (pt. Location): Home    Distant Location (provider location):  Off-site    Platform used for Video Visit: [facility]      TRANSPLANT NEPHROLOGY CHRONIC POST TRANSPLANT VISIT    Assessment & Plan   # LDKT: Stable creatinine in the ~ 2.5-2.9 range for the last couple of years, but slightly higher than previous baseline closer to ~ 1.8-2.2.  Kidney transplant biopsy 6/2021 showed no acute rejection, but moderate chronic changes.  Patient was referred for another kidney transplant, but likely will need to lose weight prior to being approved.  Would recommend he also establish care with a local Nephrologist.   - Baseline Creatinine:  ~ 2.5-2.9   - Proteinuria: Moderate (1-3 grams)   - Date DSA Last Checked: Jun/2021      Latest DSA: No   - BK Viremia: No   - Kidney Tx Biopsy: Jun 08, 2021; Result: No diagnostic evidence of acute rejection.  Mild acute tubular injury.  Moderate interstitial fibrosis and tubular atrophy.  Mild arteriosclerosis.             Mar 12, 2019; Result: No diagnostic evidence of acute rejection.  Mild interstitial fibrosis and tubular atrophy.  Moderate arterio- and moderate to severe arteriolosclerosis.             Jan 28, 2010; Result: No diagnostic evidence of acute rejection,  but did have mild focal interstitial lymphoplasmacytic infiltrate and rare tubulitis.  Mild interstitial fibrosis and tubular atrophy.  Features suggestive of CNI toxicity.             Sep 06, 2007; Result: Borderline acute cellular-mediated rejection.  Nodular arteriolar hyalinosis, suggestive of CNI toxicity.  Mesangial IgA deposits.             Nov 03, 2005; Result: No diagnostic evidence of acute rejection.  Unremarkable.    # Immunosuppression: Mycophenolate mofetil (dose 750 mg every 12 hours) and Prednisone (dose 5 mg daily)   - Continue with intensive monitoring of immunosuppression for efficacy and toxicity.   - Changes: No    # Infection Prophylaxis:   - PJP: Sulfa/TMP (Bactrim)    # Hypertension: Borderline control;  Goal BP: < 130/80   - Changes: Not at this time    - Recommend working on weight loss.    # Elevated Blood Glucose: Glucose generally running ~ 110-130s   - Management as per primary care.    # Anemia in Chronic Renal Disease: Hgb: Stable      MIKE: No   - Iron studies: Not checked recently    # Mineral Bone Disorder:   - Vitamin D; level: Not checked recently        On supplement: Yes  - Calcium; level: Normal        On supplement: No    # Electrolytes:   - Potassium; level: Normal        On supplement: No  - Bicarbonate; level: Normal        On supplement: Yes    # H/o CVA: Some residual right-sided weakness, but minimal.    # Chronic Pain: Patient with lower extremity, mostly feet, pain.  He has been on acetaminophen for many years, as well as Tramadol, up until stopping that recently.    # Obesity, Class II (BMI = 39.5): Stable weight.   - Recommend weight loss for overall health by increasing exercise and watching caloric intake.   - Patient was initially seen by Weight Management Program at time of Kidney Retransplant Evaluation, but did not follow up.  Would encourage him to do so either at [facility] or [facility].    # GERD: Asymptomatic on PPI.   - Recommend tapering off  pantoprazole by starting to take it every other day.  If tolerated without significant symptoms, patient can stop pantoprazole and just take it or OTC famotidine as needed for heartburn symptoms.    # Gout: No recent flares on allopurinol.    # Skin Cancer Risk:    - Discussed sun protection and recommend regular follow up with Dermatology.    # Medical Compliance: No.  Evidence of labs less than recommended.  - Discussed importance of checking labs regularly as recommended, taking medications as prescribed and attending scheduled medical appointments.    # COVID-19 Virus Review: Discussed COVID-19 virus and the potential medical risks.  Reviewed preventative health recommendations, including wearing a mask where appropriate.  Recommended COVID vaccination should be up to date with either an initial vaccination or booster shot when appropriate.  Asked the patient to inform the transplant center if they are exposed or diagnosed with this virus.    # COVID Vaccination Up To Date: Yes    # Transplant History:  Etiology of Kidney Failure: IgA nephropathy  Tx: LDKT  Transplant: 7/29/2004 (Kidney)  Significant changes in immunosuppression: Taken off CNI due to suggestion of CNI toxicity on biopsy.  Significant transplant-related complications: Acute cellular-mediated rejection    Transplant Office Phone Number: [phone]    Assessment and plan was discussed with the patient and he voiced his understanding and agreement.    Return visit: Return in about 1 year (around 12/16/2023).    Radu Bunch MD    Chief Complaint   Mr. Squires is a 67 year old here for kidney transplant and immunosuppression management.    History of Present Illness    Mr. Squires reports feeling okay overall with some medical complaints.  Since last clinic visit, patient reports no hospitalizations, but some ongoing medical complaints.  He has chronic pain for years with arthritis and general body aches and pains from his work and  standing on concrete for so many years.  Patient was taking Tramadol for many years on a daily basis, but he somehow lost several dozen pills or feels they were stolen.  This created some concern or questioning by his prescribing provider and patient just felt he didn't want to have that over his head so he decided to stop them cold about 6-8 weeks ago.  He reports going through significant withdrawal symptoms, but is not off Tramadol.  He has been taking Tylenol Arthritis.  Besides his ongoing chronic bilateral foot pain, patient also underwent surgery on his left foot about 6 months ago for arthritis issues.    His energy level is okay, but not quite normal.  He is active and does get regular exercise.  Denies any chest pain or shortness of breath with exertion.  He will get a little leg swelling by the end of the day, but generally has been better overall.    Appetite is good and weight has been stable at ~ 260 lbs.  No nausea or vomiting.  Occasional loose stools.  No heartburn symptoms on pantoprazole.  No fever, sweats or chills.  No night sweats.    Home BP: 120-130/70-80s    Problem List   Patient Active Problem List   Diagnosis     Testicular hypofunction     Kidney replaced by transplant     Erectile dysfunction     HTN, kidney transplant related     Immunosuppression (H)     Metabolic acidosis     Vitamin D deficiency     VANESA (obstructive sleep apnea)     Aftercare following organ transplant     Abrasion of anterior lower leg, left, subsequent encounter     Allergic rhinitis, seasonal     Arthritis of knee, right     Paroxysmal atrial fibrillation (H)     Bacteremia     Carpal tunnel syndrome     Chronic bilateral low back pain without sciatica     Chronic gout     Current smoker     History of dysplastic nevus     S/P total knee replacement using cement     Ischemic stroke (H)     Hypertriglyceridemia     Obese     Long term current use of anticoagulant     Right hand pain     Osteoarthritis of  carpometacarpal joint of thumb     CKD (chronic kidney disease) stage 4, GFR 15-29 ml/min (H)     Need for pneumocystis prophylaxis     Anemia in chronic renal disease       Allergies   No Known Allergies    Medications   Current Outpatient Medications   Medication Sig     acetaminophen (TYLENOL) 325 MG tablet Take 2 tablets (650 mg) by mouth every 6 hours as needed for pain (Patient taking differently: Take 1,000 mg by mouth daily)     allopurinol (ZYLOPRIM) 100 MG tablet Take 3 tablets (300 mg) by mouth daily     aspirin 81 MG EC tablet Take 81 mg by mouth daily     atorvastatin (LIPITOR) 20 MG tablet Take 1 tablet by mouth     carvedilol 12.5 MG PO tablet Take 1 tablet (12.5 mg) by mouth 2 times daily (with meals)     cholecalciferol (VITAMIN D) 1000 UNIT tablet Take 1 tablet (1,000 Units) by mouth daily     clonazePAM (KLONOPIN) 0.5 MG tablet Take 1 tablet (0.5 mg) by mouth daily (Patient taking differently: Take 0.25 mg by mouth every evening)     ferrous sulfate (FEROSUL) 325 (65 Fe) MG tablet TAKE 1 TAB DAILY W/ BREAKFAST OR ON AN EMPTY STOMACH. IF STOMACH UPSET OCCURS, CAN TAKE WITH MEALS.     Fexofenadine HCl (ALLEGRA PO) Take 1 tablet by mouth daily.      furosemide (LASIX) 20 MG tablet Take 2 tabs PO in the morning and in the evening     gabapentin (NEURONTIN) 300 MG capsule Take 1 capsule (300 mg) by mouth At Bedtime     losartan (COZAAR) 100 MG tablet TAKE 1 TABLET BY MOUTH EVERY DAY     Multiple Vitamins-Minerals (ONE-A-DAY MENS 50+ ADVANTAGE PO) Take 1 tablet by mouth every evening     mycophenolate (GENERIC EQUIVALENT) 250 MG capsule Take 3 capsules (750 mg) by mouth 2 times daily     omega-3 fatty acids (FISH OIL) 1200 MG capsule Take 1 capsule by mouth 3 times daily.     pantoprazole (PROTONIX) 40 MG EC tablet TAKE 1 TABLET BY MOUTH DAILY     predniSONE (DELTASONE) 5 MG tablet Take 1 tablet (5 mg) by mouth daily     sodium bicarbonate 650 MG tablet Take 1 tablet (650 mg) by mouth 2 times daily      sulfamethoxazole-trimethoprim (BACTRIM) 400-80 MG tablet Take 1 tablet by mouth Every Mon, Wed, Fri Morning     verapamil ER (CALAN-SR) 180 MG CR tablet TAKE 1 TABLET BY MOUTH EVERY DAY     No current facility-administered medications for this visit.     Medications Discontinued During This Encounter   Medication Reason     TraMADol HCl 200 MG TB24        Physical Exam   Vital Signs: There were no vitals taken for this visit.    GENERAL APPEARANCE: alert and no distress  HENT: no obvious abnormalities on appearance  RESP: breathing appears unremarkable with normal rate, no audible wheezing or cough and no apparent shortness of breath with conversation  MS: extremities normal - no gross deformities noted  SKIN: no apparent rash and normal skin tone  NEURO: speech is clear with no obvious neurological deficits  PSYCH: mentation appears normal and affect normal    Data     Renal Latest Ref Rng & Units 11/29/2022 10/18/2022 8/22/2022   Na 133 - 144 mmol/L - - -   Na (external) 136 - 145 meq/L 140 138 140   K 3.4 - 5.3 mmol/L - - -   K (external) 3.5 - 5.1 meq/L 4.8 4.5 4.8   Cl 98 - 109 meq/L 106 106 103   CO2 20 - 32 mmol/L - - -   CO2 (external) 20 - 29 meq/L 22 21 25   BUN 7 - 30 mg/dL - - -   BUN (external) 6 - 22 mg/dL 41(H) 50(H) 56(H)   Cr 0.66 - 1.25 mg/dL - - -   Cr (external) 0.73 - 1.18 mg/dL 2.71(H) 2.45(H) 2.89(H)   Glucose 70 - 99 mg/dL - - -   Glucose (external) 70 - 99 mg/dL 121(H) 118(H) 114(H)   Ca  8.5 - 10.1 mg/dL - - -   Ca (external) 8.5 - 10.5 mg/dL 8.6 8.3(L) 8.8   Mg 1.6 - 2.3 mg/dL - - -     Bone Health Latest Ref Rng & Units 2/21/2020 5/29/2018 1/28/2010   Phos 2.5 - 4.5 mg/dL - - 3.5   Vit D Def (external) 30.0 - 100.0 ng/mL 41.3 46 -     Heme Latest Ref Rng & Units 12/13/2022 11/29/2022 10/18/2022   WBC 4.0 - 11.0 10e9/L - - -   WBC (external) 4.0 - 11.0 K/uL 8.3 9.9 10.0   Hgb 13.3 - 17.7 g/dL - - -   Hgb (external) 13.5 - 17.5 g/dL 12.3(L) 12.1(L) 11.6(L)   Plt 150 - 450 10e9/L - - -   Plt  (external) 140 - 400 K/uL 169 173 197   ABSOLUTE NEUTROPHIL 1.6 - 8.3 10e9/L - - -   ABSOLUTE NEUTROPHILS (EXTERNAL) 1.8 - 8.0 K/uL - 7.8 7.4   ABSOLUTE LYMPHOCYTES 0.8 - 5.3 10e9/L - - -   ABSOLUTE LYMPHOCYTES (EXTERNAL) 0.8 - 4.1 K/uL - 0.9 1.2   ABSOLUTE MONOCYTES 0.0 - 1.3 10e9/L - - -   ABSOLUTE MONOCYTES (EXTERNAL) 0.0 - 1.0 K/uL - 0.7 0.7   ABSOLUTE EOSINOPHILS 0.0 - 0.7 10e9/L - - -   ABSOLUTE EOSINOPHILS (EXTERNAL) 0.0 - 0.7 K/uL - 0.5 0.6   ABSOLUTE BASOPHILS 0.0 - 0.2 10e9/L - - -   ABSOLUTE BASOPHILS (EXTERNAL) 0.0 - 0.2 K/uL - 0.0 0.0   ABS IMMATURE GRANULOCYTES 0 - 0.4 10e9/L - - -   ABSOLUTE NUCLEATED RBC - - - -     Liver Latest Ref Rng & Units 5/14/2018 6/15/2016 1/28/2010   AP 40 - 150 U/L - - 116   AP (external) 38 - 126 U/L 118 105 -   TBili 0.2 - 1.3 mg/dL - - 0.6   TBili (external) 0.2 - 1.3 mg/dL 0.4 0.8 -   ALT 0 - 70 U/L - - 37   ALT (external) 21 - 72 U/L 55 35 -   AST 0 - 55 U/L - - 27   AST (external) 17 - 59 U/L 43 26 -   Tot Protein 6.8 - 8.8 g/dL - - 7.8   Tot Protein (external) 6.3 - 8.2 g/dL 7.0 7.0 -   Albumin 3.9 - 5.1 g/dL - - 4.7   Albumin (external) 3.5 - 5.0 g/dL 4.1 4.1 -     Pancreas Latest Ref Rng & Units 1/6/2021   A1C (external) <5.7 % 5.3     Iron studies Latest Ref Rng & Units 12/14/2015   Ferritin 26 - 388 ng/mL 220     [facility] Txp Virology Latest Ref Rng & Units 6/8/2021 3/12/2019 5/21/2015   CVM DNA Quant - - - -   CMV DNA Quant Ext Not Detected - - NOT DETECTED   CMV Quant <100 Copies/mL - - -   CMV QT Log <2.0 Log copies/mL - - -   BK Spec - Plasma Plasma -   BK Res BKNEG:BK Virus DNA Not Detected copies/mL BK Virus DNA Not Detected <500(A) -   BK Log <2.7 Log copies/mL Not Calculated <2.7 -   EBV DNA COPIES/ML <1000 Copies/mL - - -   EBV DNA LOG OF COPIES <3.0 Log copies/mL - - -                  Again, thank you for allowing me to participate in the care of your patient.      Sincerely,    Radu Bunch MD

## 2022-12-16 NOTE — PROGRESS NOTES
Carlos is a 67 year old who is being evaluated via a billable video visit.      How would you like to obtain your AVS? Utilize [employer]  If the video visit is dropped, the invitation should be resent by: Text to cell phone: [phone]  Will anyone else be joining your video visit? No    Video-Visit Details    Video Start Time: 0942    Type of service:  Video Visit    Video End Time:1007    Originating Location (pt. Location): Home    Distant Location (provider location):  Off-site    Platform used for Video Visit: [facility]      TRANSPLANT NEPHROLOGY CHRONIC POST TRANSPLANT VISIT    Assessment & Plan   # LDKT: Stable creatinine in the ~ 2.5-2.9 range for the last couple of years, but slightly higher than previous baseline closer to ~ 1.8-2.2.  Kidney transplant biopsy 6/2021 showed no acute rejection, but moderate chronic changes.  Patient was referred for another kidney transplant, but likely will need to lose weight prior to being approved.  Would recommend he also establish care with a local Nephrologist.   - Baseline Creatinine:  ~ 2.5-2.9   - Proteinuria: Moderate (1-3 grams)   - Date DSA Last Checked: Jun/2021      Latest DSA: No   - BK Viremia: No   - Kidney Tx Biopsy: Jun 08, 2021; Result: No diagnostic evidence of acute rejection.  Mild acute tubular injury.  Moderate interstitial fibrosis and tubular atrophy.  Mild arteriosclerosis.             Mar 12, 2019; Result: No diagnostic evidence of acute rejection.  Mild interstitial fibrosis and tubular atrophy.  Moderate arterio- and moderate to severe arteriolosclerosis.             Jan 28, 2010; Result: No diagnostic evidence of acute rejection, but did have mild focal interstitial lymphoplasmacytic infiltrate and rare tubulitis.  Mild interstitial fibrosis and tubular atrophy.  Features suggestive of CNI toxicity.             Sep 06, 2007; Result: Borderline acute cellular-mediated rejection.  Nodular arteriolar hyalinosis, suggestive of CNI toxicity.  Mesangial IgA  deposits.             Nov 03, 2005; Result: No diagnostic evidence of acute rejection.  Unremarkable.    # Immunosuppression: Mycophenolate mofetil (dose 750 mg every 12 hours) and Prednisone (dose 5 mg daily)   - Continue with intensive monitoring of immunosuppression for efficacy and toxicity.   - Changes: No    # Infection Prophylaxis:   - PJP: Sulfa/TMP (Bactrim)    # Hypertension: Borderline control;  Goal BP: < 130/80   - Changes: Not at this time    - Recommend working on weight loss.    # Elevated Blood Glucose: Glucose generally running ~ 110-130s   - Management as per primary care.    # Anemia in Chronic Renal Disease: Hgb: Stable      MIKE: No   - Iron studies: Not checked recently    # Mineral Bone Disorder:   - Vitamin D; level: Not checked recently        On supplement: Yes  - Calcium; level: Normal        On supplement: No    # Electrolytes:   - Potassium; level: Normal        On supplement: No  - Bicarbonate; level: Normal        On supplement: Yes    # H/o CVA: Some residual right-sided weakness, but minimal.    # Chronic Pain: Patient with lower extremity, mostly feet, pain.  He has been on acetaminophen for many years, as well as Tramadol, up until stopping that recently.    # Obesity, Class II (BMI = 39.5): Stable weight.   - Recommend weight loss for overall health by increasing exercise and watching caloric intake.   - Patient was initially seen by Weight Management Program at time of Kidney Retransplant Evaluation, but did not follow up.  Would encourage him to do so either at [facility] or [facility].    # GERD: Asymptomatic on PPI.   - Recommend tapering off pantoprazole by starting to take it every other day.  If tolerated without significant symptoms, patient can stop pantoprazole and just take it or OTC famotidine as needed for heartburn symptoms.    # Gout: No recent flares on allopurinol.    # Skin Cancer Risk:    - Discussed sun protection and recommend regular follow up with  Dermatology.    # Medical Compliance: No.  Evidence of labs less than recommended.  - Discussed importance of checking labs regularly as recommended, taking medications as prescribed and attending scheduled medical appointments.    # COVID-19 Virus Review: Discussed COVID-19 virus and the potential medical risks.  Reviewed preventative health recommendations, including wearing a mask where appropriate.  Recommended COVID vaccination should be up to date with either an initial vaccination or booster shot when appropriate.  Asked the patient to inform the transplant center if they are exposed or diagnosed with this virus.    # COVID Vaccination Up To Date: Yes    # Transplant History:  Etiology of Kidney Failure: IgA nephropathy  Tx: LDKT  Transplant: 7/29/2004 (Kidney)  Significant changes in immunosuppression: Taken off CNI due to suggestion of CNI toxicity on biopsy.  Significant transplant-related complications: Acute cellular-mediated rejection    Transplant Office Phone Number: [phone]    Assessment and plan was discussed with the patient and he voiced his understanding and agreement.    Return visit: Return in about 1 year (around 12/16/2023).    Radu Bunch MD    Chief Complaint   Mr. Squires is a 67 year old here for kidney transplant and immunosuppression management.    History of Present Illness    Mr. Squires reports feeling okay overall with some medical complaints.  Since last clinic visit, patient reports no hospitalizations, but some ongoing medical complaints.  He has chronic pain for years with arthritis and general body aches and pains from his work and standing on concrete for so many years.  Patient was taking Tramadol for many years on a daily basis, but he somehow lost several dozen pills or feels they were stolen.  This created some concern or questioning by his prescribing provider and patient just felt he didn't want to have that over his head so he decided to stop them cold  about 6-8 weeks ago.  He reports going through significant withdrawal symptoms, but is not off Tramadol.  He has been taking Tylenol Arthritis.  Besides his ongoing chronic bilateral foot pain, patient also underwent surgery on his left foot about 6 months ago for arthritis issues.    His energy level is okay, but not quite normal.  He is active and does get regular exercise.  Denies any chest pain or shortness of breath with exertion.  He will get a little leg swelling by the end of the day, but generally has been better overall.    Appetite is good and weight has been stable at ~ 260 lbs.  No nausea or vomiting.  Occasional loose stools.  No heartburn symptoms on pantoprazole.  No fever, sweats or chills.  No night sweats.    Home BP: 120-130/70-80s    Problem List   Patient Active Problem List   Diagnosis     Testicular hypofunction     Kidney replaced by transplant     Erectile dysfunction     HTN, kidney transplant related     Immunosuppression (H)     Metabolic acidosis     Vitamin D deficiency     VANESA (obstructive sleep apnea)     Aftercare following organ transplant     Abrasion of anterior lower leg, left, subsequent encounter     Allergic rhinitis, seasonal     Arthritis of knee, right     Paroxysmal atrial fibrillation (H)     Bacteremia     Carpal tunnel syndrome     Chronic bilateral low back pain without sciatica     Chronic gout     Current smoker     History of dysplastic nevus     S/P total knee replacement using cement     Ischemic stroke (H)     Hypertriglyceridemia     Obese     Long term current use of anticoagulant     Right hand pain     Osteoarthritis of carpometacarpal joint of thumb     CKD (chronic kidney disease) stage 4, GFR 15-29 ml/min (H)     Need for pneumocystis prophylaxis     Anemia in chronic renal disease       Allergies   No Known Allergies    Medications   Current Outpatient Medications   Medication Sig     acetaminophen (TYLENOL) 325 MG tablet Take 2 tablets (650 mg) by mouth  every 6 hours as needed for pain (Patient taking differently: Take 1,000 mg by mouth daily)     allopurinol (ZYLOPRIM) 100 MG tablet Take 3 tablets (300 mg) by mouth daily     aspirin 81 MG EC tablet Take 81 mg by mouth daily     atorvastatin (LIPITOR) 20 MG tablet Take 1 tablet by mouth     carvedilol 12.5 MG PO tablet Take 1 tablet (12.5 mg) by mouth 2 times daily (with meals)     cholecalciferol (VITAMIN D) 1000 UNIT tablet Take 1 tablet (1,000 Units) by mouth daily     clonazePAM (KLONOPIN) 0.5 MG tablet Take 1 tablet (0.5 mg) by mouth daily (Patient taking differently: Take 0.25 mg by mouth every evening)     ferrous sulfate (FEROSUL) 325 (65 Fe) MG tablet TAKE 1 TAB DAILY W/ BREAKFAST OR ON AN EMPTY STOMACH. IF STOMACH UPSET OCCURS, CAN TAKE WITH MEALS.     Fexofenadine HCl (ALLEGRA PO) Take 1 tablet by mouth daily.      furosemide (LASIX) 20 MG tablet Take 2 tabs PO in the morning and in the evening     gabapentin (NEURONTIN) 300 MG capsule Take 1 capsule (300 mg) by mouth At Bedtime     losartan (COZAAR) 100 MG tablet TAKE 1 TABLET BY MOUTH EVERY DAY     Multiple Vitamins-Minerals (ONE-A-DAY MENS 50+ ADVANTAGE PO) Take 1 tablet by mouth every evening     mycophenolate (GENERIC EQUIVALENT) 250 MG capsule Take 3 capsules (750 mg) by mouth 2 times daily     omega-3 fatty acids (FISH OIL) 1200 MG capsule Take 1 capsule by mouth 3 times daily.     pantoprazole (PROTONIX) 40 MG EC tablet TAKE 1 TABLET BY MOUTH DAILY     predniSONE (DELTASONE) 5 MG tablet Take 1 tablet (5 mg) by mouth daily     sodium bicarbonate 650 MG tablet Take 1 tablet (650 mg) by mouth 2 times daily     sulfamethoxazole-trimethoprim (BACTRIM) 400-80 MG tablet Take 1 tablet by mouth Every Mon, Wed, Fri Morning     verapamil ER (CALAN-SR) 180 MG CR tablet TAKE 1 TABLET BY MOUTH EVERY DAY     No current facility-administered medications for this visit.     Medications Discontinued During This Encounter   Medication Reason     TraMADol HCl 200 MG  TB24        Physical Exam   Vital Signs: There were no vitals taken for this visit.    GENERAL APPEARANCE: alert and no distress  HENT: no obvious abnormalities on appearance  RESP: breathing appears unremarkable with normal rate, no audible wheezing or cough and no apparent shortness of breath with conversation  MS: extremities normal - no gross deformities noted  SKIN: no apparent rash and normal skin tone  NEURO: speech is clear with no obvious neurological deficits  PSYCH: mentation appears normal and affect normal    Data     Renal Latest Ref Rng & Units 11/29/2022 10/18/2022 8/22/2022   Na 133 - 144 mmol/L - - -   Na (external) 136 - 145 meq/L 140 138 140   K 3.4 - 5.3 mmol/L - - -   K (external) 3.5 - 5.1 meq/L 4.8 4.5 4.8   Cl 98 - 109 meq/L 106 106 103   CO2 20 - 32 mmol/L - - -   CO2 (external) 20 - 29 meq/L 22 21 25   BUN 7 - 30 mg/dL - - -   BUN (external) 6 - 22 mg/dL 41(H) 50(H) 56(H)   Cr 0.66 - 1.25 mg/dL - - -   Cr (external) 0.73 - 1.18 mg/dL 2.71(H) 2.45(H) 2.89(H)   Glucose 70 - 99 mg/dL - - -   Glucose (external) 70 - 99 mg/dL 121(H) 118(H) 114(H)   Ca  8.5 - 10.1 mg/dL - - -   Ca (external) 8.5 - 10.5 mg/dL 8.6 8.3(L) 8.8   Mg 1.6 - 2.3 mg/dL - - -     Bone Health Latest Ref Rng & Units 2/21/2020 5/29/2018 1/28/2010   Phos 2.5 - 4.5 mg/dL - - 3.5   Vit D Def (external) 30.0 - 100.0 ng/mL 41.3 46 -     Heme Latest Ref Rng & Units 12/13/2022 11/29/2022 10/18/2022   WBC 4.0 - 11.0 10e9/L - - -   WBC (external) 4.0 - 11.0 K/uL 8.3 9.9 10.0   Hgb 13.3 - 17.7 g/dL - - -   Hgb (external) 13.5 - 17.5 g/dL 12.3(L) 12.1(L) 11.6(L)   Plt 150 - 450 10e9/L - - -   Plt (external) 140 - 400 K/uL 169 173 197   ABSOLUTE NEUTROPHIL 1.6 - 8.3 10e9/L - - -   ABSOLUTE NEUTROPHILS (EXTERNAL) 1.8 - 8.0 K/uL - 7.8 7.4   ABSOLUTE LYMPHOCYTES 0.8 - 5.3 10e9/L - - -   ABSOLUTE LYMPHOCYTES (EXTERNAL) 0.8 - 4.1 K/uL - 0.9 1.2   ABSOLUTE MONOCYTES 0.0 - 1.3 10e9/L - - -   ABSOLUTE MONOCYTES (EXTERNAL) 0.0 - 1.0 K/uL - 0.7  0.7   ABSOLUTE EOSINOPHILS 0.0 - 0.7 10e9/L - - -   ABSOLUTE EOSINOPHILS (EXTERNAL) 0.0 - 0.7 K/uL - 0.5 0.6   ABSOLUTE BASOPHILS 0.0 - 0.2 10e9/L - - -   ABSOLUTE BASOPHILS (EXTERNAL) 0.0 - 0.2 K/uL - 0.0 0.0   ABS IMMATURE GRANULOCYTES 0 - 0.4 10e9/L - - -   ABSOLUTE NUCLEATED RBC - - - -     Liver Latest Ref Rng & Units 5/14/2018 6/15/2016 1/28/2010   AP 40 - 150 U/L - - 116   AP (external) 38 - 126 U/L 118 105 -   TBili 0.2 - 1.3 mg/dL - - 0.6   TBili (external) 0.2 - 1.3 mg/dL 0.4 0.8 -   ALT 0 - 70 U/L - - 37   ALT (external) 21 - 72 U/L 55 35 -   AST 0 - 55 U/L - - 27   AST (external) 17 - 59 U/L 43 26 -   Tot Protein 6.8 - 8.8 g/dL - - 7.8   Tot Protein (external) 6.3 - 8.2 g/dL 7.0 7.0 -   Albumin 3.9 - 5.1 g/dL - - 4.7   Albumin (external) 3.5 - 5.0 g/dL 4.1 4.1 -     Pancreas Latest Ref Rng & Units 1/6/2021   A1C (external) <5.7 % 5.3     Iron studies Latest Ref Rng & Units 12/14/2015   Ferritin 26 - 388 ng/mL 220     [facility] Txp Virology Latest Ref Rng & Units 6/8/2021 3/12/2019 5/21/2015   CVM DNA Quant - - - -   CMV DNA Quant Ext Not Detected - - NOT DETECTED   CMV Quant <100 Copies/mL - - -   CMV QT Log <2.0 Log copies/mL - - -   BK Spec - Plasma Plasma -   BK Res BKNEG:BK Virus DNA Not Detected copies/mL BK Virus DNA Not Detected <500(A) -   BK Log <2.7 Log copies/mL Not Calculated <2.7 -   EBV DNA COPIES/ML <1000 Copies/mL - - -   EBV DNA LOG OF COPIES <3.0 Log copies/mL - - -

## 2022-12-23 PROBLEM — S81.811A LACERATION OF RIGHT LOWER EXTREMITY: Status: RESOLVED | Noted: 2018-06-05 | Resolved: 2022-12-23

## 2022-12-23 PROBLEM — N17.9 AKI (ACUTE KIDNEY INJURY) (H): Status: RESOLVED | Noted: 2018-05-17 | Resolved: 2022-12-23

## 2022-12-23 PROBLEM — M17.12 PRIMARY LOCALIZED OSTEOARTHRITIS OF LEFT KNEE: Status: RESOLVED | Noted: 2017-10-16 | Resolved: 2022-12-23

## 2022-12-23 PROBLEM — I48.0 PAROXYSMAL ATRIAL FIBRILLATION (H): Status: ACTIVE | Noted: 2020-09-18

## 2022-12-23 PROBLEM — N18.9 ANEMIA IN CHRONIC RENAL DISEASE: Status: ACTIVE | Noted: 2022-12-23

## 2022-12-23 PROBLEM — Z29.89 NEED FOR PNEUMOCYSTIS PROPHYLAXIS: Status: ACTIVE | Noted: 2022-12-23

## 2022-12-23 PROBLEM — I10 HYPERTENSION: Status: RESOLVED | Noted: 2021-06-21 | Resolved: 2022-12-23

## 2022-12-23 PROBLEM — D63.1 ANEMIA IN CHRONIC RENAL DISEASE: Status: ACTIVE | Noted: 2022-12-23

## 2022-12-23 PROBLEM — N18.4 CKD (CHRONIC KIDNEY DISEASE) STAGE 4, GFR 15-29 ML/MIN (H): Status: ACTIVE | Noted: 2022-12-23

## 2022-12-23 PROBLEM — L97.909 LEG ULCER (H): Status: RESOLVED | Noted: 2018-05-17 | Resolved: 2022-12-23

## 2022-12-27 ENCOUNTER — MYC MEDICAL ADVICE (OUTPATIENT)
Dept: TRANSPLANT | Facility: CLINIC | Age: 67
End: 2022-12-27

## 2022-12-27 NOTE — LETTER
Gurjit Weeks Tavia  [address] [facility] [address]  [place] [place] [facility] [address]                December 28, 2022    To Whom It May Concern,    I am referring Gurjit Weeks Tavia for co management of nephrology care.      Gurjit is a kidney transplant recipient being referred for local general nephrology management.    .

## 2022-12-27 NOTE — TELEPHONE ENCOUNTER
Radu Bunch MD Sveiven, Sara, RN  Please check the following labs: PTH and Vitamin D.     Please send formal referral to [facility] to get patient established with a Nephrologist there.     Thanks.       Referral sent.    Shari Talley RN   Transplant Coordinator  [phone]

## 2023-01-02 NOTE — LETTER
PHYSICIAN ORDERS    DATE & TIME ISSUED:  1015  PATIENT NAME: Gurjit Squires   : 1955     [facility] MR# [if applicable]: [id number]     DIAGNOSIS / ICD - 10 CODES    Kidney Transplanted (Z94.0)    After Care Following Organ Transplant (Z48.298)    Long Term Use of Medication (Z79.899)    Immunosuppressed Status (Z92.25)    Please obtain the following this week:  BMP  CBC  Urine protein creatinine ratio  UA  UC  Microalbumin  SPEP  Serum Kappa/ Lambda light chains        Patient should release information to the [facility].   Please fax results to the Transplant Center at [phone].  Any questions please call [phone] or [phone].      .

## 2023-01-02 NOTE — PROGRESS NOTES
Radu Bunch MD Sveiven, Sara, RN  Microalbumin, SPEP and serum kappa/lambda light chains.           Previous Messages     ----- Message -----   From: Shari Talley RN   Sent: 12/30/2022   3:32 PM CST   To: Radu Bunch MD   Subject: Elevated UPC                                     Carlos Dacosta had an elevated UPC of 7.5 from an outside lab from 12/16.  I am having him repeat labs next week along with [facility].     Any additional labs you would like ordered?     Shari Talley RN   Transplant Coordinator   [phone]     OUTCOME: orders sent,  Patient made aware via Smart Voicemail

## 2023-01-02 NOTE — LETTER
PHYSICIAN ORDERS    DATE & TIME ISSUED:  0935  PATIENT NAME: Gurjit Squires   : 1955     [facility] MR# [if applicable]: [id number]     DIAGNOSIS / ICD - 10 CODES    Kidney Transplanted (Z94.0)    After Care Following Organ Transplant (Z48.298)    Long Term Use of Medication (Z79.899)    Immunosuppressed Status (Z92.25)    Please obtain the following this week:  BMP  CBC  Urine protein creatinine ratio  UA  UC  Microalbumin  SPEP  Serum Kappa/ Lambda light chains        Patient should release information to the [facility].   Please fax results to the Transplant Center at [phone].  Any questions please call [phone] or [phone].      .

## 2023-01-05 DIAGNOSIS — I12.9 RENAL HYPERTENSION, STAGE 1-4 OR UNSPECIFIED CHRONIC KIDNEY DISEASE: ICD-10-CM

## 2023-01-06 DIAGNOSIS — Z94.0 KIDNEY TRANSPLANTED: Primary | ICD-10-CM

## 2023-01-11 DIAGNOSIS — Z94.0 KIDNEY TRANSPLANTED: Primary | ICD-10-CM

## 2023-01-11 RX ORDER — PREDNISONE 5 MG/1
5 TABLET ORAL DAILY
Qty: 90 TABLET | Refills: 3 | Status: SHIPPED | OUTPATIENT
Start: 2023-01-11

## 2023-01-25 RX ORDER — VERAPAMIL HYDROCHLORIDE 180 MG/1
180 TABLET, EXTENDED RELEASE ORAL DAILY
Qty: 90 TABLET | Refills: 0 | Status: SHIPPED | OUTPATIENT
Start: 2023-01-25 | End: 2023-10-04 | Stop reason: DRUGHIGH

## 2023-01-26 DIAGNOSIS — E61.1 IRON DEFICIENCY: Primary | ICD-10-CM

## 2023-01-26 RX ORDER — FERROUS SULFATE 325(65) MG
325 TABLET ORAL
Qty: 90 TABLET | Refills: 0 | Status: SHIPPED | OUTPATIENT
Start: 2023-01-26 | End: 2023-04-25

## 2023-01-30 ENCOUNTER — TELEPHONE (OUTPATIENT)
Dept: TRANSPLANT | Facility: CLINIC | Age: 68
End: 2023-01-30
Payer: MEDICARE

## 2023-01-30 NOTE — TELEPHONE ENCOUNTER
ISSUE:  Kidney function remains at baseline. Serum calcium dropping.    PLAN:  Ensure follow up with local gen neph.     OUTCOME:  RNCC spoke with Carlos who reports that he will follow up with [facility] scheduling team this week, as he just received notification on referral, but had not heard from scheduling.     He is taking Is taking one-a-day mulit-vit along with cholecalciferol 1,000 unit(s) daily.    Carlos voiced understanding to follow up with [facility] if not able to schedule with [facility] within 1 month. Will review serum calcium management with [facility] gen neph team.

## 2023-01-31 DIAGNOSIS — E55.9 VITAMIN D DEFICIENCY: Primary | ICD-10-CM

## 2023-01-31 NOTE — LETTER
PHYSICIAN ORDERS      DATE & TIME ISSUED: February 3, 2023 2:18 PM  PATIENT NAME: Gurjit Squires   : 1955     [facility] MR# [if applicable]: [id number]     DIAGNOSIS:  Kidney transplant   ICD-10 CODE: Z94.0    Vitamin D deficiency E55.9    Please repeat the following level with next lab draw  Vitamin D  level    Any questions please call: [phone] option 5    Please fax results to [phone].    .

## 2023-01-31 NOTE — TELEPHONE ENCOUNTER
ISSUE:  Low serum calcium.    PLAN (per Dr. Bunch):  Pursue collaboration with [facility] nephrology, in the meantime:    Please check vitamin D and start calcium carbonate 500 mg daily.       LPN TASK:  Send orders for vitamin D level to preferred lab.  Notify Carlos to start TUMS (Calcium Carbonate 500mg daily) - okay for OTC or script to preferred phamracy (add to med list, please).

## 2023-02-03 RX ORDER — CALCIUM CARBONATE 500 MG/1
1 TABLET, CHEWABLE ORAL DAILY
Qty: 30 TABLET | Refills: 11 | Status: SHIPPED | OUTPATIENT
Start: 2023-02-03 | End: 2023-05-19 | Stop reason: ALTCHOICE

## 2023-03-05 DIAGNOSIS — E55.9 VITAMIN D DEFICIENCY, UNSPECIFIED: Primary | ICD-10-CM

## 2023-03-05 RX ORDER — UREA 10 %
1 LOTION (ML) TOPICAL DAILY
Qty: 30 TABLET | Refills: 0 | Status: SHIPPED | OUTPATIENT
Start: 2023-03-05 | End: 2023-03-31

## 2023-03-06 ENCOUNTER — MYC REFILL (OUTPATIENT)
Dept: NEPHROLOGY | Facility: CLINIC | Age: 68
End: 2023-03-06
Payer: MEDICARE

## 2023-03-06 DIAGNOSIS — Z94.0 KIDNEY REPLACED BY TRANSPLANT: Primary | ICD-10-CM

## 2023-03-06 RX ORDER — FUROSEMIDE 20 MG
TABLET ORAL
Qty: 360 TABLET | Refills: 0 | Status: SHIPPED | OUTPATIENT
Start: 2023-03-06 | End: 2023-05-19 | Stop reason: DRUGHIGH

## 2023-03-11 DIAGNOSIS — Z94.0 KIDNEY REPLACED BY TRANSPLANT: ICD-10-CM

## 2023-03-11 DIAGNOSIS — Z94.0 KIDNEY TRANSPLANTED: Primary | ICD-10-CM

## 2023-03-13 RX ORDER — SULFAMETHOXAZOLE AND TRIMETHOPRIM 400; 80 MG/1; MG/1
1 TABLET ORAL
Qty: 13 TABLET | Refills: 11 | Status: SHIPPED | OUTPATIENT
Start: 2023-03-13 | End: 2024-01-29

## 2023-03-13 RX ORDER — MYCOPHENOLATE MOFETIL 250 MG/1
750 CAPSULE ORAL 2 TIMES DAILY
Qty: 180 CAPSULE | Refills: 11 | Status: SHIPPED | OUTPATIENT
Start: 2023-03-13 | End: 2024-04-01

## 2023-03-27 ENCOUNTER — TELEPHONE (OUTPATIENT)
Dept: TRANSPLANT | Facility: CLINIC | Age: 68
End: 2023-03-27
Payer: MEDICARE

## 2023-03-27 NOTE — TELEPHONE ENCOUNTER
Patient Call: General  Route to LPN    Reason for call: called in regards of returning a missed call from coordinator. Call back number is  [phone].    Call back needed? Yes    Return Call Needed  Same as documented in contacts section  When to return call?: Same day: Route High Priority

## 2023-03-28 NOTE — TELEPHONE ENCOUNTER
ISSUE: patient hospitalized at [place] for a spontaneous rectus sheath Hematoma, Kidney function declining. MAHNAZ from [facility] per Nephrologist Dr Muse.  Creatinine 5 today, baseline mid 2s. Possibility of HD tomorrow.    Monitoring I&Os,  Making some urine, but not much per patient.  No indwelling urinary catheter as nephrology did not want to cause trauma.  Bladder scan was done yesterday,  With no urine in the bladder.    [phone],  Dr muse # should Dr Bunch like to consult.    Dr Bunch notified.    Shari Talley RN   Transplant Coordinator  [phone]

## 2023-03-30 NOTE — TELEPHONE ENCOUNTER
ISSUE:  patient hospitalized at [place] for a spontaneous rectus sheath Hematoma.  Creatinine improving after urinary catheter placement ~2 days ago per patient.    Creatinine 4.31 today 3/30 down from 5.02 on 3/28. Patient states pain is improving, allowing him to get up out of bed.  Patient will continue to update RNCC    Shari Talley RN   Transplant Coordinator  [phone]

## 2023-03-30 NOTE — TELEPHONE ENCOUNTER
Patient called to speak to coord, they are playing phone tag, he is currently in the hospital, please him when available

## 2023-03-31 DIAGNOSIS — E55.9 VITAMIN D DEFICIENCY, UNSPECIFIED: ICD-10-CM

## 2023-03-31 RX ORDER — UREA 10 %
LOTION (ML) TOPICAL
Qty: 30 TABLET | Refills: 0 | Status: SHIPPED | OUTPATIENT
Start: 2023-03-31 | End: 2023-04-25

## 2023-04-03 ENCOUNTER — TELEPHONE (OUTPATIENT)
Dept: TRANSPLANT | Facility: CLINIC | Age: 68
End: 2023-04-03
Payer: MEDICARE

## 2023-04-03 NOTE — TELEPHONE ENCOUNTER
See phone call encounter dated 4/6/2023    Shari Talley RN   Transplant Coordinator  [phone]

## 2023-04-05 ENCOUNTER — MYC MEDICAL ADVICE (OUTPATIENT)
Dept: OTHER | Age: 68
End: 2023-04-05

## 2023-04-05 NOTE — LETTER
PHYSICIAN ORDERS    DATE & TIME ISSUED: 2023 1530  PATIENT NAME: Gurjit Squires   : 1955     [facility] MR# [if applicable]: [id number]     DIAGNOSIS / ICD - 10 CODES  Kidney Transplanted (Z94.0)  After Care Following Organ Transplant (Z48.298)  Long Term Use of Medication (Z79.899)  Immunosuppressed Status (Z92.25)    Weekly     BMP  CBC  Urine protein creatinine ratio        Patient should release information to the [facility] Transplant Center.   Please fax results to the Transplant Center at [phone].  Any questions please call [phone] or [phone].      .

## 2023-04-06 NOTE — TELEPHONE ENCOUNTER
ISSUE: patient discharged from [place].  Admitted for spontaneous rectus sheath Hematoma, MAHNAZ from [facility], Creatinine up to ~5.    Creatinine yesterday 4/5 per care everywhere: 3.53.  Baseline 2.5-2.9  HGB: 10.2    Patient was seen in PCP clinic today.    Has apt scheduled with primary nephrology June 2023.    Patient is repeating labs Monday 4/10     Will update Dr Alivia Talley RN   Transplant Coordinator  [phone]    Emerson Gonzalez MD Sveiven, Sara, RN  Weekly labs for the next 2 weeks please and if back to baseline can go to monthly. I think he should see Dr. Bunch in the next 2 months or so.     Orders placed,  Patient aware.      Shari Talley RN   Transplant Coordinator  [phone]

## 2023-04-11 ENCOUNTER — TELEPHONE (OUTPATIENT)
Dept: TRANSPLANT | Facility: CLINIC | Age: 68
End: 2023-04-11
Payer: MEDICARE

## 2023-04-11 NOTE — LETTER
2023    Gurjit Squires  [address]   [address]  [place] [address]      Dear Mr. Squires,    This letter is sent to confirm that are a candidate in the kidney transplant program at the [facility].  You were placed on the kidney inactive waitlist on 23.  This means you will accumulate waiting time but not receive  donor calls.       Items we will need from you:    We have received approval from you insurance company for the transplant procedure.  It is critical that you notify us if there is any change in your insurance.  It is also important that you familiarize yourself with the details of your specific insurance policy.  Our patient  is available to assist you if you should have any questions regarding your coverage.    Once you are listed active status, an ALA or PRA blood sample will need to be sent here every 3 months to match you with  donors or any potential living donors.  At that time, special mailing boxes (called mailers) will be sent to you directly from the Outreach Department.  You should take the physician order form and the  to your home laboratory when it is time to for this testing to be done.  Additional mailers can be obtained by calling the Transplant Office and asking to speak to a kidney .    During this waiting period, we may request additional periodic laboratory tests with your primary physician.  It will be your responsibility to remind your physician to forward your results to the Transplant Office.    We need to be kept informed of any changes in your medical condition such as:    changes in your medications,   significant changes in your health  significant infections (such as pneumonia or abscesses)  blood transfusions  any condition which requires hospitalization  any surgery  initiation of dialysis    Remember to complete any routine cancer  screening tests required before your transplant.  This includes colonoscopy; prostrate screening for men, and mammogram and gynecologic testing for women, as well as dental work.  Your primary care clinic can assist you with arranging for these exams.  Remind your caregivers to forward copies of the records and final reports.    We want you to know that our program has physician and surgeon coverage 24 hours a day, 365 days a year. In addition, our transplant surgeons and physicians will not be on call for two or more transplant programs more than 30 miles apart unless the circumstances have been reviewed and approved by the [employer] Organ [employer]aring (UNOS) Membership and Professional Standards Committee (MPSC). Finally, our primary physician and primary surgeons are not designated as the primary surgeon or primary physician at more than 1 transplant hospital. If this coverage changes or there are substantial program changes, you will be notified in writing by letter.     Attached is a letter from UN that describes the services and information offered to patients by UN and the Organ Procurement and Transplantation Network (OPTN).    We appreciate having had the opportunity to participate in your care.  If you have questions, please feel free to call the Transplant Office at [phone] or [phone].      Sincerely,   Kath Elliott RN, Pre-Kidney/Pancreas Transplant Coordinator  Kidney Transplant Program    Enclosures: [facility] Letter  CC:   Care Team                                 The Organ Procurement and Transplantation Network  Toll-free patient services line:     Your resource for organ transplant information    If you have a question regarding your own medical care, you always should call your transplant hospital first. However, for general organ transplant-related information, you can call the Organ Procurement and Transplantation Network (OPTN) toll-free patient services line at [phone]-  [phone]. Anyone, including potential transplant candidates, candidates, recipients, family members, friends, living donors, and donor family members, can call this number to:      Talk about organ donation, living donation, the transplant process, the donation process, and transplant policies.  Get a free patient information kit with helpful booklets, waiting list and transplant information, and a list of all transplant hospitals.  Ask questions about the OPTN website (https://optn.transplant.hrsa.gov/), the [employer] for Organ Sharing s (UNOS) website (https://unos.org/), or the UNOS website for living donors and transplant recipients. (https://www.transplantliving.org/).  Learn how the OPTN can help you.  Talk about any concerns that you may have with a transplant hospital.    The [facility] transplant system, the OPTN, is managed under federal contract by the [employer] Organ Sharing (UNOS), which is a non-profit charitable organization. The OPTN helps create and define organ sharing policies that make the best use of donated organs. This process continuously evaluating new advances and discoveries so policies can be adapted to best serve patients waiting for transplants. To do so, the OPTN works closely with transplant professionals, transplant patients, transplant candidates, donor families, living donors, and the public. All transplant programs and organ procurement organizations throughout the country are OPTN members and are obligated to follow the policies the OPTN creates for allocating organs.    The OPTN also is responsible for:    Providing educational material for patients, the public, and professionals.  Raising awareness of the need for donated organs and tissue.  Coordinating organ procurement, matching, and placement.  Collecting information about every organ transplant and donation that occurs in the [place].    Remember, you should contact your transplant hospital directly if you  have questions or concerns about your own medical care including medical records, work-up progress, and test results.    We are not your transplant hospital, and our staff will not be able to answer questions about your case, so please keep your transplant hospital s phone number handy.    However, while you research your transplant needs and learn as much as you can about transplantation and donation, we welcome your call to our toll-free patient services line at [phone].          Updated 4/1/2019

## 2023-04-11 NOTE — TELEPHONE ENCOUNTER
Called pt and left VM explaining that he now has a qualifying GFR and could be listed inactive for kidney transplant.  I explained that there are two forms he will need to sign electronically and that I will be sending those via email.  Asked for him to return my call to discuss next steps. Provided direct line for follow up.

## 2023-04-11 NOTE — TELEPHONE ENCOUNTER
Pt returned my call, he was hospitalized w/ an abdominal hematoma when his GFR dropped. We discussed that he is now eligible for inactive listing, he confirmed he is still interested in kidney transplant, therefore we will proceed w/ listing.  He has signed his KDPI/TAHMINA forms. His next scheduled nephrology visit is w/ [facility] on 6/1/23.  He was instructed to try to be seen sooner, he is open to travelling to [place] if needed, I will reach out his post coordinator to see if he can be seen here.  If he is, we would like arrange for a full PKE slot. I will call him back after speaking to his post coordinator to make a plan. Pt had no further questions.

## 2023-04-12 ENCOUNTER — TELEPHONE (OUTPATIENT)
Dept: LAB | Facility: CLINIC | Age: 68
End: 2023-04-12
Payer: MEDICARE

## 2023-04-12 DIAGNOSIS — Z94.0 KIDNEY REPLACED BY TRANSPLANT: Primary | ICD-10-CM

## 2023-04-17 ENCOUNTER — MYC MEDICAL ADVICE (OUTPATIENT)
Dept: OTHER | Age: 68
End: 2023-04-17

## 2023-04-18 ENCOUNTER — DOCUMENTATION ONLY (OUTPATIENT)
Dept: TRANSPLANT | Facility: CLINIC | Age: 68
End: 2023-04-18
Payer: MEDICARE

## 2023-04-18 ENCOUNTER — TELEPHONE (OUTPATIENT)
Dept: TRANSPLANT | Facility: CLINIC | Age: 68
End: 2023-04-18
Payer: MEDICARE

## 2023-04-18 DIAGNOSIS — G47.33 OBSTRUCTIVE SLEEP APNEA: ICD-10-CM

## 2023-04-18 DIAGNOSIS — Z76.82 ORGAN TRANSPLANT CANDIDATE: ICD-10-CM

## 2023-04-18 DIAGNOSIS — N18.4 CHRONIC KIDNEY DISEASE, STAGE IV (SEVERE) (H): ICD-10-CM

## 2023-04-18 DIAGNOSIS — I10 ESSENTIAL HYPERTENSION: ICD-10-CM

## 2023-04-18 DIAGNOSIS — I25.10 CARDIOVASCULAR DISEASE: ICD-10-CM

## 2023-04-18 DIAGNOSIS — N02.B9 IGA NEPHROPATHY: ICD-10-CM

## 2023-04-18 DIAGNOSIS — Z87.891 HISTORY OF TOBACCO USE: ICD-10-CM

## 2023-04-18 DIAGNOSIS — Z01.818 PRE-TRANSPLANT EVALUATION FOR KIDNEY TRANSPLANT: ICD-10-CM

## 2023-04-18 NOTE — TELEPHONE ENCOUNTER
Pt emailed me stating he completed the transplant education, we reviewed that information (see education documentation encounter).  We reviewed that his current BMI is 38, therefore he will be eligible to come for PKE.  He is doing weight watchers and understands he will likely need to lose more weight prior to transplant.  We originally wanted to align PKE w/ his upcoming visit w/ Dr. Bunch, however, he will be seeing Dr. Bunch in [place].  He agreed to come for PKE on 5/10/23. Orders entered, pt had no further questions.

## 2023-04-18 NOTE — PROGRESS NOTES
Kidney Transplant Waitlist - Qualified: 4/11/2023  Gurjit Squires attended the pre-transplant patient education class today. The [facility] website pre-transplant modules were viewed; class participants were educated on using the site.     Content reviewed:    Living Donation and how to access that program    Paired exchange    Kidney Donor Profile Index (KDPI)    Waiting list issues (right to decline without penalty, high PHS risk donors, what to expect when called with an offer)    Hospital experience,  length of stay , need to stay locally post-discharge (2-4 weeks)    Surgical options (with pictures)                             Post-surgery lifting and driving restrictions    Post-transplant routines, frequency of lab work and clinic visits    Need to stay locally post-discharge (2-4 weeks)    Role of Transplant Coordinator    Participants were informed of the benefits of transplant as well as potential risks such as infection, cancer, and death.  The need for total adherence with immunosuppression medications and following transplant regimens was stressed.  The overall evaluation/approval/listing process was reviewed.

## 2023-04-19 DIAGNOSIS — Z94.0 KIDNEY TRANSPLANTED: Primary | ICD-10-CM

## 2023-04-19 NOTE — TELEPHONE ENCOUNTER
Radu Bunch MD Sveiven, Sara, RN  Yes, please restart.           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     Shari Talley RN   Transplant Coordinator   [phone]

## 2023-04-24 DIAGNOSIS — E61.1 IRON DEFICIENCY: Primary | ICD-10-CM

## 2023-04-25 ENCOUNTER — MYC MEDICAL ADVICE (OUTPATIENT)
Dept: TRANSPLANT | Facility: CLINIC | Age: 68
End: 2023-04-25
Payer: MEDICARE

## 2023-04-25 DIAGNOSIS — E55.9 VITAMIN D DEFICIENCY, UNSPECIFIED: Primary | ICD-10-CM

## 2023-04-25 RX ORDER — FERROUS SULFATE 325(65) MG
325 TABLET ORAL
Qty: 90 TABLET | Refills: 0 | Status: SHIPPED | OUTPATIENT
Start: 2023-04-25 | End: 2023-07-10

## 2023-04-25 RX ORDER — UREA 10 %
1 LOTION (ML) TOPICAL DAILY
Qty: 90 TABLET | Refills: 0 | Status: SHIPPED | OUTPATIENT
Start: 2023-04-25

## 2023-04-26 ENCOUNTER — TELEPHONE (OUTPATIENT)
Dept: TRANSPLANT | Facility: CLINIC | Age: 68
End: 2023-04-26
Payer: MEDICARE

## 2023-04-26 DIAGNOSIS — E87.20 ACIDOSIS: ICD-10-CM

## 2023-04-26 RX ORDER — SODIUM BICARBONATE 650 MG/1
1300 TABLET ORAL 2 TIMES DAILY
Qty: 360 TABLET | Refills: 3 | Status: SHIPPED | OUTPATIENT
Start: 2023-04-26 | End: 2023-11-29

## 2023-04-26 NOTE — TELEPHONE ENCOUNTER
Radu Bunch MD Sveiven, Sara, RN  Yes      ----- Message -----   From: Shari Talley RN   Sent: 4/25/2023   3:44 PM CDT   To: MD Dr Alivia Donald,  Carlos has been taking his sodium bicarb 650 mg bid for a week,  okay to increase to 1300 mg bid?     Shari Talley RN   Transplant Coordinator   [phone]     OUTCOME:    V/u of increasing sodium bicarb to 1300 mg bid    Shari Talley RN   Transplant Coordinator  [phone]

## 2023-05-01 ENCOUNTER — MYC MEDICAL ADVICE (OUTPATIENT)
Dept: OTHER | Age: 68
End: 2023-05-01

## 2023-05-01 DIAGNOSIS — I10 HYPERTENSION: Primary | ICD-10-CM

## 2023-05-02 DIAGNOSIS — I10 HYPERTENSION: Primary | ICD-10-CM

## 2023-05-02 RX ORDER — AMLODIPINE BESYLATE 2.5 MG/1
2.5 TABLET ORAL EVERY EVENING
Qty: 30 TABLET | Refills: 11 | Status: SHIPPED | OUTPATIENT
Start: 2023-05-02 | End: 2023-05-15

## 2023-05-02 RX ORDER — AMLODIPINE BESYLATE 5 MG/1
5 TABLET ORAL DAILY
COMMUNITY
End: 2023-05-02 | Stop reason: DRUGHIGH

## 2023-05-02 RX ORDER — LOSARTAN POTASSIUM 25 MG/1
25 TABLET ORAL DAILY
Qty: 90 TABLET | Refills: 3 | Status: SHIPPED | OUTPATIENT
Start: 2023-05-02 | End: 2024-03-13

## 2023-05-02 NOTE — TELEPHONE ENCOUNTER
ISSUE: patient c/o BLE swelling that is very uncomfortable by the end of the day.  Losartan was discontinued during his recent hospitalization.  He is currently on amlodipine 5 mg daily.      BP: 130s/70s  HR:  80s    The swelling started when amlodipine was started per PCP.  Currently on amlodipine 5 mg daily.    Patient is wondering if losartan can be restarted.  He is aware it was discontinued with his MAHNAZ.    Will update Dr Bunch    PLAN:  Radu Bunch MD Sveiven, Shari, RN    Why don't we change the amlodipine to evening and try 2.5 mg nightly.     He can restart losartan at 25 mg daily.     Needs a primary Nephrologist.     Thanks.     Jackson     OUTCOME: patient v/u of Dr Bunch's recommendations as stated above. RX sent to local pharmacy.    Patient is establishing care with general nephrology June 1, 2023    Shari Talley RN   Transplant Coordinator  [phone]

## 2023-05-03 ENCOUNTER — TELEPHONE (OUTPATIENT)
Dept: TRANSPLANT | Facility: CLINIC | Age: 68
End: 2023-05-03
Payer: MEDICARE

## 2023-05-04 ENCOUNTER — TELEPHONE (OUTPATIENT)
Dept: TRANSPLANT | Facility: CLINIC | Age: 68
End: 2023-05-04
Payer: MEDICARE

## 2023-05-09 LAB
ABO/RH(D): NORMAL
ANTIBODY SCREEN: NEGATIVE
SPECIMEN EXPIRATION DATE: NORMAL

## 2023-05-10 ENCOUNTER — ALLIED HEALTH/NURSE VISIT (OUTPATIENT)
Dept: TRANSPLANT | Facility: CLINIC | Age: 68
End: 2023-05-10
Attending: NURSE PRACTITIONER
Payer: MEDICARE

## 2023-05-10 ENCOUNTER — DOCUMENTATION ONLY (OUTPATIENT)
Dept: TRANSPLANT | Facility: CLINIC | Age: 68
End: 2023-05-10

## 2023-05-10 ENCOUNTER — ANCILLARY PROCEDURE (OUTPATIENT)
Dept: CARDIOLOGY | Facility: CLINIC | Age: 68
End: 2023-05-10
Attending: NURSE PRACTITIONER
Payer: MEDICARE

## 2023-05-10 ENCOUNTER — ANCILLARY PROCEDURE (OUTPATIENT)
Dept: GENERAL RADIOLOGY | Facility: CLINIC | Age: 68
End: 2023-05-10
Attending: NURSE PRACTITIONER
Payer: MEDICARE

## 2023-05-10 ENCOUNTER — TELEPHONE (OUTPATIENT)
Dept: TRANSPLANT | Facility: CLINIC | Age: 68
End: 2023-05-10

## 2023-05-10 ENCOUNTER — LAB (OUTPATIENT)
Dept: LAB | Facility: CLINIC | Age: 68
End: 2023-05-10
Attending: NURSE PRACTITIONER
Payer: MEDICARE

## 2023-05-10 VITALS
BODY MASS INDEX: 40.35 KG/M2 | OXYGEN SATURATION: 96 % | WEIGHT: 272.4 LBS | SYSTOLIC BLOOD PRESSURE: 158 MMHG | DIASTOLIC BLOOD PRESSURE: 75 MMHG | HEIGHT: 69 IN | HEART RATE: 72 BPM

## 2023-05-10 DIAGNOSIS — I10 ESSENTIAL HYPERTENSION: ICD-10-CM

## 2023-05-10 DIAGNOSIS — G47.33 OBSTRUCTIVE SLEEP APNEA: ICD-10-CM

## 2023-05-10 DIAGNOSIS — Z01.818 PRE-TRANSPLANT EVALUATION FOR KIDNEY TRANSPLANT: ICD-10-CM

## 2023-05-10 DIAGNOSIS — Z76.82 ORGAN TRANSPLANT CANDIDATE: ICD-10-CM

## 2023-05-10 DIAGNOSIS — N18.4 CHRONIC KIDNEY DISEASE, STAGE IV (SEVERE) (H): ICD-10-CM

## 2023-05-10 DIAGNOSIS — Z87.891 HISTORY OF TOBACCO USE: ICD-10-CM

## 2023-05-10 DIAGNOSIS — N02.B9 IGA NEPHROPATHY: ICD-10-CM

## 2023-05-10 DIAGNOSIS — I48.0 PAROXYSMAL ATRIAL FIBRILLATION (H): ICD-10-CM

## 2023-05-10 DIAGNOSIS — I25.10 CARDIOVASCULAR DISEASE: ICD-10-CM

## 2023-05-10 DIAGNOSIS — Z72.89 OTHER PROBLEMS RELATED TO LIFESTYLE: ICD-10-CM

## 2023-05-10 DIAGNOSIS — M48.061 SPINAL STENOSIS OF LUMBAR REGION, UNSPECIFIED WHETHER NEUROGENIC CLAUDICATION PRESENT: Primary | ICD-10-CM

## 2023-05-10 DIAGNOSIS — Z11.59 ENCOUNTER FOR SCREENING FOR OTHER VIRAL DISEASES: ICD-10-CM

## 2023-05-10 LAB
A1 AB TITR SERPL: >256 {TITER}
ABO/RH(D): NORMAL
ALBUMIN SERPL BCG-MCNC: 4.3 G/DL (ref 3.5–5.2)
ALBUMIN UR-MCNC: 100 MG/DL
ALP SERPL-CCNC: 123 U/L (ref 40–129)
ALT SERPL W P-5'-P-CCNC: 16 U/L (ref 10–50)
ANION GAP SERPL CALCULATED.3IONS-SCNC: 12 MMOL/L (ref 7–15)
ANTIBODY TITER IGM SCREEN: NEGATIVE
APPEARANCE UR: CLEAR
APTT PPP: 33 SECONDS (ref 22–38)
AST SERPL W P-5'-P-CCNC: 21 U/L (ref 10–50)
B IGG TITR SERPL: 128 {TITER}
B IGM TITR SERPL: 64 {TITER}
BASOPHILS # BLD AUTO: 0.1 10E3/UL (ref 0–0.2)
BASOPHILS NFR BLD AUTO: 1 %
BILIRUB SERPL-MCNC: 0.3 MG/DL
BILIRUB UR QL STRIP: NEGATIVE
BUN SERPL-MCNC: 40.2 MG/DL (ref 8–23)
CALCIUM SERPL-MCNC: 8.3 MG/DL (ref 8.8–10.2)
CHLORIDE SERPL-SCNC: 108 MMOL/L (ref 98–107)
CMV IGG SERPL IA-ACNC: <0.2 U/ML
CMV IGG SERPL IA-ACNC: NORMAL
COLOR UR AUTO: ABNORMAL
CREAT SERPL-MCNC: 3.35 MG/DL (ref 0.67–1.17)
DEPRECATED HCO3 PLAS-SCNC: 26 MMOL/L (ref 22–29)
EBV VCA IGG SER IA-ACNC: 131 U/ML
EBV VCA IGG SER IA-ACNC: POSITIVE
EOSINOPHIL # BLD AUTO: 0.3 10E3/UL (ref 0–0.7)
EOSINOPHIL NFR BLD AUTO: 3 %
ERYTHROCYTE [DISTWIDTH] IN BLOOD BY AUTOMATED COUNT: 15.7 % (ref 10–15)
FACTOR 2 INTERPRETATION: NORMAL
FACTOR V INTERPRETATION: NORMAL
GFR SERPL CREATININE-BSD FRML MDRD: 19 ML/MIN/1.73M2
GLUCOSE SERPL-MCNC: 119 MG/DL (ref 70–99)
GLUCOSE UR STRIP-MCNC: NEGATIVE MG/DL
HBV CORE AB SERPL QL IA: NONREACTIVE
HBV SURFACE AG SERPL QL IA: NONREACTIVE
HCT VFR BLD AUTO: 33.9 % (ref 40–53)
HCV AB SERPL QL IA: NONREACTIVE
HGB BLD-MCNC: 10.6 G/DL (ref 13.3–17.7)
HGB UR QL STRIP: ABNORMAL
HIV 1+2 AB+HIV1 P24 AG SERPL QL IA: NONREACTIVE
IMM GRANULOCYTES # BLD: 0.1 10E3/UL
IMM GRANULOCYTES NFR BLD: 1 %
INR PPP: 1.83 (ref 0.85–1.15)
KETONES UR STRIP-MCNC: NEGATIVE MG/DL
LAB DIRECTOR COMMENTS: NORMAL
LAB DIRECTOR DISCLAIMER: NORMAL
LAB DIRECTOR INTERPRETATION: NORMAL
LAB DIRECTOR METHODOLOGY: NORMAL
LAB DIRECTOR RESULTS: NORMAL
LEUKOCYTE ESTERASE UR QL STRIP: NEGATIVE
LVEF ECHO: NORMAL
LYMPHOCYTES # BLD AUTO: 0.6 10E3/UL (ref 0.8–5.3)
LYMPHOCYTES NFR BLD AUTO: 6 %
MCH RBC QN AUTO: 29.9 PG (ref 26.5–33)
MCHC RBC AUTO-ENTMCNC: 31.3 G/DL (ref 31.5–36.5)
MCV RBC AUTO: 96 FL (ref 78–100)
MONOCYTES # BLD AUTO: 0.5 10E3/UL (ref 0–1.3)
MONOCYTES NFR BLD AUTO: 4 %
MUCOUS THREADS #/AREA URNS LPF: PRESENT /LPF
NEUTROPHILS # BLD AUTO: 9.5 10E3/UL (ref 1.6–8.3)
NEUTROPHILS NFR BLD AUTO: 85 %
NITRATE UR QL: NEGATIVE
NRBC # BLD AUTO: 0 10E3/UL
NRBC BLD AUTO-RTO: 0 /100
PH UR STRIP: 7 [PH] (ref 5–7)
PLATELET # BLD AUTO: 228 10E3/UL (ref 150–450)
POTASSIUM SERPL-SCNC: 4.1 MMOL/L (ref 3.4–5.3)
PROT SERPL-MCNC: 6.7 G/DL (ref 6.4–8.3)
RBC # BLD AUTO: 3.55 10E6/UL (ref 4.4–5.9)
RBC URINE: 1 /HPF
SODIUM SERPL-SCNC: 146 MMOL/L (ref 136–145)
SP GR UR STRIP: 1.01 (ref 1–1.03)
SPECIMEN DESCRIPTION: NORMAL
SPECIMEN EXPIRATION DATE: NORMAL
SPECIMEN EXPIRATION DATE: NORMAL
SQUAMOUS EPITHELIAL: <1 /HPF
T PALLIDUM AB SER QL: NONREACTIVE
UROBILINOGEN UR STRIP-MCNC: NORMAL MG/DL
VZV IGG SER QL IA: >4000 INDEX
VZV IGG SER QL IA: POSITIVE
WBC # BLD AUTO: 11 10E3/UL (ref 4–11)
WBC URINE: 2 /HPF

## 2023-05-10 PROCEDURE — 93306 TTE W/DOPPLER COMPLETE: CPT | Performed by: INTERNAL MEDICINE

## 2023-05-10 PROCEDURE — 86704 HEP B CORE ANTIBODY TOTAL: CPT | Mod: GA | Performed by: PHYSICIAN ASSISTANT

## 2023-05-10 PROCEDURE — 80053 COMPREHEN METABOLIC PANEL: CPT | Performed by: PATHOLOGY

## 2023-05-10 PROCEDURE — 85610 PROTHROMBIN TIME: CPT | Performed by: PATHOLOGY

## 2023-05-10 PROCEDURE — 86803 HEPATITIS C AB TEST: CPT | Mod: GA | Performed by: PHYSICIAN ASSISTANT

## 2023-05-10 PROCEDURE — 81001 URINALYSIS AUTO W/SCOPE: CPT | Performed by: PATHOLOGY

## 2023-05-10 PROCEDURE — 86886 COOMBS TEST INDIRECT TITER: CPT | Performed by: PHYSICIAN ASSISTANT

## 2023-05-10 PROCEDURE — G0452 MOLECULAR PATHOLOGY INTERPR: HCPCS | Mod: 26 | Performed by: STUDENT IN AN ORGANIZED HEALTH CARE EDUCATION/TRAINING PROGRAM

## 2023-05-10 PROCEDURE — 86706 HEP B SURFACE ANTIBODY: CPT | Mod: GA | Performed by: PHYSICIAN ASSISTANT

## 2023-05-10 PROCEDURE — 99215 OFFICE O/P EST HI 40 MIN: CPT

## 2023-05-10 PROCEDURE — 85025 COMPLETE CBC W/AUTO DIFF WBC: CPT | Performed by: PATHOLOGY

## 2023-05-10 PROCEDURE — 71046 X-RAY EXAM CHEST 2 VIEWS: CPT | Performed by: RADIOLOGY

## 2023-05-10 PROCEDURE — 99000 SPECIMEN HANDLING OFFICE-LAB: CPT | Performed by: PATHOLOGY

## 2023-05-10 PROCEDURE — G0463 HOSPITAL OUTPT CLINIC VISIT: HCPCS

## 2023-05-10 PROCEDURE — 86787 VARICELLA-ZOSTER ANTIBODY: CPT | Performed by: PHYSICIAN ASSISTANT

## 2023-05-10 PROCEDURE — 99214 OFFICE O/P EST MOD 30 MIN: CPT | Performed by: TRANSPLANT SURGERY

## 2023-05-10 PROCEDURE — 86481 TB AG RESPONSE T-CELL SUSP: CPT | Performed by: PHYSICIAN ASSISTANT

## 2023-05-10 PROCEDURE — 85670 THROMBIN TIME PLASMA: CPT | Performed by: PHYSICIAN ASSISTANT

## 2023-05-10 PROCEDURE — 86780 TREPONEMA PALLIDUM: CPT | Performed by: PHYSICIAN ASSISTANT

## 2023-05-10 PROCEDURE — 86850 RBC ANTIBODY SCREEN: CPT | Performed by: PHYSICIAN ASSISTANT

## 2023-05-10 PROCEDURE — 87799 DETECT AGENT NOS DNA QUANT: CPT | Performed by: PHYSICIAN ASSISTANT

## 2023-05-10 PROCEDURE — 85730 THROMBOPLASTIN TIME PARTIAL: CPT | Performed by: PATHOLOGY

## 2023-05-10 PROCEDURE — 87340 HEPATITIS B SURFACE AG IA: CPT | Performed by: PHYSICIAN ASSISTANT

## 2023-05-10 PROCEDURE — 86828 HLA CLASS I&II ANTIBODY QUAL: CPT | Mod: XU | Performed by: PHYSICIAN ASSISTANT

## 2023-05-10 PROCEDURE — 86833 HLA CLASS II HIGH DEFIN QUAL: CPT | Performed by: PHYSICIAN ASSISTANT

## 2023-05-10 PROCEDURE — 93000 ELECTROCARDIOGRAM COMPLETE: CPT | Performed by: INTERNAL MEDICINE

## 2023-05-10 PROCEDURE — 86832 HLA CLASS I HIGH DEFIN QUAL: CPT | Performed by: PHYSICIAN ASSISTANT

## 2023-05-10 PROCEDURE — 86147 CARDIOLIPIN ANTIBODY EA IG: CPT | Performed by: PHYSICIAN ASSISTANT

## 2023-05-10 PROCEDURE — 86644 CMV ANTIBODY: CPT | Performed by: PHYSICIAN ASSISTANT

## 2023-05-10 PROCEDURE — 36415 COLL VENOUS BLD VENIPUNCTURE: CPT | Performed by: PATHOLOGY

## 2023-05-10 PROCEDURE — 86901 BLOOD TYPING SEROLOGIC RH(D): CPT | Performed by: PHYSICIAN ASSISTANT

## 2023-05-10 PROCEDURE — 81240 F2 GENE: CPT | Performed by: PHYSICIAN ASSISTANT

## 2023-05-10 PROCEDURE — 86665 EPSTEIN-BARR CAPSID VCA: CPT | Performed by: PHYSICIAN ASSISTANT

## 2023-05-10 PROCEDURE — 81382 HLA II TYPING 1 LOC HR: CPT | Performed by: PHYSICIAN ASSISTANT

## 2023-05-10 RX ORDER — WARFARIN SODIUM 5 MG/1
5 TABLET ORAL DAILY
COMMUNITY
Start: 2023-04-06 | End: 2023-05-30

## 2023-05-10 RX ADMIN — Medication 5 ML: at 14:24

## 2023-05-10 NOTE — LETTER
5/10/2023         RE: Gurjit Squires  [address]  [address]  [facility] [address]        Dear Colleague,    Thank you for referring your patient, Gurjit Squires, to the [facility]. Please see a copy of my visit note below.    TRANSPLANT NEPHROLOGY RECIPIENT EVALUATION NOTE    Assessment and Plan:  # Kidney Transplant Evaluation: Patient is a good candidate overall. Benefits of a living donor transplant were discussed.    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 Baseline creatinine 2.5-2.9 mg/dl, but with MAHNAZ in March 2023 in setting of spontaneous rectus sheath hematoma with creatinine around 5 mg/dl with an eGFR as low as 12 ml/min, fortunately with some improvement now. eGFR most recently 24 ml/min. On MMF and pred, off CNI due to suggestion of CNI toxicity on biopsy.   Feeling ok overall, and when ready, may benefit from a second kidney transplant.    # Cardiac Risk: he will need risk assessment due to atrial fibrillation on warfarin and other risk factors.     # PAD Screening: iliac US. Obtain outside CT from March 2023 to review images.     # Obesity: BMI 40. Working on weight loss with [employer]. Consider referral back to weight management, but patient is not local. Appreciate dietician input. Will likely need further weight loss prior to transplant, but appreciate surgery input.    # History of CVA: within the last 5 years with some residual right sided weakness.    # Lumbar Stenosis: recently evaluated locally and has been referred here for surgical evaluation. Currently doing PT and swimming in the pool 3x/week.    # Spontaneous Rectus Sheath Hematoma: unclear etiology but in setting of supratherapeutic INR requiring admission and blood transfusion March 2023. Measured 7.5 x 11.7 x 17.9 cm. Back on coumadin, hemoglobin stable. Will need repeat imaging to ensure  resolving/resolution.     # Non-Melanotic Skin Cancer: SCC 2016. Up to date on derm, last seen April 2023.     # Penile Implant    # Health Maintenance: Colonoscopy: Up to date and Dental: Up to date    Recommendations:  - Continue weight loss. Return to see surgery for follow up in ***months.  - Once closer to goal BMI, will need the following:   - Cardiology risk assessment   - Repeat CT abd/pelv non-contrast   - Iliac US   - PSA (ok to do locally and complete now)    Discussed the risks and benefits of a transplant, including the risk of surgery and immunosuppression medications.  Patient's overall evaluation will be discussed in the Transplant Program's regular meeting with a final recommendation on the patients suitability for transplant to be made at that time.    Evaluation:  Gurjit Squires was seen in consultation at the request of Dr. Isaias Chauhan for evaluation as a potential kidney transplant recipient.    Reason for Visit:  Gurjit Squires is a 68 year old male with ESKD from IgA nephropathy s/p LDKT, who presents for kidney transplant evaluation.    History of Present Illness:         Kidney Disease Hx:        History of ESKD from IgA nephropathy s/p LDKT 2004, complicated by acute cellular mediated rejection 2007, but eGFR relatively stable for last several years in mid-to-upper 30's ml/min, however did decrease to the mid 20's ml/min in 2021 and he was seen for evaluation, but BMI was prohibitive. Kidney transplant biopsy 6/2021 showed no acute rejection, but moderate chronic changes. Was admitted locally March 2023 with abdominal sheath hematoma in setting of supratherapeutic INR, eGFR down as low as 12 ml/min, and now 20-25 ml/min since. On MMF and pred, off CNI due to suggestion of CNI toxicity on biopsy.       Primary Nephrologist: Dr. Bunch       H/o Kidney Stones: No       H/o Recurrent/Frequent UTI: No         Diabetic Hx: None           Cardiac/Vascular Disease Risk Factors:         Cardiac Risk Factors: Hypertension, CKD and Age (Male > 55, Female > 65)       Known CAD: No       Known PAD/Caludication Symptoms: No       Known Heart Failure: No       Arrhythmia: Yes; a fib on coumadin       Pulmonary Hypertension: No       Valvular Disease: No       Other: None         Viral Serology Status       CMV IgG Antibody: Negative       EBV IgG Antibody: Positive         Volume Status/Weight:        Volume status: Mildly hypervolemic       BMI: Body mass index is 40.81 kg/m .         Functional Capacity/Frailty:        He has been using a cane recently due to lumbar stenosis, considering surgery. Already doing PT. Also going to the pool 3x/week, going out for a walk once day, few blocks. No chest pain or SOB.    Fatigue/Decreased Energy: [x] No [] Yes    Chest Pain or SOB with Exertion: [x] No [] Yes    Significant Weight Change: [x] No [] Yes    Nausea, Vomiting or Diarrhea: [x] No [] Yes    Fever, Sweats or Chills:  [x] No [] Yes    Leg Swelling [] No [x] Yes      Allergy Testing Questions:   Medication that caused a reaction None   Antibiotics used that didn't give an allergic reaction?  None    COVID Vaccination Up To Date: Not asked    Potential Living Kidney Donors: Not sure    Review of Systems:  A comprehensive review of systems was obtained and negative, except as noted in the HPI or PMH.    Past Medical History:   Medical record was reviewed and PMH was discussed with patient and noted below.  Past Medical History:   Diagnosis Date     BK viremia      Depression      End stage kidney disease (H)      Gout      History of blood transfusion     2004     Hypertension secondary to other renal disorders      IgA nephropathy      Immunosuppressed status (H)      Kidney replaced by transplant      Kidney transplant complication      Metabolic acidosis      VANESA (obstructive sleep apnea)     CPAP     Overweight      Rotator cuff dysfunction      Vitamin D deficiency        Past Social History:    Past Surgical History:   Procedure Laterality Date     IMPLANT PROSTHESIS PENIS INFLATABLE N/A 10/4/2016    Procedure: IMPLANT PROSTHESIS PENIS INFLATABLE;  Surgeon: Jaquan Matthew MD;  Location: [facility] OR     IR RENAL BIOPSY RIGHT  6/8/2021     ORTHOPEDIC SURGERY      RTK 09/15     PERCUTANEOUS BIOPSY KIDNEY Right 3/12/2019    Procedure: Right Kidney Biopsy;  Surgeon: Ash Benitez MD;  Location: [facility] OR     TRANSPLANT KIDNEY RECIPIENT LIVING UNRELATED       Personal history of bleeding or anesthesia problems: No    Family History:  Family History   Problem Relation Age of Onset     Kidney Disease No family hx of        Personal History:   Social History     Socioeconomic History     Marital status:      Spouse name: Not on file     Number of children: Not on file     Years of education: Not on file     Highest education level: Not on file   Occupational History     Not on file   Tobacco Use     Smoking status: Former     Types: Cigarettes     Smokeless tobacco: Never     Tobacco comments:     Quit 2004   Vaping Use     Vaping status: Not on file   Substance and Sexual Activity     Alcohol use: Yes     Comment: social     Drug use: No     Sexual activity: Yes     Partners: Female     Birth control/protection: None   Other Topics Concern     Parent/sibling w/ CABG, MI or angioplasty before 65F 55M? Not Asked   Social History Narrative     Not on file     Social Determinants of Health     Financial Resource Strain: Not on file   Food Insecurity: Not on file   Transportation Needs: Not on file   Physical Activity: Not on file   Stress: Not on file   Social Connections: Not on file   Intimate Partner Violence: Not on file   Housing Stability: Not on file       Allergies:  No Known Allergies    Medications:  Current Outpatient Medications   Medication Sig     acetaminophen (TYLENOL) 325 MG tablet Take 2 tablets (650 mg) by mouth every 6 hours as needed for pain (Patient taking differently: Take 1,000 mg by  mouth daily)     allopurinol (ZYLOPRIM) 100 MG tablet Take 3 tablets (300 mg) by mouth daily     aspirin 81 MG EC tablet Take 81 mg by mouth daily     atorvastatin (LIPITOR) 20 MG tablet Take 1 tablet by mouth     Calcium 500-2.5 MG-MCG CHEW Take 1 tablet by mouth daily     calcium carbonate (TUMS) 500 MG chewable tablet Take 1 tablet (500 mg) by mouth daily     carvedilol 12.5 MG PO tablet Take 1 tablet (12.5 mg) by mouth 2 times daily (with meals)     cholecalciferol (VITAMIN D) 1000 UNIT tablet Take 1 tablet (1,000 Units) by mouth daily     clonazePAM (KLONOPIN) 0.5 MG tablet Take 1 tablet (0.5 mg) by mouth daily (Patient taking differently: Take 0.25 mg by mouth every evening)     ferrous sulfate (FEROSUL) 325 (65 Fe) MG tablet Take 1 tablet (325 mg) by mouth daily (with breakfast)     Fexofenadine HCl (ALLEGRA PO) Take 1 tablet by mouth daily.      furosemide (LASIX) 20 MG tablet Take 2 tabs PO in the morning and in the evening     gabapentin (NEURONTIN) 300 MG capsule Take 1 capsule (300 mg) by mouth At Bedtime     losartan (COZAAR) 25 MG tablet Take 1 tablet (25 mg) by mouth daily     Multiple Vitamins-Minerals (ONE-A-DAY MENS 50+ ADVANTAGE PO) Take 1 tablet by mouth every evening     mycophenolate (GENERIC EQUIVALENT) 250 MG capsule Take 3 capsules (750 mg) by mouth 2 times daily     omega-3 fatty acids (FISH OIL) 1200 MG capsule Take 1 capsule by mouth 3 times daily.     pantoprazole (PROTONIX) 40 MG EC tablet TAKE 1 TABLET BY MOUTH DAILY     predniSONE (DELTASONE) 5 MG tablet Take 1 tablet (5 mg) by mouth daily     sodium bicarbonate 650 MG tablet Take 2 tablets (1,300 mg) by mouth 2 times daily     sulfamethoxazole-trimethoprim (BACTRIM) 400-80 MG tablet Take 1 tablet by mouth Every Mon, Wed, Fri Morning     warfarin ANTICOAGULANT (COUMADIN) 5 MG tablet Per anticoag clinic. Hold until follow-up with PCP.     verapamil ER (CALAN-SR) 180 MG CR tablet Take 1 tablet (180 mg) by mouth daily     No current  "facility-administered medications for this visit.       Vitals:  BP (!) 158/75   Pulse 72   Ht 1.74 m (5' 8.5\")   Wt 123.6 kg (272 lb 6.4 oz)   SpO2 96%   BMI 40.81 kg/m      Exam:  GENERAL APPEARANCE: alert and no distress  HENT: mouth without ulcers or lesions, fair dentition  LYMPHATICS: no cervical or supraclavicular nodes  RESP: lungs clear to auscultation - no rales, rhonchi or wheezes  CV: regular rhythm, normal rate, no rub, no murmur  EDEMA: 1+ LE edema bilaterally  ABDOMEN: soft, nondistended, nontender, central obesity  MS: extremities normal - no gross deformities noted, no evidence of inflammation in joints, no muscle tenderness  SKIN: no rash    Results:   Recent Results (from the past 336 hour(s))   CBC with platelets    Collection Time: 05/08/23  7:41 AM   Result Value Ref Range    WBC Count (External) 8.4 4.0 - 11.0 K/UL    RBC Count (External) 3.35 (L) 4.40 - 5.80 M/UL    Hemoglobin (External) 9.9 (L) 13.5 - 17.5 g/dL    Hematocrit (External) 31.7 (L) 40.0 - 50.0 %    MCV (External) 94.6 80.0 - 98.0 fL    MCH (External) 29.6 25.5 - 34.0 pg    MCHC (External) 31.2 (L) 31.5 - 36.5 g/dL    RDW (External) 15.7 (H) 11.5 - 15.5 %    Platelet Count (External) 182 140 - 400 K/uL   Basic metabolic panel    Collection Time: 05/08/23  7:41 AM   Result Value Ref Range    Glucose (External) 106 (H) 70 - 99 mg/dL    Urea Nitrogen (External) 34 (H) 6 - 22 mg/dL    Creatinine (External) 2.83 (H) 0.73 - 1.18 mg/dL    BUN/Creatinine Ratio (External) 12.0 10.0 - 25.0    Sodium (External) 139 136 - 145 meq/L    Potassium (External) 3.6 3.5 - 5.1 meq/L    Chloride (External) 106 98 - 109 meq/L    CO2 (External) 22 20 - 29 meq/L    Anion Gap (External) 15 6 - 20 meq/L    Calcium (External) 7.7 (L) 8.5 - 10.5 mg/dL    GFR Estimated (External) 24 (L) >=60 ml/min/1.73m2   Protein  random urine    Collection Time: 05/08/23  7:41 AM   Result Value Ref Range    Protein Random Urine (External) 212.0 (H) <14.0 mg/dL    " Creatinine Urine mg/dL (External) 37.4 mg/dL    Protein Total Ur per Cr (External) 5.7 (H) <=0.1   EKG 12-lead, tracing only [EKG1]    Collection Time: 05/10/23 11:33 AM   Result Value Ref Range    Systolic Blood Pressure  mmHg    Diastolic Blood Pressure  mmHg    Ventricular Rate 58 BPM    Atrial Rate 58 BPM    PA Interval 164 ms    QRS Duration 138 ms     ms    QTc 482 ms    P Axis 58 degrees    R AXIS -3 degrees    T Axis 0 degrees    Interpretation ECG       Sinus bradycardia  Right bundle branch block  Abnormal ECG  When compared with ECG of 08-JUN-2021 09:53,  Premature atrial complexes are no longer Present  Confirmed by Mary Pena ([id number]) on 5/11/2023 8:20:31 AM     CMV Antibody IgG [[id number]]    Collection Time: 05/10/23 11:50 AM   Result Value Ref Range    CMV Lyn IgG Instrument Value <0.20 <0.60 U/mL    CMV Antibody IgG No detectable antibody. No detectable antibody.    EBV Capsid Antibody IgG [[id number]]    Collection Time: 05/10/23 11:50 AM   Result Value Ref Range    EBV Capsid Lyn IgG Instrument Value 131.0 (H) <18.0 U/mL    EBV Capsid Antibody IgG Positive (A) No detectable antibody.   Treponema Abs w Reflex to RPR and Titer [[id number]]    Collection Time: 05/10/23 11:50 AM   Result Value Ref Range    Treponema Antibody Total Nonreactive Nonreactive   Varicella Zoster Virus Antibody IgG [[id number]]    Collection Time: 05/10/23 11:50 AM   Result Value Ref Range    VZV Lyn IgG Instrument Value >4,000.0 <135.0 Index    Varicella Zoster Antibody IgG Positive    Antibody titer red cell [[id number]]    Collection Time: 05/10/23 11:59 AM   Result Value Ref Range    Anti-A1 IgG Titer >256     Anti-B IgG Titer 128     Anti-B IgM Titer 64     SPECIMEN EXPIRATION DATE [id number]     ANTIBODY TITER IGM SCREEN Negative    Comprehensive metabolic panel [LAB17]    Collection Time: 05/10/23 11:59 AM   Result Value Ref Range    Sodium 146 (H) 136 - 145 mmol/L    Potassium 4.1 3.4 - 5.3 mmol/L    Chloride 108 (H)  98 - 107 mmol/L    Carbon Dioxide (CO2) 26 22 - 29 mmol/L    Anion Gap 12 7 - 15 mmol/L    Urea Nitrogen 40.2 (H) 8.0 - 23.0 mg/dL    Creatinine 3.35 (H) 0.67 - 1.17 mg/dL    Calcium 8.3 (L) 8.8 - 10.2 mg/dL    Glucose 119 (H) 70 - 99 mg/dL    Alkaline Phosphatase 123 40 - 129 U/L    AST 21 10 - 50 U/L    ALT 16 10 - 50 U/L    Protein Total 6.7 6.4 - 8.3 g/dL    Albumin 4.3 3.5 - 5.2 g/dL    Bilirubin Total 0.3 <=1.2 mg/dL    GFR Estimate 19 (L) >60 mL/min/1.73m2   Cardiolipin Lyn IgG and IgM [LAB 6836]    Collection Time: 05/10/23 11:59 AM   Result Value Ref Range    Cardiolipin Lyn IgG Instrument Value <2.0 <10.0 GPL-U/mL    Cardiolipin Antibody IgG Negative Negative    Cardiolipin Lyn IgM Instrument Value <2.0 <10.0 MPL-U/mL    Cardiolipin Antibody IgM Negative Negative   Factor 2 and 5 mutation analysis    Collection Time: 05/10/23 11:59 AM   Result Value Ref Range    METHODOLOGY       The regions of genomic DNA containing the F5 gene mutation R506Q(1691G>A) and the Factor 2 (Prothrombin [id number]) gene mutation were simultaneously amplified using the polymerase chain reaction. The amplified products were digested with restriction endonuclease TaqI and products were analyzed by gel electrophoresis.        RESULTS         Factor V 1691G>A (Leiden)  RESULTS:  Mutation analyzed: 1691G>A  Factor V 1691G>A ([place]iden)  Interpretation:  ABSENT  Factor V 1691G>A ([place]iden) mutation  genotype:  G/G    FACTOR 2/PROTHROMBIN RESULTS:  Mutation analyzed: 73089G>A  Factor 2 Mutation Interpretation:  ABSENT  Factor 2 Mutation genotype:  G/G        INTERPRETATION       The patient is negative for the Factor V 1691G>A ([place]iden) and negative for the Factor 2 mutation.  (Electronically signed by: NARCISO SHEIKH MD May 15, 2023 3:11 PM)      COMMENTS       If a patient is the recipient of an allogeneic bone marrow transplant, this test must be done on a pre-transplant sample or buccal swab.  A previous allogeneic bone marrow transplant  will interfere with test results.  Call the [email or web address] ([phone]) for instructions on sample collection for these patients.      DISCLAIMER       This test was developed and its performance characteristics determined by [email or web address]. It has not been cleared or approved by the FDA. The laboratory is regulated under CLIA as qualified to perform high-complexity testing. This test is used for clinical purposes. It should not be regarded as investigational or for research.    A resident/fellow in an accredited training program was involved in the selection of testing, review of laboratory data, and/or interpretation of this case.  I, as the senior physician, attest that I: (i) confirmed appropriate testing, (ii) examined the relevant raw data for the specimen(s); and (iii) rendered or confirmed the interpretation(s).      FACTOR 2 INTERPRETATION         Factor 2 Mutation Interpretation:  ABSENT      FACTOR V INTERPRETATION       Factor V 1691G>A ([place]iden)  Interpretation:  ABSENT      Specimen Description       Blood: ACD     INR [[id number]]    Collection Time: 05/10/23 11:59 AM   Result Value Ref Range    INR 1.83 (H) 0.85 - 1.15   Partial thromboplastin time [LAB56]    Collection Time: 05/10/23 11:59 AM   Result Value Ref Range    aPTT 33 22 - 38 Seconds   Thrombin time [[id number]]    Collection Time: 05/10/23 11:59 AM   Result Value Ref Range    Thrombin Time 17.1 13.0 - 19.0 Seconds   Hepatitis B core antibody [[id number]]    Collection Time: 05/10/23 11:59 AM   Result Value Ref Range    Hepatitis B Core Antibody Total Nonreactive Nonreactive   Hepatitis B Surface Antibody [[id number]]    Collection Time: 05/10/23 11:59 AM   Result Value Ref Range    Hepatitis B Surface Antibody Instrument Value 10.18 <8.00 m[IU]/mL    Hepatitis B Surface Antibody Indeterminate    Hepatitis B surface antigen [[id number]]    Collection Time: 05/10/23 11:59 AM   Result Value Ref Range     Hepatitis B Surface Antigen Nonreactive Nonreactive   Hepatitis C antibody [[id number]]    Collection Time: 05/10/23 11:59 AM   Result Value Ref Range    Hepatitis C Antibody Nonreactive Nonreactive   HIV Antigen Antibody Combo Pretransplant    Collection Time: 05/10/23 11:59 AM   Result Value Ref Range    HIV Antigen Antibody Combo Pretransplant Nonreactive Nonreactive   BK Virus Quantitative, PCR    Collection Time: 05/10/23 11:59 AM    Specimen: Arm, Right; Blood   Result Value Ref Range    BK Virus DNA copies/mL <500 (A) Not Detected copies/mL    BK VIRUS LOG <2.7    Adult Type and Screen    Collection Time: 05/10/23 11:59 AM   Result Value Ref Range    ABO/RH(D) O POS     Antibody Screen Negative Negative    SPECIMEN EXPIRATION DATE [id number]    CBC with platelets and differential    Collection Time: 05/10/23 11:59 AM   Result Value Ref Range    WBC Count 11.0 4.0 - 11.0 10e3/uL    RBC Count 3.55 (L) 4.40 - 5.90 10e6/uL    Hemoglobin 10.6 (L) 13.3 - 17.7 g/dL    Hematocrit 33.9 (L) 40.0 - 53.0 %    MCV 96 78 - 100 fL    MCH 29.9 26.5 - 33.0 pg    MCHC 31.3 (L) 31.5 - 36.5 g/dL    RDW 15.7 (H) 10.0 - 15.0 %    Platelet Count 228 150 - 450 10e3/uL    % Neutrophils 85 %    % Lymphocytes 6 %    % Monocytes 4 %    % Eosinophils 3 %    % Basophils 1 %    % Immature Granulocytes 1 %    NRBCs per 100 WBC 0 <1 /100    Absolute Neutrophils 9.5 (H) 1.6 - 8.3 10e3/uL    Absolute Lymphocytes 0.6 (L) 0.8 - 5.3 10e3/uL    Absolute Monocytes 0.5 0.0 - 1.3 10e3/uL    Absolute Eosinophils 0.3 0.0 - 0.7 10e3/uL    Absolute Basophils 0.1 0.0 - 0.2 10e3/uL    Absolute Immature Granulocytes 0.1 <=0.4 10e3/uL    Absolute NRBCs 0.0 10e3/uL   Quantiferon TB Gold Plus Grey Tube    Collection Time: 05/10/23 11:59 AM    Specimen: Arm, Right; Blood   Result Value Ref Range    Quantiferon Nil Tube 0.00 IU/mL   Quantiferon TB Gold Plus Green Tube    Collection Time: 05/10/23 11:59 AM    Specimen: Arm, Right; Blood   Result Value Ref Range     Quantiferon TB1 Tube 0.01 IU/mL   Quantiferon TB Gold Plus Yellow Tube    Collection Time: 05/10/23 11:59 AM    Specimen: Arm, Right; Blood   Result Value Ref Range    Quantiferon TB2 Tube 0.00    Quantiferon TB Gold Plus Purple Tube    Collection Time: 05/10/23 11:59 AM    Specimen: Arm, Right; Blood   Result Value Ref Range    Quantiferon Mitogen 7.25 IU/mL   Quantiferon TB Gold Plus    Collection Time: 05/10/23 11:59 AM    Specimen: Arm, Right; Blood   Result Value Ref Range    Quantiferon-TB Gold Plus Negative Negative    TB1 Ag minus Nil Value 0.01 IU/mL    TB2 Ag minus Nil Value 0.00 IU/mL    Mitogen minus Nil Result 7.25 IU/mL    Nil Result 0.00 IU/mL   HLA Antibody (PRA) Class I Screen    Collection Time: 05/10/23 11:59 AM   Result Value Ref Range    SCR 1 TEST METHOD Luminex     Scr1 Cell Class I     SCR1 RESULT Neg     SCR1 COMMENTS       Screen testing done to confirm Single Antigen test results.   HLA Antibody (PRA) Class II Screen    Collection Time: 05/10/23 11:59 AM   Result Value Ref Range    SCR2 TEST METHOD Luminex     SCR2 CELL Class II     SCR2 RESULT Neg     SCR2 COMMENTS       Screen testing done to confirm Single Antigen test results.   HLA Eamon Class I, Single Antigen    Collection Time: 05/10/23 11:59 AM   Result Value Ref Range    SA 1 TEST METHOD SA EDTA FCS     SA 1 CELL Class I     SA1 HI RISK EAMON Invalid. See SCN results.     SA1 MOD RISK EAMON Invalid. See SCN results.     SA 1  COMMENTS       Intentional discrepancy between current date and historical date Single Antigen Bead specificity reporting.  Additional testing performed on current date to address potential testing artifact. HLA PRA Test performed by modified testing procedure that may also include pretreatment of serum. Pretreatment may be the addition of fetal calf serum, EDTA, and/or adsorption.  High-risk, MFI > 3,000.  Mod-risk, -3,000.   HLA Eamon Class II, Single Antigen    Collection Time: 05/10/23 11:59 AM    Result Value Ref Range    SA 2 TEST METHOD SA EDTA FCS     SA 2 CELL Class II     SA2 HI RISK EAMON Invalid. See SCN results.     SA2 MOD RISK EAMON Invalid. See SCN results.     SA 2 COMMENTS       Intentional discrepancy between current date and historical date Single Antigen Bead specificity reporting.  Additional testing performed on current date to address potential testing artifact. HLA PRA Test performed by modified testing procedure that may also include pretreatment of serum. Pretreatment may be the addition of fetal calf serum, EDTA, and/or adsorption.  High-risk, MFI > 3,000.  Mod-risk, -3,000.   HLA Eamon, CPRA    Collection Time: 05/10/23 11:59 AM   Result Value Ref Range    PROTOCOL CUTOFF Plan A, 500 mfi cumulative     UNOS CPRA 33     UNACCEPTABLE ANTIGENS A:80 DR:18 DQ:5    HLA Donor Specific Antibody    Collection Time: 05/10/23 11:59 AM   Result Value Ref Range    Donor Identification 07[birth year]2004     Organ Left Kidney     DSA Present NO     DSA Comments        Flow Single Antigen Beads assays are intended for detection/identification of IgG anti-HLA antibodies. Mfi values may not accurately quantify donor-specific antibody levels in all instances.    DSA Test Method SA EDTA FCS    ABO and Rh    Collection Time: 05/10/23 12:02 PM   Result Value Ref Range    ABO/RH(D) O POS     SPECIMEN EXPIRATION DATE [id number]    UA with Microscopic reflex to Culture    Collection Time: 05/10/23 12:05 PM    Specimen: Urine, Midstream   Result Value Ref Range    Color Urine Straw Colorless, Straw, Light Yellow, Yellow    Appearance Urine Clear Clear    Glucose Urine Negative Negative mg/dL    Bilirubin Urine Negative Negative    Ketones Urine Negative Negative mg/dL    Specific Gravity Urine 1.010 1.003 - 1.035    Blood Urine Small (A) Negative    pH Urine 7.0 5.0 - 7.0    Protein Albumin Urine 100 (A) Negative mg/dL    Urobilinogen Urine Normal Normal, 2.0 mg/dL    Nitrite Urine Negative Negative     Leukocyte Esterase Urine Negative Negative    Mucus Urine Present (A) None Seen /LPF    RBC Urine 1 <=2 /HPF    WBC Urine 2 <=5 /HPF    Squamous Epithelials Urine <1 <=1 /HPF   Echocardiogram Complete    Collection Time: 05/10/23  2:39 PM   Result Value Ref Range    LVEF  60-65%              Again, thank you for allowing me to participate in the care of your patient.        Sincerely,        JENNIFER

## 2023-05-10 NOTE — PROGRESS NOTES
TRANSPLANT NEPHROLOGY RECIPIENT EVALUATION NOTE    Assessment and Plan:  # Kidney Transplant Evaluation: Patient is a good candidate overall. Benefits of a living donor transplant were discussed.    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 Baseline creatinine 2.5-2.9 mg/dl, but with MAHNAZ in March 2023 in setting of spontaneous rectus sheath hematoma with creatinine around 5 mg/dl with an eGFR as low as 12 ml/min, fortunately with some improvement now. eGFR most recently 24 ml/min. On MMF and pred, off CNI due to suggestion of CNI toxicity on biopsy. Feeling ok overall, and when ready, may benefit from a second kidney transplant.    # Cardiac Risk: he will need risk assessment due to atrial fibrillation on warfarin and other risk factors.     # PAD Screening: iliac US. Obtain outside CT from March 2023 to review images.     # Obesity: BMI 40. Working on weight loss with [employer]. Consider referral back to weight management, but patient is not local. Appreciate dietician input. Will likely need further weight loss prior to transplant, but appreciate surgery input.    # History of CVA: within the last 5 years with some residual right sided weakness.    # Lumbar Stenosis: recently evaluated locally and has been referred here for surgical evaluation. Currently doing PT and swimming in the pool 3x/week.    # Spontaneous Rectus Sheath Hematoma: unclear etiology but in setting of supratherapeutic INR requiring admission and blood transfusion March 2023. Measured 7.5 x 11.7 x 17.9 cm. Back on coumadin, hemoglobin stable. Will need repeat imaging to ensure resolving/resolution.     # Non-Melanotic Skin Cancer: SCC 2016. Up to date on derm, last seen April 2023.     # Penile Implant    # Health Maintenance: Colonoscopy: Up to date and Dental: Up to date    Recommendations:  - Continue weight loss  - Once closer to goal BMI, will need the  following:   - Cardiology risk assessment   - Repeat CT abd/pelv non-contrast   - Iliac US   - PSA (ok to do locally and complete now)    Discussed the risks and benefits of a transplant, including the risk of surgery and immunosuppression medications.  Patient's overall evaluation will be discussed in the Transplant Program's regular meeting with a final recommendation on the patients suitability for transplant to be made at that time.    Evaluation:  Gurjit Squires was seen in consultation at the request of Dr. Isaias Chauhan for evaluation as a potential kidney transplant recipient.    Reason for Visit:  Gurjit Squires is a 68 year old male with ESKD from IgA nephropathy s/p LDKT, who presents for kidney transplant evaluation.    History of Present Illness:         Kidney Disease Hx:        History of ESKD from IgA nephropathy s/p LDKT 2004, complicated by acute cellular mediated rejection 2007, but eGFR relatively stable for last several years in mid-to-upper 30's ml/min, however did decrease to the mid 20's ml/min in 2021 and he was seen for evaluation, but BMI was prohibitive. Kidney transplant biopsy June 2021 showed no acute rejection, but moderate chronic changes. Was admitted locally March 2023 with abdominal sheath hematoma in setting of supratherapeutic INR, eGFR down as low as 12 ml/min, and now 20-25 ml/min since. On MMF and pred, off CNI due to suggestion of CNI toxicity on biopsy.       Primary Nephrologist: Dr. Bunch       H/o Kidney Stones: No       H/o Recurrent/Frequent UTI: No         Diabetic Hx: None           Cardiac/Vascular Disease Risk Factors:        Cardiac Risk Factors: Hypertension, CKD and Age (Male > 55, Female > 65)       Known CAD: No       Known PAD/Caludication Symptoms: No       Known Heart Failure: No       Arrhythmia: Yes; a fib on coumadin       Pulmonary Hypertension: No       Valvular Disease: No       Other: None         Viral Serology Status       CMV IgG  Antibody: Negative       EBV IgG Antibody: Positive         Volume Status/Weight:        Volume status: Mildly hypervolemic       BMI: Body mass index is 40.81 kg/m .         Functional Capacity/Frailty:        He has been using a cane recently due to lumbar stenosis, considering surgery. Already doing PT. Also going to the pool 3x/week, going out for a walk once day, few blocks. No chest pain or SOB.    Fatigue/Decreased Energy: [x] No [] Yes    Chest Pain or SOB with Exertion: [x] No [] Yes    Significant Weight Change: [x] No [] Yes    Nausea, Vomiting or Diarrhea: [x] No [] Yes    Fever, Sweats or Chills:  [x] No [] Yes    Leg Swelling [] No [x] Yes      Allergy Testing Questions:   Medication that caused a reaction None   Antibiotics used that didn't give an allergic reaction?  None    COVID Vaccination Up To Date: Not asked    Potential Living Kidney Donors: Not sure    Review of Systems:  A comprehensive review of systems was obtained and negative, except as noted in the HPI or PMH.    Past Medical History:   Medical record was reviewed and PMH was discussed with patient and noted below.  Past Medical History:   Diagnosis Date     BK viremia      Depression      End stage kidney disease (H)      Gout      History of blood transfusion     2004     Hypertension secondary to other renal disorders      IgA nephropathy      Immunosuppressed status (H)      Kidney replaced by transplant      Kidney transplant complication      Metabolic acidosis      VANESA (obstructive sleep apnea)     CPAP     Overweight      Rotator cuff dysfunction      Vitamin D deficiency        Past Social History:   Past Surgical History:   Procedure Laterality Date     IMPLANT PROSTHESIS PENIS INFLATABLE N/A 10/4/2016    Procedure: IMPLANT PROSTHESIS PENIS INFLATABLE;  Surgeon: Jaquan Matthew MD;  Location: [facility] OR     IR RENAL BIOPSY RIGHT  6/8/2021     ORTHOPEDIC SURGERY      RTK 09/15     PERCUTANEOUS BIOPSY KIDNEY Right 3/12/2019     Procedure: Right Kidney Biopsy;  Surgeon: Ash Benitez MD;  Location: [facility] OR     TRANSPLANT KIDNEY RECIPIENT LIVING UNRELATED       Personal history of bleeding or anesthesia problems: No    Family History:  Family History   Problem Relation Age of Onset     Kidney Disease No family hx of        Personal History:   Social History     Socioeconomic History     Marital status:      Spouse name: Not on file     Number of children: Not on file     Years of education: Not on file     Highest education level: Not on file   Occupational History     Not on file   Tobacco Use     Smoking status: Former     Types: Cigarettes     Smokeless tobacco: Never     Tobacco comments:     Quit 2004   Vaping Use     Vaping status: Not on file   Substance and Sexual Activity     Alcohol use: Yes     Comment: social     Drug use: No     Sexual activity: Yes     Partners: Female     Birth control/protection: None   Other Topics Concern     Parent/sibling w/ CABG, MI or angioplasty before 65F 55M? Not Asked   Social History Narrative     Not on file     Social Determinants of Health     Financial Resource Strain: Not on file   Food Insecurity: Not on file   Transportation Needs: Not on file   Physical Activity: Not on file   Stress: Not on file   Social Connections: Not on file   Intimate Partner Violence: Not on file   Housing Stability: Not on file       Allergies:  No Known Allergies    Medications:  Current Outpatient Medications   Medication Sig     acetaminophen (TYLENOL) 325 MG tablet Take 2 tablets (650 mg) by mouth every 6 hours as needed for pain (Patient taking differently: Take 1,000 mg by mouth daily)     allopurinol (ZYLOPRIM) 100 MG tablet Take 3 tablets (300 mg) by mouth daily     aspirin 81 MG EC tablet Take 81 mg by mouth daily     atorvastatin (LIPITOR) 20 MG tablet Take 1 tablet by mouth     Calcium 500-2.5 MG-MCG CHEW Take 1 tablet by mouth daily     calcium carbonate (TUMS) 500 MG chewable tablet Take 1  "tablet (500 mg) by mouth daily     carvedilol 12.5 MG PO tablet Take 1 tablet (12.5 mg) by mouth 2 times daily (with meals)     cholecalciferol (VITAMIN D) 1000 UNIT tablet Take 1 tablet (1,000 Units) by mouth daily     clonazePAM (KLONOPIN) 0.5 MG tablet Take 1 tablet (0.5 mg) by mouth daily (Patient taking differently: Take 0.25 mg by mouth every evening)     ferrous sulfate (FEROSUL) 325 (65 Fe) MG tablet Take 1 tablet (325 mg) by mouth daily (with breakfast)     Fexofenadine HCl (ALLEGRA PO) Take 1 tablet by mouth daily.      furosemide (LASIX) 20 MG tablet Take 2 tabs PO in the morning and in the evening     gabapentin (NEURONTIN) 300 MG capsule Take 1 capsule (300 mg) by mouth At Bedtime     losartan (COZAAR) 25 MG tablet Take 1 tablet (25 mg) by mouth daily     Multiple Vitamins-Minerals (ONE-A-DAY MENS 50+ ADVANTAGE PO) Take 1 tablet by mouth every evening     mycophenolate (GENERIC EQUIVALENT) 250 MG capsule Take 3 capsules (750 mg) by mouth 2 times daily     omega-3 fatty acids (FISH OIL) 1200 MG capsule Take 1 capsule by mouth 3 times daily.     pantoprazole (PROTONIX) 40 MG EC tablet TAKE 1 TABLET BY MOUTH DAILY     predniSONE (DELTASONE) 5 MG tablet Take 1 tablet (5 mg) by mouth daily     sodium bicarbonate 650 MG tablet Take 2 tablets (1,300 mg) by mouth 2 times daily     sulfamethoxazole-trimethoprim (BACTRIM) 400-80 MG tablet Take 1 tablet by mouth Every Mon, Wed, Fri Morning     warfarin ANTICOAGULANT (COUMADIN) 5 MG tablet Per antico clinic. Hold until follow-up with PCP.     verapamil ER (CALAN-SR) 180 MG CR tablet Take 1 tablet (180 mg) by mouth daily     No current facility-administered medications for this visit.       Vitals:  BP (!) 158/75   Pulse 72   Ht 1.74 m (5' 8.5\")   Wt 123.6 kg (272 lb 6.4 oz)   SpO2 96%   BMI 40.81 kg/m      Exam:  GENERAL APPEARANCE: alert and no distress  HENT: mouth without ulcers or lesions, fair dentition  LYMPHATICS: no cervical or supraclavicular " nodes  RESP: lungs clear to auscultation - no rales, rhonchi or wheezes  CV: regular rhythm, normal rate, no rub, no murmur  EDEMA: 1+ LE edema bilaterally  ABDOMEN: soft, nondistended, nontender, central obesity  MS: extremities normal - no gross deformities noted, no evidence of inflammation in joints, no muscle tenderness  SKIN: no rash    Results:   Recent Results (from the past 336 hour(s))   CBC with platelets    Collection Time: 05/08/23  7:41 AM   Result Value Ref Range    WBC Count (External) 8.4 4.0 - 11.0 K/UL    RBC Count (External) 3.35 (L) 4.40 - 5.80 M/UL    Hemoglobin (External) 9.9 (L) 13.5 - 17.5 g/dL    Hematocrit (External) 31.7 (L) 40.0 - 50.0 %    MCV (External) 94.6 80.0 - 98.0 fL    MCH (External) 29.6 25.5 - 34.0 pg    MCHC (External) 31.2 (L) 31.5 - 36.5 g/dL    RDW (External) 15.7 (H) 11.5 - 15.5 %    Platelet Count (External) 182 140 - 400 K/uL   Basic metabolic panel    Collection Time: 05/08/23  7:41 AM   Result Value Ref Range    Glucose (External) 106 (H) 70 - 99 mg/dL    Urea Nitrogen (External) 34 (H) 6 - 22 mg/dL    Creatinine (External) 2.83 (H) 0.73 - 1.18 mg/dL    BUN/Creatinine Ratio (External) 12.0 10.0 - 25.0    Sodium (External) 139 136 - 145 meq/L    Potassium (External) 3.6 3.5 - 5.1 meq/L    Chloride (External) 106 98 - 109 meq/L    CO2 (External) 22 20 - 29 meq/L    Anion Gap (External) 15 6 - 20 meq/L    Calcium (External) 7.7 (L) 8.5 - 10.5 mg/dL    GFR Estimated (External) 24 (L) >=60 ml/min/1.73m2   Protein  random urine    Collection Time: 05/08/23  7:41 AM   Result Value Ref Range    Protein Random Urine (External) 212.0 (H) <14.0 mg/dL    Creatinine Urine mg/dL (External) 37.4 mg/dL    Protein Total Ur per Cr (External) 5.7 (H) <=0.1   EKG 12-lead, tracing only [EKG1]    Collection Time: 05/10/23 11:33 AM   Result Value Ref Range    Systolic Blood Pressure  mmHg    Diastolic Blood Pressure  mmHg    Ventricular Rate 58 BPM    Atrial Rate 58 BPM    AK Interval 164  ms    QRS Duration 138 ms     ms    QTc 482 ms    P Axis 58 degrees    R AXIS -3 degrees    T Axis 0 degrees    Interpretation ECG       Sinus bradycardia  Right bundle branch block  Abnormal ECG  When compared with ECG of 08-JUN-2021 09:53,  Premature atrial complexes are no longer Present  Confirmed by Mary Pena ([id number]) on 5/11/2023 8:20:31 AM     CMV Antibody IgG [[id number]]    Collection Time: 05/10/23 11:50 AM   Result Value Ref Range    CMV Lyn IgG Instrument Value <0.20 <0.60 U/mL    CMV Antibody IgG No detectable antibody. No detectable antibody.    EBV Capsid Antibody IgG [[id number]]    Collection Time: 05/10/23 11:50 AM   Result Value Ref Range    EBV Capsid Lyn IgG Instrument Value 131.0 (H) <18.0 U/mL    EBV Capsid Antibody IgG Positive (A) No detectable antibody.   Treponema Abs w Reflex to RPR and Titer [[id number]]    Collection Time: 05/10/23 11:50 AM   Result Value Ref Range    Treponema Antibody Total Nonreactive Nonreactive   Varicella Zoster Virus Antibody IgG [[id number]]    Collection Time: 05/10/23 11:50 AM   Result Value Ref Range    VZV Lyn IgG Instrument Value >4,000.0 <135.0 Index    Varicella Zoster Antibody IgG Positive    Antibody titer red cell [[id number]]    Collection Time: 05/10/23 11:59 AM   Result Value Ref Range    Anti-A1 IgG Titer >256     Anti-B IgG Titer 128     Anti-B IgM Titer 64     SPECIMEN EXPIRATION DATE [id number]     ANTIBODY TITER IGM SCREEN Negative    Comprehensive metabolic panel [LAB17]    Collection Time: 05/10/23 11:59 AM   Result Value Ref Range    Sodium 146 (H) 136 - 145 mmol/L    Potassium 4.1 3.4 - 5.3 mmol/L    Chloride 108 (H) 98 - 107 mmol/L    Carbon Dioxide (CO2) 26 22 - 29 mmol/L    Anion Gap 12 7 - 15 mmol/L    Urea Nitrogen 40.2 (H) 8.0 - 23.0 mg/dL    Creatinine 3.35 (H) 0.67 - 1.17 mg/dL    Calcium 8.3 (L) 8.8 - 10.2 mg/dL    Glucose 119 (H) 70 - 99 mg/dL    Alkaline Phosphatase 123 40 - 129 U/L    AST 21 10 - 50 U/L    ALT 16 10 - 50 U/L     Protein Total 6.7 6.4 - 8.3 g/dL    Albumin 4.3 3.5 - 5.2 g/dL    Bilirubin Total 0.3 <=1.2 mg/dL    GFR Estimate 19 (L) >60 mL/min/1.73m2   Cardiolipin Lny IgG and IgM [LAB 6836]    Collection Time: 05/10/23 11:59 AM   Result Value Ref Range    Cardiolipin Lyn IgG Instrument Value <2.0 <10.0 GPL-U/mL    Cardiolipin Antibody IgG Negative Negative    Cardiolipin Lyn IgM Instrument Value <2.0 <10.0 MPL-U/mL    Cardiolipin Antibody IgM Negative Negative   Factor 2 and 5 mutation analysis    Collection Time: 05/10/23 11:59 AM   Result Value Ref Range    METHODOLOGY       The regions of genomic DNA containing the F5 gene mutation R506Q(1691G>A) and the Factor 2 (Prothrombin [id number]) gene mutation were simultaneously amplified using the polymerase chain reaction. The amplified products were digested with restriction endonuclease TaqI and products were analyzed by gel electrophoresis.        RESULTS         Factor V 1691G>A ([place])  RESULTS:  Mutation analyzed: 1691G>A  Factor V 1691G>A ([place])  Interpretation:  ABSENT  Factor V 1691G>A ([place]) mutation  genotype:  G/G    FACTOR 2/PROTHROMBIN RESULTS:  Mutation analyzed: 42417B>A  Factor 2 Mutation Interpretation:  ABSENT  Factor 2 Mutation genotype:  G/G        INTERPRETATION       The patient is negative for the Factor V 1691G>A ([place]) and negative for the Factor 2 mutation.  (Electronically signed by: NARCISO SHEIKH MD May 15, 2023 3:11 PM)      COMMENTS       If a patient is the recipient of an allogeneic bone marrow transplant, this test must be done on a pre-transplant sample or buccal swab.  A previous allogeneic bone marrow transplant will interfere with test results.  Call the [facility] ([phone]) for instructions on sample collection for these patients.      DISCLAIMER       This test was developed and its performance characteristics determined by [facility]. It has not been cleared or approved  by the FDA. The laboratory is regulated under CLIA as qualified to perform high-complexity testing. This test is used for clinical purposes. It should not be regarded as investigational or for research.    A resident/fellow in an accredited training program was involved in the selection of testing, review of laboratory data, and/or interpretation of this case.  I, as the senior physician, attest that I: (i) confirmed appropriate testing, (ii) examined the relevant raw data for the specimen(s); and (iii) rendered or confirmed the interpretation(s).      FACTOR 2 INTERPRETATION         Factor 2 Mutation Interpretation:  ABSENT      FACTOR V INTERPRETATION       Factor V 1691G>A ([place])  Interpretation:  ABSENT      Specimen Description       Blood: ACD     INR [[id number]]    Collection Time: 05/10/23 11:59 AM   Result Value Ref Range    INR 1.83 (H) 0.85 - 1.15   Partial thromboplastin time [LAB56]    Collection Time: 05/10/23 11:59 AM   Result Value Ref Range    aPTT 33 22 - 38 Seconds   Thrombin time [XNU228]    Collection Time: 05/10/23 11:59 AM   Result Value Ref Range    Thrombin Time 17.1 13.0 - 19.0 Seconds   Hepatitis B core antibody [[id number]]    Collection Time: 05/10/23 11:59 AM   Result Value Ref Range    Hepatitis B Core Antibody Total Nonreactive Nonreactive   Hepatitis B Surface Antibody [[id number]]    Collection Time: 05/10/23 11:59 AM   Result Value Ref Range    Hepatitis B Surface Antibody Instrument Value 10.18 <8.00 m[IU]/mL    Hepatitis B Surface Antibody Indeterminate    Hepatitis B surface antigen [RNT813]    Collection Time: 05/10/23 11:59 AM   Result Value Ref Range    Hepatitis B Surface Antigen Nonreactive Nonreactive   Hepatitis C antibody [[id number]]    Collection Time: 05/10/23 11:59 AM   Result Value Ref Range    Hepatitis C Antibody Nonreactive Nonreactive   HIV Antigen Antibody Combo Pretransplant    Collection Time: 05/10/23 11:59 AM   Result Value Ref Range    HIV Antigen Antibody Combo  Pretransplant Nonreactive Nonreactive   BK Virus Quantitative, PCR    Collection Time: 05/10/23 11:59 AM    Specimen: Arm, Right; Blood   Result Value Ref Range    BK Virus DNA copies/mL <500 (A) Not Detected copies/mL    BK VIRUS LOG <2.7    Adult Type and Screen    Collection Time: 05/10/23 11:59 AM   Result Value Ref Range    ABO/RH(D) O POS     Antibody Screen Negative Negative    SPECIMEN EXPIRATION DATE [id number]    CBC with platelets and differential    Collection Time: 05/10/23 11:59 AM   Result Value Ref Range    WBC Count 11.0 4.0 - 11.0 10e3/uL    RBC Count 3.55 (L) 4.40 - 5.90 10e6/uL    Hemoglobin 10.6 (L) 13.3 - 17.7 g/dL    Hematocrit 33.9 (L) 40.0 - 53.0 %    MCV 96 78 - 100 fL    MCH 29.9 26.5 - 33.0 pg    MCHC 31.3 (L) 31.5 - 36.5 g/dL    RDW 15.7 (H) 10.0 - 15.0 %    Platelet Count 228 150 - 450 10e3/uL    % Neutrophils 85 %    % Lymphocytes 6 %    % Monocytes 4 %    % Eosinophils 3 %    % Basophils 1 %    % Immature Granulocytes 1 %    NRBCs per 100 WBC 0 <1 /100    Absolute Neutrophils 9.5 (H) 1.6 - 8.3 10e3/uL    Absolute Lymphocytes 0.6 (L) 0.8 - 5.3 10e3/uL    Absolute Monocytes 0.5 0.0 - 1.3 10e3/uL    Absolute Eosinophils 0.3 0.0 - 0.7 10e3/uL    Absolute Basophils 0.1 0.0 - 0.2 10e3/uL    Absolute Immature Granulocytes 0.1 <=0.4 10e3/uL    Absolute NRBCs 0.0 10e3/uL   Quantiferon TB Gold Plus Grey Tube    Collection Time: 05/10/23 11:59 AM    Specimen: Arm, Right; Blood   Result Value Ref Range    Quantiferon Nil Tube 0.00 IU/mL   Quantiferon TB Gold Plus Green Tube    Collection Time: 05/10/23 11:59 AM    Specimen: Arm, Right; Blood   Result Value Ref Range    Quantiferon TB1 Tube 0.01 IU/mL   Quantiferon TB Gold Plus Yellow Tube    Collection Time: 05/10/23 11:59 AM    Specimen: Arm, Right; Blood   Result Value Ref Range    Quantiferon TB2 Tube 0.00    Quantiferon TB Gold Plus Purple Tube    Collection Time: 05/10/23 11:59 AM    Specimen: Arm, Right; Blood   Result Value Ref Range     Quantiferon Mitogen 7.25 IU/mL   Quantiferon TB Gold Plus    Collection Time: 05/10/23 11:59 AM    Specimen: Arm, Right; Blood   Result Value Ref Range    Quantiferon-TB Gold Plus Negative Negative    TB1 Ag minus Nil Value 0.01 IU/mL    TB2 Ag minus Nil Value 0.00 IU/mL    Mitogen minus Nil Result 7.25 IU/mL    Nil Result 0.00 IU/mL   HLA Antibody (PRA) Class I Screen    Collection Time: 05/10/23 11:59 AM   Result Value Ref Range    SCR 1 TEST METHOD Luminex     Scr1 Cell Class I     SCR1 RESULT Neg     SCR1 COMMENTS       Screen testing done to confirm Single Antigen test results.   HLA Antibody (PRA) Class II Screen    Collection Time: 05/10/23 11:59 AM   Result Value Ref Range    SCR2 TEST METHOD Luminex     SCR2 CELL Class II     SCR2 RESULT Neg     SCR2 COMMENTS       Screen testing done to confirm Single Antigen test results.   HLA Eamon Class I, Single Antigen    Collection Time: 05/10/23 11:59 AM   Result Value Ref Range    SA 1 TEST METHOD SA EDTA FCS     SA 1 CELL Class I     SA1 HI RISK EAMON Invalid. See SCN results.     SA1 MOD RISK EAMON Invalid. See SCN results.     SA 1  COMMENTS       Intentional discrepancy between current date and historical date Single Antigen Bead specificity reporting.  Additional testing performed on current date to address potential testing artifact. HLA PRA Test performed by modified testing procedure that may also include pretreatment of serum. Pretreatment may be the addition of fetal calf serum, EDTA, and/or adsorption.  High-risk, MFI > 3,000.  Mod-risk, -3,000.   HLA Eamon Class II, Single Antigen    Collection Time: 05/10/23 11:59 AM   Result Value Ref Range    SA 2 TEST METHOD SA EDTA FCS     SA 2 CELL Class II     SA2 HI RISK EAMON Invalid. See SCN results.     SA2 MOD RISK EAMON Invalid. See SCN results.     SA 2 COMMENTS       Intentional discrepancy between current date and historical date Single Antigen Bead specificity reporting.  Additional testing performed on  current date to address potential testing artifact. HLA PRA Test performed by modified testing procedure that may also include pretreatment of serum. Pretreatment may be the addition of fetal calf serum, EDTA, and/or adsorption.  High-risk, MFI > 3,000.  Mod-risk, -3,000.   HLA Lyn, CPRA    Collection Time: 05/10/23 11:59 AM   Result Value Ref Range    PROTOCOL CUTOFF Plan A, 500 mfi cumulative     UNOS CPRA 33     UNACCEPTABLE ANTIGENS A:80 DR:18 DQ:5    HLA Donor Specific Antibody    Collection Time: 05/10/23 11:59 AM   Result Value Ref Range    Donor Identification 07/29/2004     Organ Left Kidney     DSA Present NO     DSA Comments        Flow Single Antigen Beads assays are intended for detection/identification of IgG anti-HLA antibodies. Mfi values may not accurately quantify donor-specific antibody levels in all instances.    DSA Test Method SA EDTA FCS    ABO and Rh    Collection Time: 05/10/23 12:02 PM   Result Value Ref Range    ABO/RH(D) O POS     SPECIMEN EXPIRATION DATE [id number]    UA with Microscopic reflex to Culture    Collection Time: 05/10/23 12:05 PM    Specimen: Urine, Midstream   Result Value Ref Range    Color Urine Straw Colorless, Straw, Light Yellow, Yellow    Appearance Urine Clear Clear    Glucose Urine Negative Negative mg/dL    Bilirubin Urine Negative Negative    Ketones Urine Negative Negative mg/dL    Specific Gravity Urine 1.010 1.003 - 1.035    Blood Urine Small (A) Negative    pH Urine 7.0 5.0 - 7.0    Protein Albumin Urine 100 (A) Negative mg/dL    Urobilinogen Urine Normal Normal, 2.0 mg/dL    Nitrite Urine Negative Negative    Leukocyte Esterase Urine Negative Negative    Mucus Urine Present (A) None Seen /LPF    RBC Urine 1 <=2 /HPF    WBC Urine 2 <=5 /HPF    Squamous Epithelials Urine <1 <=1 /HPF   Echocardiogram Complete    Collection Time: 05/10/23  2:39 PM   Result Value Ref Range    LVEF  60-65%

## 2023-05-10 NOTE — LETTER
PHYSICIAN ORDERS    DATE & TIME ISSUED: 5/10/2023 0940  PATIENT NAME: Gurjit Squires   : 1955     [facility] MR# [if applicable]: [id number]     DIAGNOSIS / ICD - 10 CODES  Kidney Transplanted (Z94.0)  After Care Following Organ Transplant (Z48.298)  Long Term Use of Medication (Z79.899)  Immunosuppressed Status (Z92.25)    Monthly    BMP  CBC  Urine protein creatinine ratio        Patient should release information to the [facility].   Please fax results to the Transplant Center at [phone].  Any questions please call [phone] or [phone].      .

## 2023-05-10 NOTE — PROGRESS NOTES
"Kidney Transplant Waitlist - Qualified: 4/11/2023  Gurjit Squires attended the pre-transplant Kidney patient EVALUATION he came alone but has support persons. The [facility] website pre-transplant modules were viewed;  Content reviewed:    Living Donation and how to access that program: Cards given to him.  He reports he may have willing donor, Unsure today.     Paired exchange    Kidney Donor Profile Index (KDPI) Carlos signed NOT Willing to accept  KDPI  >85%     Waiting list issues (right to decline without penalty, high PHS risk donors, what to expect when called with an offer)    Hospital experience,  length of stay , need to stay locally post-discharge (2-4 weeks)    Surgical options (with pictures)                             Post-surgery lifting and driving restrictions    Post-transplant routines, frequency of lab work and clinic visits    Need to stay locally post-discharge (2-4 weeks)    Role of Transplant Coordinator  (Pre, Wait List, Post)  Participants were informed of the benefits of transplant as well as potential risks such as infection, cancer, and death.  The need for total adherence with immunosuppression medications and following transplant regimens was stressed.  The overall evaluation/approval/listing process was reviewed.        The patient was provided with the following documents:  What You Need to Know About a Kidney Transplant  Adult Kidney Transplant - A Guide for Patients  SRTR Data Sheet - Kidney  Brochure - Kidney Allocation  Brochure - Multiple Listing and Waiting Time Transfer  What Every Patient Needs to Know (UNOS)  UNOS Facts and Figures  Finding a Donor  [facility] - Quick Start Guide  KDPI Consent  Receipt of Information form    Gurjit Squires signed the  Receipt of Information for Organ Transplant Recipient.\" He was provided Kath Wetzel's phone numbers  and instructed to call with additional questions.      Summary    Team s " concerns/comments: history of ESKD from IgA nephropathy s/p LDKT 7/29/2004 MAHNAZ in March 2023 in setting of spontaneous rectus sheath hematoma, met criteria in 3/2023.    Candidacy category:  Yellow     Action/Plan:  - met minimum criteria in 3/2023.    - he will need Cardiac risk assessment due to atrial fibrillation on warfarin  - Team to determine if he need CT ABD/Pelvis to assess vascular   - BMI 40 he is do PT, Pool 3X week, ( Continue)  - residual right sided weakness post stroke walks with Cane  -Lumbar stenosis JILLIAN Johns put in a referral to Spine surgeon to assess surgical evaluation.   -Hx of spontaneous Rectus Sheath Hematoma, vertify with team if anything needs to be assessed.   -team to determine basic Health maintenance of colonoscopy /dental assessment.   -Wait List 4/11/2023 team to determine if previous time can be reinstated.   -Carlos watched the MTP video episodes without questions. Regulatory items were completed prior to listing.       Expected Selection Meeting Discussion:May 17, 2023

## 2023-05-10 NOTE — PROGRESS NOTES
Psychosocial Assessment  Patient Name/ Age: Gurjit Squires 68 year old   Medical Record #: [id number]  Duration of Interview:     45  min  Process:   Face-to-Face Interview                (counseling < 50%)   Present at Appointment: Carlos        :RUPA Smith, LICSW Date:  May 10, 2023        Type of transplant: Kidney    Donor type:   Carlos indicated he does not know of any potential donors at this time.   Cadaver   Prior Transplants:    Yes    7/29/2004 LUT Kidney Status of Transplant:  Carlos indicated he has been on the inactive list       Current Living Situation    Location:   [address] [place] [address]   [facility] [id number]  With Whom: Wife Jenny       Family/ Social Support:    Carlos and Jenny have two adult children Shantel (44) lives in [place], [place] and Diogenes (41) [place], [place].  They have four grandchildren.     Carlos has one brother who is not a part of his support system.   Available, helpful   Committed relationship:  Carlos and Jenny have been  for 47 years.   Stable/supportive   Other supports:   Friends Available, helpful       Activities/ Functional Ability    Current level: Ambulatory with a cane for long distance walking and independent with ADL's     Transportation Drives self       Vocational/Employment/Financial     Employment   Retired   Job Description      Income   [facility] care home   Insurance  Medicare, [employer] Medicare supplement plan and Part D [employer]    At this time, patient can afford medication costs:  Yes  Medicare       Medical Status    Current mode of treatment for ESRD Kidney Transplant, currently not on dialysis   Complications - Non diabetic        Behavioral    Tobacco Use No Chemical Dependency No    Carlos indicated he will have 2-3 drinks a week.     Psychiatric Impairment No    Reading ability Good  Education Level: Technical College Recent Legal History No    Coping Style/Strategies: Carlos indicated when under stress he will talk to Jenny or friends.   Ability to Adhere  to Complex Medical Regime: Yes     Adherence History:  Carlos indicated he will usually follow his physician's recommendations.        Education  _X_ Medicare  _X_ Rehabilitation  _X_ Donor issues  _X_ Community resources  _X_ Post discharge housing  _X_ Financial resources  _X_ Medical insurance options  _X_ Psych adjustment  _X_ Family adjustment  _X_ Health Care Directive - Provided Education and Declined Completing at this time.  Carlos indicated he is in agreement with his wife making his medical decisions for him if he is unable. Psychosocial Risks of Transplant Reviewed and Discussed:  _X_ Increased stress related to emotional,            family, social, employment or financial           situation  _X_ Affect on work and/or disability benefits  _X_ Affect on future life insurance  _X_ Transplant outcome expectations may           not be met  _X_ Mental Health Risks: anxiety,           depression, PTSD, guilt, grief and           chronic fatigue     Notable Items:   Carlos indicated he has friends that live in the [facility] that he can stay with post transplant.       Final Evaluation/Assessment   Patient seemed to process information well. Appeared well informed, motivated and able to follow post transplant requirements. Behavior was appropriate during interview. Has adequate income and insurance coverage. Adequate social support. No major contraindications noted for transplant.  At this time patient appears to understand the risks and benefits of transplant.      Recommendation  Acceptable    Selection Criteria Met:  Plan for support Yes   Chemical Dependence Yes   Smoking Yes   Mental Health Yes   Adequate Finances Yes    Signature: RUPA Smith, [facility]   Title: Clinical

## 2023-05-10 NOTE — TELEPHONE ENCOUNTER
Radu Bunch MD Sveiven, Sara, RN  Monthly is fine.           Previous Messages       ----- Message -----   From: Shari Talley RN   Sent: 5/9/2023  10:49 AM CDT   To: MD Dr Alivia Donald,  orders were sent for weekly labs in April following hospital discharge for MAHNAZ and hematoma.  He has apt with you 5/19 and is establishing care with general neph in June.  Okay to send orders for less frequent labs?  If so how often would you like labs?     Shari Talley RN   Transplant Coordinator   [phone]     OUTCOME: left detailed message that patient can moving forward be obtaining monthly labs instead of weekly labs.  Asked for CB with any questions.  Lab orders faxed.    Shari Talley RN   Transplant Coordinator  [phone]

## 2023-05-10 NOTE — LETTER
5/10/2023         RE: Gurjit Squires  [address]   [address]  [facility] [address]        Dear Colleague,    Thank you for referring your patient, Gurjit Squires, to the [facility]. Please see a copy of my visit note below.    Transplant Surgery Consult Note     Medical record number: [id number]  YOB: 1955,   Consult requested by Dr. Bunch for evaluation of kidney transplant candidacy.    Assessment and Recommendations:Mr. Squires appears to be a good candidate for kidney transplantation and has a good understanding of the risks and benefits of this approach to the management of renal failure. The following issues should be addressed prior to finalizing his transplant candidacy:     Mr. Squires has Chronic renal failure due to comorbid conditions whose condition is not expected to resolve, is expected to progress, and is expected to continue to develop related comorbid conditions.    Dietician consult ordered: Yes  Social work consult ordered: Yes  Transplant listing labs ordered to include HLA, ABOx2, Cr, etc.  Cardiology consult for cardiac risk stratification to be ordered: Yes  Obtain past medical records  Up-to-date cancer screening  Dermatology follow-up      Mr Squires was seen in clinic a couple of years ago.  At that time he was not on dialysis and also his BMI did not meet our weight criterion.  It was recommended that he work on weight loss.  Recently had a drop in renal function related to an abdominal hematoma, and was re-referred for consideration for transplantation.  However, with resolution of the hematoma, renal function has improved; and he continues to feel well.    The majority of our visit was spent on counselling.   We talked about the pros and cons of transplantation vs. dialysis.  We discussed the fact that it was important to think about the pros and cons of each treatment option and make an active decision.  We also  discussed the fact that the two were interconnected and that if the transplant failed, it is possible that dialysis might be necessary before another transplant.  We discussed operative risks. We discussed immunosuppression side effects.  In addition we discussed the critical need for adherence to the immunosuppressive regimen as well as to maintain schedule of blood draws and follow-up clinic visits.     We also discussed the fact that if choosing to have a transplant, a second important decision was a living versus a  donor transplant.  We talked about the pros and cons of each option - the advantage of a  donor transplant being not asking someone to go through the living donor operation, the disadvantage, 5-6 years waiting; the advantage of a living donor transplant, much shorter time to transplant and significantly better long-term outcomes, the disadvantage being the risk to the donor.  Although I didn't express an opinion regarding transplantation or dialysis, I suggested that if opting for a transplant, we would strongly recommend a living donor transplant.      We discussed the benefit of a preemptive transplant.     We discussed the fact that a living donor does not have to be a direct match and that if there was a donor who met the criteria but was not a match, there was an option of participating in the national paired exchange system.  I described how the system would work.    We also discussed the possibility that his renal function might stabilize and he might not need transplantation.   Mr. Squires asked good questions and his candidacy will be reviewed at our Multidisciplinary Selection Committee. Thank you for the opportunity to participate in Mr. Squires's care.    45 min spent on the date of the encounter in chart review, patient visit,  documentation and/or discussion with other providers about the issues documented above.          Isaias Chauhan MD  Surgical Director, Kidney  Transplantation                                                                                                      ---------------------------------------------------------------------------------------------------     Past Medical History:   Diagnosis Date     BK viremia      Depression      End stage kidney disease (H)      Gout      History of blood transfusion     2004     Hypertension secondary to other renal disorders      IgA nephropathy      Immunosuppressed status (H)      Kidney replaced by transplant      Kidney transplant complication      Metabolic acidosis      VANESA (obstructive sleep apnea)     CPAP     Overweight      Rotator cuff dysfunction      Vitamin D deficiency      Past Surgical History:   Procedure Laterality Date     IMPLANT PROSTHESIS PENIS INFLATABLE N/A 10/4/2016    Procedure: IMPLANT PROSTHESIS PENIS INFLATABLE;  Surgeon: Jaquan Matthew MD;  Location: [facility] OR     IR RENAL BIOPSY RIGHT  6/8/2021     ORTHOPEDIC SURGERY      RTK 09/15     PERCUTANEOUS BIOPSY KIDNEY Right 3/12/2019    Procedure: Right Kidney Biopsy;  Surgeon: Ash Benitez MD;  Location: [facility] OR     TRANSPLANT KIDNEY RECIPIENT LIVING UNRELATED       Family History   Problem Relation Age of Onset     Kidney Disease No family hx of      Social History     Socioeconomic History     Marital status:      Spouse name: Not on file     Number of children: Not on file     Years of education: Not on file     Highest education level: Not on file   Occupational History     Not on file   Tobacco Use     Smoking status: Former     Types: Cigarettes     Smokeless tobacco: Never     Tobacco comments:     Quit 2004   Vaping Use     Vaping status: Not on file   Substance and Sexual Activity     Alcohol use: Yes     Comment: social     Drug use: No     Sexual activity: Yes     Partners: Female     Birth control/protection: None   Other Topics Concern     Parent/sibling w/ CABG, MI or angioplasty before 65F 55M? Not Asked    Social History Narrative     Not on file     Social Determinants of Health     Financial Resource Strain: Not on file   Food Insecurity: Not on file   Transportation Needs: Not on file   Physical Activity: Not on file   Stress: Not on file   Social Connections: Not on file   Intimate Partner Violence: Not on file   Housing Stability: Not on file       ROS:   CONSTITUTIONAL:  No fevers or chills  EYES: negative for icterus  ENT:  negative for hearing loss, tinnitus and sore throat  RESPIRATORY:  negative for cough, sputum, dyspnea  CARDIOVASCULAR:  negative for chest pain  GASTROINTESTINAL:  negative for nausea, vomiting, diarrhea or constipation  GENITOURINARY:  negative for incontinence, dysuria, bladder emptying problems  HEME:  No easy bruising  INTEGUMENT:  negative for rash and pruritus  NEURO:  Negative for headache, seizure disorder  Allergies:   No Known Allergies  Medications:  Prescription Medications as of 5/24/2023       Rx Number Disp Refills Start End Last Dispensed Date Next Fill Date Owning Pharmacy    acetaminophen (TYLENOL) 500 MG tablet            Sig: Take 1,000 mg by mouth daily    Class: Historical    Route: Oral    allopurinol (ZYLOPRIM) 100 MG tablet  30 tablet  7/10/2015        Sig: Take 3 tablets (300 mg) by mouth daily    Class: No Print Out    Route: Oral    aspirin 81 MG EC tablet            Sig: Take 81 mg by mouth daily    Class: Historical    Route: Oral    atorvastatin (LIPITOR) 20 MG tablet    4/20/2020    [facility] [id number] [place] [address]    Sig: Take 1 tablet by mouth    Class: Historical    Route: Oral    Calcium 500-2.5 MG-MCG CHEW  90 tablet 0 4/25/2023    [facility] [address] [place] [address]    Sig: Take 1 tablet by mouth daily    Class: E-Prescribe    Notes to Pharmacy: Please request future refills from your primary care provider    Route: Oral    carvedilol 12.5 MG PO tablet  90 tablet 3 2/21/2020    [id number] [place] [facility]  [address]    Sig: Take 1 tablet (12.5 mg) by mouth 2 times daily (with meals)    Class: E-Prescribe    Route: Oral    cholecalciferol (VITAMIN D) 1000 UNIT tablet  90 tablet 3 1/30/2017    [facility] [address] [place] [address]    Sig: Take 1 tablet (1,000 Units) by mouth daily    Class: E-Prescribe    Route: Oral    clonazePAM (KLONOPIN) 0.5 MG tablet            Sig: Take 0.25 mg by mouth every evening    Class: Historical    Route: Oral    clotrimazole (LOTRIMIN) 1 % external cream    4/10/2023        Class: Historical    Route: Topical    ferrous sulfate (FEROSUL) 325 (65 Fe) MG tablet  90 tablet 0 4/25/2023    [facility] [address] [place] [address]    Sig: Take 1 tablet (325 mg) by mouth daily (with breakfast)    Class: E-Prescribe    Notes to Pharmacy: Please request future refills from your primary care provider    Route: Oral    Fexofenadine HCl (ALLEGRA PO)            Sig: Take 1 tablet by mouth daily.     Class: Historical    Route: Oral    furosemide (LASIX) 20 MG tablet            Sig: Take 40 mg by mouth 2 times daily    Class: Historical    Route: Oral    gabapentin (NEURONTIN) 300 MG capsule  270 capsule 3 1/30/2017    [facility] [id number] [place] [address]    Sig: Take 1 capsule (300 mg) by mouth At Bedtime    Class: E-Prescribe    Route: Oral    losartan (COZAAR) 25 MG tablet  90 tablet 3 5/2/2023    [facility] [id number] [place] [address]    Sig: Take 1 tablet (25 mg) by mouth daily    Class: E-Prescribe    Route: Oral    Multiple Vitamins-Minerals (ONE-A-DAY MENS 50+ ADVANTAGE PO)            Sig: Take 1 tablet by mouth every evening    Class: Historical    Route: Oral    mycophenolate (GENERIC EQUIVALENT) 250 MG capsule  180 capsule 11 3/13/2023    [facility] [id number] [place] [address]    Sig: Take 3 capsules (750 mg) by mouth 2 times daily    Class: E-Prescribe    Route: Oral     omega-3 fatty acids (FISH OIL) 1200 MG capsule  180 capsule 11 5/21/2012    [facility] [address] [place] [address]    Sig: Take 1 capsule by mouth 3 times daily.    Class: E-Prescribe    Route: Oral    Cosign for Ordering: Accepted by Everette Pantoja MD on 5/22/2012  9:02 AM    pantoprazole (PROTONIX) 40 MG EC tablet  30 tablet 1 2/15/2018    [facility] [address] [place] [address]    Sig: TAKE 1 TABLET BY MOUTH DAILY    Class: E-Prescribe    Notes to Pharmacy: Please follow up with PCP for further refills    Renewals     Renewal provider: Everette Pantoja MD          polyethylene glycol (MIRALAX) 17 GM/Dose powder    4/3/2023        Sig: Take 1 packet by mouth    Class: Historical    Route: Oral    predniSONE (DELTASONE) 5 MG tablet  90 tablet 3 1/11/2023    [facility] [address] [place] [address]    Sig: Take 1 tablet (5 mg) by mouth daily    Class: E-Prescribe    Route: Oral    senna-docusate (SENOKOT-S/PERICOLACE) 8.6-50 MG tablet    4/3/2023        Sig: Take 2 tablets by mouth    Class: Historical    Route: Oral    sodium bicarbonate 650 MG tablet  360 tablet 3 4/26/2023    [facility] [address] [place] [address]    Sig: Take 2 tablets (1,300 mg) by mouth 2 times daily    Class: E-Prescribe    Route: Oral    sulfamethoxazole-trimethoprim (BACTRIM) 400-80 MG tablet  13 tablet 11 3/13/2023    [facility] [address] [place] [address]    Sig: Take 1 tablet by mouth Every Mon, Wed, Fri Morning    Class: E-Prescribe    Route: Oral    verapamil ER (CALAN-SR) 180 MG CR tablet  90 tablet 0 1/25/2023    [facility] [address] [place] [address]    Sig: Take 1 tablet (180 mg) by mouth daily    Class: E-Prescribe    Notes to Pharmacy: Please follow up with PCP for further management    Route: Oral    warfarin ANTICOAGULANT (COUMADIN) 5 MG tablet    4/6/2023        Sig: Take 5 mg by mouth daily Monday and Friday half  tablet    Class: Historical    Route: Oral        Exam:   Constitutional - A&O in NAD.   Eyes - no redness or discharge.  Sclera anicteric  Respiratory - no cough, no labored breathing  Musculoskeletal - range of motion normal  Skin - no discoloration, no jaundice  Neurological - no tremors.  No facial droop or dysarthria  Psychiatric - normal mood and affect  The rest of a comprehensive physical examination is deferred due to PHE (public health emergency) video visit restrictions     Diagnostics:   Recent Results (from the past 672 hour(s))   CBC with platelets    Collection Time: 05/01/23  7:34 AM   Result Value Ref Range    WBC Count (External) 7.6 4.0 - 11.0 K/uL    RBC Count (External) 3.36 (L) 4.40 - 5.80 M/uL    Hemoglobin (External) 9.9 (L) 13.5 - 17.5 g/dL    Hematocrit (External) 31.3 (L) 40.0 - 50.0 %    MCV (External) 93.2 80.0 - 98.0 fl    MCH (External) 29.5 25.5 - 34.0 pg    MCHC (External) 31.6 31.5 - 36.5 g/dL    RDW (External) 15.7 (H) 11.5 - 15.5 %    Platelet Count (External) 187 140 - 400 K/uL   Basic metabolic panel    Collection Time: 05/01/23  7:34 AM   Result Value Ref Range    Glucose (External) 108 (H) 70 - 99 mg/dL    Urea Nitrogen (External) 48 (H) 6 - 22 mg/dL    Creatinine (External) 3.26 (H) 0.73 - 1.18 mg/dL    BUN/Creatinine Ratio (External) 14.7 10.0 - 25.0    Sodium (External) 140 136 - 145 meq/L    Potassium (External) 3.6 3.5 - 5.1 meq/L    Chloride (External) 107 98 - 109 meq/L    CO2 (External) 20 20 - 29 meq/L    Anion Gap (External) 17 6 - 20 meq/L    Calcium (External) 7.7 (L) 8.5 - 10.5 mg/dL    GFR Estimated (External) 20 (L) >=60 mL/min/1.73m2   Protein  random urine    Collection Time: 05/01/23  7:34 AM   Result Value Ref Range    Protein Random Urine (External) 203.4 (H) <14.0 mg/dL    Creatinine Urine mg/dL (External) 47.0 Not Established mg/dL    Protein Total Ur per Cr (External) 4.3 (H) <=0.1 %   CBC with platelets    Collection Time: 05/08/23  7:41 AM   Result  Value Ref Range    WBC Count (External) 8.4 4.0 - 11.0 K/UL    RBC Count (External) 3.35 (L) 4.40 - 5.80 M/UL    Hemoglobin (External) 9.9 (L) 13.5 - 17.5 g/dL    Hematocrit (External) 31.7 (L) 40.0 - 50.0 %    MCV (External) 94.6 80.0 - 98.0 fL    MCH (External) 29.6 25.5 - 34.0 pg    MCHC (External) 31.2 (L) 31.5 - 36.5 g/dL    RDW (External) 15.7 (H) 11.5 - 15.5 %    Platelet Count (External) 182 140 - 400 K/uL   Basic metabolic panel    Collection Time: 05/08/23  7:41 AM   Result Value Ref Range    Glucose (External) 106 (H) 70 - 99 mg/dL    Urea Nitrogen (External) 34 (H) 6 - 22 mg/dL    Creatinine (External) 2.83 (H) 0.73 - 1.18 mg/dL    BUN/Creatinine Ratio (External) 12.0 10.0 - 25.0    Sodium (External) 139 136 - 145 meq/L    Potassium (External) 3.6 3.5 - 5.1 meq/L    Chloride (External) 106 98 - 109 meq/L    CO2 (External) 22 20 - 29 meq/L    Anion Gap (External) 15 6 - 20 meq/L    Calcium (External) 7.7 (L) 8.5 - 10.5 mg/dL    GFR Estimated (External) 24 (L) >=60 ml/min/1.73m2   Protein  random urine    Collection Time: 05/08/23  7:41 AM   Result Value Ref Range    Protein Random Urine (External) 212.0 (H) <14.0 mg/dL    Creatinine Urine mg/dL (External) 37.4 mg/dL    Protein Total Ur per Cr (External) 5.7 (H) <=0.1   EKG 12-lead, tracing only [EKG1]    Collection Time: 05/10/23 11:33 AM   Result Value Ref Range    Systolic Blood Pressure  mmHg    Diastolic Blood Pressure  mmHg    Ventricular Rate 58 BPM    Atrial Rate 58 BPM    ME Interval 164 ms    QRS Duration 138 ms     ms    QTc 482 ms    P Axis 58 degrees    R AXIS -3 degrees    T Axis 0 degrees    Interpretation ECG       Sinus bradycardia  Right bundle branch block  Abnormal ECG  When compared with ECG of 08-JUN-2021 09:53,  Premature atrial complexes are no longer Present  Confirmed by Mary Pena ([id number]) on 5/11/2023 8:20:31 AM     CMV Antibody IgG [[id number]]    Collection Time: 05/10/23 11:50 AM   Result Value Ref Range    CMV Lyn IgG  Instrument Value <0.20 <0.60 U/mL    CMV Antibody IgG No detectable antibody. No detectable antibody.    EBV Capsid Antibody IgG [[id number]]    Collection Time: 05/10/23 11:50 AM   Result Value Ref Range    EBV Capsid Lyn IgG Instrument Value 131.0 (H) <18.0 U/mL    EBV Capsid Antibody IgG Positive (A) No detectable antibody.   Treponema Abs w Reflex to RPR and Titer [[id number]]    Collection Time: 05/10/23 11:50 AM   Result Value Ref Range    Treponema Antibody Total Nonreactive Nonreactive   Varicella Zoster Virus Antibody IgG [[id number]]    Collection Time: 05/10/23 11:50 AM   Result Value Ref Range    VZV Lyn IgG Instrument Value >4,000.0 <135.0 Index    Varicella Zoster Antibody IgG Positive    Antibody titer red cell [[id number]]    Collection Time: 05/10/23 11:59 AM   Result Value Ref Range    Anti-A1 IgG Titer >256     Anti-B IgG Titer 128     Anti-B IgM Titer 64     SPECIMEN EXPIRATION DATE [id number]     ANTIBODY TITER IGM SCREEN Negative    Comprehensive metabolic panel [LAB17]    Collection Time: 05/10/23 11:59 AM   Result Value Ref Range    Sodium 146 (H) 136 - 145 mmol/L    Potassium 4.1 3.4 - 5.3 mmol/L    Chloride 108 (H) 98 - 107 mmol/L    Carbon Dioxide (CO2) 26 22 - 29 mmol/L    Anion Gap 12 7 - 15 mmol/L    Urea Nitrogen 40.2 (H) 8.0 - 23.0 mg/dL    Creatinine 3.35 (H) 0.67 - 1.17 mg/dL    Calcium 8.3 (L) 8.8 - 10.2 mg/dL    Glucose 119 (H) 70 - 99 mg/dL    Alkaline Phosphatase 123 40 - 129 U/L    AST 21 10 - 50 U/L    ALT 16 10 - 50 U/L    Protein Total 6.7 6.4 - 8.3 g/dL    Albumin 4.3 3.5 - 5.2 g/dL    Bilirubin Total 0.3 <=1.2 mg/dL    GFR Estimate 19 (L) >60 mL/min/1.73m2   Cardiolipin Lyn IgG and IgM [LAB 6836]    Collection Time: 05/10/23 11:59 AM   Result Value Ref Range    Cardiolipin Lyn IgG Instrument Value <2.0 <10.0 GPL-U/mL    Cardiolipin Antibody IgG Negative Negative    Cardiolipin Lyn IgM Instrument Value <2.0 <10.0 MPL-U/mL    Cardiolipin Antibody IgM Negative Negative   Factor 2 and  5 mutation analysis    Collection Time: 05/10/23 11:59 AM   Result Value Ref Range    METHODOLOGY       The regions of genomic DNA containing the F5 gene mutation R506Q(1691G>A) and the Factor 2 (Prothrombin C62726S) gene mutation were simultaneously amplified using the polymerase chain reaction. The amplified products were digested with restriction endonuclease TaqI and products were analyzed by gel electrophoresis.        RESULTS         Factor V 1691G>A (Leiden)  RESULTS:  Mutation analyzed: 1691G>A  Factor V 1691G>A (Leiden)  Interpretation:  ABSENT  Factor V 1691G>A (Leiden) mutation  genotype:  G/G    FACTOR 2/PROTHROMBIN RESULTS:  Mutation analyzed: 23490T>A  Factor 2 Mutation Interpretation:  ABSENT  Factor 2 Mutation genotype:  G/G        INTERPRETATION       The patient is negative for the Factor V 1691G>A (Leiden) and negative for the Factor 2 mutation.  (Electronically signed by: NARCISO SHEIKH MD May 15, 2023 3:11 PM)      COMMENTS       If a patient is the recipient of an allogeneic bone marrow transplant, this test must be done on a pre-transplant sample or buccal swab.  A previous allogeneic bone marrow transplant will interfere with test results.  Call the [facility] ([phone]) for instructions on sample collection for these patients.      DISCLAIMER       This test was developed and its performance characteristics determined by [facility]. It has not been cleared or approved by the FDA. The laboratory is regulated under CLIA as qualified to perform high-complexity testing. This test is used for clinical purposes. It should not be regarded as investigational or for research.    A resident/fellow in an accredited training program was involved in the selection of testing, review of laboratory data, and/or interpretation of this case.  I, as the senior physician, attest that I: (i) confirmed appropriate testing, (ii) examined the relevant  raw data for the specimen(s); and (iii) rendered or confirmed the interpretation(s).      FACTOR 2 INTERPRETATION         Factor 2 Mutation Interpretation:  ABSENT      FACTOR V INTERPRETATION       Factor V 1691G>A (Leiden)  Interpretation:  ABSENT      Specimen Description       Blood: ACD     INR [[id number]]    Collection Time: 05/10/23 11:59 AM   Result Value Ref Range    INR 1.83 (H) 0.85 - 1.15   Partial thromboplastin time [LAB56]    Collection Time: 05/10/23 11:59 AM   Result Value Ref Range    aPTT 33 22 - 38 Seconds   Thrombin time [ZLS140]    Collection Time: 05/10/23 11:59 AM   Result Value Ref Range    Thrombin Time 17.1 13.0 - 19.0 Seconds   Hepatitis B core antibody [[id number]]    Collection Time: 05/10/23 11:59 AM   Result Value Ref Range    Hepatitis B Core Antibody Total Nonreactive Nonreactive   Hepatitis B Surface Antibody [[id number]]    Collection Time: 05/10/23 11:59 AM   Result Value Ref Range    Hepatitis B Surface Antibody Instrument Value 10.18 <8.00 m[IU]/mL    Hepatitis B Surface Antibody Indeterminate    Hepatitis B surface antigen [PRK001]    Collection Time: 05/10/23 11:59 AM   Result Value Ref Range    Hepatitis B Surface Antigen Nonreactive Nonreactive   Hepatitis C antibody [ZOT496]    Collection Time: 05/10/23 11:59 AM   Result Value Ref Range    Hepatitis C Antibody Nonreactive Nonreactive   HIV Antigen Antibody Combo Pretransplant    Collection Time: 05/10/23 11:59 AM   Result Value Ref Range    HIV Antigen Antibody Combo Pretransplant Nonreactive Nonreactive   BK Virus Quantitative, PCR    Collection Time: 05/10/23 11:59 AM    Specimen: Arm, Right; Blood   Result Value Ref Range    BK Virus DNA copies/mL <500 (A) Not Detected copies/mL    BK VIRUS LOG <2.7    Adult Type and Screen    Collection Time: 05/10/23 11:59 AM   Result Value Ref Range    ABO/RH(D) O POS     Antibody Screen Negative Negative    SPECIMEN EXPIRATION DATE [id number]    CBC with platelets and differential     Collection Time: 05/10/23 11:59 AM   Result Value Ref Range    WBC Count 11.0 4.0 - 11.0 10e3/uL    RBC Count 3.55 (L) 4.40 - 5.90 10e6/uL    Hemoglobin 10.6 (L) 13.3 - 17.7 g/dL    Hematocrit 33.9 (L) 40.0 - 53.0 %    MCV 96 78 - 100 fL    MCH 29.9 26.5 - 33.0 pg    MCHC 31.3 (L) 31.5 - 36.5 g/dL    RDW 15.7 (H) 10.0 - 15.0 %    Platelet Count 228 150 - 450 10e3/uL    % Neutrophils 85 %    % Lymphocytes 6 %    % Monocytes 4 %    % Eosinophils 3 %    % Basophils 1 %    % Immature Granulocytes 1 %    NRBCs per 100 WBC 0 <1 /100    Absolute Neutrophils 9.5 (H) 1.6 - 8.3 10e3/uL    Absolute Lymphocytes 0.6 (L) 0.8 - 5.3 10e3/uL    Absolute Monocytes 0.5 0.0 - 1.3 10e3/uL    Absolute Eosinophils 0.3 0.0 - 0.7 10e3/uL    Absolute Basophils 0.1 0.0 - 0.2 10e3/uL    Absolute Immature Granulocytes 0.1 <=0.4 10e3/uL    Absolute NRBCs 0.0 10e3/uL   Quantiferon TB Gold Plus Grey Tube    Collection Time: 05/10/23 11:59 AM    Specimen: Arm, Right; Blood   Result Value Ref Range    Quantiferon Nil Tube 0.00 IU/mL   Quantiferon TB Gold Plus Green Tube    Collection Time: 05/10/23 11:59 AM    Specimen: Arm, Right; Blood   Result Value Ref Range    Quantiferon TB1 Tube 0.01 IU/mL   Quantiferon TB Gold Plus Yellow Tube    Collection Time: 05/10/23 11:59 AM    Specimen: Arm, Right; Blood   Result Value Ref Range    Quantiferon TB2 Tube 0.00    Quantiferon TB Gold Plus Purple Tube    Collection Time: 05/10/23 11:59 AM    Specimen: Arm, Right; Blood   Result Value Ref Range    Quantiferon Mitogen 7.25 IU/mL   Quantiferon TB Gold Plus    Collection Time: 05/10/23 11:59 AM    Specimen: Arm, Right; Blood   Result Value Ref Range    Quantiferon-TB Gold Plus Negative Negative    TB1 Ag minus Nil Value 0.01 IU/mL    TB2 Ag minus Nil Value 0.00 IU/mL    Mitogen minus Nil Result 7.25 IU/mL    Nil Result 0.00 IU/mL   HLA Antibody (PRA) Class I Screen    Collection Time: 05/10/23 11:59 AM   Result Value Ref Range    SCR 1 TEST METHOD [facility]      Scr1 Cell Class I     SCR1 RESULT Neg     SCR1 COMMENTS       Screen testing done to confirm Single Antigen test results.   HLA Antibody (PRA) Class II Screen    Collection Time: 05/10/23 11:59 AM   Result Value Ref Range    SCR2 TEST METHOD [facility]     SCR2 CELL Class II     SCR2 RESULT Neg     SCR2 COMMENTS       Screen testing done to confirm Single Antigen test results.   HLA Eamon Class I, Single Antigen    Collection Time: 05/10/23 11:59 AM   Result Value Ref Range    SA 1 TEST METHOD SA EDTA FCS     SA 1 CELL Class I     SA1 HI RISK EAMON Invalid. See SCN results.     SA1 MOD RISK EAMON Invalid. See SCN results.     SA 1  COMMENTS       Intentional discrepancy between current date and historical date Single Antigen Bead specificity reporting.  Additional testing performed on current date to address potential testing artifact. HLA PRA Test performed by modified testing procedure that may also include pretreatment of serum. Pretreatment may be the addition of fetal calf serum, EDTA, and/or adsorption.  High-risk, MFI > 3,000.  Mod-risk, -3,000.   HLA Eamon Class II, Single Antigen    Collection Time: 05/10/23 11:59 AM   Result Value Ref Range    SA 2 TEST METHOD SA EDTA FCS     SA 2 CELL Class II     SA2 HI RISK EAMON Invalid. See SCN results.     SA2 MOD RISK EAMON Invalid. See SCN results.     SA 2 COMMENTS       Intentional discrepancy between current date and historical date Single Antigen Bead specificity reporting.  Additional testing performed on current date to address potential testing artifact. HLA PRA Test performed by modified testing procedure that may also include pretreatment of serum. Pretreatment may be the addition of fetal calf serum, EDTA, and/or adsorption.  High-risk, MFI > 3,000.  Mod-risk, -3,000.   HLA Eamon, CPRA    Collection Time: 05/10/23 11:59 AM   Result Value Ref Range    PROTOCOL CUTOFF Plan A, 500 mfi cumulative     UNOS CPRA 33     UNACCEPTABLE ANTIGENS A:80 DR:18 DQ:5    HLA  Donor Specific Antibody    Collection Time: 05/10/23 11:59 AM   Result Value Ref Range    Donor Identification 07/29/2004     Organ Left Kidney     DSA Present NO     DSA Comments        Flow Single Antigen Beads assays are intended for detection/identification of IgG anti-HLA antibodies. Mfi values may not accurately quantify donor-specific antibody levels in all instances.    DSA Test Method SA EDTA FCS    HLA-ABC Typing High Resolution    Collection Time: 05/10/23 11:59 AM   Result Value Ref Range    ABTEST METHOD NGS     hiresA-1 A*01:01     hiresA-1Equiv 1     hiresA-2 A*02:01     hiresA-2Equiv 2     hiresB-1 B*07:02     hiresB-1Equiv 7     hiresB-2 B*08:01     hiresB-2Equiv 8     hiresC-1 C*07:01     hiresC-1Equiv 7     hiresC-2 C*07:02     hiresC-2Equiv 7     hiresBw-1 Bw*6    HLA-DR Typing High Resolution    Collection Time: 05/10/23 11:59 AM   Result Value Ref Range    DRhiresTestM NGS     hiresDPA1-1 DPA1*01:03     hiresDPB1-1 DPB1*03:01     hiresDPB1-1N GFNJ 03:01/124:01     hiresDPB1-2 DPB1*04:01     hiresDPB1-2N ADCGE 04:01/350:01     hiresDQA1-1 DQA1*05:01     hiresDQA1-2 DQA1*05:05     hiresDQB1-1 DQB1*02:01     hiresDQB1-1Equiv 2     hiresDQB1-2 DQB1*03:01     hiresDQB1-2Equiv 7     hiresDRB1-1 DRB1*03:01     hiresDRB1-1Equiv 17     hiresDRB1-2 DRB1*12:01     hiresDRB1-2Equiv 12     hiresDRB3-1 DRB3*01:01     hiresDRB3-1Equiv 52     DRB3 locus NMDP FJKD 01/91     hiresDRB3-2 DRB3*02:02     hiresDRB3-2Equiv 52     DRB3 NMDP JFUS 02/133    ABO and Rh    Collection Time: 05/10/23 12:02 PM   Result Value Ref Range    ABO/RH(D) O POS     SPECIMEN EXPIRATION DATE [id number]    UA with Microscopic reflex to Culture    Collection Time: 05/10/23 12:05 PM    Specimen: Urine, Midstream   Result Value Ref Range    Color Urine Straw Colorless, Straw, Light Yellow, Yellow    Appearance Urine Clear Clear    Glucose Urine Negative Negative mg/dL    Bilirubin Urine Negative Negative    Ketones Urine Negative  Negative mg/dL    Specific Gravity Urine 1.010 1.003 - 1.035    Blood Urine Small (A) Negative    pH Urine 7.0 5.0 - 7.0    Protein Albumin Urine 100 (A) Negative mg/dL    Urobilinogen Urine Normal Normal, 2.0 mg/dL    Nitrite Urine Negative Negative    Leukocyte Esterase Urine Negative Negative    Mucus Urine Present (A) None Seen /LPF    RBC Urine 1 <=2 /HPF    WBC Urine 2 <=5 /HPF    Squamous Epithelials Urine <1 <=1 /HPF   Echocardiogram Complete    Collection Time: 05/10/23  2:39 PM   Result Value Ref Range    LVEF  60-65%    CBC with platelets    Collection Time: 05/23/23  7:35 AM   Result Value Ref Range    WBC Count (External) 12.0 (H) 4.0 - 11.0 K/UL    RBC Count (External) 2.40 (L) 4.40 - 5.80 M/UL    Hemoglobin (External) 7.1 (L) 13.5 - 17.5 g/dL    Hematocrit (External) 23.3 (L) 40.0 - 50.0 %    MCV (External) 97.1 80.0 - 98.0 fL    MCH (External) 29.6 25.5 - 34.0 pg    MCHC (External) 30.5 (L) 31.5 - 36.5 g/dL    RDW (External) 15.5 11.5 - 15.5 %    Platelet Count (External) 281 140 - 400 K/UL   Basic metabolic panel    Collection Time: 05/23/23  7:35 AM   Result Value Ref Range    Glucose (External) 113 (H) 70 - 99 mg/dL    Urea Nitrogen (External) 75 (H) 6 - 22 mg/dL    Creatinine (External) 4.51 (H) 0.73 - 1.18 mg/dL    BUN/Creatinine Ratio (External) 16.6 10.0 - 25.0 Ratio    Sodium (External) 138 136 - 145 mEq/L    Potassium (External) 4.0 3.5 - 5.1 meq/L    Chloride (External) 100 98 - 109 meq/L    CO2 (External) 23 20 - 29 meq/L    Anion Gap (External) 19 6 - 20 meq/L    Calcium (External) 7.1 (L) 8.5 - 10.5 mg/dL    GFR Estimated (External) 13 (L) >60 ml/min/1.73m2     OS cPRA   Date Value Ref Range Status   06/08/2021 66  Final     [facility] CPRA   Date Value Ref Range Status   05/10/2023 33  Final       Again, thank you for allowing me to participate in the care of your patient.        Sincerely,        JENNIFER

## 2023-05-11 LAB
ATRIAL RATE - MUSE: 58 BPM
BKV DNA # SPEC NAA+PROBE: <500 COPIES/ML
BKV DNA SPEC NAA+PROBE-LOG#: <2.7 {LOG_COPIES}/ML
DIASTOLIC BLOOD PRESSURE - MUSE: NORMAL MMHG
HBV SURFACE AB SERPL IA-ACNC: 10.18 M[IU]/ML
HBV SURFACE AB SERPL IA-ACNC: NORMAL M[IU]/ML
INTERPRETATION ECG - MUSE: NORMAL
P AXIS - MUSE: 58 DEGREES
PR INTERVAL - MUSE: 164 MS
QRS DURATION - MUSE: 138 MS
QT - MUSE: 492 MS
QTC - MUSE: 482 MS
QUANTIFERON MITOGEN: 7.25 IU/ML
QUANTIFERON NIL TUBE: 0 IU/ML
QUANTIFERON TB1 TUBE: 0.01 IU/ML
QUANTIFERON TB2 TUBE: 0
R AXIS - MUSE: -3 DEGREES
SYSTOLIC BLOOD PRESSURE - MUSE: NORMAL MMHG
T AXIS - MUSE: 0 DEGREES
THROMBIN TIME: 17.1 SECONDS (ref 13–19)
VENTRICULAR RATE- MUSE: 58 BPM

## 2023-05-12 ENCOUNTER — TELEPHONE (OUTPATIENT)
Dept: TRANSPLANT | Facility: CLINIC | Age: 68
End: 2023-05-12
Payer: MEDICARE

## 2023-05-12 LAB
CARDIOLIPIN IGG SER IA-ACNC: <2 GPL-U/ML
CARDIOLIPIN IGG SER IA-ACNC: NEGATIVE
CARDIOLIPIN IGM SER IA-ACNC: <2 MPL-U/ML
CARDIOLIPIN IGM SER IA-ACNC: NEGATIVE
GAMMA INTERFERON BACKGROUND BLD IA-ACNC: 0 IU/ML
M TB IFN-G BLD-IMP: NEGATIVE
M TB IFN-G CD4+ BCKGRND COR BLD-ACNC: 7.25 IU/ML
MITOGEN IGNF BCKGRD COR BLD-ACNC: 0 IU/ML
MITOGEN IGNF BCKGRD COR BLD-ACNC: 0.01 IU/ML

## 2023-05-12 NOTE — TELEPHONE ENCOUNTER
Called [facility] at [phone], notified them that the pt's INR was 1.83 on 5/10/23 at his pre-kidney transplant evaluation.  They are able to see the results in CE and did not need the results faxed, they will follow up w/ the pt.

## 2023-05-14 ENCOUNTER — MYC MEDICAL ADVICE (OUTPATIENT)
Dept: OTHER | Age: 68
End: 2023-05-14

## 2023-05-14 LAB
DONOR IDENTIFICATION: NORMAL
DSA COMMENTS: NORMAL
DSA PRESENT: NO
DSA TEST METHOD: NORMAL
ORGAN: NORMAL
PROTOCOL CUTOFF: NORMAL
SA 1 CELL: NORMAL
SA 1 TEST METHOD: NORMAL
SA 2 CELL: NORMAL
SA 2 TEST METHOD: NORMAL
SA1 HI RISK ABY: NORMAL
SA1 MOD RISK ABY: NORMAL
SA2 HI RISK ABY: NORMAL
SA2 MOD RISK ABY: NORMAL
SCR 1 TEST METHOD: NORMAL
SCR1 CELL: NORMAL
SCR1 RESULT: NORMAL
SCR2 CELL: NORMAL
SCR2 RESULT: NORMAL
SCR2 TEST METHOD: NORMAL
UNACCEPTABLE ANTIGENS: NORMAL
UNOS CPRA: 33
ZZZSA 1  COMMENTS: NORMAL
ZZZSA 2 COMMENTS: NORMAL
ZZZSCR1 COMMENTS: NORMAL
ZZZSCR2 COMMENTS: NORMAL

## 2023-05-15 ENCOUNTER — TELEPHONE (OUTPATIENT)
Dept: TRANSPLANT | Facility: CLINIC | Age: 68
End: 2023-05-15
Payer: MEDICARE

## 2023-05-15 NOTE — TELEPHONE ENCOUNTER
Carlos Micky Squires  You 48 minutes ago (2:07 PM)     My BP was 140/71 and my pulse 68    OUTCOME: Per Dr Bunch, Hold Amlodipine and give it a couple days to see if swelling improves.  Patient to update RNCC in a couple days.  He v/u    Shari Talley RN   Transplant Coordinator  [phone]

## 2023-05-16 PROBLEM — F17.200 CURRENT SMOKER: Status: RESOLVED | Noted: 2018-05-17 | Resolved: 2023-05-16

## 2023-05-16 PROBLEM — G56.00 CARPAL TUNNEL SYNDROME: Status: RESOLVED | Noted: 2019-10-10 | Resolved: 2023-05-16

## 2023-05-16 PROBLEM — M79.641 RIGHT HAND PAIN: Status: RESOLVED | Noted: 2019-09-11 | Resolved: 2023-05-16

## 2023-05-16 PROBLEM — R78.81 BACTEREMIA: Status: RESOLVED | Noted: 2018-05-18 | Resolved: 2023-05-16

## 2023-05-17 ENCOUNTER — COMMITTEE REVIEW (OUTPATIENT)
Dept: TRANSPLANT | Facility: CLINIC | Age: 68
End: 2023-05-17
Payer: MEDICARE

## 2023-05-17 LAB
A*: NORMAL
A*LOCUS SEROLOGIC EQUIVALENT: 1
A*LOCUS: NORMAL
A*SEROLOGIC EQUIVALENT: 2
ABTEST METHOD: NORMAL
B*: NORMAL
B*LOCUS SEROLOGIC EQUIVALENT: 7
B*LOCUS: NORMAL
B*SEROLOGIC EQUIVALENT: 8
BW-1: NORMAL
C*: NORMAL
C*LOCUS SEROLOGIC EQUIVALENT: 7
C*LOCUS: NORMAL
C*SEROLOGIC EQUIVALENT: 7
DPA1*: NORMAL
DPB1*: NORMAL
DPB1*LOCUS NMDP: NORMAL
DPB1*LOCUS: NORMAL
DPB1*NMDP: NORMAL
DQA1*: NORMAL
DQA1*LOCUS: NORMAL
DQB1*: NORMAL
DQB1*LOCUS SEROLOGIC EQUIVALENT: 2
DQB1*LOCUS: NORMAL
DQB1*SEROLOGIC EQUIVALENT: 7
DRB1*: NORMAL
DRB1*LOCUS SEROLOGIC EQUIVALENT: 17
DRB1*LOCUS: NORMAL
DRB1*SEROLOGIC EQUIVALENT: 12
DRB3*: NORMAL
DRB3*LOCUS NMDP: NORMAL
DRB3*LOCUS SEROLOGIC EQUIVALENT: 52
DRB3*LOCUS: NORMAL
DRB3*NMDP: NORMAL
DRB3*SEROLOGIC EQUIVALENT: 52
DRSSO TEST METHOD: NORMAL

## 2023-05-18 ENCOUNTER — TELEPHONE (OUTPATIENT)
Dept: TRANSPLANT | Facility: CLINIC | Age: 68
End: 2023-05-18
Payer: MEDICARE

## 2023-05-18 ENCOUNTER — DOCUMENTATION ONLY (OUTPATIENT)
Dept: TRANSPLANT | Facility: CLINIC | Age: 68
End: 2023-05-18

## 2023-05-18 NOTE — COMMITTEE REVIEW
Abdominal Committee Review Note     Evaluation Date: 9/14/2021  Committee Review Date: 5/17/2023    Organ being evaluated for: Kidney    Transplant Phase: Waitlist  Transplant Status: Inactive    Transplant Coordinator: Kath Wetzel  Transplant Surgeon: Dr. Isaias Chauhan    Referring Physician: Dr. Radu Bunch    Primary Diagnosis: Malignant Hypertension  Secondary Diagnosis: CKD    Committee Review Members:  Nephrology Emerson Gonzalez MD, Niya Dyer PA-C, Singh Beltran, APRN CNP, Jose Goetz MD   Nutrition Julia Keyes, RD   Pharmacist Aditi Crocker, [facility], Carisa Soria, [facility]   Pharmacy [address], [facility]    - Clinical Izzy Olsen, [facility], Elvia Moore, [facility]   Transplant REGINA DAILY, RN, Shona Mane, RN, Kath Elliott, RN, Elvia Jack, RN, Pratibha Bansk, NP, Pratibha Pavon, RN, Shauna Barker, HERRERA, Sylvie Kennedy, HERRERA, Leann Jimenez, HERRERA, Meaghan Simmons, RN   Transplant Surgery Giovani Zuniga MD       Transplant Eligibility: Irreversible chronic kidney disease treated w/dialysis or expected need for dialysis    Committee Review Decision: Remain Inactive    Relative Contraindications: None    Absolute Contraindications: None    Committee Chair Giovani Zuniga MD verbally attested to the committee's decision.    Committee Discussion Details: Reviewed pt's medical status and evaluation results to date with multidisciplinary committee.    Recommended the following evaluation items once the pt has reached his weight loss goal of BMI 35. At that time, the pt will return to see a transplant surgeon and should complete the following items:    Cardiology: will need cardiac risk assessment  Imaging Recommended:  CT Ab/Pelv, iliac US  Health Maintenance:  PSA  Labs: Lupus anticoagulant    Patient should have live donors register now to initiate donor evaluation: Yes    Committee determined that patient is a Good  candidate for Kidney     Listing plan: pt was listed inactive on 4/11/23 when he met a qualifying GFR, he will remain inactive until the above items are completed and reviewed by the Selection Committee.     A2B Candidate: No    Patient will be called and summary letter will be sent.

## 2023-05-18 NOTE — LETTER
05/18/23        Gurjit Squires  [address]   [address]  [place] [address]        Dear Gurjit,    It was a pleasure to see you recently for consideration of kidney transplantation. Your pre-transplant evaluation results were reviewed at our Multidisciplinary Selection Committee on 5/17/23. At this time, you will remain listed inactive on the kidney wait list.The Committee is requesting the following items are completed before your case will be reviewed for active listing status:    Please continue to to work on weight loss, once you reach a BMI of 35 (230 lbs), we would like you to return to clinic to see a transplant surgeon - please call your coordinator when you are ready to arrange that visit, at that time we will schedule your other evaluation items listed below    Cardiac risk assessment   Abdominal/Pelvic CT  Iliac Ultrasound  Labs: PSA and Lupus Anticoagulant    For any questions, please contact the Transplant Office at [phone] or you may reach your new wait list coordinator, HERRERA Gan directly at [phone].      Sincerely,  Kath Elliott RN, Pre-Kidney/Pancreas Transplant Coordinator   Solid Organ Transplant  [facility], [facility]    CC: Care Team

## 2023-05-18 NOTE — TELEPHONE ENCOUNTER
Called pt to review outcome of Selection Committee.  We discussed that he will remain listed inactive to continue to accrue wait time while he completes his evaluation.  The team has recommended he continue to work on weight loss to achieve a goal BMI of 35 (roughly 230lbs for his height).  He is currently doing the weight watchers program, but does have some concerns that he has extra fluid on which is falsely elevating his weight.  I recommended he discuss those concerns w/ Dr. Bunch- whom he is seeing tomorrow, he agreed to do so.  He understands once he reaches his target weight we will have him return to clinic to see a transplant surgeon.  At that time we will also have him complete the followin. Cardiac risk assessment  2. CT Ab/pelv  3. Iliac US  4. Labs: Lupus anticoagulant and PSA    I also let the pt know his care will now transfer to [facility] coordinator, Elvia, I will send him her contact info via [employer].  Pt had no further questions. Evaluation Summary Letter sent, hand off given to [facility].

## 2023-05-19 ENCOUNTER — TELEPHONE (OUTPATIENT)
Dept: TRANSPLANT | Facility: CLINIC | Age: 68
End: 2023-05-19

## 2023-05-19 ENCOUNTER — OFFICE VISIT (OUTPATIENT)
Dept: NEPHROLOGY | Facility: CLINIC | Age: 68
End: 2023-05-19
Attending: INTERNAL MEDICINE
Payer: MEDICARE

## 2023-05-19 VITALS
HEART RATE: 76 BPM | DIASTOLIC BLOOD PRESSURE: 56 MMHG | WEIGHT: 268.8 LBS | BODY MASS INDEX: 40.27 KG/M2 | SYSTOLIC BLOOD PRESSURE: 132 MMHG

## 2023-05-19 DIAGNOSIS — N18.4 CKD (CHRONIC KIDNEY DISEASE) STAGE 4, GFR 15-29 ML/MIN (H): ICD-10-CM

## 2023-05-19 DIAGNOSIS — Z48.298 AFTERCARE FOLLOWING ORGAN TRANSPLANT: ICD-10-CM

## 2023-05-19 DIAGNOSIS — D84.9 IMMUNOSUPPRESSED STATUS (H): ICD-10-CM

## 2023-05-19 DIAGNOSIS — D63.1 ANEMIA IN STAGE 4 CHRONIC KIDNEY DISEASE (H): ICD-10-CM

## 2023-05-19 DIAGNOSIS — E87.20 METABOLIC ACIDOSIS: ICD-10-CM

## 2023-05-19 DIAGNOSIS — Z29.89 NEED FOR PNEUMOCYSTIS PROPHYLAXIS: ICD-10-CM

## 2023-05-19 DIAGNOSIS — Z94.0 HTN, KIDNEY TRANSPLANT RELATED: ICD-10-CM

## 2023-05-19 DIAGNOSIS — E55.9 VITAMIN D DEFICIENCY: ICD-10-CM

## 2023-05-19 DIAGNOSIS — I15.1 HTN, KIDNEY TRANSPLANT RELATED: ICD-10-CM

## 2023-05-19 DIAGNOSIS — Z94.0 KIDNEY REPLACED BY TRANSPLANT: Primary | ICD-10-CM

## 2023-05-19 DIAGNOSIS — N18.4 ANEMIA IN STAGE 4 CHRONIC KIDNEY DISEASE (H): ICD-10-CM

## 2023-05-19 DIAGNOSIS — Z94.0 KIDNEY TRANSPLANTED: ICD-10-CM

## 2023-05-19 PROCEDURE — 99214 OFFICE O/P EST MOD 30 MIN: CPT | Performed by: INTERNAL MEDICINE

## 2023-05-19 RX ORDER — AMOXICILLIN 250 MG
2 CAPSULE ORAL DAILY PRN
COMMUNITY
Start: 2023-04-03 | End: 2023-10-04

## 2023-05-19 RX ORDER — FUROSEMIDE 20 MG
40 TABLET ORAL 2 TIMES DAILY
COMMUNITY
End: 2023-10-04 | Stop reason: DRUGHIGH

## 2023-05-19 RX ORDER — CLOTRIMAZOLE 1 %
CREAM (GRAM) TOPICAL
COMMUNITY
Start: 2023-04-10 | End: 2023-10-04

## 2023-05-19 RX ORDER — CLONAZEPAM 0.5 MG/1
0.25 TABLET ORAL EVERY EVENING
COMMUNITY

## 2023-05-19 RX ORDER — ACETAMINOPHEN 500 MG
1000 TABLET ORAL DAILY
COMMUNITY

## 2023-05-19 RX ORDER — POLYETHYLENE GLYCOL 3350 17 G/17G
1 POWDER, FOR SOLUTION ORAL
COMMUNITY
Start: 2023-04-03 | End: 2023-10-04

## 2023-05-19 NOTE — LETTER
5/19/2023       RE: Gurjit Squires  [address]  [address]  [place] [address]     Dear Colleague,    Thank you for referring your patient, Gurjit Squires, to the [facility] KIDNEY SERVICES at [facility]. Please see a copy of my visit note below.    TRANSPLANT NEPHROLOGY CHRONIC POST TRANSPLANT VISIT    Assessment & Plan   # LDKT: Trend down in creatinine, now back near previous baseline following recent MAHNAZ episode with peak creatinine near 5, although did not require dialysis.  New baseline Cr appears to be ~ 3.2-3.5.  Kidney transplant biopsy 6/2021 showed no acute rejection, but moderate chronic changes.  Patient was referred for another kidney transplant, but likely will need to lose weight prior to being approved.  He does have an appointment to establish care with a local Nephrologist.   - Baseline Creatinine:  ~ 3.2-3.5   - Proteinuria: Nephrotic range (>3 grams)   - Date DSA Last Checked: May/2023      Latest DSA: No cPRA: 33%   - BK Viremia: No   - Kidney Tx Biopsy: Jun 08, 2021; Result: No diagnostic evidence of acute rejection.  Mild acute tubular injury.  Moderate interstitial fibrosis and tubular atrophy.  Mild arteriosclerosis.             Mar 12, 2019; Result: No diagnostic evidence of acute rejection.  Mild interstitial fibrosis and tubular atrophy.  Moderate arterio- and moderate to severe arteriolosclerosis.             Jan 28, 2010; Result: No diagnostic evidence of acute rejection, but did have mild focal interstitial lymphoplasmacytic infiltrate and rare tubulitis.  Mild interstitial fibrosis and tubular atrophy.  Features suggestive of CNI toxicity.             Sep 06, 2007; Result: Borderline acute cellular-mediated rejection.  Nodular arteriolar hyalinosis, suggestive of CNI toxicity.  Mesangial IgA deposits.             Nov 03, 2005; Result: No diagnostic evidence of acute rejection.  Unremarkable.    #  Immunosuppression: Mycophenolate mofetil (dose 750 mg every 12 hours) and Prednisone (dose 5 mg daily)   - Continue with intensive monitoring of immunosuppression for efficacy and toxicity.   - Changes: No    # Infection Prophylaxis:   - PJP: Sulfa/TMP (Bactrim)    # Hypertension: Borderline control;  Goal BP: < 130/80   - Changes: Not at this time; Patient has had some significant side effects, mostly edema with amlodipine.  His edema is also likely secondary to decreased kidney function, being on sodium bicarbonate and left leg ecchymosis/injury.  Will defer to primary Nephrologist to manage further.   - Recommend working on weight loss.    # Elevated Blood Glucose: Glucose generally running ~ 100-110s   - Likely improved due to worsening kidney function.   - Management as per primary care.   - Recommend working on weight loss for overall health by increasing exercise and watching caloric intake.    # Anemia in Chronic Renal Disease: Hgb: Stable      MIKE: No   - Iron studies: Not checked recently    - Would consider referral to Anemia Services or have primary Nephrologist manage.    # Mineral Bone Disorder:   - Secondary renal hyperparathyroidism; PTH level: Not checked recently        On treatment: None  - Vitamin D; level: Low        On supplement: Yes  - Calcium; level: Normal        On supplement: Yes    # Electrolytes:   - Potassium; level: Normal        On supplement: No  - Bicarbonate; level: Normal        On supplement: Yes    # HFpEF: Last cardiac echo (May/2023) showed normal LVEF ~ 60-65% with grade III diastolic dysfunction.  Patient will be seen by Cardiology as part of his kidney retransplant evaluation.    # H/o CVA: Some residual right-sided weakness and uses a cane to ambulate.  Patient is on chronic anticoagulation with warfarin.    # Chronic Pain: Patient with lower extremity, mostly feet, pain.  He has been on acetaminophen for many years, as well as Tramadol, up until stopping that  recently.    # Obesity, Class III (BMI = 40.9): Increased weight, although some may be secondary to water weight.   - Recommend weight loss for overall health by increasing exercise and watching caloric intake.   - Patient is working with Weight Watchers.  If no significant weight loss in the next 3-6 months, would recommend referral to Weight Management Program either at [facility] or [facility].    # MGUS: Patient with mildly elevated kappa/lambda ratio in the past, possibly just due to kidney dysfunction.   - Will repeat serum kappa/lambda light chains and SPEP.   - Recommend Hematology referral to review.    # Spontaneous Hemorrhage: Patient has had two spontaneous hemorrhages in the last couple of months, one being a large left rectal sheath hematoma and more recently a full left thigh ecchymosis.  He certainly is at risk being on warfarin, but his levels have not been out of goal, at least as far as known labs, and no known trauma to cause either of these episodes.  Patient does have a history of significant acetaminophen use and also increased alcohol use prior to initial rectal sheath hematoma episode, but no known liver disease and transaminases are normal, as are his platelet levels.  He would be at risk of fatty liver disease with his obesity.   - Would recommend referral to Hematology to determine if patient is on appropriate anticoagulation and whether there might be another cause of his spontaneous bleeding episodes.   - Would consider further liver imaging.    # GERD: Asymptomatic on PPI.    # Gout: No recent flares on allopurinol.    # Lumbar Stenosis: Patient with ongoing lower back pain.  He was evaluated locally and referred to [facility] for further evaluation.    # Skin Cancer: New lesions: none   - Discussed sun protection and recommend regular follow up with Dermatology.    # Medical Compliance: Yes    # Health Maintenance and Vaccination Review: Not Reviewed    # Transplant  History:  Etiology of Kidney Failure: IgA nephropathy  Tx: LDKT  Transplant: 7/29/2004 (Kidney)  Significant changes in immunosuppression: Taken off CNI due to suggestion of CNI toxicity on biopsy.  Significant transplant-related complications: Acute cellular-mediated rejection    Transplant Office Phone Number: [phone]    Assessment and plan was discussed with the patient and he voiced his understanding and agreement.    Return visit: Return in about 1 year (around 5/19/2024).    Radu Bunch MD    Chief Complaint   Mr. Squires is a 68 year old here for kidney transplant and immunosuppression management.    History of Present Illness    Mr. Squires reports feeling okay overall with several medical complaints.  Patient had been doing okay until several months ago.  Of note, he was having lower INR levels last November and his warfarin dose was increased.  Then, in February, his INR was high, into the 3s and even once over 4.5.  His dose was then reduced, I believe.  Patient and his wife then went to [place] for 10 days in mid March.  Per his wife and patient, they drank a bit more alcohol than normal, with several alcoholic beverages every day while they were on vacation.  When patient returned home, he quickly developed some left-sided abdominal pain and ended up being admitted at [facility] for a spontaneous large left rectal sheath hematoma that required a couple of blood transfusions.  He also developed MAHNAZ with creatinine close to 5 with ATN felt secondary to hypotension and the bleeding.  He did not require dialysis.  His INR was within goal range at 2.8 when this happened.  Since hospital discharge, his creatinine has improved some to the lower 3s.  Patient did undergo kidney transplant evaluation for another transplant earlier this month.  His biggest issues at this time is to lose weight.  Patient then reports over the last week, he had been doing okay and was trying some pool exercises,  but woke up with a bruise all over his left thigh.  His leg is more swollen and very painful.  Patient was seen by his PCP and plans to follow up with him.    In addition, with patient's worsening kidney function and MAHNAZ episode, he had some adjustments in his blood pressure medications.  He was started on amlodipine, but developed significant leg swelling with it.  Via telephone communication, he had the amlodipine dose decreased, although the swelling persisted and now had the amlodipine stopped a few days ago.  Patient was seen in urgent care for the leg bruise and was also started on metolazone for 3 days to help with his swelling, which he feels has made some improvement.    His energy level is decreased, but improved since his hospitalization in March, just not back to his previous baseline.  He was somewhat active, but now less so over the last week with his left leg bruise, as well as the leg swelling.  Overall he really gets minimal exercise.  Denies any chest pain, but some shortness of breath with exertion.    Appetite is good and he has gained ~ 10 lbs, although some of that could be due to water weight.  Patient is back on Weight Watchers.  No nausea or vomiting.  Occasional loose stools.  No heartburn symptoms on pantoprazole.  No fever, sweats or chills.  No night sweats.    Home BP: 130-140/80s    Problem List   Patient Active Problem List   Diagnosis    Testicular hypofunction    Kidney replaced by transplant    Erectile dysfunction    HTN, kidney transplant related    Immunosuppressed status (H)    Metabolic acidosis    Vitamin D deficiency    VANESA (obstructive sleep apnea)    Aftercare following organ transplant    Abrasion of anterior lower leg, left, subsequent encounter    Arthritis of knee, right    Paroxysmal atrial fibrillation (H)    Chronic bilateral low back pain without sciatica    Chronic gout    S/P total knee replacement using cement    Ischemic stroke (H)    Hypertriglyceridemia     Obese    Long term current use of anticoagulant    Osteoarthritis of carpometacarpal joint of thumb    CKD (chronic kidney disease) stage 4, GFR 15-29 ml/min (H)    Need for pneumocystis prophylaxis    Anemia in chronic renal disease       Allergies   No Known Allergies    Medications   Current Outpatient Medications   Medication Sig    acetaminophen (TYLENOL) 500 MG tablet Take 1,000 mg by mouth daily    allopurinol (ZYLOPRIM) 100 MG tablet Take 3 tablets (300 mg) by mouth daily    aspirin 81 MG EC tablet Take 81 mg by mouth daily    atorvastatin (LIPITOR) 20 MG tablet Take 1 tablet by mouth    Calcium 500-2.5 MG-MCG CHEW Take 1 tablet by mouth daily    carvedilol 12.5 MG PO tablet Take 1 tablet (12.5 mg) by mouth 2 times daily (with meals)    cholecalciferol (VITAMIN D) 1000 UNIT tablet Take 1 tablet (1,000 Units) by mouth daily    clonazePAM (KLONOPIN) 0.5 MG tablet Take 0.25 mg by mouth every evening    clotrimazole (LOTRIMIN) 1 % external cream     ferrous sulfate (FEROSUL) 325 (65 Fe) MG tablet Take 1 tablet (325 mg) by mouth daily (with breakfast)    Fexofenadine HCl (ALLEGRA PO) Take 1 tablet by mouth daily.     furosemide (LASIX) 20 MG tablet Take 40 mg by mouth 2 times daily    gabapentin (NEURONTIN) 300 MG capsule Take 1 capsule (300 mg) by mouth At Bedtime    losartan (COZAAR) 25 MG tablet Take 1 tablet (25 mg) by mouth daily    Multiple Vitamins-Minerals (ONE-A-DAY MENS 50+ ADVANTAGE PO) Take 1 tablet by mouth every evening    mycophenolate (GENERIC EQUIVALENT) 250 MG capsule Take 3 capsules (750 mg) by mouth 2 times daily    omega-3 fatty acids (FISH OIL) 1200 MG capsule Take 1 capsule by mouth 3 times daily.    pantoprazole (PROTONIX) 40 MG EC tablet TAKE 1 TABLET BY MOUTH DAILY    polyethylene glycol (MIRALAX) 17 GM/Dose powder Take 1 packet by mouth    predniSONE (DELTASONE) 5 MG tablet Take 1 tablet (5 mg) by mouth daily    senna-docusate (SENOKOT-S/PERICOLACE) 8.6-50 MG tablet Take 2 tablets by  mouth    sodium bicarbonate 650 MG tablet Take 2 tablets (1,300 mg) by mouth 2 times daily    sulfamethoxazole-trimethoprim (BACTRIM) 400-80 MG tablet Take 1 tablet by mouth Every Mon, Wed, Fri Morning    verapamil ER (CALAN-SR) 180 MG CR tablet Take 1 tablet (180 mg) by mouth daily    warfarin ANTICOAGULANT (COUMADIN) 5 MG tablet Take 5 mg by mouth daily Monday and Friday half tablet     No current facility-administered medications for this visit.     Medications Discontinued During This Encounter   Medication Reason    acetaminophen (TYLENOL) 325 MG tablet Dose adjustment    calcium carbonate (TUMS) 500 MG chewable tablet Alternate therapy    clonazePAM (KLONOPIN) 0.5 MG tablet Dose adjustment    furosemide (LASIX) 20 MG tablet Dose adjustment       Physical Exam   Vital Signs: /56 (BP Location: Left arm, Patient Position: Sitting, Cuff Size: Adult Large)   Pulse 76   Wt 121.9 kg (268 lb 12.8 oz)   BMI 40.27 kg/m      GENERAL APPEARANCE: alert and no distress  HENT: mouth without ulcers or lesions  LYMPHATICS: no cervical or supraclavicular nodes  RESP: lungs clear to auscultation - no rales, rhonchi or wheezes  CV: regular rhythm, normal rate, no rub, no murmur  EDEMA: 1+ LE edema on right and 1-2+ LE edema on left  ABDOMEN: soft, nondistended, nontender, bowel sounds normal, obese  MS: extremities normal - no gross deformities noted, no evidence of inflammation in joints, no muscle tenderness  SKIN: no rash, large ecchymosis encompassing all of the left thigh  TX KIDNEY: normal  DIALYSIS ACCESS: none    Data         Latest Ref Rng & Units 5/10/2023    11:59 AM 5/8/2023     7:41 AM 5/1/2023     7:34 AM   Renal   Sodium 136 - 145 mmol/L 146       Na (external) 136 - 145 meq/L  139      140        K 3.4 - 5.3 mmol/L 4.1       K (external) 3.5 - 5.1 meq/L  3.6      3.6        Cl 98 - 107 mmol/L 108   106      107        Cl (external) 98 - 107 mmol/L 108   106      107        CO2 22 - 29 mmol/L 26       CO2  (external) 20 - 29 meq/L  22      20        Urea Nitrogen 8.0 - 23.0 mg/dL 40.2       BUN (external) 6 - 22 mg/dL  34      48        Creatinine 0.67 - 1.17 mg/dL 3.35       Cr (external) 0.73 - 1.18 mg/dL  2.83      3.26        Glucose 70 - 99 mg/dL 119       Glucose (external) 70 - 99 mg/dL  106      108        Calcium 8.8 - 10.2 mg/dL 8.3       Ca (external) 8.5 - 10.5 mg/dL  7.7      7.7            This result is from an external source.         Latest Ref Rng & Units 2/21/2020     6:51 AM 5/29/2018     2:44 PM 1/28/2010     7:53 AM   Bone Health   Phosphorus 2.5 - 4.5 mg/dL   3.5     Vit D Def (external) 30.0 - 100.0 ng/mL 41.3      46             This result is from an external source.         Latest Ref Rng & Units 5/10/2023    11:59 AM 5/8/2023     7:41 AM 5/1/2023     7:34 AM   Heme   WBC 4.0 - 11.0 10e3/uL 11.0       WBC (external) 4.0 - 11.0 K/UL  8.4      7.6        Hgb 13.3 - 17.7 g/dL 10.6       Hgb (external) 13.5 - 17.5 g/dL  9.9      9.9        Plt 150 - 450 10e3/uL 228       Plt (external) 140 - 400 K/uL  182      187            This result is from an external source.         Latest Ref Rng & Units 5/10/2023    11:59 AM 5/14/2018    12:00 AM 6/15/2016     5:05 PM   Liver   AP 40 - 129 U/L 123       AP (external) 38 - 126 U/L  118      105        TBili <=1.2 mg/dL 0.3       TBili (external) 0.2 - 1.3 mg/dL  0.4      0.8        ALT 10 - 50 U/L 16       ALT (external) 21 - 72 U/L  55      35        AST 10 - 50 U/L 21       AST (external) 17 - 59 U/L  43      26        Tot Protein 6.4 - 8.3 g/dL 6.7       Tot Protein (external) 6.3 - 8.2 g/dL  7.0      7.0        Albumin 3.5 - 5.2 g/dL 4.3       Albumin (external) 3.5 - 5.0 g/dL  4.1      4.1            This result is from an external source.         Latest Ref Rng & Units 1/6/2021     1:31 PM   Pancreas   A1C (external) <5.7 % 5.3            This result is from an external source.         Latest Ref Rng & Units 12/14/2015    10:31 AM   Iron studies    Ferritin 26 - 388 ng/mL 220           Latest Ref Rng & Units 5/10/2023    11:50 AM 6/8/2021    10:07 AM 3/12/2019     5:36 AM   [facility] Txp Virology   BK Spec   Plasma   Plasma     BK Res BKNEG^BK Virus DNA Not Detected copies/mL  BK Virus DNA Not Detected   <500     BK Log <2.7 Log copies/mL  Not Calculated   <2.7     EBV CAPSID ANTIBODY IGG No detectable antibody. Positive            Radu Bunch MD

## 2023-05-19 NOTE — PATIENT INSTRUCTIONS
Patient Recommendations:  - No new recommendations at this time.  - Patient to establish care with Dr. Hernandez.    Transplant Patient Information  Your Post Transplant Coordinator is: Shari Talley  For non urgent items, we encourage you to contact your coordinator/care team online via [email or web address]  You and your care team can also contact your transplant coordinator Monday - Friday, 8am - 5pm at [phone] (Option 2 to reach the coordinator or Option 4 to schedule an appointment).  After hours for urgent matters, please call [facility] at [phone].

## 2023-05-19 NOTE — LETTER
5/19/2023      RE: Gurjit Squires  [address]  [address]  [place] [address]       TRANSPLANT NEPHROLOGY CHRONIC POST TRANSPLANT VISIT    Assessment & Plan   # LDKT: Trend down in creatinine, now back near previous baseline following recent MAHNAZ episode with peak creatinine near 5, although did not require dialysis.  New baseline Cr appears to be ~ 3.2-3.5.  Kidney transplant biopsy 6/2021 showed no acute rejection, but moderate chronic changes.  Patient was referred for another kidney transplant, but likely will need to lose weight prior to being approved.  He does have an appointment to establish care with a local Nephrologist.   - Baseline Creatinine:  ~ 3.2-3.5   - Proteinuria: Nephrotic range (>3 grams)   - Date DSA Last Checked: May/2023      Latest DSA: No cPRA: 33%   - BK Viremia: No   - Kidney Tx Biopsy: Jun 08, 2021; Result: No diagnostic evidence of acute rejection.  Mild acute tubular injury.  Moderate interstitial fibrosis and tubular atrophy.  Mild arteriosclerosis.             Mar 12, 2019; Result: No diagnostic evidence of acute rejection.  Mild interstitial fibrosis and tubular atrophy.  Moderate arterio- and moderate to severe arteriolosclerosis.             Jan 28, 2010; Result: No diagnostic evidence of acute rejection, but did have mild focal interstitial lymphoplasmacytic infiltrate and rare tubulitis.  Mild interstitial fibrosis and tubular atrophy.  Features suggestive of CNI toxicity.             Sep 06, 2007; Result: Borderline acute cellular-mediated rejection.  Nodular arteriolar hyalinosis, suggestive of CNI toxicity.  Mesangial IgA deposits.             Nov 03, 2005; Result: No diagnostic evidence of acute rejection.  Unremarkable.    # Immunosuppression: Mycophenolate mofetil (dose 750 mg every 12 hours) and Prednisone (dose 5 mg daily)   - Continue with intensive monitoring of immunosuppression for efficacy and toxicity.   - Changes: No    # Infection Prophylaxis:   -  PJP: Sulfa/TMP (Bactrim)    # Hypertension: Borderline control;  Goal BP: < 130/80   - Changes: Not at this time; Patient has had some significant side effects, mostly edema with amlodipine.  His edema is also likely secondary to decreased kidney function, being on sodium bicarbonate and left leg ecchymosis/injury.  Will defer to primary Nephrologist to manage further.   - Recommend working on weight loss.    # Elevated Blood Glucose: Glucose generally running ~ 100-110s   - Likely improved due to worsening kidney function.   - Management as per primary care.   - Recommend working on weight loss for overall health by increasing exercise and watching caloric intake.    # Anemia in Chronic Renal Disease: Hgb: Stable      MIKE: No   - Iron studies: Not checked recently    - Would consider referral to Anemia Services or have primary Nephrologist manage.    # Mineral Bone Disorder:   - Secondary renal hyperparathyroidism; PTH level: Not checked recently        On treatment: None  - Vitamin D; level: Low        On supplement: Yes  - Calcium; level: Normal        On supplement: Yes    # Electrolytes:   - Potassium; level: Normal        On supplement: No  - Bicarbonate; level: Normal        On supplement: Yes    # HFpEF: Last cardiac echo (May/2023) showed normal LVEF ~ 60-65% with grade III diastolic dysfunction.  Patient will be seen by Cardiology as part of his kidney retransplant evaluation.    # H/o CVA: Some residual right-sided weakness and uses a cane to ambulate.  Patient is on chronic anticoagulation with warfarin.    # Chronic Pain: Patient with lower extremity, mostly feet, pain.  He has been on acetaminophen for many years, as well as Tramadol, up until stopping that recently.    # Obesity, Class III (BMI = 40.9): Increased weight, although some may be secondary to water weight.   - Recommend weight loss for overall health by increasing exercise and watching caloric intake.   - Patient is working with [employer].  If no significant weight loss in the next 3-6 months, would recommend referral to Weight Management Program either at [facility] or [facility].    # MGUS: Patient with mildly elevated kappa/lambda ratio in the past, possibly just due to kidney dysfunction.   - Will repeat serum kappa/lambda light chains and SPEP.   - Recommend Hematology referral to review.    # Spontaneous Hemorrhage: Patient has had two spontaneous hemorrhages in the last couple of months, one being a large left rectal sheath hematoma and more recently a full left thigh ecchymosis.  He certainly is at risk being on warfarin, but his levels have not been out of goal, at least as far as known labs, and no known trauma to cause either of these episodes.  Patient does have a history of significant acetaminophen use and also increased alcohol use prior to initial rectal sheath hematoma episode, but no known liver disease and transaminases are normal, as are his platelet levels.  He would be at risk of fatty liver disease with his obesity.   - Would recommend referral to Hematology to determine if patient is on appropriate anticoagulation and whether there might be another cause of his spontaneous bleeding episodes.   - Would consider further liver imaging.    # GERD: Asymptomatic on PPI.    # Gout: No recent flares on allopurinol.    # Lumbar Stenosis: Patient with ongoing lower back pain.  He was evaluated locally and referred to [facility] for further evaluation.    # Skin Cancer: New lesions: none   - Discussed sun protection and recommend regular follow up with Dermatology.    # Medical Compliance: Yes    # Health Maintenance and Vaccination Review: Not Reviewed    # Transplant History:  Etiology of Kidney Failure: IgA nephropathy  Tx: LDKT  Transplant: 7/29/2004 (Kidney)  Significant changes in immunosuppression: Taken off CNI due to suggestion of CNI toxicity on biopsy.  Significant transplant-related complications: Acute  cellular-mediated rejection    Transplant Office Phone Number: [phone]    Assessment and plan was discussed with the patient and he voiced his understanding and agreement.    Return visit: Return in about 1 year (around 5/19/2024).    Radu Bunch MD    Chief Complaint   Mr. Squires is a 68 year old here for kidney transplant and immunosuppression management.    History of Present Illness    Mr. Squires reports feeling okay overall with several medical complaints.  Patient had been doing okay until several months ago.  Of note, he was having lower INR levels last November and his warfarin dose was increased.  Then, in February, his INR was high, into the 3s and even once over 4.5.  His dose was then reduced, I believe.  Patient and his wife then went to [place] for 10 days in mid March.  Per his wife and patient, they drank a bit more alcohol than normal, with several alcoholic beverages every day while they were on vacation.  When patient returned home, he quickly developed some left-sided abdominal pain and ended up being admitted at [facility] for a spontaneous large left rectal sheath hematoma that required a couple of blood transfusions.  He also developed MAHNAZ with creatinine close to 5 with ATN felt secondary to hypotension and the bleeding.  He did not require dialysis.  His INR was within goal range at 2.8 when this happened.  Since hospital discharge, his creatinine has improved some to the lower 3s.  Patient did undergo kidney transplant evaluation for another transplant earlier this month.  His biggest issues at this time is to lose weight.  Patient then reports over the last week, he had been doing okay and was trying some pool exercises, but woke up with a bruise all over his left thigh.  His leg is more swollen and very painful.  Patient was seen by his PCP and plans to follow up with him.    In addition, with patient's worsening kidney function and MAHNAZ episode, he had some  adjustments in his blood pressure medications.  He was started on amlodipine, but developed significant leg swelling with it.  Via telephone communication, he had the amlodipine dose decreased, although the swelling persisted and now had the amlodipine stopped a few days ago.  Patient was seen in urgent care for the leg bruise and was also started on metolazone for 3 days to help with his swelling, which he feels has made some improvement.    His energy level is decreased, but improved since his hospitalization in March, just not back to his previous baseline.  He was somewhat active, but now less so over the last week with his left leg bruise, as well as the leg swelling.  Overall he really gets minimal exercise.  Denies any chest pain, but some shortness of breath with exertion.    Appetite is good and he has gained ~ 10 lbs, although some of that could be due to water weight.  Patient is back on Weight [employer]chers.  No nausea or vomiting.  Occasional loose stools.  No heartburn symptoms on pantoprazole.  No fever, sweats or chills.  No night sweats.    Home BP: 130-140/80s    Problem List   Patient Active Problem List   Diagnosis     Testicular hypofunction     Kidney replaced by transplant     Erectile dysfunction     HTN, kidney transplant related     Immunosuppressed status (H)     Metabolic acidosis     Vitamin D deficiency     VANESA (obstructive sleep apnea)     Aftercare following organ transplant     Abrasion of anterior lower leg, left, subsequent encounter     Arthritis of knee, right     Paroxysmal atrial fibrillation (H)     Chronic bilateral low back pain without sciatica     Chronic gout     S/P total knee replacement using cement     Ischemic stroke (H)     Hypertriglyceridemia     Obese     Long term current use of anticoagulant     Osteoarthritis of carpometacarpal joint of thumb     CKD (chronic kidney disease) stage 4, GFR 15-29 ml/min (H)     Need for pneumocystis prophylaxis     Anemia in chronic  renal disease       Allergies   No Known Allergies    Medications   Current Outpatient Medications   Medication Sig     acetaminophen (TYLENOL) 500 MG tablet Take 1,000 mg by mouth daily     allopurinol (ZYLOPRIM) 100 MG tablet Take 3 tablets (300 mg) by mouth daily     aspirin 81 MG EC tablet Take 81 mg by mouth daily     atorvastatin (LIPITOR) 20 MG tablet Take 1 tablet by mouth     Calcium 500-2.5 MG-MCG CHEW Take 1 tablet by mouth daily     carvedilol 12.5 MG PO tablet Take 1 tablet (12.5 mg) by mouth 2 times daily (with meals)     cholecalciferol (VITAMIN D) 1000 UNIT tablet Take 1 tablet (1,000 Units) by mouth daily     clonazePAM (KLONOPIN) 0.5 MG tablet Take 0.25 mg by mouth every evening     clotrimazole (LOTRIMIN) 1 % external cream      ferrous sulfate (FEROSUL) 325 (65 Fe) MG tablet Take 1 tablet (325 mg) by mouth daily (with breakfast)     Fexofenadine HCl (ALLEGRA PO) Take 1 tablet by mouth daily.      furosemide (LASIX) 20 MG tablet Take 40 mg by mouth 2 times daily     gabapentin (NEURONTIN) 300 MG capsule Take 1 capsule (300 mg) by mouth At Bedtime     losartan (COZAAR) 25 MG tablet Take 1 tablet (25 mg) by mouth daily     Multiple Vitamins-Minerals (ONE-A-DAY MENS 50+ ADVANTAGE PO) Take 1 tablet by mouth every evening     mycophenolate (GENERIC EQUIVALENT) 250 MG capsule Take 3 capsules (750 mg) by mouth 2 times daily     omega-3 fatty acids (FISH OIL) 1200 MG capsule Take 1 capsule by mouth 3 times daily.     pantoprazole (PROTONIX) 40 MG EC tablet TAKE 1 TABLET BY MOUTH DAILY     polyethylene glycol (MIRALAX) 17 GM/Dose powder Take 1 packet by mouth     predniSONE (DELTASONE) 5 MG tablet Take 1 tablet (5 mg) by mouth daily     senna-docusate (SENOKOT-S/PERICOLACE) 8.6-50 MG tablet Take 2 tablets by mouth     sodium bicarbonate 650 MG tablet Take 2 tablets (1,300 mg) by mouth 2 times daily     sulfamethoxazole-trimethoprim (BACTRIM) 400-80 MG tablet Take 1 tablet by mouth Every Mon, Wed, Fri  Morning     verapamil ER (CALAN-SR) 180 MG CR tablet Take 1 tablet (180 mg) by mouth daily     warfarin ANTICOAGULANT (COUMADIN) 5 MG tablet Take 5 mg by mouth daily Monday and Friday half tablet     No current facility-administered medications for this visit.     Medications Discontinued During This Encounter   Medication Reason     acetaminophen (TYLENOL) 325 MG tablet Dose adjustment     calcium carbonate (TUMS) 500 MG chewable tablet Alternate therapy     clonazePAM (KLONOPIN) 0.5 MG tablet Dose adjustment     furosemide (LASIX) 20 MG tablet Dose adjustment       Physical Exam   Vital Signs: /56 (BP Location: Left arm, Patient Position: Sitting, Cuff Size: Adult Large)   Pulse 76   Wt 121.9 kg (268 lb 12.8 oz)   BMI 40.27 kg/m      GENERAL APPEARANCE: alert and no distress  HENT: mouth without ulcers or lesions  LYMPHATICS: no cervical or supraclavicular nodes  RESP: lungs clear to auscultation - no rales, rhonchi or wheezes  CV: regular rhythm, normal rate, no rub, no murmur  EDEMA: 1+ LE edema on right and 1-2+ LE edema on left  ABDOMEN: soft, nondistended, nontender, bowel sounds normal, obese  MS: extremities normal - no gross deformities noted, no evidence of inflammation in joints, no muscle tenderness  SKIN: no rash, large ecchymosis encompassing all of the left thigh  TX KIDNEY: normal  DIALYSIS ACCESS: none    Data         Latest Ref Rng & Units 5/10/2023    11:59 AM 5/8/2023     7:41 AM 5/1/2023     7:34 AM   Renal   Sodium 136 - 145 mmol/L 146       Na (external) 136 - 145 meq/L  139      140        K 3.4 - 5.3 mmol/L 4.1       K (external) 3.5 - 5.1 meq/L  3.6      3.6        Cl 98 - 107 mmol/L 108   106      107        Cl (external) 98 - 107 mmol/L 108   106      107        CO2 22 - 29 mmol/L 26       CO2 (external) 20 - 29 meq/L  22      20        Urea Nitrogen 8.0 - 23.0 mg/dL 40.2       BUN (external) 6 - 22 mg/dL  34      48        Creatinine 0.67 - 1.17 mg/dL 3.35       Cr (external)  0.73 - 1.18 mg/dL  2.83      3.26        Glucose 70 - 99 mg/dL 119       Glucose (external) 70 - 99 mg/dL  106      108        Calcium 8.8 - 10.2 mg/dL 8.3       Ca (external) 8.5 - 10.5 mg/dL  7.7      7.7            This result is from an external source.         Latest Ref Rng & Units 2/21/2020     6:51 AM 5/29/2018     2:44 PM 1/28/2010     7:53 AM   Bone Health   Phosphorus 2.5 - 4.5 mg/dL   3.5     Vit D Def (external) 30.0 - 100.0 ng/mL 41.3      46             This result is from an external source.         Latest Ref Rng & Units 5/10/2023    11:59 AM 5/8/2023     7:41 AM 5/1/2023     7:34 AM   Heme   WBC 4.0 - 11.0 10e3/uL 11.0       WBC (external) 4.0 - 11.0 K/UL  8.4      7.6        Hgb 13.3 - 17.7 g/dL 10.6       Hgb (external) 13.5 - 17.5 g/dL  9.9      9.9        Plt 150 - 450 10e3/uL 228       Plt (external) 140 - 400 K/uL  182      187            This result is from an external source.         Latest Ref Rng & Units 5/10/2023    11:59 AM 5/14/2018    12:00 AM 6/15/2016     5:05 PM   Liver   AP 40 - 129 U/L 123       AP (external) 38 - 126 U/L  118      105        TBili <=1.2 mg/dL 0.3       TBili (external) 0.2 - 1.3 mg/dL  0.4      0.8        ALT 10 - 50 U/L 16       ALT (external) 21 - 72 U/L  55      35        AST 10 - 50 U/L 21       AST (external) 17 - 59 U/L  43      26        Tot Protein 6.4 - 8.3 g/dL 6.7       Tot Protein (external) 6.3 - 8.2 g/dL  7.0      7.0        Albumin 3.5 - 5.2 g/dL 4.3       Albumin (external) 3.5 - 5.0 g/dL  4.1      4.1            This result is from an external source.         Latest Ref Rng & Units 1/6/2021     1:31 PM   Pancreas   A1C (external) <5.7 % 5.3            This result is from an external source.         Latest Ref Rng & Units 12/14/2015    10:31 AM   Iron studies   Ferritin 26 - 388 ng/mL 220           Latest Ref Rng & Units 5/10/2023    11:50 AM 6/8/2021    10:07 AM 3/12/2019     5:36 AM   [facility] Txp Virology   BK Spec   Plasma   Plasma     BK Res  BKNEG^BK Virus DNA Not Detected copies/mL  BK Virus DNA Not Detected   <500     BK Log <2.7 Log copies/mL  Not Calculated   <2.7     EBV CAPSID ANTIBODY IGG No detectable antibody. Positive                      Radu Bunch MD

## 2023-05-21 NOTE — PROGRESS NOTES
TRANSPLANT NEPHROLOGY CHRONIC POST TRANSPLANT VISIT    Assessment & Plan   # LDKT: Trend down in creatinine, now back near previous baseline following recent MAHNAZ episode with peak creatinine near 5, although did not require dialysis.  New baseline Cr appears to be ~ 3.2-3.5.  Kidney transplant biopsy 6/2021 showed no acute rejection, but moderate chronic changes.  Patient was referred for another kidney transplant, but likely will need to lose weight prior to being approved.  He does have an appointment to establish care with a local Nephrologist.   - Baseline Creatinine:  ~ 3.2-3.5   - Proteinuria: Nephrotic range (>3 grams)   - Date DSA Last Checked: May/2023      Latest DSA: No cPRA: 33%   - BK Viremia: No   - Kidney Tx Biopsy: Jun 08, 2021; Result: No diagnostic evidence of acute rejection.  Mild acute tubular injury.  Moderate interstitial fibrosis and tubular atrophy.  Mild arteriosclerosis.             Mar 12, 2019; Result: No diagnostic evidence of acute rejection.  Mild interstitial fibrosis and tubular atrophy.  Moderate arterio- and moderate to severe arteriolosclerosis.             Jan 28, 2010; Result: No diagnostic evidence of acute rejection, but did have mild focal interstitial lymphoplasmacytic infiltrate and rare tubulitis.  Mild interstitial fibrosis and tubular atrophy.  Features suggestive of CNI toxicity.             Sep 06, 2007; Result: Borderline acute cellular-mediated rejection.  Nodular arteriolar hyalinosis, suggestive of CNI toxicity.  Mesangial IgA deposits.             Nov 03, 2005; Result: No diagnostic evidence of acute rejection.  Unremarkable.    # Immunosuppression: Mycophenolate mofetil (dose 750 mg every 12 hours) and Prednisone (dose 5 mg daily)   - Continue with intensive monitoring of immunosuppression for efficacy and toxicity.   - Changes: No    # Infection Prophylaxis:   - PJP: Sulfa/TMP (Bactrim)    # Hypertension: Borderline control;  Goal BP: < 130/80   - Changes: Not  at this time; Patient has had some significant side effects, mostly edema with amlodipine.  His edema is also likely secondary to decreased kidney function, being on sodium bicarbonate and left leg ecchymosis/injury.  Will defer to primary Nephrologist to manage further.   - Recommend working on weight loss.    # Elevated Blood Glucose: Glucose generally running ~ 100-110s   - Likely improved due to worsening kidney function.   - Management as per primary care.   - Recommend working on weight loss for overall health by increasing exercise and watching caloric intake.    # Anemia in Chronic Renal Disease: Hgb: Stable      MIKE: No   - Iron studies: Not checked recently    - Would consider referral to Anemia Services or have primary Nephrologist manage.    # Mineral Bone Disorder:   - Secondary renal hyperparathyroidism; PTH level: Not checked recently        On treatment: None  - Vitamin D; level: Low        On supplement: Yes  - Calcium; level: Normal        On supplement: Yes    # Electrolytes:   - Potassium; level: Normal        On supplement: No  - Bicarbonate; level: Normal        On supplement: Yes    # HFpEF: Last cardiac echo (May/2023) showed normal LVEF ~ 60-65% with grade III diastolic dysfunction.  Patient will be seen by Cardiology as part of his kidney retransplant evaluation.    # H/o CVA: Some residual right-sided weakness and uses a cane to ambulate.  Patient is on chronic anticoagulation with warfarin.    # Chronic Pain: Patient with lower extremity, mostly feet, pain.  He has been on acetaminophen for many years, as well as Tramadol, up until stopping that recently.    # Obesity, Class III (BMI = 40.9): Increased weight, although some may be secondary to water weight.   - Recommend weight loss for overall health by increasing exercise and watching caloric intake.   - Patient is working with Weight Watchers.  If no significant weight loss in the next 3-6 months, would recommend referral to Weight  Management Program either at [facility] or [facility].    # MGUS: Patient with mildly elevated kappa/lambda ratio in the past, possibly just due to kidney dysfunction.   - Will repeat serum kappa/lambda light chains and SPEP.   - Recommend Hematology referral to review.    # Spontaneous Hemorrhage: Patient has had two spontaneous hemorrhages in the last couple of months, one being a large left rectal sheath hematoma and more recently a full left thigh ecchymosis.  He certainly is at risk being on warfarin, but his levels have not been out of goal, at least as far as known labs, and no known trauma to cause either of these episodes.  Patient does have a history of significant acetaminophen use and also increased alcohol use prior to initial rectal sheath hematoma episode, but no known liver disease and transaminases are normal, as are his platelet levels.  He would be at risk of fatty liver disease with his obesity.   - Would recommend referral to Hematology to determine if patient is on appropriate anticoagulation and whether there might be another cause of his spontaneous bleeding episodes.   - Would consider further liver imaging.    # GERD: Asymptomatic on PPI.    # Gout: No recent flares on allopurinol.    # Lumbar Stenosis: Patient with ongoing lower back pain.  He was evaluated locally and referred to [facility] for further evaluation.    # Skin Cancer: New lesions: none   - Discussed sun protection and recommend regular follow up with Dermatology.    # Medical Compliance: Yes    # Health Maintenance and Vaccination Review: Not Reviewed    # Transplant History:  Etiology of Kidney Failure: IgA nephropathy  Tx: LDKT  Transplant: 7/29/2004 (Kidney)  Significant changes in immunosuppression: Taken off CNI due to suggestion of CNI toxicity on biopsy.  Significant transplant-related complications: Acute cellular-mediated rejection    Transplant Office Phone Number: [phone]    Assessment and plan  was discussed with the patient and he voiced his understanding and agreement.    Return visit: Return in about 1 year (around 5/19/2024).    Radu Bunch MD    Chief Complaint   Mr. Squires is a 68 year old here for kidney transplant and immunosuppression management.    History of Present Illness    Mr. Squires reports feeling okay overall with several medical complaints.  Patient had been doing okay until several months ago.  Of note, he was having lower INR levels last November and his warfarin dose was increased.  Then, in February, his INR was high, into the 3s and even once over 4.5.  His dose was then reduced, I believe.  Patient and his wife then went to [place] for 10 days in mid March.  Per his wife and patient, they drank a bit more alcohol than normal, with several alcoholic beverages every day while they were on vacation.  When patient returned home, he quickly developed some left-sided abdominal pain and ended up being admitted at [facility] for a spontaneous large left rectal sheath hematoma that required a couple of blood transfusions.  He also developed MAHNAZ with creatinine close to 5 with ATN felt secondary to hypotension and the bleeding.  He did not require dialysis.  His INR was within goal range at 2.8 when this happened.  Since hospital discharge, his creatinine has improved some to the lower 3s.  Patient did undergo kidney transplant evaluation for another transplant earlier this month.  His biggest issues at this time is to lose weight.  Patient then reports over the last week, he had been doing okay and was trying some pool exercises, but woke up with a bruise all over his left thigh.  His leg is more swollen and very painful.  Patient was seen by his PCP and plans to follow up with him.    In addition, with patient's worsening kidney function and MAHNAZ episode, he had some adjustments in his blood pressure medications.  He was started on amlodipine, but developed significant leg  swelling with it.  Via telephone communication, he had the amlodipine dose decreased, although the swelling persisted and now had the amlodipine stopped a few days ago.  Patient was seen in urgent care for the leg bruise and was also started on metolazone for 3 days to help with his swelling, which he feels has made some improvement.    His energy level is decreased, but improved since his hospitalization in March, just not back to his previous baseline.  He was somewhat active, but now less so over the last week with his left leg bruise, as well as the leg swelling.  Overall he really gets minimal exercise.  Denies any chest pain, but some shortness of breath with exertion.    Appetite is good and he has gained ~ 10 lbs, although some of that could be due to water weight.  Patient is back on Weight Watchers.  No nausea or vomiting.  Occasional loose stools.  No heartburn symptoms on pantoprazole.  No fever, sweats or chills.  No night sweats.    Home BP: 130-140/80s    Problem List   Patient Active Problem List   Diagnosis     Testicular hypofunction     Kidney replaced by transplant     Erectile dysfunction     HTN, kidney transplant related     Immunosuppressed status (H)     Metabolic acidosis     Vitamin D deficiency     VANESA (obstructive sleep apnea)     Aftercare following organ transplant     Abrasion of anterior lower leg, left, subsequent encounter     Arthritis of knee, right     Paroxysmal atrial fibrillation (H)     Chronic bilateral low back pain without sciatica     Chronic gout     S/P total knee replacement using cement     Ischemic stroke (H)     Hypertriglyceridemia     Obese     Long term current use of anticoagulant     Osteoarthritis of carpometacarpal joint of thumb     CKD (chronic kidney disease) stage 4, GFR 15-29 ml/min (H)     Need for pneumocystis prophylaxis     Anemia in chronic renal disease       Allergies   No Known Allergies    Medications   Current Outpatient Medications    Medication Sig     acetaminophen (TYLENOL) 500 MG tablet Take 1,000 mg by mouth daily     allopurinol (ZYLOPRIM) 100 MG tablet Take 3 tablets (300 mg) by mouth daily     aspirin 81 MG EC tablet Take 81 mg by mouth daily     atorvastatin (LIPITOR) 20 MG tablet Take 1 tablet by mouth     Calcium 500-2.5 MG-MCG CHEW Take 1 tablet by mouth daily     carvedilol 12.5 MG PO tablet Take 1 tablet (12.5 mg) by mouth 2 times daily (with meals)     cholecalciferol (VITAMIN D) 1000 UNIT tablet Take 1 tablet (1,000 Units) by mouth daily     clonazePAM (KLONOPIN) 0.5 MG tablet Take 0.25 mg by mouth every evening     clotrimazole (LOTRIMIN) 1 % external cream      ferrous sulfate (FEROSUL) 325 (65 Fe) MG tablet Take 1 tablet (325 mg) by mouth daily (with breakfast)     Fexofenadine HCl (ALLEGRA PO) Take 1 tablet by mouth daily.      furosemide (LASIX) 20 MG tablet Take 40 mg by mouth 2 times daily     gabapentin (NEURONTIN) 300 MG capsule Take 1 capsule (300 mg) by mouth At Bedtime     losartan (COZAAR) 25 MG tablet Take 1 tablet (25 mg) by mouth daily     Multiple Vitamins-Minerals (ONE-A-DAY MENS 50+ ADVANTAGE PO) Take 1 tablet by mouth every evening     mycophenolate (GENERIC EQUIVALENT) 250 MG capsule Take 3 capsules (750 mg) by mouth 2 times daily     omega-3 fatty acids (FISH OIL) 1200 MG capsule Take 1 capsule by mouth 3 times daily.     pantoprazole (PROTONIX) 40 MG EC tablet TAKE 1 TABLET BY MOUTH DAILY     polyethylene glycol (MIRALAX) 17 GM/Dose powder Take 1 packet by mouth     predniSONE (DELTASONE) 5 MG tablet Take 1 tablet (5 mg) by mouth daily     senna-docusate (SENOKOT-S/PERICOLACE) 8.6-50 MG tablet Take 2 tablets by mouth     sodium bicarbonate 650 MG tablet Take 2 tablets (1,300 mg) by mouth 2 times daily     sulfamethoxazole-trimethoprim (BACTRIM) 400-80 MG tablet Take 1 tablet by mouth Every Mon, Wed, Fri Morning     verapamil ER (CALAN-SR) 180 MG CR tablet Take 1 tablet (180 mg) by mouth daily     warfarin  ANTICOAGULANT (COUMADIN) 5 MG tablet Take 5 mg by mouth daily Monday and Friday half tablet     No current facility-administered medications for this visit.     Medications Discontinued During This Encounter   Medication Reason     acetaminophen (TYLENOL) 325 MG tablet Dose adjustment     calcium carbonate (TUMS) 500 MG chewable tablet Alternate therapy     clonazePAM (KLONOPIN) 0.5 MG tablet Dose adjustment     furosemide (LASIX) 20 MG tablet Dose adjustment       Physical Exam   Vital Signs: /56 (BP Location: Left arm, Patient Position: Sitting, Cuff Size: Adult Large)   Pulse 76   Wt 121.9 kg (268 lb 12.8 oz)   BMI 40.27 kg/m      GENERAL APPEARANCE: alert and no distress  HENT: mouth without ulcers or lesions  LYMPHATICS: no cervical or supraclavicular nodes  RESP: lungs clear to auscultation - no rales, rhonchi or wheezes  CV: regular rhythm, normal rate, no rub, no murmur  EDEMA: 1+ LE edema on right and 1-2+ LE edema on left  ABDOMEN: soft, nondistended, nontender, bowel sounds normal, obese  MS: extremities normal - no gross deformities noted, no evidence of inflammation in joints, no muscle tenderness  SKIN: no rash, large ecchymosis encompassing all of the left thigh  TX KIDNEY: normal  DIALYSIS ACCESS: none    Data         Latest Ref Rng & Units 5/10/2023    11:59 AM 5/8/2023     7:41 AM 5/1/2023     7:34 AM   Renal   Sodium 136 - 145 mmol/L 146       Na (external) 136 - 145 meq/L  139      140        K 3.4 - 5.3 mmol/L 4.1       K (external) 3.5 - 5.1 meq/L  3.6      3.6        Cl 98 - 107 mmol/L 108   106      107        Cl (external) 98 - 107 mmol/L 108   106      107        CO2 22 - 29 mmol/L 26       CO2 (external) 20 - 29 meq/L  22      20        Urea Nitrogen 8.0 - 23.0 mg/dL 40.2       BUN (external) 6 - 22 mg/dL  34      48        Creatinine 0.67 - 1.17 mg/dL 3.35       Cr (external) 0.73 - 1.18 mg/dL  2.83      3.26        Glucose 70 - 99 mg/dL 119       Glucose (external) 70 - 99 mg/dL   106      108        Calcium 8.8 - 10.2 mg/dL 8.3       Ca (external) 8.5 - 10.5 mg/dL  7.7      7.7            This result is from an external source.         Latest Ref Rng & Units 2/21/2020     6:51 AM 5/29/2018     2:44 PM 1/28/2010     7:53 AM   Bone Health   Phosphorus 2.5 - 4.5 mg/dL   3.5     Vit D Def (external) 30.0 - 100.0 ng/mL 41.3      46             This result is from an external source.         Latest Ref Rng & Units 5/10/2023    11:59 AM 5/8/2023     7:41 AM 5/1/2023     7:34 AM   Heme   WBC 4.0 - 11.0 10e3/uL 11.0       WBC (external) 4.0 - 11.0 K/UL  8.4      7.6        Hgb 13.3 - 17.7 g/dL 10.6       Hgb (external) 13.5 - 17.5 g/dL  9.9      9.9        Plt 150 - 450 10e3/uL 228       Plt (external) 140 - 400 K/uL  182      187            This result is from an external source.         Latest Ref Rng & Units 5/10/2023    11:59 AM 5/14/2018    12:00 AM 6/15/2016     5:05 PM   Liver   AP 40 - 129 U/L 123       AP (external) 38 - 126 U/L  118      105        TBili <=1.2 mg/dL 0.3       TBili (external) 0.2 - 1.3 mg/dL  0.4      0.8        ALT 10 - 50 U/L 16       ALT (external) 21 - 72 U/L  55      35        AST 10 - 50 U/L 21       AST (external) 17 - 59 U/L  43      26        Tot Protein 6.4 - 8.3 g/dL 6.7       Tot Protein (external) 6.3 - 8.2 g/dL  7.0      7.0        Albumin 3.5 - 5.2 g/dL 4.3       Albumin (external) 3.5 - 5.0 g/dL  4.1      4.1            This result is from an external source.         Latest Ref Rng & Units 1/6/2021     1:31 PM   Pancreas   A1C (external) <5.7 % 5.3            This result is from an external source.         Latest Ref Rng & Units 12/14/2015    10:31 AM   Iron studies   Ferritin 26 - 388 ng/mL 220           Latest Ref Rng & Units 5/10/2023    11:50 AM 6/8/2021    10:07 AM 3/12/2019     5:36 AM   [facility] Txp Virology   BK Spec   Plasma   Plasma     BK Res BKNEG^BK Virus DNA Not Detected copies/mL  BK Virus DNA Not Detected   <500     BK Log <2.7 Log copies/mL  Not  Calculated   <2.7     EBV CAPSID ANTIBODY IGG No detectable antibody. Positive

## 2023-05-23 ENCOUNTER — MYC MEDICAL ADVICE (OUTPATIENT)
Dept: OTHER | Age: 68
End: 2023-05-23

## 2023-05-23 NOTE — LETTER
"PHYSICIAN ORDERS    DATE & TIME ISSUED: 2023 1045  PATIENT NAME: Gurjit Squires   : 1955     [facility] MR# [if applicable]: [id number]     DIAGNOSIS / ICD - 10 CODES  Kidney Transplanted (Z94.0)  After Care Following Organ Transplant (Z48.298)  Long Term Use of Medication (Z79.899)  Immunosuppressed Status (Z92.25)    Please obtain the following labs\"  PTH  SPEP  Ambika Lambda light chain        Patient should release information to the [facility].   Please fax results to the Transplant Center at [phone].  Any questions please call [phone] or [phone].      .      "

## 2023-05-23 NOTE — TELEPHONE ENCOUNTER
Shari Talley, Radu Rodríguez MD Dr Spong     Carlos's PCP did refer him to hematology locally.  However,  he had labs obtained this morning ( available in [facility] )     HGB 7, which has been trending down since 5/1   Creatinine: 4.5 up from 3.5 on 5/16     He reports very little energy, weakness, and was not able to get out of bed much yesterday. States the bruising is worsening and is now extending to the front of his leg.  He will be on his way shortly to [facility] ED for evaluation.     I will keep on eye on this and Carlos will keep me posted. I will call ED and let them know. The hematologist he was referred to is at [facility] as well     Shari Talley RN   Transplant Coordinator   [phone]           Previous Messages  ----- Message -----   From: Radu Bunch MD   Sent: 5/21/2023   8:30 AM CDT   To: Shari Talley RN   Subject: Clinic visit                                     Please check the following labs: PTH, SPEP, and Sammy Martinez and lambda light chain.     Please have patient get an appointment with Hematology to discuss his anticoagulation and why he might have had two episodes of this spontaneous bleeding (left rectal sheath and left thigh - separate occasions).     I also want Hematology to follow up on MGUS (hence the labs above).     Patient wanted to see Hematology close to home, so he will need to get his PCP or Dr. Hernandez from [facility] to refer him.     Thanks.     Jackson     OUTCOME: lab orders faxed.  Hematology referral placed by PCP.  Report called to [facility] ED.  Dr Bunch also notified.

## 2023-05-24 NOTE — PROGRESS NOTES
Transplant Surgery Consult Note     Medical record number: [id number]  YOB: 1955,   Consult requested by Dr. Bunch for evaluation of kidney transplant candidacy.    Assessment and Recommendations:Mr. Squires appears to be a good candidate for kidney transplantation and has a good understanding of the risks and benefits of this approach to the management of renal failure. The following issues should be addressed prior to finalizing his transplant candidacy:     Mr. Squires has Chronic renal failure due to comorbid conditions whose condition is not expected to resolve, is expected to progress, and is expected to continue to develop related comorbid conditions.    Dietician consult ordered: Yes  Social work consult ordered: Yes  Transplant listing labs ordered to include HLA, ABOx2, Cr, etc.  Cardiology consult for cardiac risk stratification to be ordered: Yes  Obtain past medical records  Up-to-date cancer screening  Dermatology follow-up      Mr Squires was seen in clinic a couple of years ago.  At that time he was not on dialysis and also his BMI did not meet our weight criterion.  It was recommended that he work on weight loss.  Recently had a drop in renal function related to an abdominal hematoma, and was re-referred for consideration for transplantation.  However, with resolution of the hematoma, renal function has improved; and he continues to feel well.    The majority of our visit was spent on counselling.   We talked about the pros and cons of transplantation vs. dialysis.  We discussed the fact that it was important to think about the pros and cons of each treatment option and make an active decision.  We also discussed the fact that the two were interconnected and that if the transplant failed, it is possible that dialysis might be necessary before another transplant.  We discussed operative risks. We discussed immunosuppression side effects.  In addition we discussed the critical need  for adherence to the immunosuppressive regimen as well as to maintain schedule of blood draws and follow-up clinic visits.     We also discussed the fact that if choosing to have a transplant, a second important decision was a living versus a  donor transplant.  We talked about the pros and cons of each option - the advantage of a  donor transplant being not asking someone to go through the living donor operation, the disadvantage, 5-6 years waiting; the advantage of a living donor transplant, much shorter time to transplant and significantly better long-term outcomes, the disadvantage being the risk to the donor.  Although I didn't express an opinion regarding transplantation or dialysis, I suggested that if opting for a transplant, we would strongly recommend a living donor transplant.      We discussed the benefit of a preemptive transplant.     We discussed the fact that a living donor does not have to be a direct match and that if there was a donor who met the criteria but was not a match, there was an option of participating in the national paired exchange system.  I described how the system would work.    We also discussed the possibility that his renal function might stabilize and he might not need transplantation.   Mr. Squires asked good questions and his candidacy will be reviewed at our Multidisciplinary Selection Committee. Thank you for the opportunity to participate in Mr. Squires's care.    45 min spent on the date of the encounter in chart review, patient visit,  documentation and/or discussion with other providers about the issues documented above.          Isaias Chauhan MD  Surgical Director, Kidney Transplantation                                                                                                      ---------------------------------------------------------------------------------------------------     Past Medical History:   Diagnosis Date     BK viremia       Depression      End stage kidney disease (H)      Gout      History of blood transfusion     2004     Hypertension secondary to other renal disorders      IgA nephropathy      Immunosuppressed status (H)      Kidney replaced by transplant      Kidney transplant complication      Metabolic acidosis      VANESA (obstructive sleep apnea)     CPAP     Overweight      Rotator cuff dysfunction      Vitamin D deficiency      Past Surgical History:   Procedure Laterality Date     IMPLANT PROSTHESIS PENIS INFLATABLE N/A 10/4/2016    Procedure: IMPLANT PROSTHESIS PENIS INFLATABLE;  Surgeon: Jaquan Matthew MD;  Location: [facility] OR     IR RENAL BIOPSY RIGHT  6/8/2021     ORTHOPEDIC SURGERY      RTK 09/15     PERCUTANEOUS BIOPSY KIDNEY Right 3/12/2019    Procedure: Right Kidney Biopsy;  Surgeon: Ash Benitez MD;  Location: [facility] OR     TRANSPLANT KIDNEY RECIPIENT LIVING UNRELATED       Family History   Problem Relation Age of Onset     Kidney Disease No family hx of      Social History     Socioeconomic History     Marital status:      Spouse name: Not on file     Number of children: Not on file     Years of education: Not on file     Highest education level: Not on file   Occupational History     Not on file   Tobacco Use     Smoking status: Former     Types: Cigarettes     Smokeless tobacco: Never     Tobacco comments:     Quit 2004   Vaping Use     Vaping status: Not on file   Substance and Sexual Activity     Alcohol use: Yes     Comment: social     Drug use: No     Sexual activity: Yes     Partners: Female     Birth control/protection: None   Other Topics Concern     Parent/sibling w/ CABG, MI or angioplasty before 65F 55M? Not Asked   Social History Narrative     Not on file     Social Determinants of Health     Financial Resource Strain: Not on file   Food Insecurity: Not on file   Transportation Needs: Not on file   Physical Activity: Not on file   Stress: Not on file   Social Connections: Not on file   Intimate  Partner Violence: Not on file   Housing Stability: Not on file       ROS:   CONSTITUTIONAL:  No fevers or chills  EYES: negative for icterus  ENT:  negative for hearing loss, tinnitus and sore throat  RESPIRATORY:  negative for cough, sputum, dyspnea  CARDIOVASCULAR:  negative for chest pain  GASTROINTESTINAL:  negative for nausea, vomiting, diarrhea or constipation  GENITOURINARY:  negative for incontinence, dysuria, bladder emptying problems  HEME:  No easy bruising  INTEGUMENT:  negative for rash and pruritus  NEURO:  Negative for headache, seizure disorder  Allergies:   No Known Allergies  Medications:  Prescription Medications as of 5/24/2023       Rx Number Disp Refills Start End Last Dispensed Date Next Fill Date Owning Pharmacy    acetaminophen (TYLENOL) 500 MG tablet            Sig: Take 1,000 mg by mouth daily    Class: Historical    Route: Oral    allopurinol (ZYLOPRIM) 100 MG tablet  30 tablet  7/10/2015        Sig: Take 3 tablets (300 mg) by mouth daily    Class: No Print Out    Route: Oral    aspirin 81 MG EC tablet            Sig: Take 81 mg by mouth daily    Class: Historical    Route: Oral    atorvastatin (LIPITOR) 20 MG tablet    4/20/2020    [facility] [id number] [place] [address]    Sig: Take 1 tablet by mouth    Class: Historical    Route: Oral    Calcium 500-2.5 MG-MCG CHEW  90 tablet 0 4/25/2023    [facility] [address] [place] [address]    Sig: Take 1 tablet by mouth daily    Class: E-Prescribe    Notes to Pharmacy: Please request future refills from your primary care provider    Route: Oral    carvedilol 12.5 MG PO tablet  90 tablet 3 2/21/2020    [facility] [address] [place] [address]    Sig: Take 1 tablet (12.5 mg) by mouth 2 times daily (with meals)    Class: E-Prescribe    Route: Oral    cholecalciferol (VITAMIN D) 1000 UNIT tablet  90 tablet 3 1/30/2017    [facility] [address] [place] [address]    Sig: Take 1  tablet (1,000 Units) by mouth daily    Class: E-Prescribe    Route: Oral    clonazePAM (KLONOPIN) 0.5 MG tablet            Sig: Take 0.25 mg by mouth every evening    Class: Historical    Route: Oral    clotrimazole (LOTRIMIN) 1 % external cream    4/10/2023        Class: Historical    Route: Topical    ferrous sulfate (FEROSUL) 325 (65 Fe) MG tablet  90 tablet 0 4/25/2023    [facility] [id number] [place] [address]    Sig: Take 1 tablet (325 mg) by mouth daily (with breakfast)    Class: E-Prescribe    Notes to Pharmacy: Please request future refills from your primary care provider    Route: Oral    Fexofenadine HCl (ALLEGRA PO)            Sig: Take 1 tablet by mouth daily.     Class: Historical    Route: Oral    furosemide (LASIX) 20 MG tablet            Sig: Take 40 mg by mouth 2 times daily    Class: Historical    Route: Oral    gabapentin (NEURONTIN) 300 MG capsule  270 capsule 3 1/30/2017    [facility] [id number] [place] [address]    Sig: Take 1 capsule (300 mg) by mouth At Bedtime    Class: E-Prescribe    Route: Oral    losartan (COZAAR) 25 MG tablet  90 tablet 3 5/2/2023    [facility] [address] [place] [address]    Sig: Take 1 tablet (25 mg) by mouth daily    Class: E-Prescribe    Route: Oral    Multiple Vitamins-Minerals (ONE-A-DAY MENS 50+ ADVANTAGE PO)            Sig: Take 1 tablet by mouth every evening    Class: Historical    Route: Oral    mycophenolate (GENERIC EQUIVALENT) 250 MG capsule  180 capsule 11 3/13/2023    [facility] [id number] [place] [address]    Sig: Take 3 capsules (750 mg) by mouth 2 times daily    Class: E-Prescribe    Route: Oral    omega-3 fatty acids (FISH OIL) 1200 MG capsule  180 capsule 11 5/21/2012    [facility] [address] [place] [address]    Sig: Take 1 capsule by mouth 3 times daily.    Class: E-Prescribe    Route: Oral    Cosign for Ordering: Accepted by Everette Pantoja MD on 5/22/2012   9:02 AM    pantoprazole (PROTONIX) 40 MG EC tablet  30 tablet 1 2/15/2018    [facility] [address] [place] [address]    Sig: TAKE 1 TABLET BY MOUTH DAILY    Class: E-Prescribe    Notes to Pharmacy: Please follow up with PCP for further refills    Renewals     Renewal provider: Everette Pantoja MD          polyethylene glycol (MIRALAX) 17 GM/Dose powder    4/3/2023        Sig: Take 1 packet by mouth    Class: Historical    Route: Oral    predniSONE (DELTASONE) 5 MG tablet  90 tablet 3 1/11/2023    [facility] [address] [place] [address]    Sig: Take 1 tablet (5 mg) by mouth daily    Class: E-Prescribe    Route: Oral    senna-docusate (SENOKOT-S/PERICOLACE) 8.6-50 MG tablet    4/3/2023        Sig: Take 2 tablets by mouth    Class: Historical    Route: Oral    sodium bicarbonate 650 MG tablet  360 tablet 3 4/26/2023    [facility] [address] [place] [address]    Sig: Take 2 tablets (1,300 mg) by mouth 2 times daily    Class: E-Prescribe    Route: Oral    sulfamethoxazole-trimethoprim (BACTRIM) 400-80 MG tablet  13 tablet 11 3/13/2023    [facility] [address] [place] [address]    Sig: Take 1 tablet by mouth Every Mon, Wed, Fri Morning    Class: E-Prescribe    Route: Oral    verapamil ER (CALAN-SR) 180 MG CR tablet  90 tablet 0 1/25/2023    [facility] [address] [place] [address]    Sig: Take 1 tablet (180 mg) by mouth daily    Class: E-Prescribe    Notes to Pharmacy: Please follow up with PCP for further management    Route: Oral    warfarin ANTICOAGULANT (COUMADIN) 5 MG tablet    4/6/2023        Sig: Take 5 mg by mouth daily Monday and Friday half tablet    Class: Historical    Route: Oral        Exam:   Constitutional - A&O in NAD.   Eyes - no redness or discharge.  Sclera anicteric  Respiratory - no cough, no labored breathing  Musculoskeletal - range of motion normal  Skin - no discoloration, no jaundice  Neurological - no  tremors.  No facial droop or dysarthria  Psychiatric - normal mood and affect  The rest of a comprehensive physical examination is deferred due to PHE (public health emergency) video visit restrictions     Diagnostics:   Recent Results (from the past 672 hour(s))   CBC with platelets    Collection Time: 05/01/23  7:34 AM   Result Value Ref Range    WBC Count (External) 7.6 4.0 - 11.0 K/uL    RBC Count (External) 3.36 (L) 4.40 - 5.80 M/uL    Hemoglobin (External) 9.9 (L) 13.5 - 17.5 g/dL    Hematocrit (External) 31.3 (L) 40.0 - 50.0 %    MCV (External) 93.2 80.0 - 98.0 fl    MCH (External) 29.5 25.5 - 34.0 pg    MCHC (External) 31.6 31.5 - 36.5 g/dL    RDW (External) 15.7 (H) 11.5 - 15.5 %    Platelet Count (External) 187 140 - 400 K/uL   Basic metabolic panel    Collection Time: 05/01/23  7:34 AM   Result Value Ref Range    Glucose (External) 108 (H) 70 - 99 mg/dL    Urea Nitrogen (External) 48 (H) 6 - 22 mg/dL    Creatinine (External) 3.26 (H) 0.73 - 1.18 mg/dL    BUN/Creatinine Ratio (External) 14.7 10.0 - 25.0    Sodium (External) 140 136 - 145 meq/L    Potassium (External) 3.6 3.5 - 5.1 meq/L    Chloride (External) 107 98 - 109 meq/L    CO2 (External) 20 20 - 29 meq/L    Anion Gap (External) 17 6 - 20 meq/L    Calcium (External) 7.7 (L) 8.5 - 10.5 mg/dL    GFR Estimated (External) 20 (L) >=60 mL/min/1.73m2   Protein  random urine    Collection Time: 05/01/23  7:34 AM   Result Value Ref Range    Protein Random Urine (External) 203.4 (H) <14.0 mg/dL    Creatinine Urine mg/dL (External) 47.0 Not Established mg/dL    Protein Total Ur per Cr (External) 4.3 (H) <=0.1 %   CBC with platelets    Collection Time: 05/08/23  7:41 AM   Result Value Ref Range    WBC Count (External) 8.4 4.0 - 11.0 K/UL    RBC Count (External) 3.35 (L) 4.40 - 5.80 M/UL    Hemoglobin (External) 9.9 (L) 13.5 - 17.5 g/dL    Hematocrit (External) 31.7 (L) 40.0 - 50.0 %    MCV (External) 94.6 80.0 - 98.0 fL    MCH (External) 29.6 25.5 - 34.0 pg     MCHC (External) 31.2 (L) 31.5 - 36.5 g/dL    RDW (External) 15.7 (H) 11.5 - 15.5 %    Platelet Count (External) 182 140 - 400 K/uL   Basic metabolic panel    Collection Time: 05/08/23  7:41 AM   Result Value Ref Range    Glucose (External) 106 (H) 70 - 99 mg/dL    Urea Nitrogen (External) 34 (H) 6 - 22 mg/dL    Creatinine (External) 2.83 (H) 0.73 - 1.18 mg/dL    BUN/Creatinine Ratio (External) 12.0 10.0 - 25.0    Sodium (External) 139 136 - 145 meq/L    Potassium (External) 3.6 3.5 - 5.1 meq/L    Chloride (External) 106 98 - 109 meq/L    CO2 (External) 22 20 - 29 meq/L    Anion Gap (External) 15 6 - 20 meq/L    Calcium (External) 7.7 (L) 8.5 - 10.5 mg/dL    GFR Estimated (External) 24 (L) >=60 ml/min/1.73m2   Protein  random urine    Collection Time: 05/08/23  7:41 AM   Result Value Ref Range    Protein Random Urine (External) 212.0 (H) <14.0 mg/dL    Creatinine Urine mg/dL (External) 37.4 mg/dL    Protein Total Ur per Cr (External) 5.7 (H) <=0.1   EKG 12-lead, tracing only [EKG1]    Collection Time: 05/10/23 11:33 AM   Result Value Ref Range    Systolic Blood Pressure  mmHg    Diastolic Blood Pressure  mmHg    Ventricular Rate 58 BPM    Atrial Rate 58 BPM    CA Interval 164 ms    QRS Duration 138 ms     ms    QTc 482 ms    P Axis 58 degrees    R AXIS -3 degrees    T Axis 0 degrees    Interpretation ECG       Sinus bradycardia  Right bundle branch block  Abnormal ECG  When compared with ECG of 08-JUN-2021 09:53,  Premature atrial complexes are no longer Present  Confirmed by Mary Pena ([id number]) on 5/11/2023 8:20:31 AM     CMV Antibody IgG [[id number]]    Collection Time: 05/10/23 11:50 AM   Result Value Ref Range    CMV Lyn IgG Instrument Value <0.20 <0.60 U/mL    CMV Antibody IgG No detectable antibody. No detectable antibody.    EBV Capsid Antibody IgG [[id number]]    Collection Time: 05/10/23 11:50 AM   Result Value Ref Range    EBV Capsid Lyn IgG Instrument Value 131.0 (H) <18.0 U/mL    EBV Capsid Antibody  IgG Positive (A) No detectable antibody.   Treponema Abs w Reflex to RPR and Titer [[id number]]    Collection Time: 05/10/23 11:50 AM   Result Value Ref Range    Treponema Antibody Total Nonreactive Nonreactive   Varicella Zoster Virus Antibody IgG [[id number]]    Collection Time: 05/10/23 11:50 AM   Result Value Ref Range    VZV Lyn IgG Instrument Value >4,000.0 <135.0 Index    Varicella Zoster Antibody IgG Positive    Antibody titer red cell [[id number]]    Collection Time: 05/10/23 11:59 AM   Result Value Ref Range    Anti-A1 IgG Titer >256     Anti-B IgG Titer 128     Anti-B IgM Titer 64     SPECIMEN EXPIRATION DATE [id number]     ANTIBODY TITER IGM SCREEN Negative    Comprehensive metabolic panel [LAB17]    Collection Time: 05/10/23 11:59 AM   Result Value Ref Range    Sodium 146 (H) 136 - 145 mmol/L    Potassium 4.1 3.4 - 5.3 mmol/L    Chloride 108 (H) 98 - 107 mmol/L    Carbon Dioxide (CO2) 26 22 - 29 mmol/L    Anion Gap 12 7 - 15 mmol/L    Urea Nitrogen 40.2 (H) 8.0 - 23.0 mg/dL    Creatinine 3.35 (H) 0.67 - 1.17 mg/dL    Calcium 8.3 (L) 8.8 - 10.2 mg/dL    Glucose 119 (H) 70 - 99 mg/dL    Alkaline Phosphatase 123 40 - 129 U/L    AST 21 10 - 50 U/L    ALT 16 10 - 50 U/L    Protein Total 6.7 6.4 - 8.3 g/dL    Albumin 4.3 3.5 - 5.2 g/dL    Bilirubin Total 0.3 <=1.2 mg/dL    GFR Estimate 19 (L) >60 mL/min/1.73m2   Cardiolipin Lyn IgG and IgM [LAB 6836]    Collection Time: 05/10/23 11:59 AM   Result Value Ref Range    Cardiolipin Lyn IgG Instrument Value <2.0 <10.0 GPL-U/mL    Cardiolipin Antibody IgG Negative Negative    Cardiolipin Lyn IgM Instrument Value <2.0 <10.0 MPL-U/mL    Cardiolipin Antibody IgM Negative Negative   Factor 2 and 5 mutation analysis    Collection Time: 05/10/23 11:59 AM   Result Value Ref Range    METHODOLOGY       The regions of genomic DNA containing the F5 gene mutation R506Q(1691G>A) and the Factor 2 (Prothrombin [id number]) gene mutation were simultaneously amplified using the polymerase  chain reaction. The amplified products were digested with restriction endonuclease TaqI and products were analyzed by gel electrophoresis.        RESULTS         Factor V 1691G>A (Leiden)  RESULTS:  Mutation analyzed: 1691G>A  Factor V 1691G>A ([place]iden)  Interpretation:  ABSENT  Factor V 1691G>A ([place]iden) mutation  genotype:  G/G    FACTOR 2/PROTHROMBIN RESULTS:  Mutation analyzed: 38372V>A  Factor 2 Mutation Interpretation:  ABSENT  Factor 2 Mutation genotype:  G/G        INTERPRETATION       The patient is negative for the Factor V 1691G>A ([place]iden) and negative for the Factor 2 mutation.  (Electronically signed by: NARCISO SHEIKH MD May 15, 2023 3:11 PM)      COMMENTS       If a patient is the recipient of an allogeneic bone marrow transplant, this test must be done on a pre-transplant sample or buccal swab.  A previous allogeneic bone marrow transplant will interfere with test results.  Call the [facility] ([phone]) for instructions on sample collection for these patients.      DISCLAIMER       This test was developed and its performance characteristics determined by [facility]. It has not been cleared or approved by the FDA. The laboratory is regulated under CLIA as qualified to perform high-complexity testing. This test is used for clinical purposes. It should not be regarded as investigational or for research.    A resident/fellow in an accredited training program was involved in the selection of testing, review of laboratory data, and/or interpretation of this case.  I, as the senior physician, attest that I: (i) confirmed appropriate testing, (ii) examined the relevant raw data for the specimen(s); and (iii) rendered or confirmed the interpretation(s).      FACTOR 2 INTERPRETATION         Factor 2 Mutation Interpretation:  ABSENT      FACTOR V INTERPRETATION       Factor V 1691G>A ([place]iden)  Interpretation:  ABSENT      Specimen Description        Blood: ACD     INR [[id number]]    Collection Time: 05/10/23 11:59 AM   Result Value Ref Range    INR 1.83 (H) 0.85 - 1.15   Partial thromboplastin time [LAB56]    Collection Time: 05/10/23 11:59 AM   Result Value Ref Range    aPTT 33 22 - 38 Seconds   Thrombin time [YLH946]    Collection Time: 05/10/23 11:59 AM   Result Value Ref Range    Thrombin Time 17.1 13.0 - 19.0 Seconds   Hepatitis B core antibody [SAP4185]    Collection Time: 05/10/23 11:59 AM   Result Value Ref Range    Hepatitis B Core Antibody Total Nonreactive Nonreactive   Hepatitis B Surface Antibody [[id number]]    Collection Time: 05/10/23 11:59 AM   Result Value Ref Range    Hepatitis B Surface Antibody Instrument Value 10.18 <8.00 m[IU]/mL    Hepatitis B Surface Antibody Indeterminate    Hepatitis B surface antigen [HMD421]    Collection Time: 05/10/23 11:59 AM   Result Value Ref Range    Hepatitis B Surface Antigen Nonreactive Nonreactive   Hepatitis C antibody [WOD616]    Collection Time: 05/10/23 11:59 AM   Result Value Ref Range    Hepatitis C Antibody Nonreactive Nonreactive   HIV Antigen Antibody Combo Pretransplant    Collection Time: 05/10/23 11:59 AM   Result Value Ref Range    HIV Antigen Antibody Combo Pretransplant Nonreactive Nonreactive   BK Virus Quantitative, PCR    Collection Time: 05/10/23 11:59 AM    Specimen: Arm, Right; Blood   Result Value Ref Range    BK Virus DNA copies/mL <500 (A) Not Detected copies/mL    BK VIRUS LOG <2.7    Adult Type and Screen    Collection Time: 05/10/23 11:59 AM   Result Value Ref Range    ABO/RH(D) O POS     Antibody Screen Negative Negative    SPECIMEN EXPIRATION DATE [id number]    CBC with platelets and differential    Collection Time: 05/10/23 11:59 AM   Result Value Ref Range    WBC Count 11.0 4.0 - 11.0 10e3/uL    RBC Count 3.55 (L) 4.40 - 5.90 10e6/uL    Hemoglobin 10.6 (L) 13.3 - 17.7 g/dL    Hematocrit 33.9 (L) 40.0 - 53.0 %    MCV 96 78 - 100 fL    MCH 29.9 26.5 - 33.0 pg    MCHC 31.3 (L)  31.5 - 36.5 g/dL    RDW 15.7 (H) 10.0 - 15.0 %    Platelet Count 228 150 - 450 10e3/uL    % Neutrophils 85 %    % Lymphocytes 6 %    % Monocytes 4 %    % Eosinophils 3 %    % Basophils 1 %    % Immature Granulocytes 1 %    NRBCs per 100 WBC 0 <1 /100    Absolute Neutrophils 9.5 (H) 1.6 - 8.3 10e3/uL    Absolute Lymphocytes 0.6 (L) 0.8 - 5.3 10e3/uL    Absolute Monocytes 0.5 0.0 - 1.3 10e3/uL    Absolute Eosinophils 0.3 0.0 - 0.7 10e3/uL    Absolute Basophils 0.1 0.0 - 0.2 10e3/uL    Absolute Immature Granulocytes 0.1 <=0.4 10e3/uL    Absolute NRBCs 0.0 10e3/uL   Quantiferon TB Gold Plus Grey Tube    Collection Time: 05/10/23 11:59 AM    Specimen: Arm, Right; Blood   Result Value Ref Range    Quantiferon Nil Tube 0.00 IU/mL   Quantiferon TB Gold Plus Green Tube    Collection Time: 05/10/23 11:59 AM    Specimen: Arm, Right; Blood   Result Value Ref Range    Quantiferon TB1 Tube 0.01 IU/mL   Quantiferon TB Gold Plus Yellow Tube    Collection Time: 05/10/23 11:59 AM    Specimen: Arm, Right; Blood   Result Value Ref Range    Quantiferon TB2 Tube 0.00    Quantiferon TB Gold Plus Purple Tube    Collection Time: 05/10/23 11:59 AM    Specimen: Arm, Right; Blood   Result Value Ref Range    Quantiferon Mitogen 7.25 IU/mL   Quantiferon TB Gold Plus    Collection Time: 05/10/23 11:59 AM    Specimen: Arm, Right; Blood   Result Value Ref Range    Quantiferon-TB Gold Plus Negative Negative    TB1 Ag minus Nil Value 0.01 IU/mL    TB2 Ag minus Nil Value 0.00 IU/mL    Mitogen minus Nil Result 7.25 IU/mL    Nil Result 0.00 IU/mL   HLA Antibody (PRA) Class I Screen    Collection Time: 05/10/23 11:59 AM   Result Value Ref Range    SCR 1 TEST METHOD [facility]     Scr1 Cell Class I     SCR1 RESULT Neg     SCR1 COMMENTS       Screen testing done to confirm Single Antigen test results.   HLA Antibody (PRA) Class II Screen    Collection Time: 05/10/23 11:59 AM   Result Value Ref Range    SCR2 TEST METHOD [employer]     SCR2 CELL Class II     SCR2  RESULT Neg     SCR2 COMMENTS       Screen testing done to confirm Single Antigen test results.   HLA Eamon Class I, Single Antigen    Collection Time: 05/10/23 11:59 AM   Result Value Ref Range    SA 1 TEST METHOD SA EDTA FCS     SA 1 CELL Class I     SA1 HI RISK EAMON Invalid. See SCN results.     SA1 MOD RISK EAMON Invalid. See SCN results.     SA 1  COMMENTS       Intentional discrepancy between current date and historical date Single Antigen Bead specificity reporting.  Additional testing performed on current date to address potential testing artifact. HLA PRA Test performed by modified testing procedure that may also include pretreatment of serum. Pretreatment may be the addition of fetal calf serum, EDTA, and/or adsorption.  High-risk, MFI > 3,000.  Mod-risk, -3,000.   HLA Eamon Class II, Single Antigen    Collection Time: 05/10/23 11:59 AM   Result Value Ref Range    SA 2 TEST METHOD SA EDTA FCS     SA 2 CELL Class II     SA2 HI RISK EAMON Invalid. See SCN results.     SA2 MOD RISK EAMON Invalid. See SCN results.     SA 2 COMMENTS       Intentional discrepancy between current date and historical date Single Antigen Bead specificity reporting.  Additional testing performed on current date to address potential testing artifact. HLA PRA Test performed by modified testing procedure that may also include pretreatment of serum. Pretreatment may be the addition of fetal calf serum, EDTA, and/or adsorption.  High-risk, MFI > 3,000.  Mod-risk, -3,000.   HLA Eamon, CPRA    Collection Time: 05/10/23 11:59 AM   Result Value Ref Range    PROTOCOL CUTOFF Plan A, 500 mfi cumulative     UNOS CPRA 33     UNACCEPTABLE ANTIGENS A:80 DR:18 DQ:5    HLA Donor Specific Antibody    Collection Time: 05/10/23 11:59 AM   Result Value Ref Range    Donor Identification 07/29/2004     Organ Left Kidney     DSA Present NO     DSA Comments        Flow Single Antigen Beads assays are intended for detection/identification of IgG anti-HLA  antibodies. Mfi values may not accurately quantify donor-specific antibody levels in all instances.    DSA Test Method SA EDTA FCS    HLA-ABC Typing High Resolution    Collection Time: 05/10/23 11:59 AM   Result Value Ref Range    ABTEST METHOD NGS     hiresA-1 A*01:01     hiresA-1Equiv 1     hiresA-2 A*02:01     hiresA-2Equiv 2     hiresB-1 B*07:02     hiresB-1Equiv 7     hiresB-2 B*08:01     hiresB-2Equiv 8     hiresC-1 C*07:01     hiresC-1Equiv 7     hiresC-2 C*07:02     hiresC-2Equiv 7     hiresBw-1 Bw*6    HLA-DR Typing High Resolution    Collection Time: 05/10/23 11:59 AM   Result Value Ref Range    DRhiresTestM NGS     hiresDPA1-1 DPA1*01:03     hiresDPB1-1 DPB1*03:01     hiresDPB1-1N GFNJ 03:01/124:01     hiresDPB1-2 DPB1*04:01     hiresDPB1-2N ADCGE 04:01/350:01     hiresDQA1-1 DQA1*05:01     hiresDQA1-2 DQA1*05:05     hiresDQB1-1 DQB1*02:01     hiresDQB1-1Equiv 2     hiresDQB1-2 DQB1*03:01     hiresDQB1-2Equiv 7     hiresDRB1-1 DRB1*03:01     hiresDRB1-1Equiv 17     hiresDRB1-2 DRB1*12:01     hiresDRB1-2Equiv 12     hiresDRB3-1 DRB3*01:01     hiresDRB3-1Equiv 52     DRB3 locus NMDP FJKD 01/91     hiresDRB3-2 DRB3*02:02     hiresDRB3-2Equiv 52     DRB3 NMDP JFUS 02/133    ABO and Rh    Collection Time: 05/10/23 12:02 PM   Result Value Ref Range    ABO/RH(D) O POS     SPECIMEN EXPIRATION DATE [id number]    UA with Microscopic reflex to Culture    Collection Time: 05/10/23 12:05 PM    Specimen: Urine, Midstream   Result Value Ref Range    Color Urine Straw Colorless, Straw, Light Yellow, Yellow    Appearance Urine Clear Clear    Glucose Urine Negative Negative mg/dL    Bilirubin Urine Negative Negative    Ketones Urine Negative Negative mg/dL    Specific Gravity Urine 1.010 1.003 - 1.035    Blood Urine Small (A) Negative    pH Urine 7.0 5.0 - 7.0    Protein Albumin Urine 100 (A) Negative mg/dL    Urobilinogen Urine Normal Normal, 2.0 mg/dL    Nitrite Urine Negative Negative    Leukocyte Esterase Urine  Negative Negative    Mucus Urine Present (A) None Seen /LPF    RBC Urine 1 <=2 /HPF    WBC Urine 2 <=5 /HPF    Squamous Epithelials Urine <1 <=1 /HPF   Echocardiogram Complete    Collection Time: 05/10/23  2:39 PM   Result Value Ref Range    LVEF  60-65%    CBC with platelets    Collection Time: 05/23/23  7:35 AM   Result Value Ref Range    WBC Count (External) 12.0 (H) 4.0 - 11.0 K/UL    RBC Count (External) 2.40 (L) 4.40 - 5.80 M/UL    Hemoglobin (External) 7.1 (L) 13.5 - 17.5 g/dL    Hematocrit (External) 23.3 (L) 40.0 - 50.0 %    MCV (External) 97.1 80.0 - 98.0 fL    MCH (External) 29.6 25.5 - 34.0 pg    MCHC (External) 30.5 (L) 31.5 - 36.5 g/dL    RDW (External) 15.5 11.5 - 15.5 %    Platelet Count (External) 281 140 - 400 K/UL   Basic metabolic panel    Collection Time: 05/23/23  7:35 AM   Result Value Ref Range    Glucose (External) 113 (H) 70 - 99 mg/dL    Urea Nitrogen (External) 75 (H) 6 - 22 mg/dL    Creatinine (External) 4.51 (H) 0.73 - 1.18 mg/dL    BUN/Creatinine Ratio (External) 16.6 10.0 - 25.0 Ratio    Sodium (External) 138 136 - 145 mEq/L    Potassium (External) 4.0 3.5 - 5.1 meq/L    Chloride (External) 100 98 - 109 meq/L    CO2 (External) 23 20 - 29 meq/L    Anion Gap (External) 19 6 - 20 meq/L    Calcium (External) 7.1 (L) 8.5 - 10.5 mg/dL    GFR Estimated (External) 13 (L) >60 ml/min/1.73m2     OS cPRA   Date Value Ref Range Status   06/08/2021 66  Final     OS CPRA   Date Value Ref Range Status   05/10/2023 33  Final

## 2023-05-30 ENCOUNTER — TELEPHONE (OUTPATIENT)
Dept: TRANSPLANT | Facility: CLINIC | Age: 68
End: 2023-05-30
Payer: MEDICARE

## 2023-05-30 DIAGNOSIS — D64.9 ANEMIA, UNSPECIFIED TYPE: ICD-10-CM

## 2023-05-30 DIAGNOSIS — Z94.0 KIDNEY TRANSPLANTED: Primary | ICD-10-CM

## 2023-05-30 NOTE — TELEPHONE ENCOUNTER
Radu Bunch MD Sveiven, Sara, RN  Shari,     Can you please order a liver ultrasound with elastography.  Not sure if he can get that done in [place] or [place].  Otherwise, at some point do it here.     [place]      OUTCOME:    Patient is obtaining labs this week.  Will attempt to send lab orders to [facility].    [facility] unable to do elastography.  Will schedule MR elastography liver to be done at [facility].  Patient aware.    Shari Talley RN   Transplant Coordinator  [phone]    Imaging scheduled for Thursday June 29th arrival 3:30pm, 4pm apt      NPO 6 hours, apt is 45 min    Patient notified via my chart.    Shari Talley RN   Transplant Coordinator  [phone]

## 2023-06-02 ENCOUNTER — TELEPHONE (OUTPATIENT)
Dept: ANESTHESIOLOGY | Facility: CLINIC | Age: 68
End: 2023-06-02
Payer: MEDICARE

## 2023-06-02 NOTE — TELEPHONE ENCOUNTER
I called left patient a message that as a new med spine consult or any consult visits for Dr Evans she do not have any openings until July 13 next available.    If he wants to reschedule for the next available than he can call back and we can reschedule for July with Dr Evans    Patient discharged from the hospital 5/27/23-

## 2023-06-02 NOTE — TELEPHONE ENCOUNTER
[facility] Call Center    Phone Message    May a detailed message be left on voicemail: yes     Reason for Call: Other: Patient is calling regarding switching the appointment to the 29th. Please call back when available.      Action Taken: Other: Pain    Travel Screening: Not Applicable

## 2023-06-23 ASSESSMENT — ENCOUNTER SYMPTOMS
COUGH DISTURBING SLEEP: 0
POOR WOUND HEALING: 0
SHORTNESS OF BREATH: 0
LOSS OF CONSCIOUSNESS: 0
HYPERTENSION: 1
NUMBNESS: 1
WHEEZING: 0
BACK PAIN: 1
STIFFNESS: 1
EYE WATERING: 0
HEADACHES: 0
ARTHRALGIAS: 1
SLEEP DISTURBANCES DUE TO BREATHING: 0
ORTHOPNEA: 0
HYPOTENSION: 0
HEMOPTYSIS: 0
SEIZURES: 0
POSTURAL DYSPNEA: 0
MUSCLE CRAMPS: 1
MEMORY LOSS: 0
COUGH: 0
NAIL CHANGES: 1
LIGHT-HEADEDNESS: 0
PARALYSIS: 0
MUSCLE WEAKNESS: 1
EYE IRRITATION: 1
DISTURBANCES IN COORDINATION: 1
SPEECH CHANGE: 0
EYE PAIN: 0
LEG PAIN: 1
NECK PAIN: 1
MYALGIAS: 1
TREMORS: 0
EXERCISE INTOLERANCE: 0
DYSPNEA ON EXERTION: 0
SPUTUM PRODUCTION: 0
TINGLING: 0
SWOLLEN GLANDS: 0
EYE REDNESS: 0
BRUISES/BLEEDS EASILY: 0
DOUBLE VISION: 0
DIZZINESS: 0
SNORES LOUDLY: 1
JOINT SWELLING: 0
SYNCOPE: 0
WEAKNESS: 1
PALPITATIONS: 0
SKIN CHANGES: 0

## 2023-06-27 ENCOUNTER — OFFICE VISIT (OUTPATIENT)
Dept: ANESTHESIOLOGY | Facility: CLINIC | Age: 68
End: 2023-06-27
Attending: PHYSICIAN ASSISTANT
Payer: MEDICARE

## 2023-06-27 ENCOUNTER — TELEPHONE (OUTPATIENT)
Dept: ANESTHESIOLOGY | Facility: CLINIC | Age: 68
End: 2023-06-27

## 2023-06-27 ENCOUNTER — TELEPHONE (OUTPATIENT)
Dept: PHYSICAL MEDICINE AND REHAB | Facility: CLINIC | Age: 68
End: 2023-06-27

## 2023-06-27 VITALS
DIASTOLIC BLOOD PRESSURE: 94 MMHG | HEIGHT: 68 IN | HEART RATE: 67 BPM | BODY MASS INDEX: 39.86 KG/M2 | SYSTOLIC BLOOD PRESSURE: 166 MMHG | WEIGHT: 263 LBS | OXYGEN SATURATION: 98 %

## 2023-06-27 DIAGNOSIS — I69.30 SEQUELA, POST-STROKE: Primary | ICD-10-CM

## 2023-06-27 DIAGNOSIS — M48.061 SPINAL STENOSIS OF LUMBAR REGION, UNSPECIFIED WHETHER NEUROGENIC CLAUDICATION PRESENT: ICD-10-CM

## 2023-06-27 PROBLEM — E66.812 CLASS 2 SEVERE OBESITY DUE TO EXCESS CALORIES WITH SERIOUS COMORBIDITY IN ADULT (H): Status: ACTIVE | Noted: 2023-06-27

## 2023-06-27 PROBLEM — E66.01 CLASS 2 SEVERE OBESITY DUE TO EXCESS CALORIES WITH SERIOUS COMORBIDITY IN ADULT (H): Status: ACTIVE | Noted: 2023-06-27

## 2023-06-27 PROCEDURE — 99204 OFFICE O/P NEW MOD 45 MIN: CPT | Mod: GC | Performed by: ANESTHESIOLOGY

## 2023-06-27 RX ORDER — BACLOFEN 10 MG/1
10 TABLET ORAL 3 TIMES DAILY
Qty: 90 TABLET | Refills: 0 | Status: SHIPPED | OUTPATIENT
Start: 2023-06-27 | End: 2023-07-24

## 2023-06-27 ASSESSMENT — ENCOUNTER SYMPTOMS
BRUISES/BLEEDS EASILY: 0
COUGH: 0
NECK PAIN: 1
EYE PAIN: 0
HEADACHES: 0
SPEECH CHANGE: 0
EYE REDNESS: 0
SPUTUM PRODUCTION: 0
BACK PAIN: 1
SEIZURES: 0
ORTHOPNEA: 0
MEMORY LOSS: 0
SHORTNESS OF BREATH: 0
MYALGIAS: 1
PALPITATIONS: 0
WHEEZING: 0
DIZZINESS: 0
TREMORS: 0
LOSS OF CONSCIOUSNESS: 0
TINGLING: 0
HEMOPTYSIS: 0
DOUBLE VISION: 0
WEAKNESS: 1

## 2023-06-27 ASSESSMENT — PAIN SCALES - GENERAL: PAINLEVEL: MODERATE PAIN (5)

## 2023-06-27 NOTE — TELEPHONE ENCOUNTER
----- Message from Maryse Evans MD sent at 6/27/2023  1:05 PM CDT -----  Regarding: RE: updates  Thank you for explaining availability for clinic evaluation. This morning I d/w patient about coming to the [facility] for care. The drive does not bother him at all. He came 2 hours early and his providers locally have convinced him to get care with us.   VG  ----- Message -----  From: Zohra Bolanos, RN  Sent: 6/27/2023  12:31 PM CDT  To: Alejandra Ramos MD; Aylin Owens MD; #  Subject: updates                                          My chart review indicates the stroke was Dx 8/12/2020 but probably began in July 2020 (was Dx with leg heaviness but never w/u for stroke at that time)  he lives 2.5 hours away, so I will call him to discuss likelihood of committing to ongoing PMR care in [facility] vs closer to home in [facility] or [facility].    Out next available with Dr Owens for consult is Aug 7 or 28 if he can come to [facility] for a 0740 am appt or October 2 for a mid day appt with either Dr Owens or Bhavik Tolbert.    I'll let you know what his preference is.    Zohra Bolanos, RN on 6/27/2023 at 12:31 PM      ----- Message -----  From: Maryse Evans MD  Sent: 6/27/2023  11:05 AM CDT  To: Alejandra Ramos MD; Aylin Owens MD; #    I did not share with the patient that I will discuss the case with you. He is schedule for a lumbar epidural steroid injection to help with low back pain.     Please feel free to touch base with the patient directly. I am running to procedures and trying to call him now. If he does not answer I will leave a voicemail message to expect a call from you.     VG    ----- Message -----  From: Alejandra Ramos MD  Sent: 6/27/2023  10:39 AM CDT  To: Aylin Owens MD; Zohra Bolanos, RN; #    Thank you Dr. Evans. I truly appreciate your collaboration (this is a dream come true).    I would start with BOTOX first and then consider Baclofen trials. With Baclofen pumps (if  considered), it affects LE more than UE and this can result in him loosing his ambulation (which would not be his goal).    Please hold off Baclofen trial and ship him over to me so I can evaluate his spasticity and if he is a good Baclofen candidate, I will definitely reach out to you    Please give me a buzz if any questions.    Take care   ----- Message -----  From: Maryse Evans MD  Sent: 6/27/2023  10:31 AM CDT  To: Alejandra Ramos MD; Aylin Owens MD    Dear Richar,     I saw a patient today who is a stroke survivor with right LE increased tone. He is able to ambulate but with increasing tightness in the right LE. I am trialling baclofen now. But is this a case where botox might be offered? What should I put in the referral next time he comes to the clinic?    VG

## 2023-06-27 NOTE — TELEPHONE ENCOUNTER
I called the patient and I told him wait 2 days and call and make appointment if he don't hear anything call back. He can take the next available appointment that they have. Before or after the injection. Because they have there own schedule

## 2023-06-27 NOTE — NURSING NOTE
Patient presents with:  Consult: Consult Lower Back Pain      Moderate Pain (5)     Pain Medications     Analgesics Other Refills Start End     acetaminophen (TYLENOL) 500 MG tablet          Sig - Route: Take 1,000 mg by mouth daily - Oral    Class: Historical    Salicylates Refills Start End     aspirin 81 MG EC tablet          Sig - Route: Take 81 mg by mouth daily - Oral    Class: Historical          What medications are you using for pain? Ty gwyn    (New patients only) Have you been seen by another pain clinic/ provider? yes    (Return Patients only) What refills are you needing today? No    Expectations more options

## 2023-06-27 NOTE — PATIENT INSTRUCTIONS
Medications:    baclofen (LIORESAL) 10 MG tablet- Take 1 tablet (10 mg) by mouth 3 times daily    For refills of medications - You MUST call (Or MyChart) the clinic DIRECTLY and at least 7 days before you run out of your medication.      Referrals:    External Physical Therapy referral placed.       Procedures:    Call to schedule your procedure: [phone] option #2    L5-S1 epidural steroid injection with fluoroscopy (right> left)    Your pre-procedure instructions are below, please call our clinic if you have any questions.      Recommended Follow up:      4-6 weeks after the injection.       To speak with a nurse, schedule/reschedule/cancel a clinic appointment, or request a medication refill call: [phone].    You can also reach us by [employer]: [email or web address]    Procedure Information related to COVID-19     Please call [phone] option #2 to schedule, reschedule, or cancel your procedure appointment.   Phones are answered Monday - Friday from 08:00 - 4:30pm.  Leave a voicemail with your name, birth date, and phone number if no one is available to take your call.        You no longer need to test for COVID- 19 prior to your procedure/surgery, unless your physician specifically requests that you test. If you experience COVID symptoms or have tested positive for COVID-19 within 14 days of your scheduled surgery or procedure, please update our office right away and your procedure may have to be postponed.       The procedure center staff will call you several days before the procedure to review important information that you will need to know for the day of the procedure.     Please contact the clinic if you have further questions about this information [phone].        Information related to Scheduling and Pre-Procedure Instructions:    If you must reschedule your procedure more than two times, you must follow up in clinic before rescheduling again.    Preparing for your  procedure    CAUTION - FAILURE TO FOLLOW THESE PRE-PROCEDURE INSTRUCTIONS WILL RESULT IN YOUR PROCEDURE BEING RESCHEDULED.    Your Procedure:L5-S1 epidural steroid injection with fluoroscopy (right> left)            You must have a  take you home after your procedure. Transportation by taxi or para-transit is okay as long as you have a responsible adult accompany you. You must provide your 's full name and contact number at time of check in.     Fasting Protocol (Local) Please have nothing to eat or drink 1 hour prior to arrival.       Medications If you take any medications, DO NOT STOP. Take your medications as usual the day of your procedure with a sip of water AT LEAST 2 HOURS PRIOR TO ARRIVAL.    Antibiotics If you are currently taking antibiotics, you must complete the entire dose 7 days prior to your scheduled procedure. You must be clear of any signs or symptoms of infection. If you begin antibiotics, please contact our clinic for instructions.     Fever, Chills, or Rash If you experience a fever of higher than 100 degrees, chills, rash, or open wounds during the one week before your procedure, please call the clinic to see if you may proceed with your procedure.      Medication Hold List  **Patients under Cardiology/Neurology care should consult their provider prior to the pain procedure to verify pre-procedure medication instructions. The information below contains general guidelines.**      Blood Thinners If you are taking daily ASPIRIN, PLAVIX, OR OTHER BLOOD THINNERS SUCH AS COUMADIN/WARFARIN, we will need your prescribing doctor to sign a release permitting you to stop these medications. Once approved by your prescribing doctor - STOP ALL BLOOD THINNERS BASED ON THE TIME TABLE BELOW PRIOR TO YOUR PROCEDURE. If you have been instructed to stop WARFARIN(COUMADIN), you must have an INR lab drawn the day before your procedure. . Your INR must be within normal limits before we can perform  your injection. MEDICATIONS CAN BE RESTARTED AFTER YOUR PROCEDURE.    14 DAY HOLD  Ticlid (ticlopidine)    10 DAY HOLD  Effient (Prasugel)    3 DAY HOLD  Xarelto (rivaroxaban) 7 DAY HOLD  Anacin, Bufferin, Ecotrin, Excedrin, Aggrenox (Aspirin)  Brilinta (ticagrelor)  Coumadin (Warfarin)  Pradexa (Dabigatran)  Elmiron (Pentosan)  Plavix (Clopidogrel Bisulfate)  Pletal (Cilostazol)    24 HOUR HOLD  Lovenox (enoxaparin)  Agrylin (Anagrelide)        Non-steroidal Anti-inflammatories (NSAIDs) DO NOT TAKE any non-steroidal anti-inflammatory medications (NSAIDs) listed on the table below. MEDICATIONS CAN BE RESTARTED AFTER YOUR PROCEDURE. Celebrex is OK to take and does not need to be discontinued.     Medications to stop:  3 DAY HOLD  Advil, Motrin (Ibuprofen)  Arthrotec (diciofenac sodium/misoprostol)  Clinoril (Sulindac)  Indocin (Indomethacin)  Lodine (Etodolac)  Toradol (Ketorolac)  Vicoprofen (Hydrocodone and Ibuprofen)  Voltaren (Diclofenac)  Apixaban (Eliquis)    14 DAY HOLD  Daypro (Oxaprozin)  Feldene (Piroxicam)   7 DAY HOLD  Aleve (Naproxen sodium)  Darvon compound (contains aspirin)  Naprosyn (Naproxen)  Norgesic Forte (contains aspirin)  Mobic (Meloxicam)  Oruvall (Ketoprofen)  Percodan (contains aspirin)  Relafen (Nabumetone)  Salsalate  Trilisate  Vitamin E (more than 400 mg per day)  Any medication containing aspirin                To speak with a nurse, schedule/reschedule/cancel a clinic appointment, or request a medication refill call: [phone]    You can also reach us by [employer]: [email or web address]

## 2023-06-27 NOTE — PROGRESS NOTES
Pain Clinic New Patient Consult Note:    Referring Provider: Rosa   Primary care provider: Jaquan Narvaez Micky Squires is a 68 year old y.o. old male who presents to the pain clinic with low back pain.    HPI:  Patient Supplied Answers To the  Pain Questionnaire      6/23/2023     9:10 AM    Pain -  Patient Entered Questionnaire/Answers   What number best describes your pain right now:  0 = No pain  to  10 = Worst pain imaginable 4   How would you describe the pain sharp    dull, aching   Which of the following worsen your pain sitting   Which of the following improve or reduce your pain lying down   What number best describes your average pain for the past week:  0 = No pain  to  10 = Worst pain imaginable 4   What number best describes your LOWEST pain in past 24 hours:  0 = No pain  to  10 = Worst pain imaginable 2   What number best describes your WORST pain in past 24 hours:  0 = No pain  to  10 = Worst pain imaginable 4   When is your pain worst Constant   What non-medicine treatments have you already had for your pain physical therapy    chiropractic care     He has had low back pain for many years. The pain is on the right low back below the belt line. It is a stabbing pain with walking. The pain does not radiate down his leg however his right leg does get 'pins and needles' and feels 'tight' this is the whole leg. Both the low back pain and the leg sensations have been worse since his stroke in 2020. His presenting symptom from his stroke was weakness in his right leg, this was initially misdiagnosed as coming from his back. Pain is aggravated by standing and walking. Relieved by rest, sitting, lying down. He does have weakness in his right leg that he feels is getting worse, he denies bowel or bladder incontinence. He was on tramadol 100mg BID, he uses a medication tray religiously and was short two months in a row. Thinks his son's friend may have taken them.  His PCP is willing to  continue prescribing but he doesn't want to take it anymore. He stopped taking this a few months ago.     He has been in the hospital twice in the past few months (March and May) for hematomas (anterior abdominal wall and left leg). They are attributing it to him being on warfarin. Both bleeds were non-traumatic. He has since stopped his warfarin.     He has previously been seen at the pain clinic in [place].  He reports having epidural steroid injections that worked for 3 to 6 months, he also reports having radiofrequency ablations of his low back that were effective, however when he went back he repeated the diagnostic blocks and these were not helpful.    Pain treatments:    Herbal medicines: No  Physical therapy: Yes  Chiropractor: Yes  Pain physician: A long time ago  Surgery: None  Injections: epidurals, RFAs (helpful), facet injections    Tests/Imaging reviewed with the patient:  5/9/23 MR LUMBAR SPINE WITHOUT CONTRAST  IMPRESSION:   1.  Summary:  Progression of disc degeneration at L4-5 with similar appearing   dorsal annular fissure, disc bulge and disc protrusion with increased   subarticular recess stenosis and impingement of the bilateral transiting L5   nerve roots.   Similar-appearing transiting narrowing at the L3-4 level with contact of the   bilateral L4 nerve roots.   New pseudarthrosis of the L3-4 spinous processes without edema.     2.  Spinal canal:  No significant stenosis.     3.  Neural foramen:  New moderate to severe right L4-5 foraminal stenosis with   likely impingement of the right L4 ganglion.     4.  Facet joint:  New marked inflammatory changes about the bilateral L3-4   facets with reactive edema extending into the left L4 pedicle.        Significant Medical History:   Past Medical History:   Diagnosis Date     BK viremia      Depression      End stage kidney disease (H)      Gout      History of blood transfusion     2004     Hypertension secondary to other renal disorders       IgA nephropathy      Immunosuppressed status (H)      Kidney replaced by transplant      Kidney transplant complication      Metabolic acidosis      VANESA (obstructive sleep apnea)     CPAP     Overweight      Rotator cuff dysfunction      Vitamin D deficiency           Past Surgical History:  Past Surgical History:   Procedure Laterality Date     IMPLANT PROSTHESIS PENIS INFLATABLE N/A 10/4/2016    Procedure: IMPLANT PROSTHESIS PENIS INFLATABLE;  Surgeon: Jaquan Matthew MD;  Location: [facility] OR     IR RENAL BIOPSY RIGHT  6/8/2021     ORTHOPEDIC SURGERY      RTK 09/15     PERCUTANEOUS BIOPSY KIDNEY Right 3/12/2019    Procedure: Right Kidney Biopsy;  Surgeon: Ash Benitez MD;  Location: [facility] OR     TRANSPLANT KIDNEY RECIPIENT LIVING UNRELATED            Family History:  Family History   Problem Relation Age of Onset     Kidney Disease No family hx of           Social History:  Social History     Socioeconomic History     Marital status:      Spouse name: Not on file     Number of children: Not on file     Years of education: Not on file     Highest education level: Not on file   Occupational History     Not on file   Tobacco Use     Smoking status: Former     Types: Cigarettes     Smokeless tobacco: Never     Tobacco comments:     Quit 2004   Substance and Sexual Activity     Alcohol use: Yes     Comment: social     Drug use: No     Sexual activity: Yes     Partners: Female     Birth control/protection: None   Other Topics Concern     Parent/sibling w/ CABG, MI or angioplasty before 65F 55M? Not Asked   Social History Narrative     Not on file     Social Determinants of Health     Financial Resource Strain: Not on file   Food Insecurity: Not on file   Transportation Needs: Not on file   Physical Activity: Not on file   Stress: Not on file   Social Connections: Not on file   Intimate Partner Violence: Not on file   Housing Stability: Not on file     Social History     Social History Narrative     Not on  file          Allergies:  No Known Allergies    Current Medications:   Current Outpatient Medications   Medication Sig Dispense Refill     acetaminophen (TYLENOL) 500 MG tablet Take 1,000 mg by mouth daily       allopurinol (ZYLOPRIM) 100 MG tablet Take 3 tablets (300 mg) by mouth daily 30 tablet      aspirin 81 MG EC tablet Take 81 mg by mouth daily       atorvastatin (LIPITOR) 20 MG tablet Take 1 tablet by mouth       Calcium 500-2.5 MG-MCG CHEW Take 1 tablet by mouth daily 90 tablet 0     carvedilol 12.5 MG PO tablet Take 1 tablet (12.5 mg) by mouth 2 times daily (with meals) 90 tablet 3     cholecalciferol (VITAMIN D) 1000 UNIT tablet Take 1 tablet (1,000 Units) by mouth daily 90 tablet 3     clonazePAM (KLONOPIN) 0.5 MG tablet Take 0.25 mg by mouth every evening       clotrimazole (LOTRIMIN) 1 % external cream        ferrous sulfate (FEROSUL) 325 (65 Fe) MG tablet Take 1 tablet (325 mg) by mouth daily (with breakfast) 90 tablet 0     Fexofenadine HCl (ALLEGRA PO) Take 1 tablet by mouth daily.        furosemide (LASIX) 20 MG tablet Take 40 mg by mouth 2 times daily       gabapentin (NEURONTIN) 300 MG capsule Take 1 capsule (300 mg) by mouth At Bedtime 270 capsule 3     losartan (COZAAR) 25 MG tablet Take 1 tablet (25 mg) by mouth daily 90 tablet 3     Multiple Vitamins-Minerals (ONE-A-DAY MENS 50+ ADVANTAGE PO) Take 1 tablet by mouth every evening       mycophenolate (GENERIC EQUIVALENT) 250 MG capsule Take 3 capsules (750 mg) by mouth 2 times daily 180 capsule 11     omega-3 fatty acids (FISH OIL) 1200 MG capsule Take 1 capsule by mouth 3 times daily. 180 capsule 11     pantoprazole (PROTONIX) 40 MG EC tablet TAKE 1 TABLET BY MOUTH DAILY 30 tablet 1     polyethylene glycol (MIRALAX) 17 GM/Dose powder Take 1 packet by mouth       predniSONE (DELTASONE) 5 MG tablet Take 1 tablet (5 mg) by mouth daily 90 tablet 3     senna-docusate (SENOKOT-S/PERICOLACE) 8.6-50 MG tablet Take 2 tablets by mouth       sodium  bicarbonate 650 MG tablet Take 2 tablets (1,300 mg) by mouth 2 times daily 360 tablet 3     sulfamethoxazole-trimethoprim (BACTRIM) 400-80 MG tablet Take 1 tablet by mouth Every Mon, Wed, Fri Morning 13 tablet 11     verapamil ER (CALAN-SR) 180 MG CR tablet Take 1 tablet (180 mg) by mouth daily (Patient taking differently: Take 240 mg by mouth daily) 90 tablet 0          Current Pain Medications:  Medications related to Pain Management (From now, onward)    None      Gabapentin 300 mg HS (RLS)  Acetminophen BID  Clonazepam 0.25 mg HS (RLS)    Past Pain Medications:  Tramadol    Blood thinner:  None    Work History:    Current work status: Retired - worked for [employer]      Review of Systems:  Review of Systems   Eyes: Negative for double vision, pain and redness.   Respiratory: Negative for cough, hemoptysis, sputum production, shortness of breath and wheezing.    Cardiovascular: Negative for chest pain, palpitations and orthopnea.   Musculoskeletal: Positive for back pain, myalgias and neck pain.   Skin: Positive for itching and rash.   Neurological: Positive for weakness. Negative for dizziness, tingling, tremors, speech change, seizures, loss of consciousness and headaches.   Endo/Heme/Allergies: Does not bruise/bleed easily.   Psychiatric/Behavioral: Negative for memory loss.   All other systems reviewed and are negative.    Answers for HPI/ROS submitted by the patient on 6/23/2023  General Symptoms: No  Skin Symptoms: Yes  HENT Symptoms: No  EYE SYMPTOMS: Yes  HEART SYMPTOMS: Yes  LUNG SYMPTOMS: Yes  INTESTINAL SYMPTOMS: No  URINARY SYMPTOMS: No  REPRODUCTIVE SYMPTOMS: No  SKELETAL SYMPTOMS: Yes  BLOOD SYMPTOMS: Yes  NERVOUS SYSTEM SYMPTOMS: Yes  MENTAL HEALTH SYMPTOMS: No  Changes in hair: No  Changes in moles/birth marks: No  Changes in nails: Yes  Acne: No  Change in facial hair: No  Warts: No  Non-healing sores: No  Scarring: No  Flaking of skin: Yes  Color changes of hands/feet in cold : No  Sun  "sensitivity: No  Skin thickening: No  Vision loss: No  Dry eyes: Yes  Watery eyes: No  Eye bulging: No  Flashing of lights: No  Spots: No  Floaters: Yes  Crossed eyes: No  Tunnel Vision: No  Yellowing of eyes: No  Eye irritation: Yes  Pain in legs with walking: Yes  Fingers or toes appear blue: No  High blood pressure: Yes  Low blood pressure: No  Fainting: No  Murmurs: No  Pacemaker: No  Varicose veins: No  Wake up at night with shortness of breath: No  Light-headedness: No  Exercise intolerance: No  Snoring: Yes  Difficulty breathing on exertion: No  Nighttime Cough: No  Difficulty breathing when lying flat: No  Swollen joints: No  Joint pain: Yes  Bone pain: No  Muscle cramps: Yes  Muscle weakness: Yes  Joint stiffness: Yes  Bone fracture: No  Edema or swelling: Yes  Anemia: Yes  Swollen glands: No  Trouble with coordination: Yes  Difficulty walking: Yes  Paralysis: No  Numbness: Yes        Physical Exam:     Vitals:    06/27/23 0812   BP: (!) 166/94   BP Location: Right arm   Patient Position: Chair   Cuff Size: Adult Large   Pulse: 67   SpO2: 98%   Weight: 119.3 kg (263 lb)   Height: 1.727 m (5' 8\")       General Appearance: No distress, seated comfortably  Mood: Euthymic  HE ENT: Non constricted pupils  Respiratory: Non labored breathing  CVS: Regular rate and rhythm  GI: Soft, non distended, no TTP  Skin: No rashes over exposed skin  Neuro: Increased tone in his right lower extremity.   Gait: antalgic    Lumbar Spine Exam:    There are not tender points identified in the lumbar paraspinal musculature  There are not trigger points identified in the lumbar paraspinal musculature    Range of Motion Flexion: normal     Extension: normal     Rotation: normal     Side-bending: normal    There IS exacerbation of the patient's typical pain with rotation and extension of the lumbar spine to the right side       Left  Right  Straight leg raise Negative Negative      Lower Extremity Manual Muscle Testing    Motor (0 to " 5 scale):        Right         Left    Hip flexion (L1/2)            5              5    Knee extension (L2,3,4)  5              5    Ankle dorsiflexion (L4/5)             5              5    Long toe extension (L5)              5              5    Ankle plantar flexion(S1)            5              5    Reflexes (0 to 4 scale):    Right    Left    Patellar (L4)              3           2    Achilles (S1)              2           2    Clonus:    Right sided clonus    Sensation:    Light touch: intact            Laboratory results:  Recent Labs   Lab Test 06/05/23  0736 05/31/23  0908 05/23/23  0735 05/10/23  1159 08/16/21  0828 06/08/21  1007   NA  --   --   --  146*  --  140   POTASSIUM  --   --   --  4.1  --  4.5   CHLORIDE 108 103   < > 108*   < > 107   CO2  --   --   --  26  --  28   ANIONGAP  --   --   --  12  --  6   GLC  --   --   --  119*  --  131*   BUN  --   --   --  40.2*  --  53*   CR  --   --   --  3.35*  --  2.92*   GEGE  --   --   --  8.3*  --  9.1    < > = values in this interval not displayed.       CBC RESULTS:   Recent Labs   Lab Test 05/10/23  1159   WBC 11.0   RBC 3.55*   HGB 10.6*   HCT 33.9*   MCV 96   MCH 29.9   MCHC 31.3*   RDW 15.7*            Imaging:       ASSESSMENT AND PLAN:     Encounter Diagnosis:  Lumbar spondylosis  Lumbar radiculopathy    Gurjit Squires is a 68 year old y.o. old male who presents to the pain clinic with chronic right sided low back pain.     I have summarized the patient s past medical history, discussed their clinical findings and the potential differential diagnosis with the patient. Significant past medical history pertinent to the patient s current condition includes s/p kidney transplant, CKD stage 4, VANESA, CVA, obesity, HTN, paroxysmal atrial fibrillation.  The clinical findings reveal right sided low back pain that is most consistent with lumbar spondylosis vs lumbar radiculopathy. His recent MRI shows a subarticular recess stenosis and  impingement of the bilateral transiting L5 nerve roots.  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 The differential diagnosis discussed with the patient are listed above. I have discussed anatomy and possible sources of the pain using models and/or pictures (diagrams). I have discussed multi- disciplinary pain management options withthe patient as pertaining to their case as detailed above. The pain management options we discussed included, but were not limited to the recommendations below.  I also discussed with patient the risks, benefits and alternatives to each pain management option.  All of the patient s questions and concerns were answered to the best of my ability.    RECOMMENDATIONS:     1. Medications: We are prescribing the patient baclofen. Dosing, side effects, risks/benefits/alternatives were discussed with the patient in detail.    Trial of baclofen 10 mg.  Start by taking this only at night as it may make you tired, and then titrate up to 3 times a day if helpful.    2. Procedure: We are scheduling the patient for L4-5 IL SANCHO in the procedure suite. Risks/benefits/alternatives were discussed.     I also discussed with the patient that the possible risks involved with interventional treatment included, but are not limited to, no pain control, worsened pain, stroke,seizure, spinal headache, allergic reactions, introduction of infection or bleeding which may lead to emergent spine surgery, nerve damage, paralysis oreven death.    3. Physical therapy: I have refered the patient for outpatient physical therapy for stretching, strengthening and home exercise program for low back pain. The patient will also discuss spine care and posture. Pool therapy and stretches can be considered if available.    Follow up: 4-6 weeks after your injection    This patient was seen and discussed with the attending physician.    Chrissy Preciado,  MD  Chronic Pain Fellow   [facility]     ATTENDING ATTESTATION  I saw the patient along with the pain medicine fellow Dr. Chrissy Preciado. Please see her note above for full details. I was involved in gathering history, physical examination and development of the plan of care. I discussed the case with Dr. Alejandra Herrera. I called the patient that Dr. Herrera office will be reaching out to him to coordinate an appointment.                                                             Answers for HPI/ROS submitted by the patient on 6/23/2023  General Symptoms: No  Skin Symptoms: Yes  HENT Symptoms: No  EYE SYMPTOMS: Yes  HEART SYMPTOMS: Yes  LUNG SYMPTOMS: Yes  INTESTINAL SYMPTOMS: No  URINARY SYMPTOMS: No  REPRODUCTIVE SYMPTOMS: No  SKELETAL SYMPTOMS: Yes  BLOOD SYMPTOMS: Yes  NERVOUS SYSTEM SYMPTOMS: Yes  MENTAL HEALTH SYMPTOMS: No  Changes in hair: No  Changes in moles/birth marks: No  Changes in nails: Yes  Acne: No  Change in facial hair: No  Warts: No  Non-healing sores: No  Scarring: No  Flaking of skin: Yes  Color changes of hands/feet in cold : No  Sun sensitivity: No  Skin thickening: No  Vision loss: No  Dry eyes: Yes  Watery eyes: No  Eye bulging: No  Flashing of lights: No  Spots: No  Floaters: Yes  Crossed eyes: No  Tunnel Vision: No  Yellowing of eyes: No  Eye irritation: Yes  Pain in legs with walking: Yes  Fingers or toes appear blue: No  High blood pressure: Yes  Low blood pressure: No  Fainting: No  Murmurs: No  Pacemaker: No  Varicose veins: No  Wake up at night with shortness of breath: No  Light-headedness: No  Exercise intolerance: No  Snoring: Yes  Difficulty breathing on exertion: No  Nighttime Cough: No  Difficulty breathing when lying flat: No  Swollen joints: No  Joint pain: Yes  Bone pain: No  Muscle cramps: Yes  Muscle weakness: Yes  Joint stiffness: Yes  Bone fracture: No  Edema or swelling: Yes  Anemia: Yes  Swollen glands: No  Trouble with coordination: Yes  Difficulty  walking: Yes  Paralysis: No  Numbness: Yes

## 2023-06-27 NOTE — TELEPHONE ENCOUNTER
[facility] Call Center    Phone Message    May a detailed message be left on voicemail: yes     Reason for Call: Other: Patient is wondering if he should wait to see the doctor he is being referred to by Dr. Evans, patient unsure of provider's name, before or after his injection. Please review.     Action Taken: Message routed to:  [facility] ([facility]): Pain    Travel Screening: Not Applicable

## 2023-06-27 NOTE — NURSING NOTE
LPN read through the instructions with pt for the recommended procedure:     L5-S1 epidural steroid injection with fluoroscopy (right> left)      Pt verbalized understanding to holding appropriate medication per protocol, and was agreeable to NPO policy and needing a .    Anticoagulant: None identified per provider.     Recommended follow up: 4-6 weeks after the procedure.     Ashley Weber LPN

## 2023-06-27 NOTE — TELEPHONE ENCOUNTER
Patient is scheduled for surgery with Dr. Evans    Spoke with: patient    Date of Surgery: 07/13/23    [place] [facility] [place]formed patient they will need an adult  yes    Additional comments: Patient is aware of date and time of the procedure.        Talya Kinney MA on 6/27/2023 at 10:02 AM

## 2023-06-27 NOTE — TELEPHONE ENCOUNTER
Pt opted for earliest appt with Dr Owens on Monday 8/7 at 0740  am stating he will drive to [facility] the day before this appt.    Pt was given Rx from Dr Evans today to start trial of baclofen tablets 10 mg, titrate up to TID.    eval to review possibility of Botox injections    Carlos has planned SANCHO with Dr Evans on 7/13/23    Zohra Bolanos, RN on 6/27/2023 at 2:18 PM     independent

## 2023-06-27 NOTE — LETTER
6/27/2023       RE: Gurjit Squires  [address]  [address]  [facility] [address]       Dear Colleague,    Thank you for referring your patient, Gurjit Squires, to the [facility] CLINIC FOR COMPREHENSIVE PAIN MANAGEMENT [place] at [facility]. Please see a copy of my visit note below.    Pain Clinic New Patient Consult Note:    Referring Provider: Rosa   Primary care provider: Jaquan Narvaez    Gurjit Squires is a 68 year old y.o. old male who presents to the pain clinic with low back pain.    HPI:  Patient Supplied Answers To the  Pain Questionnaire      6/23/2023     9:10 AM    Pain -  Patient Entered Questionnaire/Answers   What number best describes your pain right now:  0 = No pain  to  10 = Worst pain imaginable 4   How would you describe the pain sharp    dull, aching   Which of the following worsen your pain sitting   Which of the following improve or reduce your pain lying down   What number best describes your average pain for the past week:  0 = No pain  to  10 = Worst pain imaginable 4   What number best describes your LOWEST pain in past 24 hours:  0 = No pain  to  10 = Worst pain imaginable 2   What number best describes your WORST pain in past 24 hours:  0 = No pain  to  10 = Worst pain imaginable 4   When is your pain worst Constant   What non-medicine treatments have you already had for your pain physical therapy    chiropractic care     He has had low back pain for many years. The pain is on the right low back below the belt line. It is a stabbing pain with walking. The pain does not radiate down his leg however his right leg does get 'pins and needles' and feels 'tight' this is the whole leg. Both the low back pain and the leg sensations have been worse since his stroke in 2020. His presenting symptom from his stroke was weakness in his right leg, this was initially misdiagnosed as coming from his back. Pain is  aggravated by standing and walking. Relieved by rest, sitting, lying down. He does have weakness in his right leg that he feels is getting worse, he denies bowel or bladder incontinence. He was on tramadol 100mg BID, he uses a medication tray religiously and was short two months in a row. Thinks his son's friend may have taken them.  His PCP is willing to continue prescribing but he doesn't want to take it anymore. He stopped taking this a few months ago.     He has been in the hospital twice in the past few months (March and May) for hematomas (anterior abdominal wall and left leg). They are attributing it to him being on warfarin. Both bleeds were non-traumatic. He has since stopped his warfarin.     He has previously been seen at the pain clinic in [place].  He reports having epidural steroid injections that worked for 3 to 6 months, he also reports having radiofrequency ablations of his low back that were effective, however when he went back he repeated the diagnostic blocks and these were not helpful.    Pain treatments:    Herbal medicines: No  Physical therapy: Yes  Chiropractor: Yes  Pain physician: A long time ago  Surgery: None  Injections: epidurals, RFAs (helpful), facet injections    Tests/Imaging reviewed with the patient:  5/9/23 MR LUMBAR SPINE WITHOUT CONTRAST  IMPRESSION:   1.  Summary:  Progression of disc degeneration at L4-5 with similar appearing   dorsal annular fissure, disc bulge and disc protrusion with increased   subarticular recess stenosis and impingement of the bilateral transiting L5   nerve roots.   Similar-appearing transiting narrowing at the L3-4 level with contact of the   bilateral L4 nerve roots.   New pseudarthrosis of the L3-4 spinous processes without edema.     2.  Spinal canal:  No significant stenosis.     3.  Neural foramen:  New moderate to severe right L4-5 foraminal stenosis with   likely impingement of the right L4 ganglion.     4.  Facet joint:  New marked  inflammatory changes about the bilateral L3-4   facets with reactive edema extending into the left L4 pedicle.        Significant Medical History:   Past Medical History:   Diagnosis Date    BK viremia     Depression     End stage kidney disease (H)     Gout     History of blood transfusion     2004    Hypertension secondary to other renal disorders     IgA nephropathy     Immunosuppressed status (H)     Kidney replaced by transplant     Kidney transplant complication     Metabolic acidosis     VANESA (obstructive sleep apnea)     CPAP    Overweight     Rotator cuff dysfunction     Vitamin D deficiency           Past Surgical History:  Past Surgical History:   Procedure Laterality Date    IMPLANT PROSTHESIS PENIS INFLATABLE N/A 10/4/2016    Procedure: IMPLANT PROSTHESIS PENIS INFLATABLE;  Surgeon: Jaquan Matthew MD;  Location: [facility] OR    IR RENAL BIOPSY RIGHT  6/8/2021    ORTHOPEDIC SURGERY      RTK 09/15    PERCUTANEOUS BIOPSY KIDNEY Right 3/12/2019    Procedure: Right Kidney Biopsy;  Surgeon: Ash Benitez MD;  Location: [facility] OR    TRANSPLANT KIDNEY RECIPIENT LIVING UNRELATED            Family History:  Family History   Problem Relation Age of Onset    Kidney Disease No family hx of           Social History:  Social History     Socioeconomic History    Marital status:      Spouse name: Not on file    Number of children: Not on file    Years of education: Not on file    Highest education level: Not on file   Occupational History    Not on file   Tobacco Use    Smoking status: Former     Types: Cigarettes    Smokeless tobacco: Never    Tobacco comments:     Quit 2004   Substance and Sexual Activity    Alcohol use: Yes     Comment: social    Drug use: No    Sexual activity: Yes     Partners: Female     Birth control/protection: None   Other Topics Concern    Parent/sibling w/ CABG, MI or angioplasty before 65F 55M? Not Asked   Social History Narrative    Not on file     Social Determinants of Health      Financial Resource Strain: Not on file   Food Insecurity: Not on file   Transportation Needs: Not on file   Physical Activity: Not on file   Stress: Not on file   Social Connections: Not on file   Intimate Partner Violence: Not on file   Housing Stability: Not on file     Social History     Social History Narrative    Not on file          Allergies:  No Known Allergies    Current Medications:   Current Outpatient Medications   Medication Sig Dispense Refill    acetaminophen (TYLENOL) 500 MG tablet Take 1,000 mg by mouth daily      allopurinol (ZYLOPRIM) 100 MG tablet Take 3 tablets (300 mg) by mouth daily 30 tablet     aspirin 81 MG EC tablet Take 81 mg by mouth daily      atorvastatin (LIPITOR) 20 MG tablet Take 1 tablet by mouth      Calcium 500-2.5 MG-MCG CHEW Take 1 tablet by mouth daily 90 tablet 0    carvedilol 12.5 MG PO tablet Take 1 tablet (12.5 mg) by mouth 2 times daily (with meals) 90 tablet 3    cholecalciferol (VITAMIN D) 1000 UNIT tablet Take 1 tablet (1,000 Units) by mouth daily 90 tablet 3    clonazePAM (KLONOPIN) 0.5 MG tablet Take 0.25 mg by mouth every evening      clotrimazole (LOTRIMIN) 1 % external cream       ferrous sulfate (FEROSUL) 325 (65 Fe) MG tablet Take 1 tablet (325 mg) by mouth daily (with breakfast) 90 tablet 0    Fexofenadine HCl (ALLEGRA PO) Take 1 tablet by mouth daily.       furosemide (LASIX) 20 MG tablet Take 40 mg by mouth 2 times daily      gabapentin (NEURONTIN) 300 MG capsule Take 1 capsule (300 mg) by mouth At Bedtime 270 capsule 3    losartan (COZAAR) 25 MG tablet Take 1 tablet (25 mg) by mouth daily 90 tablet 3    Multiple Vitamins-Minerals (ONE-A-DAY MENS 50+ ADVANTAGE PO) Take 1 tablet by mouth every evening      mycophenolate (GENERIC EQUIVALENT) 250 MG capsule Take 3 capsules (750 mg) by mouth 2 times daily 180 capsule 11    omega-3 fatty acids (FISH OIL) 1200 MG capsule Take 1 capsule by mouth 3 times daily. 180 capsule 11    pantoprazole (PROTONIX) 40 MG EC  tablet TAKE 1 TABLET BY MOUTH DAILY 30 tablet 1    polyethylene glycol (MIRALAX) 17 GM/Dose powder Take 1 packet by mouth      predniSONE (DELTASONE) 5 MG tablet Take 1 tablet (5 mg) by mouth daily 90 tablet 3    senna-docusate (SENOKOT-S/PERICOLACE) 8.6-50 MG tablet Take 2 tablets by mouth      sodium bicarbonate 650 MG tablet Take 2 tablets (1,300 mg) by mouth 2 times daily 360 tablet 3    sulfamethoxazole-trimethoprim (BACTRIM) 400-80 MG tablet Take 1 tablet by mouth Every Mon, Wed, Fri Morning 13 tablet 11    verapamil ER (CALAN-SR) 180 MG CR tablet Take 1 tablet (180 mg) by mouth daily (Patient taking differently: Take 240 mg by mouth daily) 90 tablet 0          Current Pain Medications:  Medications related to Pain Management (From now, onward)      None        Gabapentin 300 mg HS (RLS)  Acetminophen BID  Clonazepam 0.25 mg HS (RLS)    Past Pain Medications:  Tramadol    Blood thinner:  None    Work History:    Current work status: Retired - worked for [employer]      Review of Systems:  Review of Systems   Eyes: Negative for double vision, pain and redness.   Respiratory: Negative for cough, hemoptysis, sputum production, shortness of breath and wheezing.    Cardiovascular: Negative for chest pain, palpitations and orthopnea.   Musculoskeletal: Positive for back pain, myalgias and neck pain.   Skin: Positive for itching and rash.   Neurological: Positive for weakness. Negative for dizziness, tingling, tremors, speech change, seizures, loss of consciousness and headaches.   Endo/Heme/Allergies: Does not bruise/bleed easily.   Psychiatric/Behavioral: Negative for memory loss.   All other systems reviewed and are negative.    Answers for HPI/ROS submitted by the patient on 6/23/2023  General Symptoms: No  Skin Symptoms: Yes  HENT Symptoms: No  EYE SYMPTOMS: Yes  HEART SYMPTOMS: Yes  LUNG SYMPTOMS: Yes  INTESTINAL SYMPTOMS: No  URINARY SYMPTOMS: No  REPRODUCTIVE SYMPTOMS: No  SKELETAL SYMPTOMS: Yes  BLOOD  "SYMPTOMS: Yes  NERVOUS SYSTEM SYMPTOMS: Yes  MENTAL HEALTH SYMPTOMS: No  Changes in hair: No  Changes in moles/birth marks: No  Changes in nails: Yes  Acne: No  Change in facial hair: No  Warts: No  Non-healing sores: No  Scarring: No  Flaking of skin: Yes  Color changes of hands/feet in cold : No  Sun sensitivity: No  Skin thickening: No  Vision loss: No  Dry eyes: Yes  Watery eyes: No  Eye bulging: No  Flashing of lights: No  Spots: No  Floaters: Yes  Crossed eyes: No  Tunnel Vision: No  Yellowing of eyes: No  Eye irritation: Yes  Pain in legs with walking: Yes  Fingers or toes appear blue: No  High blood pressure: Yes  Low blood pressure: No  Fainting: No  Murmurs: No  Pacemaker: No  Varicose veins: No  Wake up at night with shortness of breath: No  Light-headedness: No  Exercise intolerance: No  Snoring: Yes  Difficulty breathing on exertion: No  Nighttime Cough: No  Difficulty breathing when lying flat: No  Swollen joints: No  Joint pain: Yes  Bone pain: No  Muscle cramps: Yes  Muscle weakness: Yes  Joint stiffness: Yes  Bone fracture: No  Edema or swelling: Yes  Anemia: Yes  Swollen glands: No  Trouble with coordination: Yes  Difficulty walking: Yes  Paralysis: No  Numbness: Yes        Physical Exam:     Vitals:    06/27/23 0812   BP: (!) 166/94   BP Location: Right arm   Patient Position: Chair   Cuff Size: Adult Large   Pulse: 67   SpO2: 98%   Weight: 119.3 kg (263 lb)   Height: 1.727 m (5' 8\")       General Appearance: No distress, seated comfortably  Mood: Euthymic  HE ENT: Non constricted pupils  Respiratory: Non labored breathing  CVS: Regular rate and rhythm  GI: Soft, non distended, no TTP  Skin: No rashes over exposed skin  Neuro: Increased tone in his right lower extremity.   Gait: antalgic    Lumbar Spine Exam:    There are not tender points identified in the lumbar paraspinal musculature  There are not trigger points identified in the lumbar paraspinal musculature    Range of Motion Flexion: " normal     Extension: normal     Rotation: normal     Side-bending: normal    There IS exacerbation of the patient's typical pain with rotation and extension of the lumbar spine to the right side       Left  Right  Straight leg raise Negative Negative      Lower Extremity Manual Muscle Testing    Motor (0 to 5 scale):        Right         Left    Hip flexion (L1/2)            5              5    Knee extension (L2,3,4)  5              5    Ankle dorsiflexion (L4/5)             5              5    Long toe extension (L5)              5              5    Ankle plantar flexion(S1)            5              5    Reflexes (0 to 4 scale):    Right    Left    Patellar (L4)              3           2    Achilles (S1)              2           2    Clonus:    Right sided clonus    Sensation:    Light touch: intact            Laboratory results:  Recent Labs   Lab Test 06/05/23  0736 05/31/23  0908 05/23/23  0735 05/10/23  1159 08/16/21  0828 06/08/21  1007   NA  --   --   --  146*  --  140   POTASSIUM  --   --   --  4.1  --  4.5   CHLORIDE 108 103   < > 108*   < > 107   CO2  --   --   --  26  --  28   ANIONGAP  --   --   --  12  --  6   GLC  --   --   --  119*  --  131*   BUN  --   --   --  40.2*  --  53*   CR  --   --   --  3.35*  --  2.92*   GEGE  --   --   --  8.3*  --  9.1    < > = values in this interval not displayed.       CBC RESULTS:   Recent Labs   Lab Test 05/10/23  1159   WBC 11.0   RBC 3.55*   HGB 10.6*   HCT 33.9*   MCV 96   MCH 29.9   MCHC 31.3*   RDW 15.7*            Imaging:       ASSESSMENT AND PLAN:     Encounter Diagnosis:  Lumbar spondylosis  Lumbar radiculopathy    Gurjit Squires is a 68 year old y.o. old male who presents to the pain clinic with chronic right sided low back pain.     I have summarized the patient s past medical history, discussed their clinical findings and the potential differential diagnosis with the patient. Significant past medical history pertinent to the patient s  current condition includes s/p kidney transplant, CKD stage 4, VANESA, CVA, obesity, HTN, paroxysmal atrial fibrillation.  The clinical findings reveal right sided low back pain that is most consistent with lumbar spondylosis vs lumbar radiculopathy. His recent MRI shows a subarticular recess stenosis and impingement of the bilateral transiting L5 nerve roots.  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 The differential diagnosis discussed with the patient are listed above. I have discussed anatomy and possible sources of the pain using models and/or pictures (diagrams). I have discussed multi- disciplinary pain management options withthe patient as pertaining to their case as detailed above. The pain management options we discussed included, but were not limited to the recommendations below.  I also discussed with patient the risks, benefits and alternatives to each pain management option.  All of the patient s questions and concerns were answered to the best of my ability.    RECOMMENDATIONS:     1. Medications: We are prescribing the patient baclofen. Dosing, side effects, risks/benefits/alternatives were discussed with the patient in detail.    Trial of baclofen 10 mg.  Start by taking this only at night as it may make you tired, and then titrate up to 3 times a day if helpful.    2. Procedure: We are scheduling the patient for L4-5 IL SANCHO in the procedure suite. Risks/benefits/alternatives were discussed.     I also discussed with the patient that the possible risks involved with interventional treatment included, but are not limited to, no pain control, worsened pain, stroke,seizure, spinal headache, allergic reactions, introduction of infection or bleeding which may lead to emergent spine surgery, nerve damage, paralysis oreven death.    3. Physical therapy: I have refered the patient for outpatient physical therapy for stretching, strengthening  and home exercise program for low back pain. The patient will also discuss spine care and posture. Pool therapy and stretches can be considered if available.    Follow up: 4-6 weeks after your injection    This patient was seen and discussed with the attending physician.    Chrissy Preciado MD  Chronic Pain Fellow   [facility]     ATTENDING ATTESTATION  I saw the patient along with the pain medicine fellow Dr. Chrissy Preciado. Please see her note above for full details. I was involved in gathering history, physical examination and development of the plan of care. I discussed the case with Dr. Alejandra Herrera. I called the patient that Dr. Herrera office will be reaching out to him to coordinate an appointment.                                                             Answers for HPI/ROS submitted by the patient on 6/23/2023  General Symptoms: No  Skin Symptoms: Yes  HENT Symptoms: No  EYE SYMPTOMS: Yes  HEART SYMPTOMS: Yes  LUNG SYMPTOMS: Yes  INTESTINAL SYMPTOMS: No  URINARY SYMPTOMS: No  REPRODUCTIVE SYMPTOMS: No  SKELETAL SYMPTOMS: Yes  BLOOD SYMPTOMS: Yes  NERVOUS SYSTEM SYMPTOMS: Yes  MENTAL HEALTH SYMPTOMS: No  Changes in hair: No  Changes in moles/birth marks: No  Changes in nails: Yes  Acne: No  Change in facial hair: No  Warts: No  Non-healing sores: No  Scarring: No  Flaking of skin: Yes  Color changes of hands/feet in cold : No  Sun sensitivity: No  Skin thickening: No  Vision loss: No  Dry eyes: Yes  Watery eyes: No  Eye bulging: No  Flashing of lights: No  Spots: No  Floaters: Yes  Crossed eyes: No  Tunnel Vision: No  Yellowing of eyes: No  Eye irritation: Yes  Pain in legs with walking: Yes  Fingers or toes appear blue: No  High blood pressure: Yes  Low blood pressure: No  Fainting: No  Murmurs: No  Pacemaker: No  Varicose veins: No  Wake up at night with shortness of breath: No  Light-headedness: No  Exercise intolerance: No  Snoring: Yes  Difficulty breathing on exertion:  No  Nighttime Cough: No  Difficulty breathing when lying flat: No  Swollen joints: No  Joint pain: Yes  Bone pain: No  Muscle cramps: Yes  Muscle weakness: Yes  Joint stiffness: Yes  Bone fracture: No  Edema or swelling: Yes  Anemia: Yes  Swollen glands: No  Trouble with coordination: Yes  Difficulty walking: Yes  Paralysis: No  Numbness: Yes          Again, thank you for allowing me to participate in the care of your patient.      Sincerely,    Maryse Evans MD

## 2023-06-28 NOTE — TELEPHONE ENCOUNTER
LPN called the pt to discuss. Pt stated that he was contacted to schedule PMR visit and he stated he wasn't sure why.   Pt stated they thought this was origionally a PT appointment but because they live so far away they would prefer to go to PT closer to home at the [facility] like they have been doing.     LPN clarified that pt was referred for External PT so they an continue Therapy at a covered location near them.     LPN Informed the pt that they would reach out to Dr. Evans for further clarification regarding the PMR referral.     Ashley Weber LPN

## 2023-06-28 NOTE — TELEPHONE ENCOUNTER
LPN reviewed instructions with the pt during their visit with Dr. Evans on 6/27/23.    Ashley Weber LPN

## 2023-06-28 NOTE — TELEPHONE ENCOUNTER
MICHAEL heard back from Dr. Evans. She stated that pt could benefit from a collaborative effort with Pain management and PMR, and discuss the pt's stroke history and muscle stiffness with the PMR providers.   LPN noted pt is scheduled for PMR on 8/7/23 and a Follow up with Dr. Evans afterward.     LPN updated pt of providers statements. Pt was satisfied and denied having other questions.     Ashley Weber LPN

## 2023-06-29 ENCOUNTER — ANCILLARY PROCEDURE (OUTPATIENT)
Dept: MRI IMAGING | Facility: CLINIC | Age: 68
End: 2023-06-29
Attending: INTERNAL MEDICINE
Payer: MEDICARE

## 2023-06-29 DIAGNOSIS — D64.9 ANEMIA, UNSPECIFIED TYPE: ICD-10-CM

## 2023-06-29 DIAGNOSIS — Z94.0 KIDNEY TRANSPLANTED: ICD-10-CM

## 2023-06-29 PROCEDURE — G1010 CDSM STANSON: HCPCS | Performed by: STUDENT IN AN ORGANIZED HEALTH CARE EDUCATION/TRAINING PROGRAM

## 2023-06-29 PROCEDURE — 74181 MRI ABDOMEN W/O CONTRAST: CPT | Mod: MG | Performed by: STUDENT IN AN ORGANIZED HEALTH CARE EDUCATION/TRAINING PROGRAM

## 2023-06-30 ENCOUNTER — TELEPHONE (OUTPATIENT)
Dept: ANESTHESIOLOGY | Facility: CLINIC | Age: 68
End: 2023-06-30
Payer: MEDICARE

## 2023-06-30 NOTE — TELEPHONE ENCOUNTER
Phoned the patient and left voice mail about date and time of the procedure. Stated to call direct line at [phone] for any questions.          Talya Kinney MA on 6/30/2023 at 1:14 PM

## 2023-07-03 DIAGNOSIS — R93.89 ABNORMAL FINDING ON IMAGING: ICD-10-CM

## 2023-07-03 DIAGNOSIS — K74.00 LIVER FIBROSIS: Primary | ICD-10-CM

## 2023-07-03 DIAGNOSIS — Z94.0 KIDNEY TRANSPLANTED: ICD-10-CM

## 2023-07-05 ENCOUNTER — TELEPHONE (OUTPATIENT)
Dept: GASTROENTEROLOGY | Facility: CLINIC | Age: 68
End: 2023-07-05
Payer: MEDICARE

## 2023-07-05 DIAGNOSIS — K74.00 LIVER FIBROSIS: Primary | ICD-10-CM

## 2023-07-05 NOTE — TELEPHONE ENCOUNTER
Labs entered and faxed to patient requested location.     Terri MCKEON LPN  Hepatology Clinic    ----------  [facility] Call Center    Phone Message    May a detailed message be left on voicemail: yes     Reason for Call: Other: Pt is schedule for a new apt with Aditi Nicholson, please send lab orders to [facility], Dr Narvaez     Action Taken: Message routed to:  [facility]ology    Travel Screening: Not Applicable

## 2023-07-09 DIAGNOSIS — E61.1 IRON DEFICIENCY: Primary | ICD-10-CM

## 2023-07-10 RX ORDER — FERROUS SULFATE 325(65) MG
325 TABLET ORAL
Qty: 90 TABLET | Refills: 0 | Status: SHIPPED | OUTPATIENT
Start: 2023-07-10 | End: 2023-10-05

## 2023-07-10 NOTE — CONFIDENTIAL NOTE
DIAGNOSIS:    Liver fibrosis   Kidney transplanted   Abnormal finding on imaging      Appt Date: 09.13.2023   NOTES STATUS DETAILS   OFFICE NOTE from referring provider Internal 07.03.2023 Radu Bunch MD   OFFICE NOTES from other specialists     DISCHARGE SUMMARY from hospital     MEDICATION LIST Internal    LIVER BIOSPY (IF APPLICABLE)      PATHOLOGY REPORTS      IMAGING     ENDOSCOPY (IF AVAILABLE)     COLONOSCOPY (IF AVAILABLE)     ULTRASOUND LIVER     CT OF ABDOMEN     MRI OF LIVER Internal 06.9.2023 MRI ABDOMEN ELASTOGRAPHY WITHOUT CONTRAST   FIBROSCAN, US ELASTOGRAPHY, FIBROSIS SCAN, MR ELASTOGRAPHY     LABS     HEPATIC PANEL (LIVER PANEL) Care Everywhere 03.26.2023   BASIC METABOLIC PANEL Care Everywhere 06.05.2023   COMPLETE METABOLIC PANEL Care Everywhere 06.05.2023   COMPLETE BLOOD COUNT (CBC) Care Everywhere 06.05.2023   INTERNATIONAL NORMALIZED RATIO (INR) Internal 05.10.2023   HEPATITIS C ANTIBODY Internal 05.10.2023   HEPATITIS C VIRAL LOAD/PCR     HEPATITIS C GENOTYPE     HEPATITIS B SURFACE ANTIGEN Internal 05.03.2023   HEPATITIS B SURFACE ANTIBODY Internal 05.03.2023   HEPATITIS B DNA QUANT LEVEL     HEPATITIS B CORE ANTIBODY Internal 05.03.2023

## 2023-07-13 ENCOUNTER — ANCILLARY PROCEDURE (OUTPATIENT)
Dept: RADIOLOGY | Facility: AMBULATORY SURGERY CENTER | Age: 68
End: 2023-07-13
Attending: ANESTHESIOLOGY
Payer: MEDICARE

## 2023-07-13 ENCOUNTER — HOSPITAL ENCOUNTER (OUTPATIENT)
Facility: AMBULATORY SURGERY CENTER | Age: 68
Discharge: HOME | End: 2023-07-13
Attending: ANESTHESIOLOGY | Admitting: ANESTHESIOLOGY
Payer: MEDICARE

## 2023-07-13 VITALS
OXYGEN SATURATION: 97 % | BODY MASS INDEX: 39.4 KG/M2 | WEIGHT: 260 LBS | TEMPERATURE: 97.2 F | DIASTOLIC BLOOD PRESSURE: 75 MMHG | HEART RATE: 58 BPM | SYSTOLIC BLOOD PRESSURE: 172 MMHG | RESPIRATION RATE: 18 BRPM | HEIGHT: 68 IN

## 2023-07-13 DIAGNOSIS — M48.061 SPINAL STENOSIS OF LUMBAR REGION, UNSPECIFIED WHETHER NEUROGENIC CLAUDICATION PRESENT: ICD-10-CM

## 2023-07-13 PROCEDURE — 62323 NJX INTERLAMINAR LMBR/SAC: CPT | Performed by: ANESTHESIOLOGY

## 2023-07-13 PROCEDURE — 62323 NJX INTERLAMINAR LMBR/SAC: CPT

## 2023-07-13 RX ORDER — BUPIVACAINE HYDROCHLORIDE 2.5 MG/ML
INJECTION, SOLUTION EPIDURAL; INFILTRATION; INTRACAUDAL PRN
Status: DISCONTINUED | OUTPATIENT
Start: 2023-07-13 | End: 2023-07-13 | Stop reason: HOSPADM

## 2023-07-13 RX ORDER — METHYLPREDNISOLONE ACETATE 40 MG/ML
INJECTION, SUSPENSION INTRA-ARTICULAR; INTRALESIONAL; INTRAMUSCULAR; SOFT TISSUE PRN
Status: DISCONTINUED | OUTPATIENT
Start: 2023-07-13 | End: 2023-07-13 | Stop reason: HOSPADM

## 2023-07-13 RX ORDER — LIDOCAINE HYDROCHLORIDE 10 MG/ML
INJECTION, SOLUTION EPIDURAL; INFILTRATION; INTRACAUDAL; PERINEURAL PRN
Status: DISCONTINUED | OUTPATIENT
Start: 2023-07-13 | End: 2023-07-13 | Stop reason: HOSPADM

## 2023-07-13 RX ORDER — IOPAMIDOL 408 MG/ML
INJECTION, SOLUTION INTRATHECAL PRN
Status: DISCONTINUED | OUTPATIENT
Start: 2023-07-13 | End: 2023-07-13 | Stop reason: HOSPADM

## 2023-07-13 NOTE — OP NOTE
Lumbar Epidural Steroid Injection    Procedure:  1. Lumbar epidural steroid injection at right L5-S1  2. Fluoroscopy for needle guidance    Pre-operative Diagnosis:  1. Intervertebral disc disorders w radiculopathy, lumbosacral region  2. Other intervertebral disc degeneration, lumbosacral region    Post-Operative Diagnosis:  1. Intervertebral disc disorders w radiculopathy, lumbosacral region  2. Other intervertebral disc degeneration, lumbosacral region    Anesthesia:  Local anesthesia    Medical Indications:  The patient presents to the ambulatory surgery center today for the treatment of persistent pain. We believe that the pain is spinally mediated. The patient has been exhibiting symptoms consistent with lumbar intraspinal inflammation and radiculopathy. Symptoms have been persistent, disabling and intermittently severe. The patient has been evaluated in the pain clinic and scheduled today for a spinal injection to aid in the diagnosis and treatment of presumed spinal pain. The risks, benefits, potential complications, and alternatives were discussed with the patient as per the signed consent form, and informed consent was obtained. The patient has received information about the procedure and its risks. The physician personally confirmed the procedure, side, and site to be injected with the patient and marked the site in the pre-procedure area.    Description of Procedure:  An additional intraoperative timeout, specifically to confirm accurate localization, was conducted by the attending physician. The patient remained awake and alert throughout the procedure. The patient was placed in the prone position. The skin was prepped with chlorhexidine and sterile drapes were applied. The skin and soft tissues were anesthetized with lidocaine 1%. A 22 gauge 3.5 inch touhy needle epidural needle was inserted percutaneously and advanced under fluoroscopic guidance using AP, and lateral projections. Using loss of  resistance to air and a right sided interlaminar approach, the L5/S1 posterior epidural space was entered after the lamina was contacted with the epidural needle. No paraesthesias occurred and aspiration was negative. Epidurography and radiographic interpretation: Epidurography was performed by injection of isovue 200 under live fluoroscopy. Multiple Xray images were obtained. Radiologic examination confirmed proper spread of the contrast medium within the epidural space. There was no evidence of intrathecal or intravascular runoff. The needle was injected with 40mg methylprednisolone mixed with 3 ml of 0.25% bupivacaine. The needle was removed after flushing with 1% lidocaine. A sterile bandage was applied. The patient did not experience any hemodynamic or neurologic sequelae. The patient was transferred to the recovery area. Post operative instructions were explained and given to the patient.    Complications:  No procedural or immediate post-procedural complications occurred and the patient was transferred to PACU in stable condition.    Post-Operative Plan:  The patient will monitor pain relief from today's procedure. They will call the clinic for any problems related to today's procedure. A copy of our written post-procedure instructions was provided. They will return to clinic as scheduled.

## 2023-07-13 NOTE — DISCHARGE INSTRUCTIONS
Home Care Instructions after an Epidural Steroid Pain Injection    A lumbar epidural steroid injection delivers steroid medication directly into the area that may be causing your lower back pain and/or leg pain. A cervical or thoracic epidural steroid injection delivers steroids into the epidural space surrounding spinal nerve roots to help relieve pain in the upper spine/neck.    Activity  -Rest today  -Do not work today  -Resume normal activity tomorrow  -DO NOT shower for 24 hours  -DO NOT remove bandaid for 24 hours    Pain  -You may experience soreness at the injection site for one or two days  -You may use an ice pack for 20 minutes every 2 hours for the first 24 hours  -You may use a heating pad after the first 24 hours  -You may use Tylenol (acetaminophen) every 4 hours or other pain medicines as     directed by your physician    You may experience numbness radiating into your legs or arms (depending on the procedure location). This numbness may last several hours. Until sensation returns to normal; please use caution in walking, climbing stairs, and stepping out of your vehicle, etc.    Common side effects of steroids:  Not everyone will experience corticosteroid side effects. If side effects are experienced, they will gradually subside in the 7-10 day period following an injection. Most common side effects include:  -Flushed face and/or chest  -Feeling of warmth, particularly in the face but could be an overall feeling of warmth  -Increased blood sugar in diabetic patients  -Menstrual irregularities my occur. If taking hormone-based birth control an alternate method of birth control is recommended  -Sleep disturbances and/or mood swings are possible  -Leg cramps    Please contact us if you have:  -Severe pain  -Fever more than 101.5 degrees Fahrenheit  -Signs of infection at the injection site (redness, swelling, or drainage)    FOR PAIN CENTER PATIENTS:  If you have questions, please contact the Pain  Clinic at [phone] Option #1 between the hours of 7:00 am and 3:00 pm Monday through Friday. After office hours you can contact the on call provider by dialing [phone]. If you need immediate attention, we recommend that you go to a hospital emergency room or dial [phone].      FOR PM&R PATIENTS:  For patients seen by the PM and R service, please call [phone]. (Monday through Friday 8a-5p.  After business hours and weekends call [phone] and ask for the PM and R doctor on call). If you need to fax a pain diary to PM&R the fax number is [phone]. If you are unable to fax, uploading to [employer] is encouraged, then send to provider. If you have procedure scheduling questions please call [phone] Option #2.

## 2023-07-13 NOTE — H&P
Called and unable to LVM due to VM being full. Gurjit Weeks [facility]  [phone]  male  68 year old      Reason for procedure/surgery: lumbar radiculopathy    Patient Active Problem List   Diagnosis    Testicular hypofunction    Kidney replaced by transplant    Erectile dysfunction    HTN, kidney transplant related    Immunosuppressed status (H)    Metabolic acidosis    Vitamin D deficiency    VANESA (obstructive sleep apnea)    Aftercare following organ transplant    Abrasion of anterior lower leg, left, subsequent encounter    Arthritis of knee, right    Paroxysmal atrial fibrillation (H)    Chronic bilateral low back pain without sciatica    Chronic gout    S/P total knee replacement using cement    Ischemic stroke (H)    Hypertriglyceridemia    Obese    Long term current use of anticoagulant    Osteoarthritis of carpometacarpal joint of thumb    CKD (chronic kidney disease) stage 4, GFR 15-29 ml/min (H)    Need for pneumocystis prophylaxis    Anemia in chronic renal disease    Spinal stenosis of lumbar region, unspecified whether neurogenic claudication present    Class 2 severe obesity due to excess calories with serious comorbidity in adult (H)       Past Surgical History:    Past Surgical History:   Procedure Laterality Date    IMPLANT PROSTHESIS PENIS INFLATABLE N/A 10/4/2016    Procedure: IMPLANT PROSTHESIS PENIS INFLATABLE;  Surgeon: Jaquan Matthew MD;  Location: [facility] OR    IR RENAL BIOPSY RIGHT  6/8/2021    ORTHOPEDIC SURGERY      RTK 09/15    PERCUTANEOUS BIOPSY KIDNEY Right 3/12/2019    Procedure: Right Kidney Biopsy;  Surgeon: Ash Benitez MD;  Location: [facility] OR    TRANSPLANT KIDNEY RECIPIENT LIVING UNRELATED         Past Medical History:   Past Medical History:   Diagnosis Date    BK viremia     Depression     End stage kidney disease (H)     Gout     History of blood transfusion     2004    Hypertension secondary to other renal disorders     IgA nephropathy     Immunosuppressed status (H)     Kidney replaced by transplant     Kidney  transplant complication     Metabolic acidosis     VANESA (obstructive sleep apnea)     CPAP    Overweight     Rotator cuff dysfunction     Vitamin D deficiency        Social History:   Social History     Tobacco Use    Smoking status: Former     Types: Cigarettes    Smokeless tobacco: Never    Tobacco comments:     Quit 2004   Substance Use Topics    Alcohol use: Yes     Comment: social       Family History:   Family History   Problem Relation Age of Onset    Kidney Disease No family hx of        Allergies: No Known Allergies    Active Medications:   Current Outpatient Medications   Medication Sig Dispense Refill    acetaminophen (TYLENOL) 500 MG tablet Take 1,000 mg by mouth daily      allopurinol (ZYLOPRIM) 100 MG tablet Take 3 tablets (300 mg) by mouth daily 30 tablet     aspirin 81 MG EC tablet Take 81 mg by mouth daily      atorvastatin (LIPITOR) 20 MG tablet Take 1 tablet by mouth      baclofen (LIORESAL) 10 MG tablet Take 1 tablet (10 mg) by mouth 3 times daily 90 tablet 0    carvedilol 12.5 MG PO tablet Take 1 tablet (12.5 mg) by mouth 2 times daily (with meals) 90 tablet 3    cholecalciferol (VITAMIN D) 1000 UNIT tablet Take 1 tablet (1,000 Units) by mouth daily 90 tablet 3    clonazePAM (KLONOPIN) 0.5 MG tablet Take 0.25 mg by mouth every evening      clotrimazole (LOTRIMIN) 1 % external cream       ferrous sulfate (FEROSUL) 325 (65 Fe) MG tablet Take 1 tablet (325 mg) by mouth daily (with breakfast) Please request future ferrous sulfate refills from your primary care provider, or OK to buy over the counter. 90 tablet 0    Fexofenadine HCl (ALLEGRA PO) Take 1 tablet by mouth daily.       furosemide (LASIX) 20 MG tablet Take 40 mg by mouth 2 times daily      gabapentin (NEURONTIN) 300 MG capsule Take 1 capsule (300 mg) by mouth At Bedtime 270 capsule 3    losartan (COZAAR) 25 MG tablet Take 1 tablet (25 mg) by mouth daily 90 tablet 3    Multiple Vitamins-Minerals (ONE-A-DAY MENS 50+ ADVANTAGE PO) Take 1  "tablet by mouth every evening      mycophenolate (GENERIC EQUIVALENT) 250 MG capsule Take 3 capsules (750 mg) by mouth 2 times daily 180 capsule 11    omega-3 fatty acids (FISH OIL) 1200 MG capsule Take 1 capsule by mouth 3 times daily. 180 capsule 11    pantoprazole (PROTONIX) 40 MG EC tablet TAKE 1 TABLET BY MOUTH DAILY 30 tablet 1    polyethylene glycol (MIRALAX) 17 GM/Dose powder Take 1 packet by mouth      predniSONE (DELTASONE) 5 MG tablet Take 1 tablet (5 mg) by mouth daily 90 tablet 3    senna-docusate (SENOKOT-S/PERICOLACE) 8.6-50 MG tablet Take 2 tablets by mouth daily as needed (prn)      sodium bicarbonate 650 MG tablet Take 2 tablets (1,300 mg) by mouth 2 times daily 360 tablet 3    verapamil ER (CALAN-SR) 180 MG CR tablet Take 1 tablet (180 mg) by mouth daily (Patient taking differently: Take 240 mg by mouth daily) 90 tablet 0    Calcium 500-2.5 MG-MCG CHEW Take 1 tablet by mouth daily 90 tablet 0    sulfamethoxazole-trimethoprim (BACTRIM) 400-80 MG tablet Take 1 tablet by mouth Every Mon, Wed, Fri Morning 13 tablet 11       Systemic Review:   CONSTITUTIONAL: NEGATIVE for fever, chills, change in weight  ENT/MOUTH: NEGATIVE for ear, mouth and throat problems  RESP: NEGATIVE for significant cough or SOB  CV: NEGATIVE for chest pain, palpitations or peripheral edema    Physical Examination:   Vital Signs: BP (!) 172/75 (Cuff Size: Adult Large)   Pulse 58   Temp 97.2  F (36.2  C)   Resp 18   Ht 1.727 m (5' 8\")   Wt 117.9 kg (260 lb)   SpO2 97%   BMI 39.53 kg/m    GENERAL: healthy, alert and no distress  NECK: no adenopathy, no asymmetry, masses, or scars  RESP: lungs clear to auscultation - no rales, rhonchi or wheezes  CV: regular rate and rhythm, normal S1 S2, no S3 or S4, no murmur, click or rub, no peripheral edema and peripheral pulses strong  ABDOMEN: soft, nontender, no hepatosplenomegaly, no masses and bowel sounds normal  MS: no gross musculoskeletal defects noted, no edema    Plan: " Appropriate to proceed as scheduled.      Maryse Evans MD  7/13/2023

## 2023-07-20 DIAGNOSIS — M48.061 SPINAL STENOSIS OF LUMBAR REGION, UNSPECIFIED WHETHER NEUROGENIC CLAUDICATION PRESENT: ICD-10-CM

## 2023-07-20 NOTE — TELEPHONE ENCOUNTER
Refill request    Medication: baclofen (LIORESAL) 10 MG tablet    Sig: Take 1 tablet (10 mg) by mouth 3 times daily     Dispensed: 90  Refills: 0  Last prescribed on 6/27/23    Last clinic appointment: 6/27/23  Next clinic appointment: 8/7/23      Preferred pharmacy:    [facility] [id number] [address]    Refill request routed to the provider to review.     Ashley Weber LPN

## 2023-07-24 RX ORDER — BACLOFEN 10 MG/1
10 TABLET ORAL 3 TIMES DAILY
Qty: 90 TABLET | Refills: 0 | Status: SHIPPED | OUTPATIENT
Start: 2023-07-24 | End: 2023-08-07

## 2023-08-02 DIAGNOSIS — I63.9 ISCHEMIC STROKE (H): ICD-10-CM

## 2023-08-02 DIAGNOSIS — M62.838 SPASM OF MUSCLE: ICD-10-CM

## 2023-08-02 DIAGNOSIS — G81.11 RIGHT SPASTIC HEMIPARESIS (H): Primary | ICD-10-CM

## 2023-08-02 ASSESSMENT — ENCOUNTER SYMPTOMS
POOR WOUND HEALING: 0
WHEEZING: 0
MUSCLE WEAKNESS: 1
DYSPNEA ON EXERTION: 0
ARTHRALGIAS: 1
POSTURAL DYSPNEA: 0
SHORTNESS OF BREATH: 0
SPUTUM PRODUCTION: 0
BACK PAIN: 1
HEMOPTYSIS: 0
STIFFNESS: 1
SKIN CHANGES: 0
JOINT SWELLING: 0
COUGH: 0
COUGH DISTURBING SLEEP: 0
NAIL CHANGES: 1
NECK PAIN: 0
SNORES LOUDLY: 1
MUSCLE CRAMPS: 1
MYALGIAS: 1

## 2023-08-07 ENCOUNTER — OFFICE VISIT (OUTPATIENT)
Dept: ANESTHESIOLOGY | Facility: CLINIC | Age: 68
End: 2023-08-07
Payer: MEDICARE

## 2023-08-07 ENCOUNTER — OFFICE VISIT (OUTPATIENT)
Dept: PHYSICAL MEDICINE AND REHAB | Facility: CLINIC | Age: 68
End: 2023-08-07
Payer: MEDICARE

## 2023-08-07 VITALS
HEIGHT: 68 IN | OXYGEN SATURATION: 99 % | WEIGHT: 260 LBS | DIASTOLIC BLOOD PRESSURE: 94 MMHG | HEART RATE: 61 BPM | SYSTOLIC BLOOD PRESSURE: 179 MMHG | BODY MASS INDEX: 39.4 KG/M2

## 2023-08-07 VITALS
SYSTOLIC BLOOD PRESSURE: 168 MMHG | BODY MASS INDEX: 39.53 KG/M2 | DIASTOLIC BLOOD PRESSURE: 83 MMHG | HEART RATE: 65 BPM | WEIGHT: 260 LBS | OXYGEN SATURATION: 99 %

## 2023-08-07 DIAGNOSIS — I69.30 SEQUELA, POST-STROKE: ICD-10-CM

## 2023-08-07 DIAGNOSIS — I63.9 ISCHEMIC STROKE (H): ICD-10-CM

## 2023-08-07 DIAGNOSIS — M62.838 SPASM OF MUSCLE: ICD-10-CM

## 2023-08-07 DIAGNOSIS — G81.11 RIGHT SPASTIC HEMIPARESIS (H): Primary | ICD-10-CM

## 2023-08-07 DIAGNOSIS — I69.30 SEQUELA, POST-STROKE: Primary | ICD-10-CM

## 2023-08-07 PROCEDURE — 99204 OFFICE O/P NEW MOD 45 MIN: CPT | Mod: 25 | Performed by: PHYSICAL MEDICINE & REHABILITATION

## 2023-08-07 PROCEDURE — 95874 GUIDE NERV DESTR NEEDLE EMG: CPT | Mod: GC | Performed by: PHYSICAL MEDICINE & REHABILITATION

## 2023-08-07 PROCEDURE — 64642 CHEMODENERV 1 EXTREMITY 1-4: CPT | Mod: GC | Performed by: PHYSICAL MEDICINE & REHABILITATION

## 2023-08-07 PROCEDURE — 99213 OFFICE O/P EST LOW 20 MIN: CPT | Mod: 25 | Performed by: ANESTHESIOLOGY

## 2023-08-07 ASSESSMENT — PAIN SCALES - GENERAL
PAINLEVEL: MODERATE PAIN (4)
PAINLEVEL: MODERATE PAIN (4)

## 2023-08-07 ASSESSMENT — ENCOUNTER SYMPTOMS
MYALGIAS: 1
HEMOPTYSIS: 0
SPUTUM PRODUCTION: 0
NECK PAIN: 0
BACK PAIN: 1
COUGH: 0
SHORTNESS OF BREATH: 0
WHEEZING: 0

## 2023-08-07 NOTE — PATIENT INSTRUCTIONS
Treatment planning:    Recommendations will be written in the providers note for your Primary Care provider (OR other providers in your care team) to review and make changes to your therapies based on their discretion.       Recommended Follow up:      Follow up on October 30th       To speak with a nurse, schedule/reschedule/cancel a clinic appointment, or request a medication refill call: [phone].    You can also reach us by [facility]: [email or web address]

## 2023-08-07 NOTE — NURSING NOTE
RN reviewed AVS with patient. Patient to contact clinic if any questions/concerns. Patient verbalized understanding.    Charley Boyer RNCC

## 2023-08-07 NOTE — PROGRESS NOTES
[facility] CLINIC & SURGERY CENTER    PM&R CLINIC NOTE  BOTULINUM TOXIN PROCEDURE      HPI  Chief Complaint   Patient presents with    Consult For     Back pain lower right side that travels down right leg     Gurjit Micky Squires is a RHD  68 year old male with a history of status-post kidney transplant (2004), CKD stage 4, VANESA, CVA (7/2020), obesity, HTN, paroxysmal Afib, history of bilateral rotator cuff repair, and history of bilateral knee replacement, who presents to clinic for initial consultation and botulinum toxin injections for management of right lower extremity. Mr. Squires was referred for PM&R evaluation by Dr. Evans.       In brief, Mr. Squires presented to an ED in [place] on 7/22/2020 with concerns of right leg heaviness. CT head and neck was completed, CT angiogram was deferred de to elevated creatinine, and symptoms were thought to be lumbar spine related, and he was discharged to home. Later in the week he went to see his chiropractor who had recommended that he see his provider. MRI of neck, thorax spine, and brain was the ordered, which revealed subacute infarct of left periventricular and extending into left basal ganglia. As per chart review, Mr. Squires was noted to have right leg weakness, right foot drop, unable to wiggle right toes, and numbness and lack of coordination in the right hand. He was initially followed by neurology following the stroke, but has not seen them recently.       Mr. Squires saw Dr. Evans on 6/27/2023 for management of his chronic low back pain. He was started him on a trial of baclofen 10mg TID and was referred to PT. Underwent lumbar epidural steroid injection at [place] L5-S1 (right> left) with Dr. Evans on 7/13/2023, chronic low back pain thought to be consistent with lumbar intraspinal inflammation and radiculopathy.     Today, Mr. Squires is accompanied in clinic by his wife, Jenny.     Mr. Squires  "reports that currently he feels that his RLE is weaker than the LLE, ongoing residual deficit from his prior stroke. He endorses feeling right leg stiffness and tightness after sitting or standing too long. He also reports right leg cramping, particularly in the right hamstring and calf, after prolonged inactivity or after lying in bed for a period of time. Mr. Squires also reports that since the stroke, the sensation in the right leg has been diminished. Occasionally endorses pain radiating from low back into right buttock.     Mr. Squires fells that he has increased tightness in the right hamstring and calf. As well, he notes his right foot turning inward and the big toe curling up at times. He has previously tried baclofen (Dr. Evans had trialed him on baclofen), but made him \"foggy,\" therefore was discontinued. Takes clonazepam for his history of restless leg syndrome.     He currently ambulates without the use of an assistive device, but notes tripping and foot dragging when he is not focused on his ambulation, or fatigued. Denies any recent falls. Has not had an AFO or any sort of brace for RLE. Overall, his wife feels that his walking is now worse than when he first had the stroke back in 2020.     He is currently participating in PT twice a week, but they are focusing more on the low back pain. He has also been doing pool therapy on his own.     Denies any cognitive or speech concerns status-post stroke.     Of note, he recently had unprovoked hematomas develop in the abdomen and left hamstring, and was subsequently taken off of the warfarin and aspirin. He also notes that he is currently on the inactive transplant list, may need another future kidney transplant (abdominal hematoma affected kidney, increased creatinine).    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     Is independent in all ADL and IADLs. Is driving.    Reports sleeping well, wears CPAP nightly.     Weight has " been stable, good appetite.     Mr. Squires lives with his wife in a house with a couple STEVEN (no railing). Bedroom and bathroom (walk in shower) are accessible on the main level.         RECENT LABS  Hgb A1C 5.3 1/2021  LDL 23 8/2023      IMAGING  Carotid US 8/27/2020    IMPRESSION:   1.  Focally elevated peak systolic velocities of the left internal carotid   artery without visible stenosis by grayscale evaluation.  Consider further   evaluation with CT angiogram.   2.  Normal right carotid artery system with minimal plaque disease.     Holter 8/12/2020  CONCLUSION:  The patient underwent an event monitor for 2 weeks during which there was no evidence of atrial fibrillation/atrial flutter or sustained atrial tachycardia.  If clinically indicated, implantable loop recorder is to be considered.       Echo 5/10/23  Interpretation Summary  Technically difficult study.Extremely poor acoustic windows.  Global and regional left ventricular function is normal with an EF of 60-65%.  Right ventricular function, chamber size, wall motion, and thickness are  normal.  The inferior vena cava is normal.  No pericardial effusion is present.    SINCE LAST VISIT  This is Mr. Squires's first round of Botox injections.     RESPONSE TO PREVIOUS TREATMENT  Side effects:  This is his first round of Botox injections.     N/A - Initial treatment.     Pain Improvement: Not applicable    Spasticity Improvement: Not applicable      PHYSICAL EXAM  VS: BP (!) 168/83   Pulse 65   Wt 117.9 kg (260 lb)   SpO2 99%   BMI 39.53 kg/m     GEN: Pleasant and cooperative, in no acute distress  HEENT: No facial asymmetry  EXT: 1+ pitting edema in RLE below knee, pt notes this is stable. 2-3 beats of clonus at right ankle, none at left ankle. 5/5 strength in bilateral UE and LE. No tone noted in bilateral UE. MAS 1/4 in right hamstring, gastrocnemius, and tibialis posterior.   GAIT: Clearing right foot during ambulation with circumduction and  intermittent steppage gait of RLE.       ALLERGIES  Allergies   Allergen Reactions    Baclofen Other (See Comments)     Foggy, not clear headed.        CURRENT MEDICATIONS    Current Outpatient Medications:     acetaminophen (TYLENOL) 500 MG tablet, Take 1,000 mg by mouth daily, Disp: , Rfl:     allopurinol (ZYLOPRIM) 100 MG tablet, Take 3 tablets (300 mg) by mouth daily, Disp: 30 tablet, Rfl:     aspirin 81 MG EC tablet, Take 81 mg by mouth daily, Disp: , Rfl:     atorvastatin (LIPITOR) 20 MG tablet, Take 1 tablet by mouth, Disp: , Rfl:     Calcium 500-2.5 MG-MCG CHEW, Take 1 tablet by mouth daily, Disp: 90 tablet, Rfl: 0    carvedilol 12.5 MG PO tablet, Take 1 tablet (12.5 mg) by mouth 2 times daily (with meals), Disp: 90 tablet, Rfl: 3    cholecalciferol (VITAMIN D) 1000 UNIT tablet, Take 1 tablet (1,000 Units) by mouth daily, Disp: 90 tablet, Rfl: 3    clonazePAM (KLONOPIN) 0.5 MG tablet, Take 0.25 mg by mouth every evening, Disp: , Rfl:     clotrimazole (LOTRIMIN) 1 % external cream, , Disp: , Rfl:     ferrous sulfate (FEROSUL) 325 (65 Fe) MG tablet, Take 1 tablet (325 mg) by mouth daily (with breakfast) Please request future ferrous sulfate refills from your primary care provider, or OK to buy over the counter., Disp: 90 tablet, Rfl: 0    Fexofenadine HCl (ALLEGRA PO), Take 1 tablet by mouth daily. , Disp: , Rfl:     furosemide (LASIX) 20 MG tablet, Take 40 mg by mouth 2 times daily, Disp: , Rfl:     gabapentin (NEURONTIN) 300 MG capsule, Take 1 capsule (300 mg) by mouth At Bedtime, Disp: 270 capsule, Rfl: 3    losartan (COZAAR) 25 MG tablet, Take 1 tablet (25 mg) by mouth daily, Disp: 90 tablet, Rfl: 3    Multiple Vitamins-Minerals (ONE-A-DAY MENS 50+ ADVANTAGE PO), Take 1 tablet by mouth every evening, Disp: , Rfl:     mycophenolate (GENERIC EQUIVALENT) 250 MG capsule, Take 3 capsules (750 mg) by mouth 2 times daily, Disp: 180 capsule, Rfl: 11    omega-3 fatty acids (FISH OIL) 1200 MG capsule, Take 1 capsule  by mouth 3 times daily., Disp: 180 capsule, Rfl: 11    pantoprazole (PROTONIX) 40 MG EC tablet, TAKE 1 TABLET BY MOUTH DAILY, Disp: 30 tablet, Rfl: 1    polyethylene glycol (MIRALAX) 17 GM/Dose powder, Take 1 packet by mouth, Disp: , Rfl:     predniSONE (DELTASONE) 5 MG tablet, Take 1 tablet (5 mg) by mouth daily, Disp: 90 tablet, Rfl: 3    senna-docusate (SENOKOT-S/PERICOLACE) 8.6-50 MG tablet, Take 2 tablets by mouth daily as needed (prn), Disp: , Rfl:     sodium bicarbonate 650 MG tablet, Take 2 tablets (1,300 mg) by mouth 2 times daily, Disp: 360 tablet, Rfl: 3    sulfamethoxazole-trimethoprim (BACTRIM) 400-80 MG tablet, Take 1 tablet by mouth Every Mon, Wed, Fri Morning, Disp: 13 tablet, Rfl: 11    verapamil ER (CALAN-SR) 180 MG CR tablet, Take 1 tablet (180 mg) by mouth daily (Patient taking differently: Take 240 mg by mouth daily), Disp: 90 tablet, Rfl: 0    Current Facility-Administered Medications:     botulinum toxin type A (BOTOX) 100 units injection 400 Units, 400 Units, Intramuscular, Q90 Days, Aylin Owens MD       BOTULINUM NEUROTOXIN INJECTION PROCEDURES    VERIFICATION OF PATIENT IDENTIFICATION AND PROCEDURE     Initials   Patient Name KG   Patient  KG   Procedure Verified by: KG     Prior to the start of the procedure and with procedural staff participation, I verbally confirmed the patient s identity using two indicators, relevant allergies, that the procedure was appropriate and matched the consent or emergent situation, and that the correct equipment/implants were available. Immediately prior to starting the procedure I conducted the Time Out with the procedural staff and re-confirmed the patient s name, procedure, and site/side. (The Joint Commission universal protocol was followed.)  Yes    Sedation (Moderate or Deep): None    ABOVE ASSESSMENTS PERFORMED BY  Resident/Fellow: Grace Ahmadi MD  The attending provider was present for the entire procedure documented below.    Aylin Owens  MD      INDICATIONS FOR PROCEDURES  Gurjit Squires is a 68 year old patient with right lower extremity spasticity secondary to the diagnosis of previous CVA. His baseline symptoms have been recalcitrant to oral medications and conservative therapy. He is here today for injection with Botox.    GOAL OF PROCEDURE  The goal of this procedure is to increase active range of motion, improve volitional motor control, decrease pain , and enhance functional independence.      TOTAL DOSE ADMINISTERED  Dose Administered:  100 units  Botox (Botulinum Toxin Type A)       2:1 Dilution   Unavoidable Drug Waste: No  Diluent Used:  Preservative Free Normal Saline  Total Volume of Diluent Used:  2 ml  Lot # [id number] with Expiration Date: 11/2025  NDC #: Botox 100u ([address]-72)      CONSENT  The risks, benefits, and treatment options were discussed with Gurjit Squires and he agreed to proceed.    Written consent was obtained by KG.     EQUIPMENT USED  Needle-37mm stimulating/recording  (For the next round of Botox injections, will plan to use longer needle for RLE injections)    SKIN PREPARATION  Skin preparation was performed using an alcohol wipe.    GUIDANCE DESCRIPTION  Electro-myographic guidance was necessary throughout the procedure to accurately identify all areas of spastic muscles while avoiding injection of non-spastic muscles, neighboring nerves and nearby vascular structures.     AREA/MUSCLE INJECTED   1.  RIGHT LOWER EXTREMITY- total of 100 units Botox = Total Dose, 2:1 Dilution      Right gastrocnemius - 40 units of Botox at 2 site/s.      Right hamstring - 30 units of Botox at 2 site/s.      Right tibialis posterior - 20 units of Botox at 1 site/s.      Right EHL - 10 units of Botox at 1 site/s.       RESPONSE TO PROCEDURE   Gurjit Squires tolerated the procedure well and there were no immediate complications. He was allowed to recover for an appropriate period of time and was discharged home  "in stable condition.    ASSESSMENT AND PLAN   Spasticity: Spasticity in the RLE. Previously tried baclofen, but did not tolerate, reported feeling \"foggy,\" and was discontinued. Would not recommend trialing further PO medications to address localized spasticity in the RLE. Discussed trialing botulinum toxin injections today, and he was in agreement with plan. Discussed trying at least 3 rounds of injections to optimize dose. Will have him monitor his foot drop, gait, RLE cramps and tightness/tone and report back at next follow-up.   Botulinum toxin injections: First round of Botox injections today to right gastrocnemius, hamstring, tibialis posterior, and EHL, total of 100 units (see above). Patient will continue to monitor response and report at next appointment. For the next round of Botox injections, will plan to use longer needle for RLE injections.  Therapies: Continue with pool therapy. Continue with HEP for stretching and ROM of RLE. Placed order for PT for trial of bracing for AFO. May be helpful to work with PT to trial some off the shelf AFOs for right foot drop.    Referrals: None.  Follow up: Gurjit Squires was rescheduled for the next series of injections in 12 weeks, at which time we will evaluate response to today's injections. he may call the clinic prior with any questions or concerns prior to the next appointment.           Grace Ahmadi MD  Resident Physician, PGY-4  Physical Medicine & Rehabilitation  [facility]    Patient was seen and discussed with staff attending, Dr. Aylin Owens.      Physician Attestation   I, Aylin Owens MD, saw this patient and agree with the findings and plan of care as documented in the note.      Items personally reviewed/procedural attestation: notes in the chart and agree with the interpretation documented in the note and I was present for and supervised the entire procedure.    Aylin Owens MD        "

## 2023-08-07 NOTE — PATIENT INSTRUCTIONS
Continue with physical therapy for both your low back pain as well as range of motion, stretching, and strengthening of the right lower extremity.   We have placed an order for physical therapy to trial braces for the right foot drop (AFO-ankle foot orthosis).   Completed first round of Botox injections to right lower extremity today. Please monitor your spasms, right lower extremity tightness, and foot drop.  Return to clinic in approx 12 weeks for next series of injections. Please call the clinic with any questions or concerns.

## 2023-08-07 NOTE — LETTER
8/7/2023       RE: Gurjit Squires  [address]  [address]  [place] [address]       Dear Colleague,    Thank you for referring your patient, Gurjit Squires, to the [facility] CLINIC FOR COMPREHENSIVE PAIN MANAGEMENT [place] at [facility]. Please see a copy of my visit note below.    Pain clinic follow up note    HPI:   Gurjit Squires is a 68 year old year old male  who presents to the pain clinic with back and right leg pain, s/p botox injections with morning.     Patient Supplied Answers To the  Pain Questionnaire      8/2/2023     9:21 AM   [facility] -  Patient Entered Questionnaire/Answers   What number best describes your pain right now:  0 = No pain  to  10 = Worst pain imaginable 3   How would you describe the pain cramping    dull, aching   Which of the following worsen your pain walking   Which of the following improve or reduce your pain lying down    sitting    medication   What number best describes your average pain for the past week:  0 = No pain  to  10 = Worst pain imaginable 3   What number best describes your LOWEST pain in past 24 hours:  0 = No pain  to  10 = Worst pain imaginable 1   What number best describes your WORST pain in past 24 hours:  0 = No pain  to  10 = Worst pain imaginable 4   When is your pain worst AM   What non-medicine treatments have you already had for your pain pain clinic    physical therapy    acupuncture    chiropractic care    TENS (electrical stimulator)    spine injections (shots)    exercise             Carlos is a 68-year-old male with ongoing low back and right leg pain presenting to the clinic with his wife.  The patient states that the epidural steroid injection completed last month helped for about a week.  He was able to wake up from bed without much discomfort.  The patient feels that the effects of the injection dissipated in about 1 week.  He received a series of Botox  injections in his right lower leg by Dr. Owens this morning.  He would like to observe the effects.  They are also working on an ankle brace for the patient.  The patient was not able to tolerate baclofen as it made him feel foggy    ROS:  Review of Systems   Respiratory:  Negative for cough, hemoptysis, sputum production, shortness of breath and wheezing.    Musculoskeletal:  Positive for back pain and myalgias. Negative for neck pain.   Skin:  Positive for itching. Negative for rash.   All other systems reviewed and are negative.        Significant Medical History:   Past Medical History:   Diagnosis Date    BK viremia     Depression     End stage kidney disease (H)     Gout     History of blood transfusion     2004    Hypertension secondary to other renal disorders     IgA nephropathy     Immunosuppressed status (H)     Kidney replaced by transplant     Kidney transplant complication     Metabolic acidosis     VANESA (obstructive sleep apnea)     CPAP    Overweight     Rotator cuff dysfunction     Vitamin D deficiency           Past Surgical History:  Past Surgical History:   Procedure Laterality Date    IMPLANT PROSTHESIS PENIS INFLATABLE N/A 10/4/2016    Procedure: IMPLANT PROSTHESIS PENIS INFLATABLE;  Surgeon: Jaquan Matthew MD;  Location: [facility] OR    INJECT EPIDURAL LUMBAR N/A 7/13/2023    Procedure: L5-S1 epidural steroid injection with fluoroscopy (right> left);  Surgeon: Maryse Evans MD;  Location: [facility] OR    IR RENAL BIOPSY RIGHT  6/8/2021    ORTHOPEDIC SURGERY      RTK 09/15    PERCUTANEOUS BIOPSY KIDNEY Right 3/12/2019    Procedure: Right Kidney Biopsy;  Surgeon: Ash Benitez MD;  Location: [facility] OR    TRANSPLANT KIDNEY RECIPIENT LIVING UNRELATED            Family History:  Family History   Problem Relation Age of Onset    Kidney Disease No family hx of           Social History:  Social History     Socioeconomic History    Marital status:      Spouse name: Not on file    Number of children:  Not on file    Years of education: Not on file    Highest education level: Not on file   Occupational History    Not on file   Tobacco Use    Smoking status: Former     Types: Cigarettes    Smokeless tobacco: Never    Tobacco comments:     Quit 2004   Substance and Sexual Activity    Alcohol use: Yes     Comment: social    Drug use: No    Sexual activity: Yes     Partners: Female     Birth control/protection: None   Other Topics Concern    Parent/sibling w/ CABG, MI or angioplasty before 65F 55M? Not Asked   Social History Narrative    Not on file     Social Determinants of Health     Financial Resource Strain: Not on file   Food Insecurity: Not on file   Transportation Needs: Not on file   Physical Activity: Not on file   Stress: Not on file   Social Connections: Not on file   Intimate Partner Violence: Not on file   Housing Stability: Not on file     Social History     Social History Narrative    Not on file          Allergies:  Allergies   Allergen Reactions    Baclofen Other (See Comments)     Foggy, not clear headed.        Current Medications:   Current Outpatient Medications   Medication Sig Dispense Refill    acetaminophen (TYLENOL) 500 MG tablet Take 1,000 mg by mouth daily      allopurinol (ZYLOPRIM) 100 MG tablet Take 3 tablets (300 mg) by mouth daily 30 tablet     atorvastatin (LIPITOR) 20 MG tablet Take 1 tablet by mouth      Calcium 500-2.5 MG-MCG CHEW Take 1 tablet by mouth daily 90 tablet 0    carvedilol 12.5 MG PO tablet Take 1 tablet (12.5 mg) by mouth 2 times daily (with meals) 90 tablet 3    cholecalciferol (VITAMIN D) 1000 UNIT tablet Take 1 tablet (1,000 Units) by mouth daily 90 tablet 3    clonazePAM (KLONOPIN) 0.5 MG tablet Take 0.25 mg by mouth every evening      clotrimazole (LOTRIMIN) 1 % external cream       ferrous sulfate (FEROSUL) 325 (65 Fe) MG tablet Take 1 tablet (325 mg) by mouth daily (with breakfast) Please request future ferrous sulfate refills from your primary care provider,  "or OK to buy over the counter. 90 tablet 0    Fexofenadine HCl (ALLEGRA PO) Take 1 tablet by mouth daily.       furosemide (LASIX) 20 MG tablet Take 40 mg by mouth 2 times daily      gabapentin (NEURONTIN) 300 MG capsule Take 1 capsule (300 mg) by mouth At Bedtime 270 capsule 3    losartan (COZAAR) 25 MG tablet Take 1 tablet (25 mg) by mouth daily 90 tablet 3    Multiple Vitamins-Minerals (ONE-A-DAY MENS 50+ ADVANTAGE PO) Take 1 tablet by mouth every evening      mycophenolate (GENERIC EQUIVALENT) 250 MG capsule Take 3 capsules (750 mg) by mouth 2 times daily 180 capsule 11    omega-3 fatty acids (FISH OIL) 1200 MG capsule Take 1 capsule by mouth 3 times daily. 180 capsule 11    pantoprazole (PROTONIX) 40 MG EC tablet TAKE 1 TABLET BY MOUTH DAILY 30 tablet 1    predniSONE (DELTASONE) 5 MG tablet Take 1 tablet (5 mg) by mouth daily 90 tablet 3    sodium bicarbonate 650 MG tablet Take 2 tablets (1,300 mg) by mouth 2 times daily 360 tablet 3    sulfamethoxazole-trimethoprim (BACTRIM) 400-80 MG tablet Take 1 tablet by mouth Every Mon, Wed, Fri Morning 13 tablet 11    verapamil ER (CALAN-SR) 180 MG CR tablet Take 1 tablet (180 mg) by mouth daily (Patient taking differently: Take 240 mg by mouth daily) 90 tablet 0    aspirin 81 MG EC tablet Take 81 mg by mouth daily      polyethylene glycol (MIRALAX) 17 GM/Dose powder Take 1 packet by mouth      senna-docusate (SENOKOT-S/PERICOLACE) 8.6-50 MG tablet Take 2 tablets by mouth daily as needed (prn)             Physical Exam:     BP (!) 179/94 (BP Location: Right arm, Patient Position: Chair, Cuff Size: Adult Large)   Pulse 61   Ht 1.727 m (5' 8\")   Wt 117.9 kg (260 lb)   SpO2 99%   BMI 39.53 kg/m      General Appearance: No distress, seated comfortably  Mood: Euthymic  HE ENT: Non constricted pupils  Respiratory: Non labored breathing      ASSESSMENT AND PLAN:     Encounter Diagnosis:  Low back pain  Right leg muscle spasms  History of stroke  Chronic anticoagulation   "     Gurjit Squires is a 68 year old year old male  who presents to the pain clinic with return of discomfort in the lower back and right leg, s/p Botox injections    RECOMMENDATIONS:     1. Medications: Discontinue baclofen due to side effect    2. Procedure: We will holdoff further injections and monitor the patient's response to Botox.  In the future transforaminal epidural steroid injection can be considered    Follow up: 10/30/2023 after the appointment with Dr. Owens.             Answers submitted by the patient for this visit:  Symptoms you have experienced in the last 30 days (Submitted on 8/2/2023)  General Symptoms: No  Skin Symptoms: Yes  HENT Symptoms: No  EYE SYMPTOMS: No  HEART SYMPTOMS: No  LUNG SYMPTOMS: Yes  INTESTINAL SYMPTOMS: No  URINARY SYMPTOMS: No  REPRODUCTIVE SYMPTOMS: No  SKELETAL SYMPTOMS: Yes  BLOOD SYMPTOMS: No  NERVOUS SYSTEM SYMPTOMS: No  MENTAL HEALTH SYMPTOMS: No   (Submitted on 8/2/2023)  Changes in hair: No  Changes in moles/birth marks: No  Changes in nails: Yes  Acne: No  Change in facial hair: No  Warts: No  Non-healing sores: No  Scarring: No  Flaking of skin: Yes  Color changes of hands/feet in cold : No  Sun sensitivity: No  Skin thickening: No  Please answer the questions below to tell us what condition you are experiencing: (Submitted on 8/2/2023)  Snoring: Yes  Difficulty breathing on exertion: No  Nighttime Cough: No  Difficulty breathing when lying flat: No  Please answer the questions below to tell us what condition you are experiencing: (Submitted on 8/2/2023)  Swollen joints: No  Joint pain: Yes  Bone pain: No  Muscle cramps: Yes  Muscle weakness: Yes  Joint stiffness: Yes  Bone fracture: No      Again, thank you for allowing me to participate in the care of your patient.      Sincerely,    Maryse Evans MD

## 2023-08-07 NOTE — LETTER
8/7/2023       RE: Gurjit Squires  [address]  [address]  [facility] [address]       Dear Colleague,    Thank you for referring your patient, Gurjit Squires, to the [facility] PHYSICAL MEDICINE AND REHABILITATION CLINIC [place] at [facility]. Please see a copy of my visit note below.      [facility] - CLINIC & SURGERY CENTER    PM&R CLINIC NOTE  BOTULINUM TOXIN PROCEDURE      HPI  Chief Complaint   Patient presents with    Consult For     Back pain lower right side that travels down right leg     Gurjit Squires is a RHD  68 year old male with a history of status-post kidney transplant (2004), CKD stage 4, VANESA, CVA (7/2020), obesity, HTN, paroxysmal Afib, history of bilateral rotator cuff repair, and history of bilateral knee replacement, who presents to clinic for initial consultation and botulinum toxin injections for management of right lower extremity. Mr. Squires was referred for PM&R evaluation by Dr. Evans.       In brief, Mr. Squires presented to an ED in [place] on 7/22/2020 with concerns of right leg heaviness. CT head and neck was completed, CT angiogram was deferred de to elevated creatinine, and symptoms were thought to be lumbar spine related, and he was discharged to home. Later in the week he went to see his chiropractor who had recommended that he see his provider. MRI of neck, thorax spine, and brain was the ordered, which revealed subacute infarct of left periventricular and extending into left basal ganglia. As per chart review, Mr. Squires was noted to have right leg weakness, right foot drop, unable to wiggle right toes, and numbness and lack of coordination in the right hand. He was initially followed by neurology following the stroke, but has not seen them recently.       Mr. Squires saw Dr. Evans on 6/27/2023 for management of his chronic low back  "pain. He was started him on a trial of baclofen 10mg TID and was referred to PT. Underwent lumbar epidural steroid injection at [place] L5-S1 (right> left) with Dr. Evans on 7/13/2023, chronic low back pain thought to be consistent with lumbar intraspinal inflammation and radiculopathy.     Today, Mr. Squires is accompanied in clinic by his wife, Jenny.     Mr. Squires reports that currently he feels that his RLE is weaker than the LLE, ongoing residual deficit from his prior stroke. He endorses feeling right leg stiffness and tightness after sitting or standing too long. He also reports right leg cramping, particularly in the right hamstring and calf, after prolonged inactivity or after lying in bed for a period of time. Mr. Squires also reports that since the stroke, the sensation in the right leg has been diminished. Occasionally endorses pain radiating from low back into right buttock.     Mr. Squires fells that he has increased tightness in the right hamstring and calf. As well, he notes his right foot turning inward and the big toe curling up at times. He has previously tried baclofen (Dr. Evans had trialed him on baclofen), but made him \"foggy,\" therefore was discontinued. Takes clonazepam for his history of restless leg syndrome.     He currently ambulates without the use of an assistive device, but notes tripping and foot dragging when he is not focused on his ambulation, or fatigued. Denies any recent falls. Has not had an AFO or any sort of brace for RLE. Overall, his wife feels that his walking is now worse than when he first had the stroke back in 2020.     He is currently participating in PT twice a week, but they are focusing more on the low back pain. He has also been doing pool therapy on his own.     Denies any cognitive or speech concerns status-post stroke.     Of note, he recently had unprovoked hematomas develop in the abdomen and left hamstring, and was subsequently taken off of the warfarin and " aspirin. He also notes that he is currently on the inactive transplant list, may need another future kidney transplant (abdominal hematoma affected kidney, increased creatinine).    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     Is independent in all ADL and IADLs. Is driving.    Reports sleeping well, wears CPAP nightly.     Weight has been stable, good appetite.     Mr. Squires lives with his wife in a house with a couple STEVEN (no railing). Bedroom and bathroom (walk in shower) are accessible on the main level.         RECENT LABS  Hgb A1C 5.3 1/2021  LDL 23 8/2023      IMAGING  Carotid US 8/27/2020    IMPRESSION:   1.  Focally elevated peak systolic velocities of the left internal carotid   artery without visible stenosis by grayscale evaluation.  Consider further   evaluation with CT angiogram.   2.  Normal right carotid artery system with minimal plaque disease.     Holter 8/12/2020  CONCLUSION:  The patient underwent an event monitor for 2 weeks during which there was no evidence of atrial fibrillation/atrial flutter or sustained atrial tachycardia.  If clinically indicated, implantable loop recorder is to be considered.       Echo 5/10/23  Interpretation Summary  Technically difficult study.Extremely poor acoustic windows.  Global and regional left ventricular function is normal with an EF of 60-65%.  Right ventricular function, chamber size, wall motion, and thickness are  normal.  The inferior vena cava is normal.  No pericardial effusion is present.    SINCE LAST VISIT  This is Mr. Squires's first round of Botox injections.     RESPONSE TO PREVIOUS TREATMENT  Side effects:  This is his first round of Botox injections.     N/A - Initial treatment.     Pain Improvement: Not applicable    Spasticity Improvement: Not applicable      PHYSICAL EXAM  VS: BP (!) 168/83   Pulse 65   Wt 117.9 kg (260 lb)   SpO2 99%   BMI 39.53 kg/m     GEN: Pleasant and cooperative, in no acute  distress  HEENT: No facial asymmetry  EXT: 1+ pitting edema in RLE below knee, pt notes this is stable. 2-3 beats of clonus at right ankle, none at left ankle. 5/5 strength in bilateral UE and LE. No tone noted in bilateral UE. MAS 1/4 in right hamstring, gastrocnemius, and tibialis posterior.   GAIT: Clearing right foot during ambulation with circumduction and intermittent steppage gait of RLE.       ALLERGIES  Allergies   Allergen Reactions    Baclofen Other (See Comments)     Foggy, not clear headed.        CURRENT MEDICATIONS    Current Outpatient Medications:     acetaminophen (TYLENOL) 500 MG tablet, Take 1,000 mg by mouth daily, Disp: , Rfl:     allopurinol (ZYLOPRIM) 100 MG tablet, Take 3 tablets (300 mg) by mouth daily, Disp: 30 tablet, Rfl:     aspirin 81 MG EC tablet, Take 81 mg by mouth daily, Disp: , Rfl:     atorvastatin (LIPITOR) 20 MG tablet, Take 1 tablet by mouth, Disp: , Rfl:     Calcium 500-2.5 MG-MCG CHEW, Take 1 tablet by mouth daily, Disp: 90 tablet, Rfl: 0    carvedilol 12.5 MG PO tablet, Take 1 tablet (12.5 mg) by mouth 2 times daily (with meals), Disp: 90 tablet, Rfl: 3    cholecalciferol (VITAMIN D) 1000 UNIT tablet, Take 1 tablet (1,000 Units) by mouth daily, Disp: 90 tablet, Rfl: 3    clonazePAM (KLONOPIN) 0.5 MG tablet, Take 0.25 mg by mouth every evening, Disp: , Rfl:     clotrimazole (LOTRIMIN) 1 % external cream, , Disp: , Rfl:     ferrous sulfate (FEROSUL) 325 (65 Fe) MG tablet, Take 1 tablet (325 mg) by mouth daily (with breakfast) Please request future ferrous sulfate refills from your primary care provider, or OK to buy over the counter., Disp: 90 tablet, Rfl: 0    Fexofenadine HCl (ALLEGRA PO), Take 1 tablet by mouth daily. , Disp: , Rfl:     furosemide (LASIX) 20 MG tablet, Take 40 mg by mouth 2 times daily, Disp: , Rfl:     gabapentin (NEURONTIN) 300 MG capsule, Take 1 capsule (300 mg) by mouth At Bedtime, Disp: 270 capsule, Rfl: 3    losartan (COZAAR) 25 MG tablet, Take 1  tablet (25 mg) by mouth daily, Disp: 90 tablet, Rfl: 3    Multiple Vitamins-Minerals (ONE-A-DAY MENS 50+ ADVANTAGE PO), Take 1 tablet by mouth every evening, Disp: , Rfl:     mycophenolate (GENERIC EQUIVALENT) 250 MG capsule, Take 3 capsules (750 mg) by mouth 2 times daily, Disp: 180 capsule, Rfl: 11    omega-3 fatty acids (FISH OIL) 1200 MG capsule, Take 1 capsule by mouth 3 times daily., Disp: 180 capsule, Rfl: 11    pantoprazole (PROTONIX) 40 MG EC tablet, TAKE 1 TABLET BY MOUTH DAILY, Disp: 30 tablet, Rfl: 1    polyethylene glycol (MIRALAX) 17 GM/Dose powder, Take 1 packet by mouth, Disp: , Rfl:     predniSONE (DELTASONE) 5 MG tablet, Take 1 tablet (5 mg) by mouth daily, Disp: 90 tablet, Rfl: 3    senna-docusate (SENOKOT-S/PERICOLACE) 8.6-50 MG tablet, Take 2 tablets by mouth daily as needed (prn), Disp: , Rfl:     sodium bicarbonate 650 MG tablet, Take 2 tablets (1,300 mg) by mouth 2 times daily, Disp: 360 tablet, Rfl: 3    sulfamethoxazole-trimethoprim (BACTRIM) 400-80 MG tablet, Take 1 tablet by mouth Every Mon, Wed, Fri Morning, Disp: 13 tablet, Rfl: 11    verapamil ER (CALAN-SR) 180 MG CR tablet, Take 1 tablet (180 mg) by mouth daily (Patient taking differently: Take 240 mg by mouth daily), Disp: 90 tablet, Rfl: 0    Current Facility-Administered Medications:     botulinum toxin type A (BOTOX) 100 units injection 400 Units, 400 Units, Intramuscular, Q90 Days, Aylin Owens MD       BOTULINUM NEUROTOXIN INJECTION PROCEDURES    VERIFICATION OF PATIENT IDENTIFICATION AND PROCEDURE     Initials   Patient Name KG   Patient  KG   Procedure Verified by: KG     Prior to the start of the procedure and with procedural staff participation, I verbally confirmed the patient s identity using two indicators, relevant allergies, that the procedure was appropriate and matched the consent or emergent situation, and that the correct equipment/implants were available. Immediately prior to starting the procedure I  conducted the Time Out with the procedural staff and re-confirmed the patient s name, procedure, and site/side. (The Joint Commission universal protocol was followed.)  Yes    Sedation (Moderate or Deep): None    ABOVE ASSESSMENTS PERFORMED BY  Resident/Fellow: Grace Ahmadi MD  The attending provider was present for the entire procedure documented below.    Aylin Owens MD      INDICATIONS FOR PROCEDURES  Gurjit Squires is a 68 year old patient with right lower extremity spasticity secondary to the diagnosis of previous CVA. His baseline symptoms have been recalcitrant to oral medications and conservative therapy. He is here today for injection with Botox.    GOAL OF PROCEDURE  The goal of this procedure is to increase active range of motion, improve volitional motor control, decrease pain , and enhance functional independence.      TOTAL DOSE ADMINISTERED  Dose Administered:  100 units  Botox (Botulinum Toxin Type A)       2:1 Dilution   Unavoidable Drug Waste: No  Diluent Used:  Preservative Free Normal Saline  Total Volume of Diluent Used:  2 ml  Lot # [id number] with Expiration Date: 11/2025  NDC #: Botox 100u ([address]-00)      CONSENT  The risks, benefits, and treatment options were discussed with Gurjit Squires and he agreed to proceed.    Written consent was obtained by KG.     EQUIPMENT USED  Needle-37mm stimulating/recording  (For the next round of Botox injections, will plan to use longer needle for RLE injections)    SKIN PREPARATION  Skin preparation was performed using an alcohol wipe.    GUIDANCE DESCRIPTION  Electro-myographic guidance was necessary throughout the procedure to accurately identify all areas of spastic muscles while avoiding injection of non-spastic muscles, neighboring nerves and nearby vascular structures.     AREA/MUSCLE INJECTED   1.  RIGHT LOWER EXTREMITY- total of 100 units Botox = Total Dose, 2:1 Dilution      Right gastrocnemius - 40 units of Botox at 2 site/s.  "     Right hamstring - 30 units of Botox at 2 site/s.      Right tibialis posterior - 20 units of Botox at 1 site/s.      Right EHL - 10 units of Botox at 1 site/s.       RESPONSE TO PROCEDURE   Gurjit Squires tolerated the procedure well and there were no immediate complications. He was allowed to recover for an appropriate period of time and was discharged home in stable condition.    ASSESSMENT AND PLAN   Spasticity: Spasticity in the RLE. Previously tried baclofen, but did not tolerate, reported feeling \"foggy,\" and was discontinued. Would not recommend trialing further PO medications to address localized spasticity in the RLE. Discussed trialing botulinum toxin injections today, and he was in agreement with plan. Discussed trying at least 3 rounds of injections to optimize dose. Will have him monitor his foot drop, gait, RLE cramps and tightness/tone and report back at next follow-up.   Botulinum toxin injections: First round of Botox injections today to right gastrocnemius, hamstring, tibialis posterior, and EHL, total of 100 units (see above). Patient will continue to monitor response and report at next appointment. For the next round of Botox injections, will plan to use longer needle for RLE injections.  Therapies: Continue with pool therapy. Continue with HEP for stretching and ROM of RLE. Placed order for PT for trial of bracing for AFO. May be helpful to work with PT to trial some off the shelf AFOs for right foot drop.    Referrals: None.  Follow up: Gurjit Squires was rescheduled for the next series of injections in 12 weeks, at which time we will evaluate response to today's injections. he may call the clinic prior with any questions or concerns prior to the next appointment.           Grace Ahmadi MD  Resident Physician, PGY-4  Physical Medicine & Rehabilitation  [facility]    Patient was seen and discussed with staff attending, Dr. Aylin Owens.      Physician Attestation "   I, Aylin Owens MD, saw this patient and agree with the findings and plan of care as documented in the note.      Items personally reviewed/procedural attestation: notes in the chart and agree with the interpretation documented in the note and I was present for and supervised the entire procedure.          Again, thank you for allowing me to participate in the care of your patient.      Sincerely,    Aylin Owens MD

## 2023-08-07 NOTE — NURSING NOTE
Chief Complaint   Patient presents with    Consult For     Back pain lower right side that travels down right leg     Kristen Mendez CMA at 7:12 AM on 8/7/2023.

## 2023-08-07 NOTE — PROGRESS NOTES
Pain clinic follow up note    HPI:   Gurjit Squires is a 68 year old year old male  who presents to the pain clinic with back and right leg pain, s/p botox injections with morning.     Patient Supplied Answers To the  Pain Questionnaire      8/2/2023     9:21 AM    Pain -  Patient Entered Questionnaire/Answers   What number best describes your pain right now:  0 = No pain  to  10 = Worst pain imaginable 3   How would you describe the pain cramping    dull, aching   Which of the following worsen your pain walking   Which of the following improve or reduce your pain lying down    sitting    medication   What number best describes your average pain for the past week:  0 = No pain  to  10 = Worst pain imaginable 3   What number best describes your LOWEST pain in past 24 hours:  0 = No pain  to  10 = Worst pain imaginable 1   What number best describes your WORST pain in past 24 hours:  0 = No pain  to  10 = Worst pain imaginable 4   When is your pain worst AM   What non-medicine treatments have you already had for your pain pain clinic    physical therapy    acupuncture    chiropractic care    TENS (electrical stimulator)    spine injections (shots)    darian Gonzalez is a 68-year-old male with ongoing low back and right leg pain presenting to the clinic with his wife.  The patient states that the epidural steroid injection completed last month helped for about a week.  He was able to wake up from bed without much discomfort.  The patient feels that the effects of the injection dissipated in about 1 week.  He received a series of Botox injections in his right lower leg by Dr. Owens this morning.  He would like to observe the effects.  They are also working on an ankle brace for the patient.  The patient was not able to tolerate baclofen as it made him feel foggy    ROS:  Review of Systems   Respiratory:  Negative for cough, hemoptysis, sputum production, shortness of breath and wheezing.     Musculoskeletal:  Positive for back pain and myalgias. Negative for neck pain.   Skin:  Positive for itching. Negative for rash.   All other systems reviewed and are negative.        Significant Medical History:   Past Medical History:   Diagnosis Date    BK viremia     Depression     End stage kidney disease (H)     Gout     History of blood transfusion     2004    Hypertension secondary to other renal disorders     IgA nephropathy     Immunosuppressed status (H)     Kidney replaced by transplant     Kidney transplant complication     Metabolic acidosis     VANESA (obstructive sleep apnea)     CPAP    Overweight     Rotator cuff dysfunction     Vitamin D deficiency           Past Surgical History:  Past Surgical History:   Procedure Laterality Date    IMPLANT PROSTHESIS PENIS INFLATABLE N/A 10/4/2016    Procedure: IMPLANT PROSTHESIS PENIS INFLATABLE;  Surgeon: Jaquan Matthew MD;  Location: [facility] OR    INJECT EPIDURAL LUMBAR N/A 7/13/2023    Procedure: L5-S1 epidural steroid injection with fluoroscopy (right> left);  Surgeon: Maryse Evans MD;  Location: [facility] OR    IR RENAL BIOPSY RIGHT  6/8/2021    ORTHOPEDIC SURGERY      RTK 09/15    PERCUTANEOUS BIOPSY KIDNEY Right 3/12/2019    Procedure: Right Kidney Biopsy;  Surgeon: Ash Benitez MD;  Location: [facility] OR    TRANSPLANT KIDNEY RECIPIENT LIVING UNRELATED            Family History:  Family History   Problem Relation Age of Onset    Kidney Disease No family hx of           Social History:  Social History     Socioeconomic History    Marital status:      Spouse name: Not on file    Number of children: Not on file    Years of education: Not on file    Highest education level: Not on file   Occupational History    Not on file   Tobacco Use    Smoking status: Former     Types: Cigarettes    Smokeless tobacco: Never    Tobacco comments:     Quit 2004   Substance and Sexual Activity    Alcohol use: Yes     Comment: social    Drug use: No    Sexual activity:  Yes     Partners: Female     Birth control/protection: None   Other Topics Concern    Parent/sibling w/ CABG, MI or angioplasty before 65F 55M? Not Asked   Social History Narrative    Not on file     Social Determinants of Health     Financial Resource Strain: Not on file   Food Insecurity: Not on file   Transportation Needs: Not on file   Physical Activity: Not on file   Stress: Not on file   Social Connections: Not on file   Intimate Partner Violence: Not on file   Housing Stability: Not on file     Social History     Social History Narrative    Not on file          Allergies:  Allergies   Allergen Reactions    Baclofen Other (See Comments)     Foggy, not clear headed.        Current Medications:   Current Outpatient Medications   Medication Sig Dispense Refill    acetaminophen (TYLENOL) 500 MG tablet Take 1,000 mg by mouth daily      allopurinol (ZYLOPRIM) 100 MG tablet Take 3 tablets (300 mg) by mouth daily 30 tablet     atorvastatin (LIPITOR) 20 MG tablet Take 1 tablet by mouth      Calcium 500-2.5 MG-MCG CHEW Take 1 tablet by mouth daily 90 tablet 0    carvedilol 12.5 MG PO tablet Take 1 tablet (12.5 mg) by mouth 2 times daily (with meals) 90 tablet 3    cholecalciferol (VITAMIN D) 1000 UNIT tablet Take 1 tablet (1,000 Units) by mouth daily 90 tablet 3    clonazePAM (KLONOPIN) 0.5 MG tablet Take 0.25 mg by mouth every evening      clotrimazole (LOTRIMIN) 1 % external cream       ferrous sulfate (FEROSUL) 325 (65 Fe) MG tablet Take 1 tablet (325 mg) by mouth daily (with breakfast) Please request future ferrous sulfate refills from your primary care provider, or OK to buy over the counter. 90 tablet 0    Fexofenadine HCl (ALLEGRA PO) Take 1 tablet by mouth daily.       furosemide (LASIX) 20 MG tablet Take 40 mg by mouth 2 times daily      gabapentin (NEURONTIN) 300 MG capsule Take 1 capsule (300 mg) by mouth At Bedtime 270 capsule 3    losartan (COZAAR) 25 MG tablet Take 1 tablet (25 mg) by mouth daily 90 tablet  "3    Multiple Vitamins-Minerals (ONE-A-DAY MENS 50+ ADVANTAGE PO) Take 1 tablet by mouth every evening      mycophenolate (GENERIC EQUIVALENT) 250 MG capsule Take 3 capsules (750 mg) by mouth 2 times daily 180 capsule 11    omega-3 fatty acids (FISH OIL) 1200 MG capsule Take 1 capsule by mouth 3 times daily. 180 capsule 11    pantoprazole (PROTONIX) 40 MG EC tablet TAKE 1 TABLET BY MOUTH DAILY 30 tablet 1    predniSONE (DELTASONE) 5 MG tablet Take 1 tablet (5 mg) by mouth daily 90 tablet 3    sodium bicarbonate 650 MG tablet Take 2 tablets (1,300 mg) by mouth 2 times daily 360 tablet 3    sulfamethoxazole-trimethoprim (BACTRIM) 400-80 MG tablet Take 1 tablet by mouth Every Mon, Wed, Fri Morning 13 tablet 11    verapamil ER (CALAN-SR) 180 MG CR tablet Take 1 tablet (180 mg) by mouth daily (Patient taking differently: Take 240 mg by mouth daily) 90 tablet 0    aspirin 81 MG EC tablet Take 81 mg by mouth daily      polyethylene glycol (MIRALAX) 17 GM/Dose powder Take 1 packet by mouth      senna-docusate (SENOKOT-S/PERICOLACE) 8.6-50 MG tablet Take 2 tablets by mouth daily as needed (prn)             Physical Exam:     BP (!) 179/94 (BP Location: Right arm, Patient Position: Chair, Cuff Size: Adult Large)   Pulse 61   Ht 1.727 m (5' 8\")   Wt 117.9 kg (260 lb)   SpO2 99%   BMI 39.53 kg/m      General Appearance: No distress, seated comfortably  Mood: Euthymic  HE ENT: Non constricted pupils  Respiratory: Non labored breathing      ASSESSMENT AND PLAN:     Encounter Diagnosis:  Low back pain  Right leg muscle spasms  History of stroke  Chronic anticoagulation       Gurjit Squires is a 68 year old year old male  who presents to the pain clinic with return of discomfort in the lower back and right leg, s/p Botox injections    RECOMMENDATIONS:     1. Medications: Discontinue baclofen due to side effect    2. Procedure: We will holdoff further injections and monitor the patient's response to Botox.  In the " future transforaminal epidural steroid injection can be considered    Follow up: 10/30/2023 after the appointment with Dr. Owens.             Answers submitted by the patient for this visit:  Symptoms you have experienced in the last 30 days (Submitted on 8/2/2023)  General Symptoms: No  Skin Symptoms: Yes  HENT Symptoms: No  EYE SYMPTOMS: No  HEART SYMPTOMS: No  LUNG SYMPTOMS: Yes  INTESTINAL SYMPTOMS: No  URINARY SYMPTOMS: No  REPRODUCTIVE SYMPTOMS: No  SKELETAL SYMPTOMS: Yes  BLOOD SYMPTOMS: No  NERVOUS SYSTEM SYMPTOMS: No  MENTAL HEALTH SYMPTOMS: No   (Submitted on 8/2/2023)  Changes in hair: No  Changes in moles/birth marks: No  Changes in nails: Yes  Acne: No  Change in facial hair: No  Warts: No  Non-healing sores: No  Scarring: No  Flaking of skin: Yes  Color changes of hands/feet in cold : No  Sun sensitivity: No  Skin thickening: No  Please answer the questions below to tell us what condition you are experiencing: (Submitted on 8/2/2023)  Snoring: Yes  Difficulty breathing on exertion: No  Nighttime Cough: No  Difficulty breathing when lying flat: No  Please answer the questions below to tell us what condition you are experiencing: (Submitted on 8/2/2023)  Swollen joints: No  Joint pain: Yes  Bone pain: No  Muscle cramps: Yes  Muscle weakness: Yes  Joint stiffness: Yes  Bone fracture: No

## 2023-08-07 NOTE — NURSING NOTE
Patient presents with:  Follow Up: Follow-up Lower Back Pain      Moderate Pain (4)     Pain Medications       Analgesics Other Refills Start End     acetaminophen (TYLENOL) 500 MG tablet          Sig - Route: Take 1,000 mg by mouth daily - Oral    Class: Historical      Salicylates Refills Start End     aspirin 81 MG EC tablet          Sig - Route: Take 81 mg by mouth daily - Oral    Class: Historical            What medications are you using for pain? Tylenol, Gabapentin    (New patients only) Have you been seen by another pain clinic/ provider? no    (Return Patients only) What refills are you needing today? no

## 2023-08-17 ENCOUNTER — TELEPHONE (OUTPATIENT)
Dept: TRANSPLANT | Facility: CLINIC | Age: 68
End: 2023-08-17
Payer: MEDICARE

## 2023-08-17 NOTE — TELEPHONE ENCOUNTER
ISSUE:   Creatinine trending up: 4.4 on 8/16  Hypocalcemia: 7.2 on 8/16    calitriol .25 mg MWF  Cholecalciferol 18975 units weekly prescribed by Dr Choudhury  Calcium 500 mg daily-  has not been taking     Patient reports diarrhea,  slight BLE swelling by the end of the day.    Reports good appetite,     BP: patient states has not been monitoring at home, however hypertensive per chart.    168/83 Abnormal 179/94 Abnormal 179/94 Abnormal  BP taken while at  facility 8/7, has not been monitoring at home.    OUTCOME: spoke with [facility] RN,  will review chart, medications, lab results with patient and Dr Choudhury and make adjustments as needed. Felisha care to repeat labs for continued monitoring of hypocalcemia.    Shari Talley RN   Transplant Coordinator  [phone]

## 2023-08-23 ENCOUNTER — TELEPHONE (OUTPATIENT)
Dept: TRANSPLANT | Facility: CLINIC | Age: 68
End: 2023-08-23
Payer: MEDICARE

## 2023-08-23 DIAGNOSIS — N18.6 ESRD (END STAGE RENAL DISEASE) (H): Primary | ICD-10-CM

## 2023-08-23 DIAGNOSIS — Z76.82 ORGAN TRANSPLANT CANDIDATE: ICD-10-CM

## 2023-08-23 NOTE — TELEPHONE ENCOUNTER
[facility] Call Center    Phone Message    May a detailed message be left on voicemail: yes     Reason for Call: Appointment Intake    Patient calling today to schedule follow-up appointment with Dr. Bunch.  He was seen by local nephrologist today who advised him that he may need to be transplanted again sooner rather than later.  Patient stated that he will be sending a [employer] message as well with information.  Next available appointments with Dr. Bunch for RKT's are into February of 2023.  Are we wanting to schedule an RKT, or waitlist appt's for patient?  Sending to both coordinators to discuss.    Please advise.  Thank you!    Action Taken: Other:  SOT    Travel Screening: Not Applicable

## 2023-08-24 NOTE — TELEPHONE ENCOUNTER
Returned call to pt. Reviewed pt needs to get to goal weight of 230lbs prior to proceeding with kidney transplant. This was reviewed at our selection committee 5/2023 that pt needs to be at goal weight prior to completing the rest of the evaluation items. Pt is frustrated with this decision and would like to see a surgeon to discuss further. Will set up in person visit.

## 2023-08-29 ENCOUNTER — MYC MEDICAL ADVICE (OUTPATIENT)
Dept: PHYSICAL MEDICINE AND REHAB | Facility: CLINIC | Age: 68
End: 2023-08-29
Payer: MEDICARE

## 2023-08-29 DIAGNOSIS — I63.9 ISCHEMIC STROKE (H): ICD-10-CM

## 2023-08-29 DIAGNOSIS — G81.11 RIGHT SPASTIC HEMIPARESIS (H): Primary | ICD-10-CM

## 2023-08-29 DIAGNOSIS — M62.838 SPASM OF MUSCLE: ICD-10-CM

## 2023-08-29 DIAGNOSIS — K74.00 LIVER FIBROSIS: Primary | ICD-10-CM

## 2023-08-29 NOTE — TELEPHONE ENCOUNTER
Writer called patient. States he is experiencing cramps in R hamstring and calf, during the night toward waking hours.  Had a knot in his right calf last night that took a long time to calm down.      Patient had first round of Botox on 8/7, states he has not noticed much difference in the spasticity since then.  Taking small dose of clonazepam every evening, managed by Dr. Narvaez, PCP at [facility].  No other oral spasticity medications.     Writer discussed the Botox 12 week cycle, that we started with a low dose until we know how his body reacts to the dosage as we do not want to make him to weak to ambulate, and that there is room to increase the dosage with the next round, optimizing dosage with up to 3 rounds of Botox.  Also stated that there is still a chance that he may still notice a difference, as peak effectiveness can typically be reached week 4-6, and he is currently at 3 weeks.      Writer encouraged patient to continue with his HEP (stretching and ROM of RLE), which he states that he tries to keep up with.  As well as continue pool therapy.  HE states that PT has not worked with him yet on bracing/AFO.    Patient states that he has a number of health issues going on at the same time, so he is trying to address all of these and prioritize them.  He has a message out to Dr. Evans about his back pain, and has other upcoming appointments in early September.    Writer stated that I will inform Dr. Owens of our conversation, and if she has any further recommendations at this current time, we will let him know, otherwise he should continue with the items we discussed.  He verbalized agreement with this.    Judith Malloy RN on 8/29/2023 at 11:34 AM

## 2023-08-30 RX ORDER — BACLOFEN 10 MG/1
30 TABLET ORAL AT BEDTIME
Qty: 270 TABLET | Refills: 1 | Status: SHIPPED | OUTPATIENT
Start: 2023-08-30 | End: 2023-09-13

## 2023-08-30 NOTE — TELEPHONE ENCOUNTER
Writer called patient with Dr. Owens's recommendation of either trying Baclofen 30mg at bedtime or try tizanidine.  He states that he would like to try Baclofen at bedtime.  Would like Dr. Owens to send in a prescription to [facility] in [place], [place].  Writer asked him to send us an update with how this works for him, and he was agreeable to this.    Judith Malloy RN on 8/30/2023 at 10:27 AM

## 2023-09-05 DIAGNOSIS — M54.16 LUMBAR RADICULOPATHY: Primary | ICD-10-CM

## 2023-09-06 NOTE — PROGRESS NOTES
[facility] Hepatology    New Patient Visit    Referring provider:  Radu Bunch  Chief complaint:  Liver Fibrosis    Assessment   68 year old male with PMHx of living donor renal transplant 7/29/2004 now with MAHNAZ on CKD, HTN, history of CVA, heart failure with diastolic dysfunction, Paroxysmal Atrial Fibrillation and class III obesity.    #. Metabolic dysfunction associated steatotic liver disease (MASLD; formerly NAFLD)+ alcohol overuse  #. Hepatic Fibrosis  Multiple metabolic comorbid conditions including class III obesity, heart failure with diastolic dysfunction and hypertension as well as prednisone use for immunosuppression; these factors together likely contribute to metabolic dysfunction associated steatotic liver disease.  During the COVID-19 pandemic he noticed that his alcohol use based on that he was drinking up to 2 drinks on weekdays intermittently and up to 2 drinks on weekends intermittently.  He has stopped drinking since July 2023.  He has been working on weight loss through weight watchers but has not been prescribed pharmacotherapy.    MR elastography suggestive of F1 to F2 fibrosis.  He has no splenomegaly on cross-sectional imaging earlier in 2023 + platelets > 150 which suggests against portal HTN. His most recent labs are from May 2023 with normal ALT and AST we spent the majority of the discussion today talking about complete alcohol cessation, portion control, exercise and dietary changes with the goal of weight loss. With weight loss, metabolic syndrome management and alcohol cessation he may see some regression of his fibrosis.     Plan  -- Abdominal ultrasound complete with visualization of spleen  -- Labs: HgA1c, lipids, CMP, CBC, INR, iron studies, ROSANNE, TSH  -- Discussed lifestyle and dietary changes with the goal of 7-10% weight loss   * Referral to comprehensive metabolic clinic placed   * Recommend limiting portion sizes, excluding MASLD promoting components -  processed food, foods & beverages high in added fructose   * Recommend aerobic exercise and resistance training; plan to go back to the pool  -- Recommend weight loss medication initiation, such as semaglutide or liraglutide; per patient issues with prior authorization when PCP applied  -- Metabolic syndrome management per primary care doctor  -- Counseled on complete alcohol abstinence  -- Patient to determine his last colonoscopy and if his last colonoscopy was 2016 he would be due for repeat colon cancer screening    RTC: 6 months    Aditi Nicholson MD (Lizzie)  Advanced & Transplant Hepatology  [facility]    I spent 45 minutes on this encounter performing the following: reviewing the patient's medical record (clinic visits, hospital records, lab results, imaging and procedural documentation), history taking, physical exam and documentation on the date of the encounter. I also spent part of the time in coordination of care and counseling.    HPI:  Patient reports that he was recently hospitalized in May for several days for having spontaneous hematomas in his left leg with blood loss requiring a blood transfusion.    He reports that in the last year he has been trying to lose weight through weight watchers.  He reports losing about 20 pounds through this.  His activity level is hindered by the fact that he has a pinched nerve in his back that has been limiting his mobility.  He has been seeing a specialist in the pain department and has undergone an epidural in the past.  There is a plan for Botox in the future.  There is a plan for discussion with a surgeon regarding back surgery as well.  Given all of this he has not been able to engage in much activity or exercise.  He is planning on going to a pool nearby to increase his activity.  He talked with his primary care doctor about considering pharmacotherapy for weight loss but Wegovy was not authorized by his insurance and he has not  heard back from his primary care doctor about other options.    He denies any history of diabetes.  His blood pressure has been up and down with some readings under 140/80 but some are above that.  He takes Lasix for some fluid retention in his legs.  His immunosuppression is mycophenolate and prednisone.    Prior to July 2023 he was drinking up to 2 drinks on weekdays and up to 2 drinks on weekends intermittently.  He has stopped drinking since July 3, 2023.  He was a former smoker.  No family history of liver disease or liver cancer.    He denies any signs or symptoms of decompensated liver disease such as jaundice, abdominal distention, lower extremity edema, lethargy, confusion, melena or hematochezia.    Medical hx Surgical hx   Past Medical History:   Diagnosis Date    BK viremia     Depression     End stage kidney disease (H)     Gout     History of blood transfusion     2004    Hypertension secondary to other renal disorders     IgA nephropathy     Immunosuppressed status (H)     Kidney replaced by transplant     Kidney transplant complication     Metabolic acidosis     VANESA (obstructive sleep apnea)     CPAP    Overweight     Rotator cuff dysfunction     Vitamin D deficiency       Past Surgical History:   Procedure Laterality Date    IMPLANT PROSTHESIS PENIS INFLATABLE N/A 10/4/2016    Procedure: IMPLANT PROSTHESIS PENIS INFLATABLE;  Surgeon: Jaquan Matthew MD;  Location: [facility] OR    INJECT EPIDURAL LUMBAR N/A 7/13/2023    Procedure: L5-S1 epidural steroid injection with fluoroscopy (right> left);  Surgeon: Maryse Evans MD;  Location: [facility] OR    IR RENAL BIOPSY RIGHT  6/8/2021    ORTHOPEDIC SURGERY      RTK 09/15    PERCUTANEOUS BIOPSY KIDNEY Right 3/12/2019    Procedure: Right Kidney Biopsy;  Surgeon: Ash Benitez MD;  Location: [facility] OR    TRANSPLANT KIDNEY RECIPIENT LIVING UNRELATED          Medications  Current Outpatient Medications   Medication Sig Dispense Refill    acetaminophen (TYLENOL) 500  MG tablet Take 1,000 mg by mouth daily      allopurinol (ZYLOPRIM) 100 MG tablet Take 3 tablets (300 mg) by mouth daily 30 tablet     aspirin 81 MG EC tablet Take 81 mg by mouth daily      atorvastatin (LIPITOR) 20 MG tablet Take 1 tablet by mouth      baclofen (LIORESAL) 10 MG tablet Take 3 tablets (30 mg) by mouth At Bedtime for 180 days 270 tablet 1    Calcium 500-2.5 MG-MCG CHEW Take 1 tablet by mouth daily 90 tablet 0    carvedilol 12.5 MG PO tablet Take 1 tablet (12.5 mg) by mouth 2 times daily (with meals) 90 tablet 3    cholecalciferol (VITAMIN D) 1000 UNIT tablet Take 1 tablet (1,000 Units) by mouth daily 90 tablet 3    clonazePAM (KLONOPIN) 0.5 MG tablet Take 0.25 mg by mouth every evening      clotrimazole (LOTRIMIN) 1 % external cream       ferrous sulfate (FEROSUL) 325 (65 Fe) MG tablet Take 1 tablet (325 mg) by mouth daily (with breakfast) Please request future ferrous sulfate refills from your primary care provider, or OK to buy over the counter. 90 tablet 0    Fexofenadine HCl (ALLEGRA PO) Take 1 tablet by mouth daily.       furosemide (LASIX) 20 MG tablet Take 40 mg by mouth 2 times daily      gabapentin (NEURONTIN) 300 MG capsule Take 1 capsule (300 mg) by mouth At Bedtime 270 capsule 3    losartan (COZAAR) 25 MG tablet Take 1 tablet (25 mg) by mouth daily 90 tablet 3    Multiple Vitamins-Minerals (ONE-A-DAY MENS 50+ ADVANTAGE PO) Take 1 tablet by mouth every evening      mycophenolate (GENERIC EQUIVALENT) 250 MG capsule Take 3 capsules (750 mg) by mouth 2 times daily 180 capsule 11    omega-3 fatty acids (FISH OIL) 1200 MG capsule Take 1 capsule by mouth 3 times daily. 180 capsule 11    pantoprazole (PROTONIX) 40 MG EC tablet TAKE 1 TABLET BY MOUTH DAILY 30 tablet 1    polyethylene glycol (MIRALAX) 17 GM/Dose powder Take 1 packet by mouth      predniSONE (DELTASONE) 5 MG tablet Take 1 tablet (5 mg) by mouth daily 90 tablet 3    senna-docusate (SENOKOT-S/PERICOLACE) 8.6-50 MG tablet Take 2 tablets  by mouth daily as needed (prn)      sodium bicarbonate 650 MG tablet Take 2 tablets (1,300 mg) by mouth 2 times daily 360 tablet 3    sulfamethoxazole-trimethoprim (BACTRIM) 400-80 MG tablet Take 1 tablet by mouth Every Mon, Wed, Fri Morning 13 tablet 11    verapamil ER (CALAN-SR) 180 MG CR tablet Take 1 tablet (180 mg) by mouth daily (Patient taking differently: Take 240 mg by mouth daily) 90 tablet 0       Allergies  Allergies   Allergen Reactions    Baclofen Other (See Comments)     Foggy, not clear headed.        Family hx Social hx   Family History   Problem Relation Age of Onset    Kidney Disease No family hx of     Liver Cancer No family hx of     Liver Disease No family hx of     Colon Cancer No family hx of       - Lives with wife Jenny  - Alcohol: prior to July 3 2023 - up to 2 drinks on weekdays and 2 drinks on weekends.  - Tobacco: former, quit 2004     Review of systems  A 10-point review of systems was negative.    Examination  No vital given virtual visit     Gen-NAD  Eye-glasses in place  ENT- MMM  CVS- RRR, no murmurs  RS-breathing comfortably on ambient air  Neuro- A+Ox3  Skin- no jaundice  Psych- normal mood    Laboratory  BMP  Recent Labs   Lab Test 08/16/23  0736 06/05/23  0736 05/31/23  0908 05/23/23  0735 05/10/23  1159 08/16/21  0828 06/08/21  1007 11/11/19  0743 03/12/19  0537 12/16/16  0740 10/04/16  1038   NA  --   --   --   --  146*  --  140  --  142  --   --    POTASSIUM  --   --   --   --  4.1  --  4.5  --  4.1  --  4.2   CHLORIDE 109 108 103 100 108*   < > 107   < > 108   < >  --    GEGE  --   --   --   --  8.3*  --  9.1  --  8.0*  --   --    CO2  --   --   --   --  26  --  28  --  26  --   --    BUN  --   --   --   --  40.2*  --  53*  --  48*  --   --    CR  --   --   --   --  3.35*  --  2.92*  --  2.35*  --   --    GLC  --   --   --   --  119*  --  131*  --  96  --   --     < > = values in this interval not displayed.     CBC  Recent Labs   Lab Test 05/10/23  1159 06/08/21  1007  03/12/19  1220 03/12/19  0537   WBC 11.0 9.1  --  8.5   RBC 3.55* 3.87*  --  4.22*   HGB 10.6* 12.2*   < > 13.2*   HCT 33.9* 38.6*  --  42.4   MCV 96 100  --  101*   MCH 29.9 31.5  --  31.3   MCHC 31.3* 31.6  --  31.1*   RDW 15.7* 13.4  --  13.2    167  --  152    < > = values in this interval not displayed.     Liver Enzymes   Recent Labs   Lab Test 05/10/23  1159   PROTTOTAL 6.7   ALBUMIN 4.3   BILITOTAL 0.3   ALKPHOS 123   AST 21   ALT 16      INR   INR   Date Value Ref Range Status   05/10/2023 1.83 (H) 0.85 - 1.15 Final   06/08/2021 1.02 0.86 - 1.14 Final         Hep B s Ag non-reactive  Hep B c AB non-reactive  Hep B s Ab 10.18  Hep C Ab non-reactive    Radiology  MR Elastography 6/2023  1. MR Elastography: The averaged liver stiffness index (kPa) measures  2. This is compatible with stage1-2 fibrosis.  3. Atrophic appearing kidneys compatible with chronic renal disease.     CT Abdomen Pelvis 3/2023  1.  Large hematoma in the left anterior abdominal wall involving the    rectus sheath. Compared to prior study, this is increased. There is    increased hemorrhage extending into the anterior aspect of the    peritoneum surrounding the urinary bladder.   2.  Distended urinary bladder with adjacent hemorrhage. Consider    placement of a Ramirez catheter.   3.  The penile implant balloon is being compressed by the rectal    sheath hematoma.   4.  Small amount of perihepatic ascites. This is new   Spleen normal size    Imaging reports have been independently reviewed.     Endoscopy  Colonoscopy 4/15/2016  A polyp was photographed and removed with snare   cautery from the distal ascending colon and sent for permanent   section.    A polyp was photographed and removed with snare   cautery from the proximal sigmoid colon and sent for permanent   section.   The patient was noted to have sigmoid diverticulosis.       Path:  PART A:  Colonic tubular adenoma with focal mild epithelial nuclear atypia;   no high grade  dysplasia identified; fragments of edematous, nonneoplastic   colonic mucosa showing electrocautery artifact, specimen submitted as   ascending colon polyp.   PART B:  Edematous colonic mucosa focally showing tubular adenoma with no   significant epithelial nuclear atypia, specimen submitted as sigmoid colon   polyp.     Endoscopy reports have been independently reviewed.      Virtual Visit Details    Type of service:  Video Visit   Video Start Time: 7:50   Video End Time: 8:08    Originating Location (pt. Location): Home    Distant Location (provider location):  On-site  Platform used for Video Visit: Gota[employer]

## 2023-09-10 ENCOUNTER — MYC MEDICAL ADVICE (OUTPATIENT)
Dept: GASTROENTEROLOGY | Facility: CLINIC | Age: 68
End: 2023-09-10
Payer: MEDICARE

## 2023-09-11 ENCOUNTER — OFFICE VISIT (OUTPATIENT)
Dept: TRANSPLANT | Facility: CLINIC | Age: 68
End: 2023-09-11
Attending: TRANSPLANT SURGERY
Payer: MEDICARE

## 2023-09-11 VITALS
OXYGEN SATURATION: 95 % | WEIGHT: 261.1 LBS | BODY MASS INDEX: 39.7 KG/M2 | HEART RATE: 75 BPM | SYSTOLIC BLOOD PRESSURE: 187 MMHG | DIASTOLIC BLOOD PRESSURE: 79 MMHG

## 2023-09-11 DIAGNOSIS — Z76.82 ORGAN TRANSPLANT CANDIDATE: ICD-10-CM

## 2023-09-11 DIAGNOSIS — N18.6 ESRD (END STAGE RENAL DISEASE) (H): ICD-10-CM

## 2023-09-11 PROCEDURE — 99212 OFFICE O/P EST SF 10 MIN: CPT | Performed by: TRANSPLANT SURGERY

## 2023-09-11 PROCEDURE — G0463 HOSPITAL OUTPT CLINIC VISIT: HCPCS | Performed by: TRANSPLANT SURGERY

## 2023-09-11 NOTE — PROGRESS NOTES
Transplant Surgery      Reason For Visit: Kidney pre eval    History of Present Illness:  Prior kidney transplant.  Now with worsening renal function. Wants another transplant but BMI is in excess of advised.  Wants another opinion.         Not on dialysis  Uncertain of donors.    Found to have liver fibrosis.  No symptoms of hepatic failure.  Elevated INR at last check but he might have been on coumadin at that time (since stopped but no repeat INR)    Exam:   BP (!) 187/79   Pulse 75   Wt 118.4 kg (261 lb 1.6 oz)   SpO2 95%   BMI 39.70 kg/m      Impression:  needs to lose 20 lb for transplant.     Plan: Ok to list inactive.  20 lb weight loss likely required.  Repeat INR to see if elevated value was from coumadin  Scheduled to see hepatology.    Working on spinal issues.    Aaron Hobson MD, PhD  Transplant Surgery  TT: 10 min

## 2023-09-11 NOTE — LETTER
9/11/2023         RE: Gurjit Squires  [address]  [address]  [place] [address]        Dear Colleague,    Thank you for referring your patient, Gurjit Squires, to the [facility]. Please see a copy of my visit note below.      Transplant Surgery      Reason For Visit: Kidney pre eval    History of Present Illness:  Prior kidney transplant.  Now with worsening renal function. Wants another transplant but BMI is in excess of advised.  Wants another opinion.         Not on dialysis  Uncertain of donors.    Found to have liver fibrosis.  No symptoms of hepatic failure.  Elevated INR at last check but he might have been on coumadin at that time (since stopped but no repeat INR)    Exam:   BP (!) 187/79   Pulse 75   Wt 118.4 kg (261 lb 1.6 oz)   SpO2 95%   BMI 39.70 kg/m      Impression:  needs to lose 20 lb for transplant.     Plan: Ok to list inactive.  20 lb weight loss likely required.  Repeat INR to see if elevated value was from coumadin  Scheduled to see hepatology.    Working on spinal issues.    Aaron Hobson MD, PhD  Transplant Surgery  TT: 10 min

## 2023-09-13 ENCOUNTER — VIRTUAL VISIT (OUTPATIENT)
Dept: GASTROENTEROLOGY | Facility: CLINIC | Age: 68
End: 2023-09-13
Attending: INTERNAL MEDICINE
Payer: MEDICARE

## 2023-09-13 ENCOUNTER — PRE VISIT (OUTPATIENT)
Dept: GASTROENTEROLOGY | Facility: CLINIC | Age: 68
End: 2023-09-13
Payer: MEDICARE

## 2023-09-13 DIAGNOSIS — K76.0 NAFLD (NONALCOHOLIC FATTY LIVER DISEASE): Primary | ICD-10-CM

## 2023-09-13 DIAGNOSIS — R93.89 ABNORMAL FINDING ON IMAGING: ICD-10-CM

## 2023-09-13 DIAGNOSIS — K74.00 LIVER FIBROSIS: ICD-10-CM

## 2023-09-13 DIAGNOSIS — Z94.0 KIDNEY TRANSPLANTED: ICD-10-CM

## 2023-09-13 DIAGNOSIS — E66.813 CLASS 3 DRUG-INDUCED OBESITY WITH SERIOUS COMORBIDITY AND BODY MASS INDEX (BMI) OF 40.0 TO 44.9 IN ADULT (H): ICD-10-CM

## 2023-09-13 DIAGNOSIS — E66.1 CLASS 3 DRUG-INDUCED OBESITY WITH SERIOUS COMORBIDITY AND BODY MASS INDEX (BMI) OF 40.0 TO 44.9 IN ADULT (H): ICD-10-CM

## 2023-09-13 PROCEDURE — 99204 OFFICE O/P NEW MOD 45 MIN: CPT | Mod: VID | Performed by: INTERNAL MEDICINE

## 2023-09-13 ASSESSMENT — PAIN SCALES - GENERAL: PAINLEVEL: MILD PAIN (3)

## 2023-09-13 NOTE — LETTER
9/13/2023         RE: Gurjit Squires  [address]  [address]  [facility] [address]        Dear Colleague,    Thank you for referring your patient, Gurjit Squires, to the [facility] [place]. Please see a copy of my visit note below.    [facility] Hepatology    New Patient Visit    Referring provider:  Radu Bunch  Chief complaint:  Liver Fibrosis    Assessment   68 year old male with PMHx of living donor renal transplant 7/29/2004 now with MAHNAZ on CKD, HTN, history of CVA, heart failure with diastolic dysfunction, Paroxysmal Atrial Fibrillation and class III obesity.    #. Metabolic dysfunction associated steatotic liver disease (MASLD; formerly NAFLD)+ alcohol overuse  #. Hepatic Fibrosis  Multiple metabolic comorbid conditions including class III obesity, heart failure with diastolic dysfunction and hypertension as well as prednisone use for immunosuppression; these factors together likely contribute to metabolic dysfunction associated steatotic liver disease.  During the COVID-19 pandemic he noticed that his alcohol use based on that he was drinking up to 2 drinks on weekdays intermittently and up to 2 drinks on weekends intermittently.  He has stopped drinking since July 2023.  He has been working on weight loss through weight watchers but has not been prescribed pharmacotherapy.    MR elastography suggestive of F1 to F2 fibrosis.  He has no splenomegaly on cross-sectional imaging earlier in 2023 + platelets > 150 which suggests against portal HTN. His most recent labs are from May 2023 with normal ALT and AST we spent the majority of the discussion today talking about complete alcohol cessation, portion control, exercise and dietary changes with the goal of weight loss. With weight loss, metabolic syndrome management and alcohol cessation he may see some regression of his fibrosis.     Plan  -- Abdominal ultrasound complete with visualization of  spleen  -- Labs: HgA1c, lipids, CMP, CBC, INR, iron studies, ROSANNE, TSH  -- Discussed lifestyle and dietary changes with the goal of 7-10% weight loss   * Referral to comprehensive metabolic clinic placed   * Recommend limiting portion sizes, excluding MASLD promoting components - processed food, foods & beverages high in added fructose   * Recommend aerobic exercise and resistance training; plan to go back to the pool  -- Recommend weight loss medication initiation, such as semaglutide or liraglutide; per patient issues with prior authorization when PCP applied  -- Metabolic syndrome management per primary care doctor  -- Counseled on complete alcohol abstinence  -- Patient to determine his last colonoscopy and if his last colonoscopy was 2016 he would be due for repeat colon cancer screening    RTC: 6 months    Aditi Nicholson MD (Lizzie)  Advanced & Transplant Hepatology  [facility]    I spent 45 minutes on this encounter performing the following: reviewing the patient's medical record (clinic visits, hospital records, lab results, imaging and procedural documentation), history taking, physical exam and documentation on the date of the encounter. I also spent part of the time in coordination of care and counseling.    HPI:  Patient reports that he was recently hospitalized in May for several days for having spontaneous hematomas in his left leg with blood loss requiring a blood transfusion.    He reports that in the last year he has been trying to lose weight through weight watchers.  He reports losing about 20 pounds through this.  His activity level is hindered by the fact that he has a pinched nerve in his back that has been limiting his mobility.  He has been seeing a specialist in the pain department and has undergone an epidural in the past.  There is a plan for Botox in the future.  There is a plan for discussion with a surgeon regarding back surgery as well.  Given all of this he  has not been able to engage in much activity or exercise.  He is planning on going to a pool nearby to increase his activity.  He talked with his primary care doctor about considering pharmacotherapy for weight loss but Wegovy was not authorized by his insurance and he has not heard back from his primary care doctor about other options.    He denies any history of diabetes.  His blood pressure has been up and down with some readings under 140/80 but some are above that.  He takes Lasix for some fluid retention in his legs.  His immunosuppression is mycophenolate and prednisone.    Prior to July 2023 he was drinking up to 2 drinks on weekdays and up to 2 drinks on weekends intermittently.  He has stopped drinking since July 3, 2023.  He was a former smoker.  No family history of liver disease or liver cancer.    He denies any signs or symptoms of decompensated liver disease such as jaundice, abdominal distention, lower extremity edema, lethargy, confusion, melena or hematochezia.    Medical hx Surgical hx   Past Medical History:   Diagnosis Date    BK viremia     Depression     End stage kidney disease (H)     Gout     History of blood transfusion     2004    Hypertension secondary to other renal disorders     IgA nephropathy     Immunosuppressed status (H)     Kidney replaced by transplant     Kidney transplant complication     Metabolic acidosis     VANESA (obstructive sleep apnea)     CPAP    Overweight     Rotator cuff dysfunction     Vitamin D deficiency       Past Surgical History:   Procedure Laterality Date    IMPLANT PROSTHESIS PENIS INFLATABLE N/A 10/4/2016    Procedure: IMPLANT PROSTHESIS PENIS INFLATABLE;  Surgeon: Jaquan Matthew MD;  Location: [facility] OR    INJECT EPIDURAL LUMBAR N/A 7/13/2023    Procedure: L5-S1 epidural steroid injection with fluoroscopy (right> left);  Surgeon: Maryse Evans MD;  Location: [facility] OR    IR RENAL BIOPSY RIGHT  6/8/2021    ORTHOPEDIC SURGERY      RTK 09/15    PERCUTANEOUS  BIOPSY KIDNEY Right 3/12/2019    Procedure: Right Kidney Biopsy;  Surgeon: Ash Benitez MD;  Location: [facility] OR    TRANSPLANT KIDNEY RECIPIENT LIVING UNRELATED          Medications  Current Outpatient Medications   Medication Sig Dispense Refill    acetaminophen (TYLENOL) 500 MG tablet Take 1,000 mg by mouth daily      allopurinol (ZYLOPRIM) 100 MG tablet Take 3 tablets (300 mg) by mouth daily 30 tablet     aspirin 81 MG EC tablet Take 81 mg by mouth daily      atorvastatin (LIPITOR) 20 MG tablet Take 1 tablet by mouth      baclofen (LIORESAL) 10 MG tablet Take 3 tablets (30 mg) by mouth At Bedtime for 180 days 270 tablet 1    Calcium 500-2.5 MG-MCG CHEW Take 1 tablet by mouth daily 90 tablet 0    carvedilol 12.5 MG PO tablet Take 1 tablet (12.5 mg) by mouth 2 times daily (with meals) 90 tablet 3    cholecalciferol (VITAMIN D) 1000 UNIT tablet Take 1 tablet (1,000 Units) by mouth daily 90 tablet 3    clonazePAM (KLONOPIN) 0.5 MG tablet Take 0.25 mg by mouth every evening      clotrimazole (LOTRIMIN) 1 % external cream       ferrous sulfate (FEROSUL) 325 (65 Fe) MG tablet Take 1 tablet (325 mg) by mouth daily (with breakfast) Please request future ferrous sulfate refills from your primary care provider, or OK to buy over the counter. 90 tablet 0    Fexofenadine HCl (ALLEGRA PO) Take 1 tablet by mouth daily.       furosemide (LASIX) 20 MG tablet Take 40 mg by mouth 2 times daily      gabapentin (NEURONTIN) 300 MG capsule Take 1 capsule (300 mg) by mouth At Bedtime 270 capsule 3    losartan (COZAAR) 25 MG tablet Take 1 tablet (25 mg) by mouth daily 90 tablet 3    Multiple Vitamins-Minerals (ONE-A-DAY MENS 50+ ADVANTAGE PO) Take 1 tablet by mouth every evening      mycophenolate (GENERIC EQUIVALENT) 250 MG capsule Take 3 capsules (750 mg) by mouth 2 times daily 180 capsule 11    omega-3 fatty acids (FISH OIL) 1200 MG capsule Take 1 capsule by mouth 3 times daily. 180 capsule 11    pantoprazole (PROTONIX) 40 MG EC  tablet TAKE 1 TABLET BY MOUTH DAILY 30 tablet 1    polyethylene glycol (MIRALAX) 17 GM/Dose powder Take 1 packet by mouth      predniSONE (DELTASONE) 5 MG tablet Take 1 tablet (5 mg) by mouth daily 90 tablet 3    senna-docusate (SENOKOT-S/PERICOLACE) 8.6-50 MG tablet Take 2 tablets by mouth daily as needed (prn)      sodium bicarbonate 650 MG tablet Take 2 tablets (1,300 mg) by mouth 2 times daily 360 tablet 3    sulfamethoxazole-trimethoprim (BACTRIM) 400-80 MG tablet Take 1 tablet by mouth Every Mon, Wed, Fri Morning 13 tablet 11    verapamil ER (CALAN-SR) 180 MG CR tablet Take 1 tablet (180 mg) by mouth daily (Patient taking differently: Take 240 mg by mouth daily) 90 tablet 0       Allergies  Allergies   Allergen Reactions    Baclofen Other (See Comments)     Foggy, not clear headed.        Family hx Social hx   Family History   Problem Relation Age of Onset    Kidney Disease No family hx of     Liver Cancer No family hx of     Liver Disease No family hx of     Colon Cancer No family hx of       - Lives with wife Jenny  - Alcohol: prior to July 3 2023 - up to 2 drinks on weekdays and 2 drinks on weekends.  - Tobacco: former, quit 2004     Review of systems  A 10-point review of systems was negative.    Examination  No vital given virtual visit     Gen-NAD  Eye-glasses in place  ENT- MMM  CVS- RRR, no murmurs  RS-breathing comfortably on ambient air  Neuro- A+Ox3  Skin- no jaundice  Psych- normal mood    Laboratory  BMP  Recent Labs   Lab Test 08/16/23  0736 06/05/23  0736 05/31/23  0908 05/23/23  0735 05/10/23  1159 08/16/21  0828 06/08/21  1007 11/11/19  0743 03/12/19  0537 12/16/16  0740 10/04/16  1038   NA  --   --   --   --  146*  --  140  --  142  --   --    POTASSIUM  --   --   --   --  4.1  --  4.5  --  4.1  --  4.2   CHLORIDE 109 108 103 100 108*   < > 107   < > 108   < >  --    GEGE  --   --   --   --  8.3*  --  9.1  --  8.0*  --   --    CO2  --   --   --   --  26  --  28  --  26  --   --    BUN  --   --    --   --  40.2*  --  53*  --  48*  --   --    CR  --   --   --   --  3.35*  --  2.92*  --  2.35*  --   --    GLC  --   --   --   --  119*  --  131*  --  96  --   --     < > = values in this interval not displayed.     CBC  Recent Labs   Lab Test 05/10/23  1159 06/08/21  1007 03/12/19  1220 03/12/19  0537   WBC 11.0 9.1  --  8.5   RBC 3.55* 3.87*  --  4.22*   HGB 10.6* 12.2*   < > 13.2*   HCT 33.9* 38.6*  --  42.4   MCV 96 100  --  101*   MCH 29.9 31.5  --  31.3   MCHC 31.3* 31.6  --  31.1*   RDW 15.7* 13.4  --  13.2    167  --  152    < > = values in this interval not displayed.     Liver Enzymes   Recent Labs   Lab Test 05/10/23  1159   PROTTOTAL 6.7   ALBUMIN 4.3   BILITOTAL 0.3   ALKPHOS 123   AST 21   ALT 16      INR   INR   Date Value Ref Range Status   05/10/2023 1.83 (H) 0.85 - 1.15 Final   06/08/2021 1.02 0.86 - 1.14 Final         Hep B s Ag non-reactive  Hep B c AB non-reactive  Hep B s Ab 10.18  Hep C Ab non-reactive    Radiology  MR Elastography 6/2023  1. MR Elastography: The averaged liver stiffness index (kPa) measures  2. This is compatible with stage1-2 fibrosis.  3. Atrophic appearing kidneys compatible with chronic renal disease.     CT Abdomen Pelvis 3/2023  1.  Large hematoma in the left anterior abdominal wall involving the    rectus sheath. Compared to prior study, this is increased. There is    increased hemorrhage extending into the anterior aspect of the    peritoneum surrounding the urinary bladder.   2.  Distended urinary bladder with adjacent hemorrhage. Consider    placement of a Ramirez catheter.   3.  The penile implant balloon is being compressed by the rectal    sheath hematoma.   4.  Small amount of perihepatic ascites. This is new   Spleen normal size    Imaging reports have been independently reviewed.     Endoscopy  Colonoscopy 4/15/2016  A polyp was photographed and removed with snare   cautery from the distal ascending colon and sent for permanent   section.    A polyp was  photographed and removed with snare   cautery from the proximal sigmoid colon and sent for permanent   section.   The patient was noted to have sigmoid diverticulosis.       Path:  PART A:  Colonic tubular adenoma with focal mild epithelial nuclear atypia;   no high grade dysplasia identified; fragments of edematous, nonneoplastic   colonic mucosa showing electrocautery artifact, specimen submitted as   ascending colon polyp.   PART B:  Edematous colonic mucosa focally showing tubular adenoma with no   significant epithelial nuclear atypia, specimen submitted as sigmoid colon   polyp.     Endoscopy reports have been independently reviewed.      Virtual Visit Details    Type of service:  Video Visit   Video Start Time: 7:50   Video End Time: 8:08    Originating Location (pt. Location): Home    Distant Location (provider location):  On-site  Platform used for Video Visit: AmWell      Again, thank you for allowing me to participate in the care of your patient.        Sincerely,        Aditi Nicholson MD

## 2023-09-13 NOTE — PATIENT INSTRUCTIONS
It was a pleasure taking care of you today.  I've included a brief summary of our discussion and care plan from today's visit below.  Please review this information with your primary care provider.  _______________________________________________________________________    My recommendations are summarized as follows:  - Continue weight watchers + working on portion control, reducing carbohydrates + sugars  - Agree with getting back in the pool for activity + mobility  - Labs + imaging locally  - Referral to weight loss clinic    Return to Liver/Hepatology Clinic in 6 months.    _______________________________________________________________________    Scheduling Procedures or Tests  - To schedule endoscopic procedures call: [phone]  - To schedule radiology tests call: [phone] (for [facility]) or [phone] (for [facility])     _______________________________________________________________________    Who do I call with any questions after my visit?  Please be in touch if there are any further questions that arise following today's visit.  There are multiple ways to contact your gastroenterology care team.      To schedule or reschedule an appointment, please call [phone] and choose option 2 (for hepatology) and then option 1 (for scheduling).    During business hours, you may reach a nurse by calling the appointment line ([phone]) and asking to speak with a nurse    You can send a secure message through [employer] for non-urgent questions. [employer] messages are answered by your nurse or doctor typically within 24 to 48 hours. Please allow extra time on weekends and holidays.      For urgent/emergent questions after business hours, you may reach the on-call GI Fellow by contacting the [facility]  at [phone].     How will I get the results of any tests ordered?    You will receive all of your results.  If you have signed up for MyChart, any tests ordered at your  visit will be available to you after your physician reviews them.  Typically this takes 1-2 weeks.  If there are urgent results that require a change in your care plan, your physician or nurse will call you to discuss the next steps.      What is [employer]?  [employer] is a secure way for you to access all of your healthcare records from the [facility].  It is a web based computer program, so you can sign on to it from any location.  It also allows you to send secure messages to your care team.  I recommend signing up for [employer] access if you have not already done so and are comfortable with using a computer.      How to I schedule a follow-up visit?  If you did not schedule a follow-up visit today, please call [phone] to schedule a follow-up office visit.     Sincerely,    Aditi Nicholson (Lizzie)

## 2023-09-14 ENCOUNTER — MYC MEDICAL ADVICE (OUTPATIENT)
Dept: OTHER | Age: 68
End: 2023-09-14

## 2023-09-14 NOTE — LETTER
PHYSICIAN ORDERS    DATE & TIME ISSUED: 9/15/2023 1500  PATIENT NAME: Gurjit Squires   : 1955     [facility] MR# [if applicable]: [id number]     DIAGNOSIS / ICD - 10 CODES  Kidney Transplanted (Z94.0)  After Care Following Organ Transplant (Z48.298)  Long Term Use of Medication (Z79.899)  Immunosuppressed Status (Z92.25)    Monthly    BMP  CBC  Urine protein creatinine ratio        Patient should release information to the [facility].   Please fax results to the Transplant Center at [phone].  Any questions please call [phone] or [phone].      .

## 2023-09-14 NOTE — TELEPHONE ENCOUNTER
SPINE PATIENTS - NEW PROTOCOL PREVISIT    RECORDS RECEIVED FROM: internal   REASON FOR VISIT: Lumbar radiculopathy   Date of Appt: 9/22/23   NOTES (FOR ALL VISITS) STATUS DETAILS   OFFICE NOTE from referring provider Internal Dr Maryse Evans @ [facility]:  9/1/23 mychart encounter  8/7/23   OFFICE NOTE from other specialist Internal Dr Aylin Owens @ [facility]:  8/7/23   MEDICATION LIST Internal    IMAGING  (FOR ALL VISITS)     MRI (HEAD, NECK, SPINE) PACS [facility]:  MRI Lumbar Spine 5/3/23  MRI Cervical Spine 7/24/20  MRI Thoracic Spine 7/24/20   CT PACS [facility]:  CT Lumbar Spine 7/22/20

## 2023-09-15 ASSESSMENT — ENCOUNTER SYMPTOMS
SLEEP DISTURBANCES DUE TO BREATHING: 0
ARTHRALGIAS: 1
MUSCLE CRAMPS: 1
BLOATING: 0
STIFFNESS: 1
NAUSEA: 0
LEG PAIN: 1
ORTHOPNEA: 0
NECK PAIN: 0
PALPITATIONS: 0
HYPERTENSION: 1
HYPOTENSION: 0
BLOOD IN STOOL: 0
CONSTIPATION: 0
EXERCISE INTOLERANCE: 1
BOWEL INCONTINENCE: 0
HEARTBURN: 0
JOINT SWELLING: 0
JAUNDICE: 0
MYALGIAS: 1
RECTAL PAIN: 0
SYNCOPE: 0
VOMITING: 0
SWOLLEN GLANDS: 0
DIARRHEA: 1
MUSCLE WEAKNESS: 1
BACK PAIN: 1
LIGHT-HEADEDNESS: 0
ABDOMINAL PAIN: 0
BRUISES/BLEEDS EASILY: 1

## 2023-09-16 ENCOUNTER — HEALTH MAINTENANCE LETTER (OUTPATIENT)
Age: 68
End: 2023-09-16

## 2023-09-16 NOTE — TELEPHONE ENCOUNTER
Radu Bunch MD Sveiven, Sara, RN  He needs to go into his local clinic to get his BP done and/or buy a new one.  He can stop the amlodipine, but there is a risk of stroke if his blood pressure goes too high and I cannot know that if he isn't checking it.  I would hesitate making any other additional changes, such as increasing diuretic or losartan, without knowing his BP and pulse.     [place]      ----- Message -----   From: Shari Talley RN   Sent: 5/15/2023  10:37 AM CDT   To: Radu Bunch MD   Subject: RE: BLE swelling and amlodipine                   Carlos Dacosta send me two my chart messages over the weekend with complaints of severe BLE swelling that has worsened and has become painful.  He did follow your last recommendation of decreasing the amlodipine from 5 mg to 2.5 mg daily- take this at night.  We restarted losartan 25 mg daily 1.5 weeks ago.     He states the swelling began after the amlodipine was started at the time of discharge- early April.  He is up ~8 pounds since April discharge as well.       Currently on lasix 40 mg daily as well as carvedilol 12.5 mg bid.     He does not have updated HR or BP for me as his BP monitor is broken... I did tell him it will be difficult to make any adjustments without these pieces of information.  He states his BP is usual 130s/70s, HR 70s-80s     I can ask him again to please obtain a working BP cuff or go get BP taken and report back.  However,  I do believe PCP was initially managing the amlodipine at the time of discharge.  Do you want me to have him reach out to PCP?  He is very frustrated and would like us to help without current VS readings.     Shari Talley RN   Transplant Coordinator   [phone]      OUTCOME: patient v/u of stopping amlodipine.  Will obtain new BP cuff and will update RNCC with BP and HR.    Shari Talley RN   Transplant Coordinator  [phone]         This patient does have evidence of infective focus  My overall impression is sepsis.  Source: Urinary Tract and Skin and Soft Tissue (location labia)  Antibiotics given-   Antibiotics (72h ago, onward)    Start     Stop Route Frequency Ordered    09/16/23 0830  doxycycline (VIBRAMYCIN) 100 mg in dextrose 5 % in water (D5W) 100 mL IVPB (MB+)         -- IV Every 12 hours (non-standard times) 09/16/23 0726    09/15/23 1045  ertapenem (INVANZ) 500 mg in sodium chloride 0.9% 100 mL IVPB         -- IV Every 24 hours (non-standard times) 09/15/23 0938        Latest lactate reviewed-  Recent Labs   Lab 09/14/23  1106 09/15/23  1114   LACTATE 2.2 1.5     Organ dysfunction indicated by Acute kidney injury    Fluid challenge Ideal Body Weight- The patient's ideal body weight is Ideal body weight: 52.4 kg (115 lb 8.3 oz) which will be used to calculate fluid bolus of 30 ml/kg for treatment of septic shock.      Post- resuscitation assessment Yes Perfusion exam was performed within 6 hours of septic shock presentation after bolus shows Adequate tissue perfusion assessed by non-invasive monitoring     Will Not start Pressors- Levophed for MAP of 65    --pt with hx of recurrent UTI's and UA suggestive of infection on admission   --previous urine cx with pansensitivity (e coli, klebsiella)  --presence of labial abscess (see image in media)   --gynecology consulted, appreciate recs   --per gyn, I+D not indicated based on size and location; also significant concern for pt's ability to tolerate procedure and for her tissue's ability to heal via secondary intention. Would reconsider surgical intervention if worsening signs of localized infection or tissue breakdown.   --recommend hot compresses Q4H to affected area   --will discontinue vanc, start doxycycline 100 mg Q12H by IV, per ID recs.     --received 3L total IVF upon admission, lactate wnl   --repeat lactate remains wnl  --vanc/zosyn for broad coverage, dosed by  pharmacy  --initial blood cultures preliminary growth of gram positive cocci resembling staph   --repeat blood cultures negative to date  --final urine cultures with proteus (sensitive) and klebsiella (previously sensitive, now resistant to most)  --discontinued zosyn, started ertapenem with ID consulted, appreciate recs.  --f/u blood cultures

## 2023-09-22 ENCOUNTER — PRE VISIT (OUTPATIENT)
Dept: NEUROSURGERY | Facility: CLINIC | Age: 68
End: 2023-09-22

## 2023-09-22 ENCOUNTER — OFFICE VISIT (OUTPATIENT)
Dept: NEUROSURGERY | Facility: CLINIC | Age: 68
End: 2023-09-22
Payer: MEDICARE

## 2023-09-22 VITALS
OXYGEN SATURATION: 97 % | SYSTOLIC BLOOD PRESSURE: 175 MMHG | WEIGHT: 261 LBS | HEIGHT: 69 IN | BODY MASS INDEX: 38.66 KG/M2 | DIASTOLIC BLOOD PRESSURE: 82 MMHG | HEART RATE: 69 BPM

## 2023-09-22 DIAGNOSIS — M54.16 LUMBAR RADICULOPATHY: ICD-10-CM

## 2023-09-22 PROCEDURE — 99203 OFFICE O/P NEW LOW 30 MIN: CPT | Performed by: NEUROLOGICAL SURGERY

## 2023-09-22 NOTE — NURSING NOTE
"Chief Complaint   Patient presents with    Consult     Lumbar radiculopathy   BP (!) 175/82   Pulse 69   Ht 1.745 m (5' 8.7\")   Wt 118.4 kg (261 lb)   SpO2 97%   BMI 38.88 kg/m      Kristen Mendez CMA at 10:41 AM on 9/22/2023.        "

## 2023-09-22 NOTE — LETTER
9/22/2023       RE: Gurjit Squires  [address]  [address]  [facility] [address]     Dear Colleague,    Thank you for referring your patient, Gurjit Squires, to the [facility] [place] at [facility]. Please see a copy of my visit note below.    Date of service: 9/22/2023    Carlos Squires is a 68-year-old male who presents our clinic for a consultation requested by Dr. Evans for evaluation of of right-sided low back pain that he describes as sharp and stabbing.  He has had low back pain going back to his teenage years when he was playing football and he has had back pain ever since.  Over the last 1 or 2 years pain in the low back on the right side has become much more noticeable and painful.  Pain is worse usually with prolonged standing and walking.  Sitting and lying down makes it better.  Although he has a intermittent fleeting sensation in his right leg he does not endorse any classic radiculopathy or leg pain.    He recently had right L5-S1 interlaminal epidural injection by Dr. Evans but but he did not have any significant benefit.    He rates his pain as anywhere between 8-10 out of 10 on the pain scale when it occurs.  His pain is not constant and is very much activity dependent.  When he leans forward he has pain does improve somewhat.    Past Medical History:   Diagnosis Date    BK viremia     Depression     End stage kidney disease (H)     Gout     History of blood transfusion     2004    Hypertension secondary to other renal disorders     IgA nephropathy     Immunosuppressed status (H)     Kidney replaced by transplant     Kidney transplant complication     Metabolic acidosis     VANESA (obstructive sleep apnea)     CPAP    Overweight     Rotator cuff dysfunction     Vitamin D deficiency      Past Surgical History:   Procedure Laterality Date    IMPLANT PROSTHESIS PENIS INFLATABLE N/A 10/4/2016    Procedure: IMPLANT  PROSTHESIS PENIS INFLATABLE;  Surgeon: Jaquan Matthew MD;  Location: [facility] OR    INJECT EPIDURAL LUMBAR N/A 7/13/2023    Procedure: L5-S1 epidural steroid injection with fluoroscopy (right> left);  Surgeon: Maryse Evans MD;  Location: [facility] OR    IR RENAL BIOPSY RIGHT  6/8/2021    ORTHOPEDIC SURGERY      RTK 09/15    PERCUTANEOUS BIOPSY KIDNEY Right 3/12/2019    Procedure: Right Kidney Biopsy;  Surgeon: Ash Benitez MD;  Location: [facility] OR    TRANSPLANT KIDNEY RECIPIENT LIVING UNRELATED       Current Outpatient Medications   Medication    acetaminophen (TYLENOL) 500 MG tablet    allopurinol (ZYLOPRIM) 100 MG tablet    aspirin 81 MG EC tablet    atorvastatin (LIPITOR) 20 MG tablet    Calcium 500-2.5 MG-MCG CHEW    carvedilol 12.5 MG PO tablet    cholecalciferol (VITAMIN D) 1000 UNIT tablet    clonazePAM (KLONOPIN) 0.5 MG tablet    ferrous sulfate (FEROSUL) 325 (65 Fe) MG tablet    Fexofenadine HCl (ALLEGRA PO)    furosemide (LASIX) 20 MG tablet    gabapentin (NEURONTIN) 300 MG capsule    losartan (COZAAR) 25 MG tablet    Multiple Vitamins-Minerals (ONE-A-DAY MENS 50+ ADVANTAGE PO)    mycophenolate (GENERIC EQUIVALENT) 250 MG capsule    omega-3 fatty acids (FISH OIL) 1200 MG capsule    pantoprazole (PROTONIX) 40 MG EC tablet    predniSONE (DELTASONE) 5 MG tablet    sodium bicarbonate 650 MG tablet    sulfamethoxazole-trimethoprim (BACTRIM) 400-80 MG tablet    verapamil ER (CALAN-SR) 180 MG CR tablet    clotrimazole (LOTRIMIN) 1 % external cream    polyethylene glycol (MIRALAX) 17 GM/Dose powder    senna-docusate (SENOKOT-S/PERICOLACE) 8.6-50 MG tablet     Current Facility-Administered Medications   Medication    botulinum toxin type A (BOTOX) 100 units injection 400 Units     He has no significant weakness or numbness.  The right-sided low back pain worsens with extension but not flexion.  His gait and balance are normal.    I have personally reviewed the lumbar spine MRI scan which shows evidence of right L4-5  foraminal stenosis with a disc bulge compressing the L4 nerve root.  There is a central disc bulge at L4-5 causing bilateral lateral recess stenosis compressing the L5 nerve roots.      Impression/plan:  Carlos Squires is a 68-year-old male who presents our clinic for evaluation of right-sided low back pain that he described as sharp and stabbing-like.  Given the location of his pain without radiculopathy, it is difficult to ascertain the source of his pain.  It could be L5 radiculopathy but he does not have significant pain radiating down the leg.  Furthermore, his pain does improved with of flexion and worsens with extension.  At this time I would like for him to get a diagnostic L5 nerve block to see whether his pain would improve if not then he may need to undergo a facet block which according to the patient has helped some years ago.  I will request Dr. Evans for a diagnostic L5 nerve block on the right side.            Again, thank you for allowing me to participate in the care of your patient.      Sincerely,    Adrián Alvarado MD

## 2023-09-22 NOTE — PROGRESS NOTES
Date of service: 9/22/2023    Carlos Squires is a 68-year-old male who presents our clinic for a consultation requested by Dr. Evans for evaluation of of right-sided low back pain that he describes as sharp and stabbing.  He has had low back pain going back to his teenage years when he was playing football and he has had back pain ever since.  Over the last 1 or 2 years pain in the low back on the right side has become much more noticeable and painful.  Pain is worse usually with prolonged standing and walking.  Sitting and lying down makes it better.  Although he has a intermittent fleeting sensation in his right leg he does not endorse any classic radiculopathy or leg pain.    He recently had right L5-S1 interlaminal epidural injection by Dr. Evans but but he did not have any significant benefit.    He rates his pain as anywhere between 8-10 out of 10 on the pain scale when it occurs.  His pain is not constant and is very much activity dependent.  When he leans forward he has pain does improve somewhat.    Past Medical History:   Diagnosis Date    BK viremia     Depression     End stage kidney disease (H)     Gout     History of blood transfusion     2004    Hypertension secondary to other renal disorders     IgA nephropathy     Immunosuppressed status (H)     Kidney replaced by transplant     Kidney transplant complication     Metabolic acidosis     VANESA (obstructive sleep apnea)     CPAP    Overweight     Rotator cuff dysfunction     Vitamin D deficiency      Past Surgical History:   Procedure Laterality Date    IMPLANT PROSTHESIS PENIS INFLATABLE N/A 10/4/2016    Procedure: IMPLANT PROSTHESIS PENIS INFLATABLE;  Surgeon: Jaquan Matthew MD;  [facility]: [facility]    INJECT EPIDURAL LUMBAR N/A 7/13/2023    Procedure: L5-S1 epidural steroid injection with fluoroscopy (right> left);  Surgeon: Maryse Evans MD;  Location: [facility] OR    IR RENAL BIOPSY RIGHT  6/8/2021    ORTHOPEDIC SURGERY      RTK 09/15    PERCUTANEOUS  BIOPSY KIDNEY Right 3/12/2019    Procedure: Right Kidney Biopsy;  Surgeon: Ash Benitez MD;  [facility]    TRANSPLANT KIDNEY RECIPIENT LIVING UNRELATED       Current Outpatient Medications   Medication    acetaminophen (TYLENOL) 500 MG tablet    allopurinol (ZYLOPRIM) 100 MG tablet    aspirin 81 MG EC tablet    atorvastatin (LIPITOR) 20 MG tablet    Calcium 500-2.5 MG-MCG CHEW    carvedilol 12.5 MG PO tablet    cholecalciferol (VITAMIN D) 1000 UNIT tablet    clonazePAM (KLONOPIN) 0.5 MG tablet    ferrous sulfate (FEROSUL) 325 (65 Fe) MG tablet    Fexofenadine HCl (ALLEGRA PO)    furosemide (LASIX) 20 MG tablet    gabapentin (NEURONTIN) 300 MG capsule    losartan (COZAAR) 25 MG tablet    Multiple Vitamins-Minerals (ONE-A-DAY MENS 50+ ADVANTAGE PO)    mycophenolate (GENERIC EQUIVALENT) 250 MG capsule    omega-3 fatty acids (FISH OIL) 1200 MG capsule    pantoprazole (PROTONIX) 40 MG EC tablet    predniSONE (DELTASONE) 5 MG tablet    sodium bicarbonate 650 MG tablet    sulfamethoxazole-trimethoprim (BACTRIM) 400-80 MG tablet    verapamil ER (CALAN-SR) 180 MG CR tablet    clotrimazole (LOTRIMIN) 1 % external cream    polyethylene glycol (MIRALAX) 17 GM/Dose powder    senna-docusate (SENOKOT-S/PERICOLACE) 8.6-50 MG tablet     Current Facility-Administered Medications   Medication    botulinum toxin type A (BOTOX) 100 units injection 400 Units     He has no significant weakness or numbness.  The right-sided low back pain worsens with extension but not flexion.  His gait and balance are normal.    I have personally reviewed the lumbar spine MRI scan which shows evidence of right L4-5 foraminal stenosis with a disc bulge compressing the L4 nerve root.  There is a central disc bulge at L4-5 causing bilateral lateral recess stenosis compressing the L5 nerve roots.      Impression/plan:  Carlos Squires is a 68-year-old male who presents our clinic for evaluation of right-sided low back pain that he described as sharp  and stabbing-like.  Given the location of his pain without radiculopathy, it is difficult to ascertain the source of his pain.  It could be L5 radiculopathy but he does not have significant pain radiating down the leg.  Furthermore, his pain does improved with of flexion and worsens with extension.  At this time I would like for him to get a diagnostic L5 nerve block to see whether his pain would improve if not then he may need to undergo a facet block which according to the patient has helped some years ago.  I will request Dr. Evans for a diagnostic L5 nerve block on the right side.    Adrián Alvarado MD

## 2023-09-25 ENCOUNTER — TELEPHONE (OUTPATIENT)
Dept: ANESTHESIOLOGY | Facility: CLINIC | Age: 68
End: 2023-09-25
Payer: MEDICARE

## 2023-09-25 DIAGNOSIS — M54.16 LUMBAR RADICULOPATHY: Primary | ICD-10-CM

## 2023-09-25 NOTE — TELEPHONE ENCOUNTER
RN spoke with patient regarding his procedure. Patient is wondering if he should be getting the botox injection in his knee shortly after the lumbar SANCHO. Patient is also wondering if he needs to keep his follow up appointment with Dr. vEans since he will be seeing her for a procedure a few days prior. Writer informed a message would be sent to the provider to obtain her recommendations. Patient understood and agreed.    Charley Boyer RNCC

## 2023-09-25 NOTE — TELEPHONE ENCOUNTER
Spoke with the patient to get his injection scheduled. He stated that he has an appointment with Dr. Evans on 10/30 along with Botox injection into his leg. He is asking if he should keep these appointments. I told him that I will keep them on the schedule and send a message to Dr. Evans's team to advise.     Date Scheduled: 10-24-23  Facility: Surgery Locations: [facility]  Surgeon: Dr. Evans   Post-op appointment scheduled:    scheduled?: No  Surgery packet/instructions confirmed received?  Yes  Pre op physical/PAC appointment:   Special Considerations:     Glory Bhatti  Surgery Scheduling Coordinator  Ph: [phone]

## 2023-09-27 DIAGNOSIS — K76.0 NAFLD (NONALCOHOLIC FATTY LIVER DISEASE): Primary | ICD-10-CM

## 2023-10-04 ENCOUNTER — VIRTUAL VISIT (OUTPATIENT)
Dept: ENDOCRINOLOGY | Facility: CLINIC | Age: 68
End: 2023-10-04
Attending: INTERNAL MEDICINE
Payer: MEDICARE

## 2023-10-04 ENCOUNTER — TELEPHONE (OUTPATIENT)
Dept: ENDOCRINOLOGY | Facility: CLINIC | Age: 68
End: 2023-10-04

## 2023-10-04 VITALS — BODY MASS INDEX: 38.21 KG/M2 | WEIGHT: 258 LBS | HEIGHT: 69 IN

## 2023-10-04 DIAGNOSIS — E88.810 METABOLIC SYNDROME: ICD-10-CM

## 2023-10-04 DIAGNOSIS — E66.01 CLASS 2 SEVERE OBESITY WITH SERIOUS COMORBIDITY AND BODY MASS INDEX (BMI) OF 38.0 TO 38.9 IN ADULT, UNSPECIFIED OBESITY TYPE (H): Primary | ICD-10-CM

## 2023-10-04 DIAGNOSIS — R73.03 PRE-DIABETES: ICD-10-CM

## 2023-10-04 DIAGNOSIS — E66.812 CLASS 2 SEVERE OBESITY WITH SERIOUS COMORBIDITY AND BODY MASS INDEX (BMI) OF 38.0 TO 38.9 IN ADULT, UNSPECIFIED OBESITY TYPE (H): Primary | ICD-10-CM

## 2023-10-04 DIAGNOSIS — K74.00 LIVER FIBROSIS: ICD-10-CM

## 2023-10-04 PROBLEM — L03.90 SEPSIS DUE TO CELLULITIS (H): Status: RESOLVED | Noted: 2018-05-17 | Resolved: 2023-10-04

## 2023-10-04 PROBLEM — A41.9 SEPSIS DUE TO CELLULITIS (H): Status: RESOLVED | Noted: 2018-05-17 | Resolved: 2023-10-04

## 2023-10-04 PROBLEM — D47.2 MGUS (MONOCLONAL GAMMOPATHY OF UNKNOWN SIGNIFICANCE): Status: ACTIVE | Noted: 2023-07-06

## 2023-10-04 PROBLEM — T14.8XXA HEMATOMA: Status: ACTIVE | Noted: 2023-03-26

## 2023-10-04 PROBLEM — M25.512 LEFT SHOULDER PAIN: Status: ACTIVE | Noted: 2021-07-07

## 2023-10-04 PROCEDURE — 99215 OFFICE O/P EST HI 40 MIN: CPT | Mod: 95

## 2023-10-04 PROCEDURE — 99417 PROLNG OP E/M EACH 15 MIN: CPT | Mod: 95

## 2023-10-04 RX ORDER — TIRZEPATIDE 5 MG/.5ML
5 INJECTION, SOLUTION SUBCUTANEOUS
Qty: 2 ML | Refills: 1 | Status: SHIPPED | OUTPATIENT
Start: 2023-10-04 | End: 2023-10-11

## 2023-10-04 RX ORDER — TIRZEPATIDE 2.5 MG/.5ML
2.5 INJECTION, SOLUTION SUBCUTANEOUS
Qty: 2 ML | Refills: 0 | Status: SHIPPED | OUTPATIENT
Start: 2023-10-04 | End: 2023-10-11

## 2023-10-04 RX ORDER — FUROSEMIDE 40 MG
40 TABLET ORAL
COMMUNITY
Start: 2023-08-07 | End: 2024-03-13

## 2023-10-04 RX ORDER — VERAPAMIL HYDROCHLORIDE 240 MG/1
240 TABLET, FILM COATED, EXTENDED RELEASE ORAL DAILY
COMMUNITY
Start: 2023-09-07 | End: 2024-03-13

## 2023-10-04 ASSESSMENT — PAIN SCALES - GENERAL: PAINLEVEL: MILD PAIN (3)

## 2023-10-04 NOTE — TELEPHONE ENCOUNTER
Ozempic not covered by plan unless patient has type 2 diabetes    From insurance  USE IS NOT MEDICALLY ACCEPTED

## 2023-10-04 NOTE — NURSING NOTE
Is the patient currently in the state of [place]? YES    Visit mode:VIDEO    If the visit is dropped, the patient can be reconnected by: VIDEO VISIT: Send to e-mail at: [email or web address]    Will anyone else be joining the visit? NO  (If patient encounters technical issues they should call [phone]    How would you like to obtain your AVS? MyChart    Are changes needed to the allergy or medication list? Pt stated no changes to allergies and Pt stated no med changes    Reason for visit: Consult    Sherri MERCEDES

## 2023-10-04 NOTE — LETTER
"10/4/2023       RE: Gurjit Squires  [address]  [address]  [facility] [address]     Dear Colleague,    Thank you for referring your patient, Gurjit Squires, to the [facility] [place] at [facility]. Please see a copy of my visit note below.    Virtual Visit Details    Type of service:  Video Visit   Video Start Time:  7:00Am  Video End Time: 8:10AM    Originating Location (pt. Location): Home    Distant Location (provider location):  Off-site  Platform used for Video Visit: [employer]      75 minutes spent by me on the date of the encounter doing chart review, history and exam, documentation and further activities per the note    New Medical Weight Management Consult    PATIENT:  Gurjit Squiers  MRN:         [id number]  :         1955  FELIX:         10/4/2023        I had the pleasure of seeing your patient, Gurjit Squires. Full intake/assessment was done to determine barriers to weight loss success and develop a treatment plan. Gurjit Squires is a 68 year old male interested in treatment of medical problems associated with excess weight. He has a height of 5' 8.701\", a weight of 258 lbs 0 oz, and the calculated Body mass index is 38.43 kg/m .        Assessment & Plan  Problem List Items Addressed This Visit       Class 2 severe obesity with serious comorbidity and body mass index (BMI) of 38.0 to 38.9 in adult (H) - Primary     Overweight onset in childhood. Previously lost 40lbs from 260lbs to 220lbs while going through kidney transplant process 19 year ago. Since then weight regain has been gradual related to decrease in activity after skilled nursing and decrease in mobility after stroke. Has been able to lose 35lbs through [facility] in the past 3 years. Currently weight stable at 258lbs. Needs to lose 20lbs, goal weight of 241lbs for second kidney transplant.     Discussed AOMs to help with weight " loss:   - Phentermine contraindicated due to hx of stroke and HTN. Concern for CKD stage 4.   - Naltrexone - concern for history of liver fibrosis. Would need to discuss with hepatologist prior to starting.   - Topiramate - concern for history of CKD stage 4. No hx of kidney stones. Can consider at a low dose of 50mg if approved by nephrology. Discussed side effects, risks, and benefits.  - GLP-1 - to be started today. No contraindications. Discussed side effects, risks, and benefits. Saxenda and Wegovy previously denied through PCP. Mounjaro and Ozempic denied due to no hx of Type II Diabetes. Will try Victoza. Discussed need to monitor fluid intake and reach out with any side effects of vomiting or diarrhea.            Relevant Medications    liraglutide (VICTOZA) 18 MG/3ML solution    Other Relevant Orders    Med Therapy Management Referral    Liver fibrosis    Relevant Medications    liraglutide (VICTOZA) 18 MG/3ML solution    Pre-diabetes    Relevant Medications    liraglutide (VICTOZA) 18 MG/3ML solution     Other Visit Diagnoses       Metabolic syndrome               Start Victoza ramp up to 1.8mg once daily.   Reached out to nephrology about starting topiramate at low dose of 50mg.   Ozempic and Mounjaro denied by insurance as no hx of Type II diabetes.    Follow up with Lauren Bloch, MTM Pharmacist in 6 weeks   Follow up with Gale Degroot in 3 months             Gurjit Micky Squires is a 68 year old male who presents to clinic today for the following health issues.     He has the following co-morbidities:        9/27/2023     9:41 AM   --   I have the following health issues associated with obesity High Blood Pressure    High Cholesterol    Sleep Apnea    GERD (Reflux)    Fatty Liver    Osteoarthritis (joint disease)   I have the following symptoms associated with obesity Lower Extremity Swelling    Back Pain    Fatigue     Hematoma - has had 2 instances in legs over the past 6 months. Put pressure on  "kidney, which has speed up transplant process. Was stopped warfarin     Chronic low back pain and radiculopathy - gets injections with pain management. Limits activity.     VANESA - wears CPAP     Gout - well controlled on preventative medication    HLD - well controlled on medications. Previously seen by cardiology. Followed by PCP    Fatty liver - followed by hepatology     Afib - one episode 19 years ago after transplant. Previously seen by cardiology. Had just completed wearing a 30day holter monitor     HTN - moderately controlled on medications. Followed by PCP and nephrology     S/p kidney transpant 19 years ago - due to IgA nephropathy. After first transplant was complicated by rejection at first. Currently in the process of getting second transplant.     CKD stage 4, not on dialysis currently.      Stoke - 2020. Since then has been on blood thinner. Since has had leg pain.     GERD - well controlled on protonix     Pre-diabetes - A1C 5.7         9/27/2023     9:41 AM   Patient Goals   If yes, please indicate which surgery? kidney transplant           9/27/2023     9:41 AM   Referring Provider   Please name the provider who referred you to Medical Weight Management  If you do not know, please answer \"I Don't Know\"            9/27/2023     9:41 AM   Weight History   How concerned are you about your weight? Very Concerned   I became overweight As a Child   The following factors have contributed to my weight gain Started on Medication that Caused Weight Gain    Eating Wrong Types of Food   I have tried the following methods to lose weight Watching Portions or Calories    Weight Watchers   My lowest weight since age 18 was 220   My highest weight since age 18 was 295   The most weight I have ever lost was (lbs) 45   I have the following family history of obesity/being overweight Many of my relatives are overweight   How has your weight changed over the last year? Lost   How many pounds? 25     Overweight " onset in childhood. Previously lost 40lbs from 260lb to 221lbs, but related to kidney disease. Since transplant weight regain has been gradual. Weight increased through [facility] and after stroke that has limited mobility. High weight of 295lbs. Has lost 35lbs through [facility] through the past 3 years. Is currently weight stable, has not lost weight in the past couple of months. Needs to lose weight for second kidney transplant - needs to lose 20lbs, goal weight of 241lbs.     Eating 2 meals a day, maybe 1-2 snacks. If does have a snack at night, will have popcorn. Some increased hunger at times. Minimal thoughts of food. Craves sweets at times. Can get full and stay full. Eats out of boredom. No emotional eating. Drinks water, diet coke (3 cans).     Activity - limited due to back pain and decreased right leg mobility. Tries to go to walk in pool and some mild weight lifting. Tries to go 3xweek.     Has not tried AOMs in the past. Tried to get Wegovy and Saxenda approved, but was denied by insurance         9/27/2023     9:41 AM   Diet Recall Review with Patient   If you do eat breakfast, what types of food do you eat? eggs, lite wheat toast, turkey sausage britta   If you do eat supper, what types of food do you typically eat? meat, vegetable   How many of glasses of milk do you drink in a typical day? 0   How many 8oz glasses of sugar containing drinks such as Rishi-Aid/sweet tea do you drink in a day? 0   How many cans/bottles of sugar pop/soda/tea/sports drinks do you drink in a day? 0   How many cans/bottles of diet pop/soda/tea or sports drink do you drink in a day? 3   How often do you have a drink of alcohol? Never           9/27/2023     9:41 AM   Eating Habits   Generally, my meals include foods like these bread, pasta, rice, potatoes, corn, crackers, sweet dessert, pop, or juice A Few Times a Week   Generally, my meals include foods like these fried meats, brats, burgers, french fries, pizza, cheese, chips, or  ice cream Less Than Weekly   Eat fast food (like McDonal[employer], [employer], Taco Bell) Less Than Weekly   Eat at a buffet or sit-down restaurant Once a Week   Eat most of my meals in front of the TV or computer Never   Often skip meals, eat at random times, have no regular eating times A Few Times a Week   Rarely sit down for a meal but snack or graze throughout Less Than Weekly   Eat extra snacks between meals A Few Times a Week   Eat most of my food at the end of the day Less Than Weekly   Eat in the middle of the night or wake up at night to eat Never   Eat extra snacks to prevent or correct low blood sugar Never   Eat to prevent acid reflux or stomach pain Never   Worry about not having enough food to eat Never   I eat when I am depressed Never   I eat when I am stressed Never   I eat when I am bored Less Than Weekly   I eat when I am anxious Never   I eat when I am happy or as a reward Less Than Weekly   I feel hungry all the time even if I just have eaten Less Than Weekly   Feeling full is important to me Never   I finish all the food on my plate even if I am already full Almost Everyday   I can't resist eating delicious food or walk past the good food/smell Less Than Weekly   I eat/snack without noticing that I am eating Less Than Weekly   I eat when I am preparing the meal Never   I eat more than usual when I see others eating Less Than Weekly   I have trouble not eating sweets, ice cream, cookies, or chips if they are around the house A Few Times a Week   I think about food all day Less Than Weekly   What foods, if any, do you crave? Sweets/Candy/Chocolate           9/27/2023     9:41 AM   Amount of Food   I feel out of control when eating Never   I eat a large amount of food, like a loaf of bread, a box of cookies, a pint/quart of ice cream, all at once Never   I eat a large amount of food even when I am not hungry Never   I eat rapidly Weekly   I eat alone because I feel embarrassed and do not want others to  see how much I have eaten Never   I eat until I am uncomfortably full Monthly   I feel bad, disgusted, or guilty after I overeat Monthly           9/27/2023     9:41 AM   Activity/Exercise History   How much of a typical 12 hour day do you spend sitting? Less Than Half the Day   How much of a typical 12 hour day do you spend lying down? Less Than Half the Day   How much of a typical day do you spend walking/standing? Less Than Half the Day   How many hours (not including work) do you spend on the TV/Video Games/Computer/Tablet/Phone? 4-5 Hours   How many times a week are you active for the purpose of exercise? Once a Week   What keeps you from being more active? Pain   How many total minutes do you spend doing some activity for the purpose of exercising when you exercise? More Than 30 Minutes       PAST MEDICAL HISTORY:  Past Medical History:   Diagnosis Date    BK viremia     Depression     End stage kidney disease (H)     Gout     History of blood transfusion     2004    Hypertension secondary to other renal disorders     IgA nephropathy     Immunosuppressed status (H24)     Kidney replaced by transplant     Kidney transplant complication     Metabolic acidosis     VANESA (obstructive sleep apnea)     CPAP    Overweight     Rotator cuff dysfunction     Sepsis due to cellulitis (H) 05/17/2018    Vitamin D deficiency            9/27/2023     9:41 AM   Work/Social History Reviewed With Patient   My employment status is Retired   My job is n/a   How many hours do you spend commuting to work daily? n/a   What is your marital status? /In a Relationship   If in a relationship, is your significant other overweight? No   If you have children, are they overweight? Yes   Who do you live with? wife   Who does the food shopping? 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 Previously was traveling a lot and very active job. Good support system     No ETOH, nicotine, drugs, or THC          9/27/2023     9:41 AM   Mental Health History Reviewed With Patient   Have you ever been physically or sexually abused? No   How often in the past 2 weeks have you felt little interest or pleasure in doing things? Not at all   Over the past 2 weeks how often have you felt down, depressed, or hopeless? Not at all           9/27/2023     9:41 AM   Sleep History Reviewed With Patient   How many hours do you sleep at night? 7       MEDICATIONS:   Current Outpatient Medications   Medication Sig Dispense Refill    acetaminophen (TYLENOL) 500 MG tablet Take 1,000 mg by mouth daily      allopurinol (ZYLOPRIM) 100 MG tablet Take 3 tablets (300 mg) by mouth daily 30 tablet     aspirin 81 MG EC tablet Take 81 mg by mouth daily      atorvastatin (LIPITOR) 20 MG tablet Take 1 tablet by mouth      Calcium 500-2.5 MG-MCG CHEW Take 1 tablet by mouth daily 90 tablet 0    carvedilol 12.5 MG PO tablet Take 1 tablet (12.5 mg) by mouth 2 times daily (with meals) 90 tablet 3    cholecalciferol (VITAMIN D) 1000 UNIT tablet Take 1 tablet (1,000 Units) by mouth daily 90 tablet 3    clonazePAM (KLONOPIN) 0.5 MG tablet Take 0.25 mg by mouth every evening      Fexofenadine HCl (ALLEGRA PO) Take 1 tablet by mouth daily.       furosemide (LASIX) 40 MG tablet Take 40 mg by mouth 2 times daily      gabapentin (NEURONTIN) 300 MG capsule Take 1 capsule (300 mg) by mouth At Bedtime 270 capsule 3    liraglutide (VICTOZA) 18 MG/3ML solution Inject 0.6 mg subcutaneously daily for 1 week, then 1.2 mg daily for 1 week, then 1.8 mg daily. 9 mL 2    losartan (COZAAR) 25 MG tablet Take 1 tablet (25 mg) by mouth daily 90 tablet 3    Multiple Vitamins-Minerals (ONE-A-DAY MENS 50+ ADVANTAGE PO) Take 1 tablet by mouth every evening      mycophenolate (GENERIC EQUIVALENT) 250 MG capsule Take 3 capsules (750 mg) by mouth 2 times daily 180 capsule 11    omega-3 fatty acids (FISH OIL) 1200 MG capsule Take 1 capsule by mouth 3 times daily. 180 capsule 11     "pantoprazole (PROTONIX) 40 MG EC tablet TAKE 1 TABLET BY MOUTH DAILY 30 tablet 1    predniSONE (DELTASONE) 5 MG tablet Take 1 tablet (5 mg) by mouth daily 90 tablet 3    sodium bicarbonate 650 MG tablet Take 2 tablets (1,300 mg) by mouth 2 times daily 360 tablet 3    sulfamethoxazole-trimethoprim (BACTRIM) 400-80 MG tablet Take 1 tablet by mouth Every Mon, Wed, Fri Morning 13 tablet 11    verapamil ER (CALAN-SR) 240 MG CR tablet Take 240 mg by mouth daily      ferrous sulfate (FEROSUL) 325 (65 Fe) MG tablet TAKE 1 TABLET DAILY (WITH BREAKFAST) PLEASE REQUEST FUTURE FERROUS SULFATE REFILLS FROM YOUR PRIMARY CARE PROVIDER, OR OK TO BUY OVER THE COUNTER. 90 tablet 0       ALLERGIES:   Allergies   Allergen Reactions    Baclofen Other (See Comments)     Foggy, not clear headed.            5/3/2023     5:31 PM 9/4/2023     9:01 AM   NADEGE Score (Last Two)   NADEGE Raw Score 35    35 34   Activation Score 72.1    72.1 68.9   NADEGE Level 3    3 3         Objective   Ht 1.745 m (5' 8.7\")   Wt 117 kg (258 lb)   BMI 38.43 kg/m             Physical Exam   GENERAL: Healthy, alert and no distress  EYES: Eyes grossly normal to inspection.  No discharge or erythema, or obvious scleral/conjunctival abnormalities.  RESP: No audible wheeze, cough, or visible cyanosis.  No visible retractions or increased work of breathing.    SKIN: Visible skin clear. No significant rash, abnormal pigmentation or lesions.  NEURO: Cranial nerves grossly intact.  Mentation and speech appropriate for age.  PSYCH: Mentation appears normal, affect normal/bright, judgement and insight intact, normal speech and appearance well-groomed.     Anti-obesity medication ROS:    HEENT  Hx of glaucoma: No    Cardiovascular  CAD:Yes  HTN:Yes    Gastrointestinal  GERD:Yes  Constipation:No  Diarrhea: yes   Liver Dz:Yes  H/O Pancreatitis:No    Psychiatric  Bipolar: No  Anxiety:No  Depression:No  History of alcohol/drug abuse: No  Hx of eating " disorder:No    Endocrine  Personal or family hx of MTC or MEN2:No  Diabetes/prediabetes: No    Neurologic:  Hx of seizures: No  Hx of migraines: No  Memory Impairment: No      History of kidney stones: No  Kidney disease: Yes  Current birth control:  N/A    Taking Opioid/Narcotic: No        Sincerely,    RATNA RHODES PA-C

## 2023-10-04 NOTE — PROGRESS NOTES
"Virtual Visit Details    Type of service:  Video Visit   Video Start Time:  7:00Am  Video End Time: 8:10AM    Originating Location (pt. Location): Home    Distant Location (provider location):  Off-site  Platform used for Video Visit: [employer]      75 minutes spent by me on the date of the encounter doing chart review, history and exam, documentation and further activities per the note    New Medical Weight Management Consult    PATIENT:  Gurjit Squires  MRN:         [id number]  :         1955  FELIX:         10/4/2023        I had the pleasure of seeing your patient, Gurjit Squires. Full intake/assessment was done to determine barriers to weight loss success and develop a treatment plan. Gurjit Squires is a 68 year old male interested in treatment of medical problems associated with excess weight. He has a height of 5' 8.701\", a weight of 258 lbs 0 oz, and the calculated Body mass index is 38.43 kg/m .        Assessment & Plan   Problem List Items Addressed This Visit       Class 2 severe obesity with serious comorbidity and body mass index (BMI) of 38.0 to 38.9 in adult (H) - Primary     Overweight onset in childhood. Previously lost 40lbs from 260lbs to 220lbs while going through kidney transplant process 19 year ago. Since then weight regain has been gradual related to decrease in activity after prison and decrease in mobility after stroke. Has been able to lose 35lbs through [facility] in the past 3 years. Currently weight stable at 258lbs. Needs to lose 20lbs, goal weight of 241lbs for second kidney transplant.     Discussed AOMs to help with weight loss:   - Phentermine contraindicated due to hx of stroke and HTN. Concern for CKD stage 4.   - Naltrexone - concern for history of liver fibrosis. Would need to discuss with hepatologist prior to starting.   - Topiramate - concern for history of CKD stage 4. No hx of kidney stones. Can consider at a low dose of 50mg if approved by " nephrology. Discussed side effects, risks, and benefits.  - GLP-1 - to be started today. No contraindications. Discussed side effects, risks, and benefits. Saxenda and Wegovy previously denied through PCP. Mounjaro and Ozempic denied due to no hx of Type II Diabetes. Will try Victoza. Discussed need to monitor fluid intake and reach out with any side effects of vomiting or diarrhea.            Relevant Medications    liraglutide (VICTOZA) 18 MG/3ML solution    Other Relevant Orders    Med Therapy Management Referral    Liver fibrosis    Relevant Medications    liraglutide (VICTOZA) 18 MG/3ML solution    Pre-diabetes    Relevant Medications    liraglutide (VICTOZA) 18 MG/3ML solution     Other Visit Diagnoses       Metabolic syndrome               Start Victoza ramp up to 1.8mg once daily.   Reached out to nephrology about starting topiramate at low dose of 50mg.   Ozempic and Mounjaro denied by insurance as no hx of Type II diabetes.    Follow up with Lauren Bloch, MTM Pharmacist in 6 weeks   Follow up with Gale Degroot in 3 months             Gurjit Micky Squires is a 68 year old male who presents to clinic today for the following health issues.     He has the following co-morbidities:        9/27/2023     9:41 AM   --   I have the following health issues associated with obesity High Blood Pressure    High Cholesterol    Sleep Apnea    GERD (Reflux)    Fatty Liver    Osteoarthritis (joint disease)   I have the following symptoms associated with obesity Lower Extremity Swelling    Back Pain    Fatigue     Hematoma - has had 2 instances in legs over the past 6 months. Put pressure on kidney, which has speed up transplant process. Was stopped warfarin     Chronic low back pain and radiculopathy - gets injections with pain management. Limits activity.     VANESA - wears CPAP     Gout - well controlled on preventative medication    HLD - well controlled on medications. Previously seen by cardiology. Followed by PCP    Fatty  "liver - followed by hepatology     Afib - one episode 19 years ago after transplant. Previously seen by cardiology. Had just completed wearing a 30day holter monitor     HTN - moderately controlled on medications. Followed by PCP and nephrology     S/p kidney transpant 19 years ago - due to IgA nephropathy. After first transplant was complicated by rejection at first. Currently in the process of getting second transplant.     CKD stage 4, not on dialysis currently.      Stoke - 2020. Since then has been on blood thinner. Since has had leg pain.     GERD - well controlled on protonix     Pre-diabetes - A1C 5.7         9/27/2023     9:41 AM   Patient Goals   If yes, please indicate which surgery? kidney transplant           9/27/2023     9:41 AM   Referring Provider   Please name the provider who referred you to Medical Weight Management  If you do not know, please answer \"I Don't Know\"            9/27/2023     9:41 AM   Weight History   How concerned are you about your weight? Very Concerned   I became overweight As a Child   The following factors have contributed to my weight gain Started on Medication that Caused Weight Gain    Eating Wrong Types of Food   I have tried the following methods to lose weight Watching Portions or Calories    Weight Watchers   My lowest weight since age 18 was 220   My highest weight since age 18 was 295   The most weight I have ever lost was (lbs) 45   I have the following family history of obesity/being overweight Many of my relatives are overweight   How has your weight changed over the last year? Lost   How many pounds? 25     Overweight onset in childhood. Previously lost 40lbs from 260lb to 221lbs, but related to kidney disease. Since transplant weight regain has been gradual. Weight increased through half-way and after stroke that has limited mobility. High weight of 295lbs. Has lost 35lbs through [facility] through the past 3 years. Is currently weight stable, has not lost " weight in the past couple of months. Needs to lose weight for second kidney transplant - needs to lose 20lbs, goal weight of 241lbs.     Eating 2 meals a day, maybe 1-2 snacks. If does have a snack at night, will have popcorn. Some increased hunger at times. Minimal thoughts of food. Craves sweets at times. Can get full and stay full. Eats out of boredom. No emotional eating. Drinks water, diet coke (3 cans).     Activity - limited due to back pain and decreased right leg mobility. Tries to go to walk in pool and some mild weight lifting. Tries to go 3xweek.     Has not tried AOMs in the past. Tried to get Wegovy and Saxenda approved, but was denied by insurance         9/27/2023     9:41 AM   Diet Recall Review with Patient   If you do eat breakfast, what types of food do you eat? eggs, lite wheat toast, turkey sausage britta   If you do eat supper, what types of food do you typically eat? meat, vegetable   How many of glasses of milk do you drink in a typical day? 0   How many 8oz glasses of sugar containing drinks such as Rishi-Aid/sweet tea do you drink in a day? 0   How many cans/bottles of sugar pop/soda/tea/sports drinks do you drink in a day? 0   How many cans/bottles of diet pop/soda/tea or sports drink do you drink in a day? 3   How often do you have a drink of alcohol? Never           9/27/2023     9:41 AM   Eating Habits   Generally, my meals include foods like these bread, pasta, rice, potatoes, corn, crackers, sweet dessert, pop, or juice A Few Times a Week   Generally, my meals include foods like these fried meats, brats, burgers, french fries, pizza, cheese, chips, or ice cream Less Than Weekly   Eat fast food (like McD[employer], [employer], [employer]aco Bell) Less Than Weekly   Eat at a buffet or sit-down restaurant Once a Week   Eat most of my meals in front of the TV or computer Never   Often skip meals, eat at random times, have no regular eating times A Few Times a Week   Rarely sit down for a meal but  snack or graze throughout Less Than Weekly   Eat extra snacks between meals A Few Times a Week   Eat most of my food at the end of the day Less Than Weekly   Eat in the middle of the night or wake up at night to eat Never   Eat extra snacks to prevent or correct low blood sugar Never   Eat to prevent acid reflux or stomach pain Never   Worry about not having enough food to eat Never   I eat when I am depressed Never   I eat when I am stressed Never   I eat when I am bored Less Than Weekly   I eat when I am anxious Never   I eat when I am happy or as a reward Less Than Weekly   I feel hungry all the time even if I just have eaten Less Than Weekly   Feeling full is important to me Never   I finish all the food on my plate even if I am already full Almost Everyday   I can't resist eating delicious food or walk past the good food/smell Less Than Weekly   I eat/snack without noticing that I am eating Less Than Weekly   I eat when I am preparing the meal Never   I eat more than usual when I see others eating Less Than Weekly   I have trouble not eating sweets, ice cream, cookies, or chips if they are around the house A Few Times a Week   I think about food all day Less Than Weekly   What foods, if any, do you crave? Sweets/Candy/Chocolate           9/27/2023     9:41 AM   Amount of Food   I feel out of control when eating Never   I eat a large amount of food, like a loaf of bread, a box of cookies, a pint/quart of ice cream, all at once Never   I eat a large amount of food even when I am not hungry Never   I eat rapidly Weekly   I eat alone because I feel embarrassed and do not want others to see how much I have eaten Never   I eat until I am uncomfortably full Monthly   I feel bad, disgusted, or guilty after I overeat Monthly           9/27/2023     9:41 AM   Activity/Exercise History   How much of a typical 12 hour day do you spend sitting? Less Than Half the Day   How much of a typical 12 hour day do you spend lying  down? Less Than Half the Day   How much of a typical day do you spend walking/standing? Less Than Half the Day   How many hours (not including work) do you spend on the TV/Video Games/Computer/Tablet/Phone? 4-5 Hours   How many times a week are you active for the purpose of exercise? Once a Week   What keeps you from being more active? Pain   How many total minutes do you spend doing some activity for the purpose of exercising when you exercise? More Than 30 Minutes       PAST MEDICAL HISTORY:  Past Medical History:   Diagnosis Date    BK viremia     Depression     End stage kidney disease (H)     Gout     History of blood transfusion     2004    Hypertension secondary to other renal disorders     IgA nephropathy     Immunosuppressed status (H24)     Kidney replaced by transplant     Kidney transplant complication     Metabolic acidosis     VANESA (obstructive sleep apnea)     CPAP    Overweight     Rotator cuff dysfunction     Sepsis due to cellulitis (H) 05/17/2018    Vitamin D deficiency            9/27/2023     9:41 AM   Work/Social History Reviewed With Patient   My employment status is Retired   My job is n/a   How many hours do you spend commuting to work daily? n/a   What is your marital status? /In a Relationship   If in a relationship, is your significant other overweight? No   If you have children, are they overweight? Yes   Who do you live with? wife   Who does the food shopping? 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 Previously was traveling a lot and very active job. Good support system     No ETOH, nicotine, drugs, or THC         9/27/2023     9:41 AM   Mental Health History Reviewed With Patient   Have you ever been physically or sexually abused? No   How often in the past 2 weeks have you felt little interest or pleasure in doing things? Not at all   Over the past 2 weeks how often have you felt down, depressed, or hopeless? Not at all            9/27/2023     9:41 AM   Sleep History Reviewed With Patient   How many hours do you sleep at night? 7       MEDICATIONS:   Current Outpatient Medications   Medication Sig Dispense Refill    acetaminophen (TYLENOL) 500 MG tablet Take 1,000 mg by mouth daily      allopurinol (ZYLOPRIM) 100 MG tablet Take 3 tablets (300 mg) by mouth daily 30 tablet     aspirin 81 MG EC tablet Take 81 mg by mouth daily      atorvastatin (LIPITOR) 20 MG tablet Take 1 tablet by mouth      Calcium 500-2.5 MG-MCG CHEW Take 1 tablet by mouth daily 90 tablet 0    carvedilol 12.5 MG PO tablet Take 1 tablet (12.5 mg) by mouth 2 times daily (with meals) 90 tablet 3    cholecalciferol (VITAMIN D) 1000 UNIT tablet Take 1 tablet (1,000 Units) by mouth daily 90 tablet 3    clonazePAM (KLONOPIN) 0.5 MG tablet Take 0.25 mg by mouth every evening      Fexofenadine HCl (ALLEGRA PO) Take 1 tablet by mouth daily.       furosemide (LASIX) 40 MG tablet Take 40 mg by mouth 2 times daily      gabapentin (NEURONTIN) 300 MG capsule Take 1 capsule (300 mg) by mouth At Bedtime 270 capsule 3    liraglutide (VICTOZA) 18 MG/3ML solution Inject 0.6 mg subcutaneously daily for 1 week, then 1.2 mg daily for 1 week, then 1.8 mg daily. 9 mL 2    losartan (COZAAR) 25 MG tablet Take 1 tablet (25 mg) by mouth daily 90 tablet 3    Multiple Vitamins-Minerals (ONE-A-DAY MENS 50+ ADVANTAGE PO) Take 1 tablet by mouth every evening      mycophenolate (GENERIC EQUIVALENT) 250 MG capsule Take 3 capsules (750 mg) by mouth 2 times daily 180 capsule 11    omega-3 fatty acids (FISH OIL) 1200 MG capsule Take 1 capsule by mouth 3 times daily. 180 capsule 11    pantoprazole (PROTONIX) 40 MG EC tablet TAKE 1 TABLET BY MOUTH DAILY 30 tablet 1    predniSONE (DELTASONE) 5 MG tablet Take 1 tablet (5 mg) by mouth daily 90 tablet 3    sodium bicarbonate 650 MG tablet Take 2 tablets (1,300 mg) by mouth 2 times daily 360 tablet 3    sulfamethoxazole-trimethoprim (BACTRIM) 400-80 MG tablet Take 1  "tablet by mouth Every Mon, Wed, Fri Morning 13 tablet 11    verapamil ER (CALAN-SR) 240 MG CR tablet Take 240 mg by mouth daily      ferrous sulfate (FEROSUL) 325 (65 Fe) MG tablet TAKE 1 TABLET DAILY (WITH BREAKFAST) PLEASE REQUEST FUTURE FERROUS SULFATE REFILLS FROM YOUR PRIMARY CARE PROVIDER, OR OK TO BUY OVER THE COUNTER. 90 tablet 0       ALLERGIES:   Allergies   Allergen Reactions    Baclofen Other (See Comments)     Foggy, not clear headed.            5/3/2023     5:31 PM 9/4/2023     9:01 AM   NADEGE Score (Last Two)   NADEGE Raw Score 35    35 34   Activation Score 72.1    72.1 68.9   NADEGE Level 3    3 3         Objective    Ht 1.745 m (5' 8.7\")   Wt 117 kg (258 lb)   BMI 38.43 kg/m             Physical Exam   GENERAL: Healthy, alert and no distress  EYES: Eyes grossly normal to inspection.  No discharge or erythema, or obvious scleral/conjunctival abnormalities.  RESP: No audible wheeze, cough, or visible cyanosis.  No visible retractions or increased work of breathing.    SKIN: Visible skin clear. No significant rash, abnormal pigmentation or lesions.  NEURO: Cranial nerves grossly intact.  Mentation and speech appropriate for age.  PSYCH: Mentation appears normal, affect normal/bright, judgement and insight intact, normal speech and appearance well-groomed.     Anti-obesity medication ROS:    HEENT  Hx of glaucoma: No    Cardiovascular  CAD:Yes  HTN:Yes    Gastrointestinal  GERD:Yes  Constipation:No  Diarrhea: yes   Liver Dz:Yes  H/O Pancreatitis:No    Psychiatric  Bipolar: No  Anxiety:No  Depression:No  History of alcohol/drug abuse: No  Hx of eating disorder:No    Endocrine  Personal or family hx of MTC or MEN2:No  Diabetes/prediabetes: No    Neurologic:  Hx of seizures: No  Hx of migraines: No  Memory Impairment: No      History of kidney stones: No  Kidney disease: Yes  Current birth control:  N/A    Taking Opioid/Narcotic: No        Sincerely,    RATNA RHODES PA-C    "

## 2023-10-05 DIAGNOSIS — E61.1 IRON DEFICIENCY: ICD-10-CM

## 2023-10-05 RX ORDER — FERROUS SULFATE 325(65) MG
TABLET ORAL
Qty: 90 TABLET | Refills: 0 | Status: SHIPPED | OUTPATIENT
Start: 2023-10-05 | End: 2023-12-26

## 2023-10-10 ENCOUNTER — TELEPHONE (OUTPATIENT)
Dept: ANESTHESIOLOGY | Facility: CLINIC | Age: 68
End: 2023-10-10
Payer: MEDICARE

## 2023-10-11 PROBLEM — R73.03 PRE-DIABETES: Status: ACTIVE | Noted: 2023-10-11

## 2023-10-11 RX ORDER — LIRAGLUTIDE 6 MG/ML
INJECTION SUBCUTANEOUS
Qty: 9 ML | Refills: 2 | Status: SHIPPED | OUTPATIENT
Start: 2023-10-11 | End: 2023-10-24

## 2023-10-11 NOTE — PATIENT INSTRUCTIONS
"Hi Carlos, it was nice to meet you today!  Thank you for allowing us the privilege of caring for you. We hope we provided you with the excellent service you deserve.   Please let us know if there is anything else we can do for you so that we can be sure you are completely satisfied with your care experience.    To ensure the quality of our services you may be receiving a patient satisfaction survey from an independent patient satisfaction monitoring company.    The greatest compliment you can give is a \"Likely to Recommend\"    Your visit was with RATNA RHODES PA-C today.    Instructions per today's visit:     Start Victoza ramp up to 1.8mg once daily.   If not covered will reach out to nephrology about starting topiramate at low dose of 50mg.   Ozempic and Mounjaro denied as not covered by insurance.   Follow up with Lauren Bloch, MTM Pharmacist in 6 weeks   Follow up with Ratna Degroot in 3 months     ___________________________________________________________________________  Important contact and scheduling information:  Please call our contact center at [phone] to schedule your next appointments.  For any nursing questions or concerns call Rehana Borden LPN at [phone] or Miriam France RN at [phone]  Please call during clinic hours Monday through Friday 8:00a - 4:00p if you have questions or you can contact us via "[employer] at anytime and we will reply during clinic hours.    Lab results will be communicated through My Chart or letter (if My Chart not used). Please call the clinic if you have not received communication after 1 week or if you have any questions.?  Clinic Fax: [phone]  __________________________________________________________________________    If labs were ordered today:    Please make an appointment to have them drawn at your convenience.     To schedule the Lab Appointment using "[employer]:  Select \"Schedule an Appointment\"  Select \"Lab Only\"  For \"A couple of questions\", select " "\"Other\"  For \"Which locations work for you?, select the location and set up the appointment    To schedule by phone call [phone] to schedule a lab only appointment at any [facility] lab.  ___________________________________________________________________________  Work with A Health !  Virtual Sessions are Available through [facility] Weight Management Clinics    To learn more, call to schedule a free, Health  Q&A appointment: [phone]     What is Health Coaching?  Do you know what you are supposed to do, but you just aren't doing it?  Then, HEALTH COACHING may help you!   Get unstuck and move forward with the support of a professionally trained NBC-HWC (National Board-Certified Health and ) who uses evidence-based approaches to help you move forward with healthy lifestyle changes in the areas of weight loss, stress management and overall well-being.    Health Coaches help you identify goals that will work best for you. Health Coaches provide support and encouragement with overcoming barriers and help you to find inspiration and motivation to lead a healthy lifestyle.    Option one:  Health Coaching 3-Pack; Three, 30-minute Health Coaching Visits, for $99  Visits are done virtually (phone or video)  This is a self pay service; we do not accept insurance for meri coaching.    Option two:   The 24 week Plan; 11 Health Coaching Visits, and a 7 months subscription to [employer]-- on-demand fitness, nutrition and mindfulness classes, for $499 (employee discounts may be available). Participants will also meet regularly with a weight management Medical Provider and a Registered/Licensed Dietician.  This is a self-pay service; we do not accept insurance for health coaching.    To Schedule a free Health  Q&A appointment to learn more,  call [phone].  ____________________________________________________________________    [facility]" [facility]   Healthy Lifestyle Virtual Support Group    Healthy Lifestyle Virtual Support Group?  This is 60 minutes of small group guided discussion, support and resources. All are welcome who want a healthy lifestyle.  WHEN: Starting in July 2023, this group meets the 1st Friday of the month from 12:30 PM - 1:30 PM virtually using Microsoft Teams.    FACILITATOR: Led by National Board Certified Health and , Nora Hill [facility].   TO REGISTER: Please send an email to Nora [email or web address] to receive monthly invites to the group or if you have any questions about having a health .  Prior to the meeting, a link with directions on how to join the meeting will be sent to you.    2023 Meetings  May 19: Let's Talk  June 9: Create Your Coaching Toolkit: Learn How to  Yourself  July 7: Let's Talk  August 4: Benefits of Fiber with BIB [place]  September 1: Show and Tell (share your aps, podcasts, recipes, hacks, books)  October 6 :Let's Talk  November 3: Introduction to Mindfulness   December 1: Let's Talk    If you would like bariatric surgery specific support group info please let your care team know.         Thank you,   [facility] Comprehensive Weight Management Team        Getting started on Victoza    Victoza  can help you lose weight and control your blood sugar. One of the ways it works is by slowing down the rate that food leaves your stomach. You feel presley and will eat less.  It also helps regulate hormones that can help improve your blood sugars    Victoza  is taken once  a day - most people either chose to give it in the morning or int the evening.     Start out with the 0.6 mg injection, use this strength for a week. If you tolerate it well you  can increase to 1.2 mg. Stay at this dose unless you have been told to increase to 1.8 mg.    Victoza can be injected into your stomach, upper thigh, upper arm, or upper buttock. Use a different place for each time you  inject.  Make sure to count to 5  Very S-L-O-W-L-Y while you are injecting Victoza. Your body needs only a very tiny amount of the medication, so only a tiny amount comes out of the needle. By counting to 5 slowly before you withdraw the needle from your skin you are making sure that your body has gotten all the medication.    Victoza  may make your stomach feel upset. To avoid that:     Eat smaller meals and eat slower. This means eat about half of what you usually eat and take about 15 - 20 minutes to eat your meal.   Pay attention to how you are feeling when you eat. When you feel full: stop eating.  This will give your stomach time to empty.  Usually the nausea goes away, if it doesn t please call us. We can help you with other ideas.                                                                                                                                                                                                                                 8. The risk of pancreatitis, inflammation of the pancrease, has been associated with Victoza, ut is very rare.  If you have had pancreatitis in the past Victoza  may not be for you. Please let us know about any past history of pancreas problems.    Symptoms of pancreatitis include: Pain in your upper stomach area which  may travel to your back and be worse after eating. Your stomach area may be tender to the touch.  You may have vomiting or nausea and /have a fever. If you should develop any of these symptoms, stop the Victoza  and contact your primary care doctor. They will do a blood test to check for pancreatitis.       There is a small chance you may have some low blood sugar after taking the medication.   The signs of low blood sugar are:  Weakness  Shaky   Hungry  Sweating  Confusion      See below for ways to treat low blood sugar without adding in lots of extra  "calories.    https://www.youtube.com/watch?v=TG96zHpc-Xj    https://[email or web address]/clinical-integration/health-resources/diabetes/index.html  Victoza is the last pen in the \"Diabetes Device\" section.    Special instructions for me:          Treating Low Blood Sugar    If you have symptoms of low blood sugar (sweating, shaking, dizzy, confused) eat 15 grams of carbs and wait 15 minutes:    Glucose Tabs are best for sugars under 70 -  Dex4 or BD Glucose tablets are good, you will need to take 3-4 of these to equal 15 grams.     One small box of raisins  4 oz fruit juice box or   cup fruit juice  1 small apple  1 small banana    cup canned fruit in water    English muffin or a slice of bread with jelly   1 low fat frozen waffle with sugar-free syrup    cup cottage cheese with   cup frozen or fresh blueberries  1 cup skim or low-fat milk    cup whole grain cereal  4-6 crackers such as Triscuits        TOPIRAMATE (TOPAMAX)    We are considering starting topiramate at bedtime.  Start one tab, 25 mg, for a week. Go up to 50 mg (2 tabs) for the next week. At the third week, take  3 tabs (75 mg).  Stay at 3 tabs until you are seen again.       Topiramate (Topamax) is a medication that is used most often to treat migraine headaches or for seizures. It has also been found to help with weight loss. Although it's not currently FDA approved for weight loss, it has been used safely for a number of years to help people who are carrying extra weight.     Just how topiramate helps with weight loss has not been exactly determined. However it seems to work on areas of the brain to quiet down signals related to eating.      Topiramate may make you:    >feel less interest in eating in between meals   >think less about food and eating   >find it easier to push the plate away   >find giving up pop easier    >have an easier time eating less    For some of our patients, the pills work right away. They feel and think quite differently " about food. Other patients don't feel much of a change but find in fact they have lost weight! Like all weight loss medications, topiramate works best when you help it work.  This means:    1) Have less tempting high calorie (fattening) food around the house or office    2) Have lower calorie food (fruits, vegetables,low fat meats and dairy) for snacks    3) Eat out only one time or less each week.   4) Eat your meals at a table with the TV or computer off.    Side-effects. Topiramate is generally well tolerated. The main side-effects we see are:   Tingling in hands,feet, or face (usually not very troublesome)   Mental confusion and word finding trouble (about 10% of patients have this.)     Feeling sleepy or a bit dopey- this goes away very soon after starting.    One of the dangers of topiramate is the possibility of birth defects--if you get pregnant when you are on it, there is the risk that your baby will be born with a cleft lip or palate.  If you are on topiramate and of child bearing age, you need to be on a reliable form of birth control or refrain from sexual intercourse.     Please refer to the pharmacy insert for more information on side-effects. Since many pharmacists are not familiar with the use of topiramate in weight loss, calling the clinic will get you the most accurate information on the use of this medication for weight loss.    For any questions or concerns please send a [employer] message to our team or call our weight management call center at [phone] during regular business hours. For questions during evenings or weekends your messages will be addressed during the next business day.  For emergencies please call 911 or seek immediate medical care.     (Do not stop taking it if you don't think it's working. For some people it works even though they do not feel much different.)     In order to get refills of this or any medication we prescribe you must be seen in the medical weight mgmt  clinic every 3-6 months. Please have your pharmacy fax a refill request to [phone].

## 2023-10-11 NOTE — ASSESSMENT & PLAN NOTE
Overweight onset in childhood. Previously lost 40lbs from 260lbs to 220lbs while going through kidney transplant process 19 year ago. Since then weight regain has been gradual related to decrease in activity after [facility] and decrease in mobility after stroke. Has been able to lose 35lbs through [facility] in the past 3 years. Currently weight stable at 258lbs. Needs to lose 20lbs, goal weight of 241lbs for second kidney transplant.     Discussed AOMs to help with weight loss:   - Phentermine contraindicated due to hx of stroke and HTN. Concern for CKD stage 4.   - Naltrexone - concern for history of liver fibrosis. Would need to discuss with hepatologist prior to starting.   - Topiramate - concern for history of CKD stage 4. No hx of kidney stones. Can consider at a low dose of 50mg if approved by nephrology. Discussed side effects, risks, and benefits.  - GLP-1 - to be started today. No contraindications. Discussed side effects, risks, and benefits. Saxenda and Wegovy previously denied through PCP. Mounjaro and Ozempic denied due to no hx of Type II Diabetes. Will try Victoza. Discussed need to monitor fluid intake and reach out with any side effects of vomiting or diarrhea.

## 2023-10-24 ENCOUNTER — ANCILLARY PROCEDURE (OUTPATIENT)
Dept: RADIOLOGY | Facility: AMBULATORY SURGERY CENTER | Age: 68
End: 2023-10-24
Attending: ANESTHESIOLOGY
Payer: MEDICARE

## 2023-10-24 ENCOUNTER — HOSPITAL ENCOUNTER (OUTPATIENT)
Facility: AMBULATORY SURGERY CENTER | Age: 68
Discharge: HOME | End: 2023-10-24
Attending: ANESTHESIOLOGY | Admitting: ANESTHESIOLOGY
Payer: MEDICARE

## 2023-10-24 ENCOUNTER — TELEPHONE (OUTPATIENT)
Dept: ENDOCRINOLOGY | Facility: CLINIC | Age: 68
End: 2023-10-24

## 2023-10-24 VITALS
WEIGHT: 258 LBS | OXYGEN SATURATION: 97 % | HEIGHT: 68 IN | TEMPERATURE: 97.1 F | HEART RATE: 75 BPM | DIASTOLIC BLOOD PRESSURE: 74 MMHG | SYSTOLIC BLOOD PRESSURE: 170 MMHG | RESPIRATION RATE: 16 BRPM | BODY MASS INDEX: 39.1 KG/M2

## 2023-10-24 DIAGNOSIS — M54.16 LUMBAR RADICULOPATHY: ICD-10-CM

## 2023-10-24 DIAGNOSIS — E66.01 CLASS 2 SEVERE OBESITY WITH SERIOUS COMORBIDITY AND BODY MASS INDEX (BMI) OF 38.0 TO 38.9 IN ADULT, UNSPECIFIED OBESITY TYPE (H): Primary | ICD-10-CM

## 2023-10-24 DIAGNOSIS — E66.812 CLASS 2 SEVERE OBESITY WITH SERIOUS COMORBIDITY AND BODY MASS INDEX (BMI) OF 38.0 TO 38.9 IN ADULT, UNSPECIFIED OBESITY TYPE (H): Primary | ICD-10-CM

## 2023-10-24 DIAGNOSIS — K74.00 LIVER FIBROSIS: ICD-10-CM

## 2023-10-24 DIAGNOSIS — E88.810 METABOLIC SYNDROME: ICD-10-CM

## 2023-10-24 PROCEDURE — 64483 NJX AA&/STRD TFRM EPI L/S 1: CPT | Mod: RT

## 2023-10-24 RX ORDER — BUPIVACAINE HYDROCHLORIDE 7.5 MG/ML
INJECTION, SOLUTION EPIDURAL; RETROBULBAR DAILY PRN
Status: DISCONTINUED | OUTPATIENT
Start: 2023-10-24 | End: 2023-10-24 | Stop reason: HOSPADM

## 2023-10-24 RX ORDER — SEMAGLUTIDE 0.25 MG/.5ML
0.25 INJECTION, SOLUTION SUBCUTANEOUS WEEKLY
Qty: 2 ML | Refills: 0 | Status: SHIPPED | OUTPATIENT
Start: 2023-10-24 | End: 2024-03-13

## 2023-10-24 RX ORDER — LIDOCAINE HYDROCHLORIDE 10 MG/ML
INJECTION, SOLUTION EPIDURAL; INFILTRATION; INTRACAUDAL; PERINEURAL DAILY PRN
Status: DISCONTINUED | OUTPATIENT
Start: 2023-10-24 | End: 2023-10-24 | Stop reason: HOSPADM

## 2023-10-24 RX ORDER — DEXAMETHASONE SODIUM PHOSPHATE 10 MG/ML
INJECTION, SOLUTION INTRAMUSCULAR; INTRAVENOUS DAILY PRN
Status: DISCONTINUED | OUTPATIENT
Start: 2023-10-24 | End: 2023-10-24 | Stop reason: HOSPADM

## 2023-10-24 RX ORDER — BUPIVACAINE HYDROCHLORIDE 2.5 MG/ML
INJECTION, SOLUTION INFILTRATION; PERINEURAL DAILY PRN
Status: DISCONTINUED | OUTPATIENT
Start: 2023-10-24 | End: 2023-10-24 | Stop reason: HOSPADM

## 2023-10-24 RX ORDER — IOPAMIDOL 408 MG/ML
INJECTION, SOLUTION INTRATHECAL DAILY PRN
Status: DISCONTINUED | OUTPATIENT
Start: 2023-10-24 | End: 2023-10-24 | Stop reason: HOSPADM

## 2023-10-24 RX ORDER — SEMAGLUTIDE 0.5 MG/.5ML
0.5 INJECTION, SOLUTION SUBCUTANEOUS WEEKLY
Qty: 2 ML | Refills: 2 | Status: SHIPPED | OUTPATIENT
Start: 2023-11-23 | End: 2024-03-13

## 2023-10-24 NOTE — H&P
Gurjit Weeks [facility]  [id number]  male  68 year old      Reason for procedure/surgery: lumbar radiculopathy    Patient Active Problem List   Diagnosis    Testicular hypofunction    Kidney replaced by transplant    Erectile dysfunction    HTN, kidney transplant related    Immunosuppressed status (H24)    Metabolic acidosis    Vitamin D deficiency    VANESA (obstructive sleep apnea)    Aftercare following organ transplant    Abrasion of anterior lower leg, left, subsequent encounter    Arthritis of knee, right    Paroxysmal atrial fibrillation (H)    Chronic bilateral low back pain without sciatica    Chronic gout    S/P total knee replacement using cement    Ischemic stroke (H)    Hypertriglyceridemia    Class 2 severe obesity with serious comorbidity and body mass index (BMI) of 38.0 to 38.9 in adult (H)    Long term current use of anticoagulant    Osteoarthritis of carpometacarpal joint of thumb    CKD (chronic kidney disease) stage 4, GFR 15-29 ml/min (H)    Need for pneumocystis prophylaxis    Anemia in chronic renal disease    Spinal stenosis of lumbar region, unspecified whether neurogenic claudication present    Lumbar radiculopathy    Left shoulder pain    Liver fibrosis    MGUS (monoclonal gammopathy of unknown significance)    Hematoma    Pre-diabetes       Past Surgical History:    Past Surgical History:   Procedure Laterality Date    IMPLANT PROSTHESIS PENIS INFLATABLE N/A 10/4/2016    Procedure: IMPLANT PROSTHESIS PENIS INFLATABLE;  Surgeon: Jaquan Matthew MD;  Location: [facility] OR    INJECT EPIDURAL LUMBAR N/A 7/13/2023    Procedure: L5-S1 epidural steroid injection with fluoroscopy (right> left);  Surgeon: Maryse Evans MD;  Location: [facility] OR    IR RENAL BIOPSY RIGHT  6/8/2021    ORTHOPEDIC SURGERY      RTK 09/15    PERCUTANEOUS BIOPSY KIDNEY Right 3/12/2019    Procedure: Right Kidney Biopsy;  Surgeon: Ash Benitez MD;  Location: [facility] OR    TRANSPLANT KIDNEY RECIPIENT LIVING UNRELATED         Past  Medical History:   Past Medical History:   Diagnosis Date    BK viremia     Depression     End stage kidney disease (H)     Gout     History of blood transfusion     2004    Hypertension secondary to other renal disorders     IgA nephropathy     Immunosuppressed status (H24)     Kidney replaced by transplant     Kidney transplant complication     Metabolic acidosis     VANESA (obstructive sleep apnea)     CPAP    Overweight     Rotator cuff dysfunction     Sepsis due to cellulitis (H) 05/17/2018    Vitamin D deficiency        Social History:   Social History     Tobacco Use    Smoking status: Former     Types: Cigarettes    Smokeless tobacco: Never    Tobacco comments:     Quit 2004   Substance Use Topics    Alcohol use: Yes     Comment: social       Family History:   Family History   Problem Relation Age of Onset    Kidney Disease No family hx of     Liver Cancer No family hx of     Liver Disease No family hx of     Colon Cancer No family hx of        Allergies: No Known Allergies    Active Medications:   Current Outpatient Medications   Medication Sig Dispense Refill    acetaminophen (TYLENOL) 500 MG tablet Take 1,000 mg by mouth daily      allopurinol (ZYLOPRIM) 100 MG tablet Take 3 tablets (300 mg) by mouth daily 30 tablet     aspirin 81 MG EC tablet Take 81 mg by mouth daily      atorvastatin (LIPITOR) 20 MG tablet Take 1 tablet by mouth      Calcium 500-2.5 MG-MCG CHEW Take 1 tablet by mouth daily 90 tablet 0    carvedilol 12.5 MG PO tablet Take 1 tablet (12.5 mg) by mouth 2 times daily (with meals) 90 tablet 3    cholecalciferol (VITAMIN D) 1000 UNIT tablet Take 1 tablet (1,000 Units) by mouth daily 90 tablet 3    clonazePAM (KLONOPIN) 0.5 MG tablet Take 0.25 mg by mouth every evening      ferrous sulfate (FEROSUL) 325 (65 Fe) MG tablet TAKE 1 TABLET DAILY (WITH BREAKFAST) PLEASE REQUEST FUTURE FERROUS SULFATE REFILLS FROM YOUR PRIMARY CARE PROVIDER, OR OK TO BUY OVER THE COUNTER. 90 tablet 0    furosemide  "(LASIX) 40 MG tablet Take 40 mg by mouth 2 times daily      gabapentin (NEURONTIN) 300 MG capsule Take 1 capsule (300 mg) by mouth At Bedtime 270 capsule 3    losartan (COZAAR) 25 MG tablet Take 1 tablet (25 mg) by mouth daily 90 tablet 3    Multiple Vitamins-Minerals (ONE-A-DAY MENS 50+ ADVANTAGE PO) Take 1 tablet by mouth every evening      omega-3 fatty acids (FISH OIL) 1200 MG capsule Take 1 capsule by mouth 3 times daily. 180 capsule 11    pantoprazole (PROTONIX) 40 MG EC tablet TAKE 1 TABLET BY MOUTH DAILY 30 tablet 1    predniSONE (DELTASONE) 5 MG tablet Take 1 tablet (5 mg) by mouth daily 90 tablet 3    sodium bicarbonate 650 MG tablet Take 2 tablets (1,300 mg) by mouth 2 times daily 360 tablet 3    sulfamethoxazole-trimethoprim (BACTRIM) 400-80 MG tablet Take 1 tablet by mouth Every Mon, Wed, Fri Morning 13 tablet 11    verapamil ER (CALAN-SR) 240 MG CR tablet Take 240 mg by mouth daily      Fexofenadine HCl (ALLEGRA PO) Take 1 tablet by mouth daily.       mycophenolate (GENERIC EQUIVALENT) 250 MG capsule Take 3 capsules (750 mg) by mouth 2 times daily 180 capsule 11       Systemic Review:   CONSTITUTIONAL: NEGATIVE for fever, chills, change in weight  ENT/MOUTH: NEGATIVE for ear, mouth and throat problems  RESP: NEGATIVE for significant cough or SOB  CV: NEGATIVE for chest pain, palpitations or peripheral edema    Physical Examination:   Vital Signs: BP (!) 180/94 (BP Location: Right arm)   Pulse 75   Temp 97.1  F (36.2  C) (Tympanic)   Resp 16   Ht 1.727 m (5' 8\")   Wt 117 kg (258 lb)   SpO2 97%   BMI 39.23 kg/m    GENERAL: healthy, alert and no distress  NECK: no adenopathy, no asymmetry, masses, or scars  RESP: lungs clear to auscultation - no rales, rhonchi or wheezes  CV: regular rate and rhythm, normal S1 S2, no S3 or S4, no murmur, click or rub, no peripheral edema and peripheral pulses strong  ABDOMEN: soft, nontender, no hepatosplenomegaly, no masses and bowel sounds normal  MS: no gross " musculoskeletal defects noted, no edema    Plan: Appropriate to proceed as scheduled.      Maryse Evans MD  10/24/2023

## 2023-10-24 NOTE — TELEPHONE ENCOUNTER
Prior Authorization Not Needed per Insurance    Medication: WEGOVY 0.25 MG/0.5ML SC SOAJ  Insurance Company: [employer] - Phone [phone] Fax [phone]  Expected CoPay: $ 1,392.17  Pharmacy Filling the Rx: [id number] [place] [address]  Pharmacy Notified: yes  Patient Notified: pharmacy will notify pt.    Wegovy is considered cosmetic by medicare and will not cover any costs of the medication.  Will release to the pharmacy.

## 2023-10-24 NOTE — DISCHARGE INSTRUCTIONS
Home Care Instructions after an Epidural Steroid Pain Injection  A lumbar epidural steroid injection delivers steroid medication directly into the area that may be causing your lower back pain and/or leg pain. A cervical or thoracic epidural steroid injection delivers steroids into the epidural space surrounding spinal nerve roots to help relieve pain in the upper spine/neck.  Activity  -Rest today  -Do not work today  -Resume normal activity tomorrow  -DO NOT shower for 24 hours  -DO NOT remove bandaid for 24 hours  Pain  -You may experience soreness at the injection site for one or two days  -You may use an ice pack for 20 minutes every 2 hours for the first 24 hours  -You may use a heating pad after the first 24 hours  -You may use Tylenol (acetaminophen) every 4 hours or other pain medicines as     directed by your physician    You may experience numbness radiating into your legs or arms (depending on the procedure location). This numbness may last several hours. Until sensation returns to normal; please use caution in walking, climbing stairs, and stepping out of your vehicle, etc.    Common side effects of steroids:  Not everyone will experience corticosteroid side effects. If side effects are experienced, they will gradually subside in the 7-10 day period following an injection. Most common side effects include:  -Flushed face and/or chest  -Feeling of warmth, particularly in the face but could be an overall feeling of warmth  -Increased blood sugar in diabetic patients  -Menstrual irregularities my occur. If taking hormone-based birth control an alternate method of birth control is recommended  -Sleep disturbances and/or mood swings are possible  -Leg cramps    Please contact us if you have:  -Severe pain  -Fever more than 101.5 degrees Fahrenheit  -Signs of infection at the injection site (redness, swelling, or drainage)    FOR PAIN CENTER PATIENTS:  If you have questions, please contact the Pain Clinic at  [phone] Option #1 between the hours of 7:00 am and 3:00 pm Monday through Friday. After office hours you can contact the on call provider by dialing [phone]. If you need immediate attention, we recommend that you go to a hospital emergency room or dial [phone].

## 2023-10-24 NOTE — OP NOTE
Transforaminal Lumbar Epidural Steroid Injection    Procedure:  1. Lumbar epidural steroid injection via the transforaminal approach at the right L5-S1  2. Fluoroscopy for needle guidance    Pre-operative Diagnosis:  1. Intervertebral disc disorders w radiculopathy, lumbar region  2. Other intervertebral disc degeneration, lumbar region    Post-operative Diagnosis:  1. Intervertebral disc disorders w radiculopathy, lumbar region  2. Other intervertebral disc degeneration, lumbar region    Anesthesia:  Local anesthesia.    Medical Indications:  The patient presents to the clinic today for the treatment of persistent pain. We believe that the pain is spinally mediated. The patient has been exhibiting symptoms consistent with lumbar intraspinal inflammation and radiculopathy. Symptoms have been persistent, disabling and intermittently severe. The patient has been evaluated in the pain clinic and scheduled today for a spinal injection to aid in the diagnosis and treatment of presumed spinal pain. The risks, benefits, potential complications, and alternatives were discussed with the patient as per the signed consent form, and informed consent was obtained. The patient has received information about the procedure and its risks. The physician personally confirmed the procedure, side, and site to be injected with the patient and marked the site in the pre-procedure area.    Description of Procedure:  After informed consent, the patient was brought to the procedure room and placed onto the x-ray table in the prone position. The lumbar region was prepped and draped in the usual sterile fashion. I used AP fluoroscopy to visualize the lumbar spine and identify the right L5-S1 level. I then rotated the fluoroscope oblique to identify the right L5-S1 neural foramen and the right L5 pedicle. I then anesthetized the skin and subcutaneous tissue overlying the target foramen with lidocaine 1%. I then passed a 22g, 5 in. needle  through the anesthetized track and down the base of the L5 pedicle using AP and lateral views. I aspirated the needle and it was negative for blood and CSF. I next injected 3 mL isovue 200 mg/mL using static and continuous fluoroscopy to obtain an epidurogram and a right L5 neurogram. I used fluoroscopy to verify that there was no vascular or intrathecal uptake of contrast. I injected a solution containing 10mg Dexamethasone 10 mg/mL mixed with 2 mL bupivacaine 0.25%.  All needles were placed with minimal trauma and subsequently removed.    Complications:    No procedural or immediate post-procedural complications occurred and the patient was transferred to PACU in stable condition.    ?  ?

## 2023-10-27 DIAGNOSIS — E66.812 CLASS 2 SEVERE OBESITY WITH SERIOUS COMORBIDITY AND BODY MASS INDEX (BMI) OF 38.0 TO 38.9 IN ADULT, UNSPECIFIED OBESITY TYPE (H): Primary | ICD-10-CM

## 2023-10-27 DIAGNOSIS — E66.01 CLASS 2 SEVERE OBESITY WITH SERIOUS COMORBIDITY AND BODY MASS INDEX (BMI) OF 38.0 TO 38.9 IN ADULT, UNSPECIFIED OBESITY TYPE (H): Primary | ICD-10-CM

## 2023-10-27 RX ORDER — TOPIRAMATE 25 MG/1
TABLET, FILM COATED ORAL
Qty: 60 TABLET | Refills: 3 | Status: SHIPPED | OUTPATIENT
Start: 2023-10-27 | End: 2024-03-13

## 2023-10-30 ENCOUNTER — OFFICE VISIT (OUTPATIENT)
Dept: ANESTHESIOLOGY | Facility: CLINIC | Age: 68
End: 2023-10-30
Attending: ANESTHESIOLOGY
Payer: MEDICARE

## 2023-10-30 ENCOUNTER — OFFICE VISIT (OUTPATIENT)
Dept: PHYSICAL MEDICINE AND REHAB | Facility: CLINIC | Age: 68
End: 2023-10-30
Payer: MEDICARE

## 2023-10-30 VITALS — OXYGEN SATURATION: 96 % | DIASTOLIC BLOOD PRESSURE: 78 MMHG | HEART RATE: 69 BPM | SYSTOLIC BLOOD PRESSURE: 167 MMHG

## 2023-10-30 VITALS — DIASTOLIC BLOOD PRESSURE: 83 MMHG | SYSTOLIC BLOOD PRESSURE: 179 MMHG | OXYGEN SATURATION: 98 % | HEART RATE: 72 BPM

## 2023-10-30 DIAGNOSIS — I69.30 SEQUELA, POST-STROKE: Primary | ICD-10-CM

## 2023-10-30 DIAGNOSIS — I63.9 ISCHEMIC STROKE (H): ICD-10-CM

## 2023-10-30 DIAGNOSIS — G81.11 RIGHT SPASTIC HEMIPARESIS (H): Primary | ICD-10-CM

## 2023-10-30 PROCEDURE — 95874 GUIDE NERV DESTR NEEDLE EMG: CPT | Mod: GC | Performed by: PHYSICAL MEDICINE & REHABILITATION

## 2023-10-30 PROCEDURE — 64642 CHEMODENERV 1 EXTREMITY 1-4: CPT | Mod: GC | Performed by: PHYSICAL MEDICINE & REHABILITATION

## 2023-10-30 PROCEDURE — 99213 OFFICE O/P EST LOW 20 MIN: CPT | Mod: GC | Performed by: ANESTHESIOLOGY

## 2023-10-30 ASSESSMENT — PAIN SCALES - PAIN ENJOYMENT GENERAL ACTIVITY SCALE (PEG)
PEG_TOTALSCORE: 1.67
INTERFERED_GENERAL_ACTIVITY: 2
INTERFERED_ENJOYMENT_LIFE: 1
AVG_PAIN_PASTWEEK: 2

## 2023-10-30 ASSESSMENT — ENCOUNTER SYMPTOMS: SHORTNESS OF BREATH: 1

## 2023-10-30 ASSESSMENT — PAIN SCALES - GENERAL: PAINLEVEL: MILD PAIN (2)

## 2023-10-30 NOTE — NURSING NOTE
Chief Complaint   Patient presents with    RECHECK     Botox injections confirmed with patient   BP (!) 167/78   Pulse 69   SpO2 96%     Kristen Mendez CMA at 11:08 AM on 10/30/2023.

## 2023-10-30 NOTE — PROGRESS NOTES
Pain clinic follow up note    HPI:   Gurjit Squires is a 68 year old year old male  who presents to the pain clinic for follow-up, last seen by Dr. Evans 8/7/2023 for back and right neck pain the following diagnosis:  Low back pain  Right leg muscle spasms  History of stroke  Chronic anticoagulation    10/24/2023 s/p Lumbar epidural steroid injection via the transforaminal approach at the right L5-S1.    Patient Supplied Answers To the  Pain Questionnaire      9/15/2023     9:54 AM    Pain -  Patient Entered Questionnaire/Answers   What number best describes your pain right now:  0 = No pain  to  10 = Worst pain imaginable 3   How would you describe the pain sharp    dull, aching    throbbing   Which of the following worsen your pain walking   Which of the following improve or reduce your pain lying down    sitting    medication   What number best describes your average pain for the past week:  0 = No pain  to  10 = Worst pain imaginable 3   What number best describes your LOWEST pain in past 24 hours:  0 = No pain  to  10 = Worst pain imaginable 1   What number best describes your WORST pain in past 24 hours:  0 = No pain  to  10 = Worst pain imaginable 4   When is your pain worst Constant   What non-medicine treatments have you already had for your pain pain clinic    physical therapy    chiropractic care    spine injections (shots)    exercise       No images are attached to the encounter.    Pain today is better by 50% overall as he got b/l L5-S1 trans foraminal epidural injections last week    Patient reports compared to the last visit their pain is improved by following percent:    Pain intensity: 50 %  Pain frequency: 40%  Duration of pain episodes: 30%  Falling asleep: 60-70%  Staying asleep: 60-70%  Ability to work: 30%  Ability to exercise: 30%  Performing chores: 10 %    Got his Botox injection done today with Dr. Owens on his right lower extremity.    New imaging reviewed with the  patient:  None    ROS:  Review of Systems   Respiratory:  Positive for shortness of breath.    Believes its from is reduced hemoglobin    Significant Medical History:   Past Medical History:   Diagnosis Date    BK viremia     Depression     End stage kidney disease (H)     Gout     History of blood transfusion     2004    Hypertension secondary to other renal disorders     IgA nephropathy     Immunosuppressed status (H24)     Kidney replaced by transplant     Kidney transplant complication     Metabolic acidosis     VANESA (obstructive sleep apnea)     CPAP    Overweight     Rotator cuff dysfunction     Sepsis due to cellulitis (H) 05/17/2018    Vitamin D deficiency           Past Surgical History:  Past Surgical History:   Procedure Laterality Date    IMPLANT PROSTHESIS PENIS INFLATABLE N/A 10/4/2016    Procedure: IMPLANT PROSTHESIS PENIS INFLATABLE;  Surgeon: Jaquan Matthew MD;  [facility]    INJECT EPIDURAL LUMBAR N/A 7/13/2023    Procedure: L5-S1 epidural steroid injection with fluoroscopy (right> left);  Surgeon: Maryse Evans MD;  Location: [facility] OR    INJECT EPIDURAL TRANSFORAMINAL Right 10/24/2023    Procedure: Right L5 transforaminal epidural steroid injection with fluoroscopy;  Surgeon: Maryse Evans MD;  Location: [facility] OR    IR RENAL BIOPSY RIGHT  6/8/2021    ORTHOPEDIC SURGERY      RTK 09/15    PERCUTANEOUS BIOPSY KIDNEY Right 3/12/2019    Procedure: Right Kidney Biopsy;  Surgeon: Ash Benitez MD;  [facility]: [facility]    TRANSPLANT KIDNEY RECIPIENT LIVING UNRELATED            Family History:  Family History   Problem Relation Age of Onset    Kidney Disease No family hx of     Liver Cancer No family hx of     Liver Disease No family hx of     Colon Cancer No family hx of           Social History:  Social History     Socioeconomic History    Marital status:      Spouse name: Not on file    Number of children: Not on file    Years of education: Not on file    Highest education level: Not  on file   Occupational History    Not on file   Tobacco Use    Smoking status: Former     Types: Cigarettes    Smokeless tobacco: Never    Tobacco comments:     Quit 2004   Vaping Use    Vaping Use: Never used   Substance and Sexual Activity    Alcohol use: Yes     Comment: social    Drug use: No    Sexual activity: Yes     Partners: Female     Birth control/protection: None   Other Topics Concern    Parent/sibling w/ CABG, MI or angioplasty before 65F 55M? Not Asked   Social History Narrative    Not on file     Social Determinants of Health     Financial Resource Strain: Not on file   Food Insecurity: Not on file   Transportation Needs: Not on file   Physical Activity: Not on file   Stress: Not on file   Social Connections: Not on file   Interpersonal Safety: Not on file   Housing Stability: Not on file     Social History     Social History Narrative    Not on file          Allergies:  No Known Allergies    Current Medications:   Current Outpatient Medications   Medication Sig Dispense Refill    acetaminophen (TYLENOL) 500 MG tablet Take 1,000 mg by mouth daily      allopurinol (ZYLOPRIM) 100 MG tablet Take 3 tablets (300 mg) by mouth daily 30 tablet     aspirin 81 MG EC tablet Take 81 mg by mouth daily      atorvastatin (LIPITOR) 20 MG tablet Take 1 tablet by mouth      Calcium 500-2.5 MG-MCG CHEW Take 1 tablet by mouth daily 90 tablet 0    carvedilol 12.5 MG PO tablet Take 1 tablet (12.5 mg) by mouth 2 times daily (with meals) 90 tablet 3    cholecalciferol (VITAMIN D) 1000 UNIT tablet Take 1 tablet (1,000 Units) by mouth daily 90 tablet 3    clonazePAM (KLONOPIN) 0.5 MG tablet Take 0.25 mg by mouth every evening      ferrous sulfate (FEROSUL) 325 (65 Fe) MG tablet TAKE 1 TABLET DAILY (WITH BREAKFAST) PLEASE REQUEST FUTURE FERROUS SULFATE REFILLS FROM YOUR PRIMARY CARE PROVIDER, OR OK TO BUY OVER THE COUNTER. 90 tablet 0    Fexofenadine HCl (ALLEGRA PO) Take 1 tablet by mouth daily.       furosemide (LASIX) 40 MG  tablet Take 40 mg by mouth 2 times daily      gabapentin (NEURONTIN) 300 MG capsule Take 1 capsule (300 mg) by mouth At Bedtime 270 capsule 3    losartan (COZAAR) 25 MG tablet Take 1 tablet (25 mg) by mouth daily 90 tablet 3    Multiple Vitamins-Minerals (ONE-A-DAY MENS 50+ ADVANTAGE PO) Take 1 tablet by mouth every evening      mycophenolate (GENERIC EQUIVALENT) 250 MG capsule Take 3 capsules (750 mg) by mouth 2 times daily 180 capsule 11    omega-3 fatty acids (FISH OIL) 1200 MG capsule Take 1 capsule by mouth 3 times daily. 180 capsule 11    pantoprazole (PROTONIX) 40 MG EC tablet TAKE 1 TABLET BY MOUTH DAILY 30 tablet 1    predniSONE (DELTASONE) 5 MG tablet Take 1 tablet (5 mg) by mouth daily 90 tablet 3    sodium bicarbonate 650 MG tablet Take 2 tablets (1,300 mg) by mouth 2 times daily 360 tablet 3    sulfamethoxazole-trimethoprim (BACTRIM) 400-80 MG tablet Take 1 tablet by mouth Every Mon, Wed, Fri Morning 13 tablet 11    topiramate (TOPAMAX) 25 MG tablet 25mg at bedtime for week 1, 50mg at bedtime thereafter 60 tablet 3    verapamil ER (CALAN-SR) 240 MG CR tablet Take 240 mg by mouth daily      Semaglutide-Weight Management (WEGOVY) 0.25 MG/0.5ML pen Inject 0.25 mg Subcutaneous once a week For 4 weeks 2 mL 0    [START ON 11/23/2023] Semaglutide-Weight Management (WEGOVY) 0.5 MG/0.5ML pen Inject 0.5 mg Subcutaneous once a week After completing 4 weeks of 0.25mg 2 mL 2             Current Pain Medications:  Tylenol     Physical Exam:     BP (!) 179/83   Pulse 72   SpO2 98%     General Appearance: No distress, seated comfortably  Mood: Euthymic  HE ENT: Non constricted pupils  Respiratory: Non labored breathing  CVS: Regular rate and rhythm  GI: Soft, non distended, no TTP  Skin: No rashes over exposed skin  MS: Normal muscle bulk  Neuro: Cranial nerves 2-12 intact  Gait:  antalgic, ambulates with  assistance    Pain specific exam:  5/5 strength in bilateral lower extremities L2-S1    ASSESSMENT AND PLAN:      Encounter Diagnosis:  Low back pain  Right leg muscle spasms/spasticity  History of stroke  Chronic anticoagulation     Patient  Gurjit Squires is a 68 year old year old male  who presents to the pain clinic for follow-up, last seen by Dr. Evans 8/7/2023 for back and right neck pain. 10/24/2023 s/p Lumbar epidural steroid injection via the transforaminal approach at the right L5-S1.    Patient received his injection last week.  He was seen by PM&R team today for right lower extremity spasms/spasticity and a trial of Botox injections. We also discussed with him that we can delineate his pain source coming from for stroke right lower extremity spasticity versus lumbar radiculopathy.    RECOMMENDATIONS:     1. Medications: Continue pain medications as prescribed.  Dosing, side effects, risks/benefits/alternatives were discussed with the patient in detail.    2. Procedure: No procedures were ordered today..    3. Education: We discussed with the patient to see the full effect of Botox injections and also full effect of his epidural transforaminal right-sided L5-S1 injection in the coming months.  He also reinforced him that he might require 3-4 sessions before having the full affect from Botox injections.      Follow up: In 3 months when he sees Dr. Owens for his Botox injection in January.    Patient was seen and discussed with my staff physician Dr. Evans, who agrees with my assessment and plan.    Danny HOLM  Pain Fellow  [facility]

## 2023-10-30 NOTE — LETTER
10/30/2023       RE: Gurjit Squires  [address]  [address]  [place] [address]     Dear Colleague,    Thank you for referring your patient, Gurjit Squires, to the [facility] [place] at [facility]. Please see a copy of my visit note below.    Called pt remind him of his appt Monday, 10/30.     Pain clinic follow up note    HPI:   Gurjit Squires is a 68 year old year old male  who presents to the pain clinic for follow-up, last seen by Dr. Evans 8/7/2023 for back and right neck pain the following diagnosis:  Low back pain  Right leg muscle spasms  History of stroke  Chronic anticoagulation    10/24/2023 s/p Lumbar epidural steroid injection via the transforaminal approach at the right L5-S1.    Patient Supplied Answers To the  Pain Questionnaire      9/15/2023     9:54 AM    Pain -  Patient Entered Questionnaire/Answers   What number best describes your pain right now:  0 = No pain  to  10 = Worst pain imaginable 3   How would you describe the pain sharp    dull, aching    throbbing   Which of the following worsen your pain walking   Which of the following improve or reduce your pain lying down    sitting    medication   What number best describes your average pain for the past week:  0 = No pain  to  10 = Worst pain imaginable 3   What number best describes your LOWEST pain in past 24 hours:  0 = No pain  to  10 = Worst pain imaginable 1   What number best describes your WORST pain in past 24 hours:  0 = No pain  to  10 = Worst pain imaginable 4   When is your pain worst Constant   What non-medicine treatments have you already had for your pain pain clinic    physical therapy    chiropractic care    spine injections (shots)    exercise       No images are attached to the encounter.    Pain today is better by 50% overall as he got b/l L5-S1 trans foraminal epidural injections last  week    Patient reports compared to the last visit their pain is improved by following percent:    Pain intensity: 50 %  Pain frequency: 40%  Duration of pain episodes: 30%  Falling asleep: 60-70%  Staying asleep: 60-70%  Ability to work: 30%  Ability to exercise: 30%  Performing chores: 10 %    Got his Botox injection done today with Dr. Owens on his right lower extremity.    New imaging reviewed with the patient:  None    ROS:  Review of Systems   Respiratory:  Positive for shortness of breath.    Believes its from is reduced hemoglobin    Significant Medical History:   Past Medical History:   Diagnosis Date    BK viremia     Depression     End stage kidney disease (H)     Gout     History of blood transfusion     2004    Hypertension secondary to other renal disorders     IgA nephropathy     Immunosuppressed status (H24)     Kidney replaced by transplant     Kidney transplant complication     Metabolic acidosis     VANESA (obstructive sleep apnea)     CPAP    Overweight     Rotator cuff dysfunction     Sepsis due to cellulitis (H) 05/17/2018    Vitamin D deficiency           Past Surgical History:  Past Surgical History:   Procedure Laterality Date    IMPLANT PROSTHESIS PENIS INFLATABLE N/A 10/4/2016    Procedure: IMPLANT PROSTHESIS PENIS INFLATABLE;  Surgeon: Jaquan Matthew MD;  [facility]    INJECT EPIDURAL LUMBAR N/A 7/13/2023    Procedure: L5-S1 epidural steroid injection with fluoroscopy (right> left);  Surgeon: Maryse Evans MD;  Location: [facility] OR    INJECT EPIDURAL TRANSFORAMINAL Right 10/24/2023    Procedure: Right L5 transforaminal epidural steroid injection with fluoroscopy;  Surgeon: Maryse Evans MD;  Location: [facility] OR    IR RENAL BIOPSY RIGHT  6/8/2021    ORTHOPEDIC SURGERY      RTK 09/15    PERCUTANEOUS BIOPSY KIDNEY Right 3/12/2019    Procedure: Right Kidney Biopsy;  Surgeon: Ash Benitez MD;  [facility]    TRANSPLANT KIDNEY RECIPIENT LIVING UNRELATED            Family  History:  Family History   Problem Relation Age of Onset    Kidney Disease No family hx of     Liver Cancer No family hx of     Liver Disease No family hx of     Colon Cancer No family hx of           Social History:  Social History     Socioeconomic History    Marital status:      Spouse name: Not on file    Number of children: Not on file    Years of education: Not on file    Highest education level: Not on file   Occupational History    Not on file   Tobacco Use    Smoking status: Former     Types: Cigarettes    Smokeless tobacco: Never    Tobacco comments:     Quit 2004   Vaping Use    Vaping Use: Never used   Substance and Sexual Activity    Alcohol use: Yes     Comment: social    Drug use: No    Sexual activity: Yes     Partners: Female     Birth control/protection: None   Other Topics Concern    Parent/sibling w/ CABG, MI or angioplasty before 65F 55M? Not Asked   Social History Narrative    Not on file     Social Determinants of Health     Financial Resource Strain: Not on file   Food Insecurity: Not on file   Transportation Needs: Not on file   Physical Activity: Not on file   Stress: Not on file   Social Connections: Not on file   Interpersonal Safety: Not on file   Housing Stability: Not on file     Social History     Social History Narrative    Not on file          Allergies:  No Known Allergies    Current Medications:   Current Outpatient Medications   Medication Sig Dispense Refill    acetaminophen (TYLENOL) 500 MG tablet Take 1,000 mg by mouth daily      allopurinol (ZYLOPRIM) 100 MG tablet Take 3 tablets (300 mg) by mouth daily 30 tablet     aspirin 81 MG EC tablet Take 81 mg by mouth daily      atorvastatin (LIPITOR) 20 MG tablet Take 1 tablet by mouth      Calcium 500-2.5 MG-MCG CHEW Take 1 tablet by mouth daily 90 tablet 0    carvedilol 12.5 MG PO tablet Take 1 tablet (12.5 mg) by mouth 2 times daily (with meals) 90 tablet 3    cholecalciferol (VITAMIN D) 1000 UNIT tablet Take 1 tablet  (1,000 Units) by mouth daily 90 tablet 3    clonazePAM (KLONOPIN) 0.5 MG tablet Take 0.25 mg by mouth every evening      ferrous sulfate (FEROSUL) 325 (65 Fe) MG tablet TAKE 1 TABLET DAILY (WITH BREAKFAST) PLEASE REQUEST FUTURE FERROUS SULFATE REFILLS FROM YOUR PRIMARY CARE PROVIDER, OR OK TO BUY OVER THE COUNTER. 90 tablet 0    Fexofenadine HCl (ALLEGRA PO) Take 1 tablet by mouth daily.       furosemide (LASIX) 40 MG tablet Take 40 mg by mouth 2 times daily      gabapentin (NEURONTIN) 300 MG capsule Take 1 capsule (300 mg) by mouth At Bedtime 270 capsule 3    losartan (COZAAR) 25 MG tablet Take 1 tablet (25 mg) by mouth daily 90 tablet 3    Multiple Vitamins-Minerals (ONE-A-DAY MENS 50+ ADVANTAGE PO) Take 1 tablet by mouth every evening      mycophenolate (GENERIC EQUIVALENT) 250 MG capsule Take 3 capsules (750 mg) by mouth 2 times daily 180 capsule 11    omega-3 fatty acids (FISH OIL) 1200 MG capsule Take 1 capsule by mouth 3 times daily. 180 capsule 11    pantoprazole (PROTONIX) 40 MG EC tablet TAKE 1 TABLET BY MOUTH DAILY 30 tablet 1    predniSONE (DELTASONE) 5 MG tablet Take 1 tablet (5 mg) by mouth daily 90 tablet 3    sodium bicarbonate 650 MG tablet Take 2 tablets (1,300 mg) by mouth 2 times daily 360 tablet 3    sulfamethoxazole-trimethoprim (BACTRIM) 400-80 MG tablet Take 1 tablet by mouth Every Mon, Wed, Fri Morning 13 tablet 11    topiramate (TOPAMAX) 25 MG tablet 25mg at bedtime for week 1, 50mg at bedtime thereafter 60 tablet 3    verapamil ER (CALAN-SR) 240 MG CR tablet Take 240 mg by mouth daily      Semaglutide-Weight Management (WEGOVY) 0.25 MG/0.5ML pen Inject 0.25 mg Subcutaneous once a week For 4 weeks 2 mL 0    [START ON 11/23/2023] Semaglutide-Weight Management (WEGOVY) 0.5 MG/0.5ML pen Inject 0.5 mg Subcutaneous once a week After completing 4 weeks of 0.25mg 2 mL 2             Current Pain Medications:  Tylenol     Physical Exam:     BP (!) 179/83   Pulse 72   SpO2 98%     General  Appearance: No distress, seated comfortably  Mood: Euthymic  HE ENT: Non constricted pupils  Respiratory: Non labored breathing  CVS: Regular rate and rhythm  GI: Soft, non distended, no TTP  Skin: No rashes over exposed skin  MS: Normal muscle bulk  Neuro: Cranial nerves 2-12 intact  Gait:  antalgic, ambulates with  assistance    Pain specific exam:  5/5 strength in bilateral lower extremities L2-S1    ASSESSMENT AND PLAN:     Encounter Diagnosis:  Low back pain  Right leg muscle spasms/spasticity  History of stroke  Chronic anticoagulation     Patient  Gurjit Squires is a 68 year old year old male  who presents to the pain clinic for follow-up, last seen by Dr. Evans 8/7/2023 for back and right neck pain. 10/24/2023 s/p Lumbar epidural steroid injection via the transforaminal approach at the right L5-S1.    Patient received his injection last week.  He was seen by PM&R team today for right lower extremity spasms/spasticity and a trial of Botox injections. We also discussed with him that we can delineate his pain source coming from for stroke right lower extremity spasticity versus lumbar radiculopathy.    RECOMMENDATIONS:     1. Medications: Continue pain medications as prescribed.  Dosing, side effects, risks/benefits/alternatives were discussed with the patient in detail.    2. Procedure: No procedures were ordered today..    3. Education: We discussed with the patient to see the full effect of Botox injections and also full effect of his epidural transforaminal right-sided L5-S1 injection in the coming months.  He also reinforced him that he might require 3-4 sessions before having the full affect from Botox injections.      Follow up: In 3 months when he sees Dr. Owens for his Botox injection in January.    Patient was seen and discussed with my staff physician Dr. Evans, who agrees with my assessment and plan.    Danny HOLM  Pain Fellow  [facility]      Attestation signed by Maryse Evans MD at  10/31/2023  9:41 AM:  ATTENDING ATTESTATION  I saw the patient along with the pain medicine fellow Dr. Danny Landrum. Please see his note above for full details. I was involved in gathering history, physical examination and development of the plan of care.           Again, thank you for allowing me to participate in the care of your patient.      Sincerely,    Maryse Evans MD

## 2023-10-30 NOTE — PROGRESS NOTES
[facility]    PM&R CLINIC NOTE  BOTULINUM TOXIN PROCEDURE      HPI  Chief Complaint   Patient presents with    RECHECK     Botox injections confirmed with patient     HPI from initial consult from 8/7/2023:   Gurjit Squires is a RHD  68 year old male with a history of status-post kidney transplant (2004), CKD stage 4, VANESA, CVA (7/2020), obesity, HTN, paroxysmal Afib, history of bilateral rotator cuff repair, and history of bilateral knee replacement, who presents to clinic for initial consultation and botulinum toxin injections for management of right lower extremity. Mr. Squires was referred for PM&R evaluation by Dr. Evans.       In brief, Mr. Squires presented to an ED in [place] on 7/22/2020 with concerns of right leg heaviness. CT head and neck was completed, CT angiogram was deferred de to elevated creatinine, and symptoms were thought to be lumbar spine related, and he was discharged to home. Later in the week he went to see his chiropractor who had recommended that he see his provider. MRI of neck, thorax spine, and brain was the ordered, which revealed subacute infarct of left periventricular and extending into left basal ganglia. As per chart review, Mr. Squires was noted to have right leg weakness, right foot drop, unable to wiggle right toes, and numbness and lack of coordination in the right hand. He was initially followed by neurology following the stroke, but has not seen them recently.       Mr. Squires saw Dr. Evans on 6/27/2023 for management of his chronic low back pain. He was started him on a trial of baclofen 10mg TID and was referred to PT. Underwent lumbar epidural steroid injection at IL L5-S1 (right> left) with Dr. Evans on 7/13/2023, chronic low back pain thought to be consistent with lumbar intraspinal inflammation and radiculopathy.     Today, Mr. Squires is accompanied in clinic by his  "wife, Jenny.     Mr. Squires reports that currently he feels that his RLE is weaker than the LLE, ongoing residual deficit from his prior stroke. He endorses feeling right leg stiffness and tightness after sitting or standing too long. He also reports right leg cramping, particularly in the right hamstring and calf, after prolonged inactivity or after lying in bed for a period of time. Mr. Squires also reports that since the stroke, the sensation in the right leg has been diminished. Occasionally endorses pain radiating from low back into right buttock.     Mr. Squires fells that he has increased tightness in the right hamstring and calf. As well, he notes his right foot turning inward and the big toe curling up at times. He has previously tried baclofen (Dr. Evans had trialed him on baclofen), but made him \"foggy,\" therefore was discontinued. Takes clonazepam for his history of restless leg syndrome.     He currently ambulates without the use of an assistive device, but notes tripping and foot dragging when he is not focused on his ambulation, or fatigued. Denies any recent falls. Has not had an AFO or any sort of brace for RLE. Overall, his wife feels that his walking is now worse than when he first had the stroke back in 2020.     He is currently participating in PT twice a week, but they are focusing more on the low back pain. He has also been doing pool therapy on his own.     Denies any cognitive or speech concerns status-post stroke.     Of note, he recently had unprovoked hematomas develop in the abdomen and left hamstring, and was subsequently taken off of the warfarin and aspirin. He also notes that he is currently on the inactive transplant list, may need another future kidney transplant (abdominal hematoma affected kidney, increased creatinine).    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     Is independent in all ADL and IADLs. Is driving.    Reports sleeping well, wears CPAP " nightly.     Weight has been stable, good appetite.     Mr. Squires lives with his wife in a house with a couple STEVEN (no railing). Bedroom and bathroom (walk in shower) are accessible on the main level.     Interval History:   - called on 8/29 to give update on injections. Reported cramps in hamstring and calf. Had not yet noticed much of a difference in spasticity. Was taking a small dose of clonazepam each evening, prescribed by PCP.     - He has been following with Dr. Evans and Dr. Alvarado regarding back pain. He had an L5-S1 ILESI with no relief. Dr. Alvarado evaluated he patient on 9/22 and noted increased pain on extension, improvement in flexion, without radicular symptoms at that time. He suggested a diagnostic L5 nerve block. He ended up receiving bilateral L5-S1 TFESI  which provided 50% pain relief with less stabbing pain in the back but still has an achy pain with extension. Reports he has not had left lower extremity symptoms. If these had not provided significant relief, facet blocks had been suggested.     - Today he reports possibly some improvement in tightness of the right calf immediately after his injection but after those initial days there was no appreciable difference. He didn't notice any improvement in gait. He's had no falls or near falls. He does not use an assistive device. Given that was his first cycle, he's interested in repeat injections given the additive effect botulinum toxin can have.     PHYSICAL EXAM  VS: BP (!) 167/78   Pulse 69   SpO2 96%    GEN: Pleasant and cooperative, in no acute distress  HEENT: No facial asymmetry  EXT: 1+ pitting edema in RLE below knee, pt notes this is stable. 2-3 beats of clonus at right ankle, none at left ankle. 5/5 strength in bilateral UE and LE. No tone noted in bilateral UE. MAS 1/4 in right hamstring,  2/4 gastrocnemius, and 2/4 tibialis posterior. Can achieve full DF range at the ankle with moderate amount of force  GAIT: Clearing right foot during  ambulation with circumduction and intermittent steppage gait of RLE.     ALLERGIES  No Known Allergies      CURRENT MEDICATIONS    Current Outpatient Medications:     acetaminophen (TYLENOL) 500 MG tablet, Take 1,000 mg by mouth daily, Disp: , Rfl:     allopurinol (ZYLOPRIM) 100 MG tablet, Take 3 tablets (300 mg) by mouth daily, Disp: 30 tablet, Rfl:     aspirin 81 MG EC tablet, Take 81 mg by mouth daily, Disp: , Rfl:     atorvastatin (LIPITOR) 20 MG tablet, Take 1 tablet by mouth, Disp: , Rfl:     Calcium 500-2.5 MG-MCG CHEW, Take 1 tablet by mouth daily, Disp: 90 tablet, Rfl: 0    carvedilol 12.5 MG PO tablet, Take 1 tablet (12.5 mg) by mouth 2 times daily (with meals), Disp: 90 tablet, Rfl: 3    cholecalciferol (VITAMIN D) 1000 UNIT tablet, Take 1 tablet (1,000 Units) by mouth daily, Disp: 90 tablet, Rfl: 3    clonazePAM (KLONOPIN) 0.5 MG tablet, Take 0.25 mg by mouth every evening, Disp: , Rfl:     ferrous sulfate (FEROSUL) 325 (65 Fe) MG tablet, TAKE 1 TABLET DAILY (WITH BREAKFAST) PLEASE REQUEST FUTURE FERROUS SULFATE REFILLS FROM YOUR PRIMARY CARE PROVIDER, OR OK TO BUY OVER THE COUNTER., Disp: 90 tablet, Rfl: 0    Fexofenadine HCl (ALLEGRA PO), Take 1 tablet by mouth daily. , Disp: , Rfl:     furosemide (LASIX) 40 MG tablet, Take 40 mg by mouth 2 times daily, Disp: , Rfl:     gabapentin (NEURONTIN) 300 MG capsule, Take 1 capsule (300 mg) by mouth At Bedtime, Disp: 270 capsule, Rfl: 3    losartan (COZAAR) 25 MG tablet, Take 1 tablet (25 mg) by mouth daily, Disp: 90 tablet, Rfl: 3    Multiple Vitamins-Minerals (ONE-A-DAY MENS 50+ ADVANTAGE PO), Take 1 tablet by mouth every evening, Disp: , Rfl:     mycophenolate (GENERIC EQUIVALENT) 250 MG capsule, Take 3 capsules (750 mg) by mouth 2 times daily, Disp: 180 capsule, Rfl: 11    omega-3 fatty acids (FISH OIL) 1200 MG capsule, Take 1 capsule by mouth 3 times daily., Disp: 180 capsule, Rfl: 11    pantoprazole (PROTONIX) 40 MG EC tablet, TAKE 1 TABLET BY MOUTH  DAILY, Disp: 30 tablet, Rfl: 1    predniSONE (DELTASONE) 5 MG tablet, Take 1 tablet (5 mg) by mouth daily, Disp: 90 tablet, Rfl: 3    sodium bicarbonate 650 MG tablet, Take 2 tablets (1,300 mg) by mouth 2 times daily, Disp: 360 tablet, Rfl: 3    sulfamethoxazole-trimethoprim (BACTRIM) 400-80 MG tablet, Take 1 tablet by mouth Every Mon, Wed, Fri Morning, Disp: 13 tablet, Rfl: 11    topiramate (TOPAMAX) 25 MG tablet, 25mg at bedtime for week 1, 50mg at bedtime thereafter, Disp: 60 tablet, Rfl: 3    verapamil ER (CALAN-SR) 240 MG CR tablet, Take 240 mg by mouth daily, Disp: , Rfl:     Semaglutide-Weight Management (WEGOVY) 0.25 MG/0.5ML pen, Inject 0.25 mg Subcutaneous once a week For 4 weeks, Disp: 2 mL, Rfl: 0    [START ON 2023] Semaglutide-Weight Management (WEGOVY) 0.5 MG/0.5ML pen, Inject 0.5 mg Subcutaneous once a week After completing 4 weeks of 0.25mg, Disp: 2 mL, Rfl: 2    Current Facility-Administered Medications:     botulinum toxin type A (BOTOX) 100 units injection 400 Units, 400 Units, Intramuscular, Q90 Days, Aylin Owens MD, 100 Units at 10/30/23 1229       BOTULINUM NEUROTOXIN INJECTION PROCEDURES    VERIFICATION OF PATIENT IDENTIFICATION AND PROCEDURE     Initials   Patient Name PS   Patient  PS   Procedure Verified by: PS     Prior to the start of the procedure and with procedural staff participation, I verbally confirmed the patient s identity using two indicators, relevant allergies, that the procedure was appropriate and matched the consent or emergent situation, and that the correct equipment/implants were available. Immediately prior to starting the procedure I conducted the Time Out with the procedural staff and re-confirmed the patient s name, procedure, and site/side. (The Joint Commission universal protocol was followed.)  Yes    Sedation (Moderate or Deep): None    ABOVE ASSESSMENTS PERFORMED BY  Resident/Fellow: Leoncio Webb   The attending provider was present for the  entire procedure documented below.    Aylin Owens MD      INDICATIONS FOR PROCEDURES  Gurjit Squires is a 68 year old patient with right lower extremity spasticity secondary to the diagnosis of previous CVA. His baseline symptoms have been recalcitrant to oral medications and conservative therapy. He is here today for injection with Botox.    GOAL OF PROCEDURE  The goal of this procedure is to increase active range of motion, improve volitional motor control, decrease pain , and enhance functional independence.      TOTAL DOSE ADMINISTERED  Dose Administered:  200 units  Botox (Botulinum Toxin Type A)       2:1 Dilution and 1:1 dilution (see below) -we will do all 1-1 at next visit  Unavoidable Drug Waste: No  Diluent Used:  Preservative Free Normal Saline  Total Volume of Diluent Used:  5 ml  NDC #: Botox 100u ([id number])    CONSENT  The risks, benefits, and treatment options were discussed with Gurjit Squires and he agreed to proceed.    Written consent was obtained by PS.     EQUIPMENT USED  Needle-50mm stimulating/recording  EMG machine    SKIN PREPARATION  Skin preparation was performed using an alcohol wipe.    GUIDANCE DESCRIPTION  Electro-myographic guidance was necessary throughout the procedure to accurately identify all areas of spastic muscles while avoiding injection of non-spastic muscles, neighboring nerves and nearby vascular structures.     AREA/MUSCLE INJECTED   1.  RIGHT LOWER EXTREMITY- total of 200 units Botox = Total Dose, 2:1 vs 1:1 Dilution (see below)     Right gastrocnemius - 80 units of Botox at 2 site/s in a 2:1 dilution      Right hamstring - 60 units of Botox at 2 site/s in a 1:1 dilution       Right tibialis posterior - 40 units of Botox at 1 site/s in a 1:1 dilution      Right EHL -20 units of Botox at 1 site/s in a 2:1 dilution      RESPONSE TO PROCEDURE   Gurjit Squires tolerated the procedure well and there were no immediate complications. He was  "allowed to recover for an appropriate period of time and was discharged home in stable condition.    ASSESSMENT AND PLAN  Spasticity: Spasticity in the RLE. Previously tried baclofen, but did not tolerate, reported feeling \"foggy,\" and was discontinued. Would not recommend trialing further PO medications to address localized spasticity in the RLE. First round of botulinum toxin injections provided some short term relief. He returns today to have a second cycle to determine if there is any additive effect along with improvement benefit at an increased dose of 200 units in the right lower extremity. Discussed trying at least 3 rounds of injections to optimize dose. Will have him monitor his foot drop, gait, RLE cramps and tightness/tone and report back at next follow-up.   Botulinum toxin injections: Second round of Botox injections today to right gastrocnemius, hamstring, tibialis posterior, and EHL, total of 100 units (see above). Patient will continue to monitor response and report at next appointment.   Therapies: Continue with pool therapy. Continue with HEP for stretching and ROM of RLE. Placed order for PT for trial of bracing for AFO. May be helpful to work with PT to trial some off the shelf AFOs for right foot drop. Will review benefit of PT with patient at follow up.   Referrals: None.  Follow up: Gurjit Squires was rescheduled for the next series of injections in 12 weeks, at which time we will evaluate response to today's injections. he may call the clinic prior with any questions or concerns prior to the next appointment.     Patient was discussed with attending physician, Dr. Owens.     Leoncio Webb DO  PM&R Resident  [facility]      Physician Attestation   I, Aylin Owens MD, saw this patient and agree with the findings and plan of care as documented in the note.      Items personally reviewed/procedural attestation: I was present for and supervised the entire " procedure.  Increase the dose from 100 to 200 units and will monitor his response.  We also used 50 mm needle for more accuracy.  Encouraged him to monitor his response very closely to help us with the decision making process at next visit    Aylin Owens MD

## 2023-10-30 NOTE — LETTER
10/30/2023       RE: Gurjit Squires  [address]  [address]  [facility] [address]     Dear Colleague,    Thank you for referring your patient, Gurjit Squires, to the [facility] PHYSICAL MEDICINE AND REHABILITATION CLINIC [place] at [facility]. Please see a copy of my visit note below.      [facility] - CLINIC & SURGERY CENTER    PM&R CLINIC NOTE  BOTULINUM TOXIN PROCEDURE      HPI  Chief Complaint   Patient presents with    RECHECK     Botox injections confirmed with patient     HPI from initial consult from 8/7/2023:   Gurjit Squires is a RHD  68 year old male with a history of status-post kidney transplant (2004), CKD stage 4, VANESA, CVA (7/2020), obesity, HTN, paroxysmal Afib, history of bilateral rotator cuff repair, and history of bilateral knee replacement, who presents to clinic for initial consultation and botulinum toxin injections for management of right lower extremity. Mr. Squires was referred for PM&R evaluation by Dr. Evans.       In brief, Mr. Squires presented to an ED in [place] on 7/22/2020 with concerns of right leg heaviness. CT head and neck was completed, CT angiogram was deferred de to elevated creatinine, and symptoms were thought to be lumbar spine related, and he was discharged to home. Later in the week he went to see his chiropractor who had recommended that he see his provider. MRI of neck, thorax spine, and brain was the ordered, which revealed subacute infarct of left periventricular and extending into left basal ganglia. As per chart review, Mr. Squires was noted to have right leg weakness, right foot drop, unable to wiggle right toes, and numbness and lack of coordination in the right hand. He was initially followed by neurology following the stroke, but has not seen them recently.       Mr. Squires saw Dr. Evans on 6/27/2023 for management  "of his chronic low back pain. He was started him on a trial of baclofen 10mg TID and was referred to PT. Underwent lumbar epidural steroid injection at IL L5-S1 (right> left) with Dr. Evans on 7/13/2023, chronic low back pain thought to be consistent with lumbar intraspinal inflammation and radiculopathy.     Today, Mr. Squires is accompanied in clinic by his wife, Jenny.     Mr. Squires reports that currently he feels that his RLE is weaker than the LLE, ongoing residual deficit from his prior stroke. He endorses feeling right leg stiffness and tightness after sitting or standing too long. He also reports right leg cramping, particularly in the right hamstring and calf, after prolonged inactivity or after lying in bed for a period of time. Mr. Squires also reports that since the stroke, the sensation in the right leg has been diminished. Occasionally endorses pain radiating from low back into right buttock.     Mr. Squires fells that he has increased tightness in the right hamstring and calf. As well, he notes his right foot turning inward and the big toe curling up at times. He has previously tried baclofen (Dr. Evans had trialed him on baclofen), but made him \"foggy,\" therefore was discontinued. Takes clonazepam for his history of restless leg syndrome.     He currently ambulates without the use of an assistive device, but notes tripping and foot dragging when he is not focused on his ambulation, or fatigued. Denies any recent falls. Has not had an AFO or any sort of brace for RLE. Overall, his wife feels that his walking is now worse than when he first had the stroke back in 2020.     He is currently participating in PT twice a week, but they are focusing more on the low back pain. He has also been doing pool therapy on his own.     Denies any cognitive or speech concerns status-post stroke.     Of note, he recently had unprovoked hematomas develop in the abdomen and left hamstring, and was subsequently taken " off of the warfarin and aspirin. He also notes that he is currently on the inactive transplant list, may need another future kidney transplant (abdominal hematoma affected kidney, increased creatinine).    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     Is independent in all ADL and IADLs. Is driving.    Reports sleeping well, wears CPAP nightly.     Weight has been stable, good appetite.     Mr. Squires lives with his wife in a house with a couple STEVEN (no railing). Bedroom and bathroom (walk in shower) are accessible on the main level.     Interval History:   - called on 8/29 to give update on injections. Reported cramps in hamstring and calf. Had not yet noticed much of a difference in spasticity. Was taking a small dose of clonazepam each evening, prescribed by PCP.     - He has been following with Dr. Evans and Dr. Alvarado regarding back pain. He had an L5-S1 ILESI with no relief. Dr. Alvarado evaluated he patient on 9/22 and noted increased pain on extension, improvement in flexion, without radicular symptoms at that time. He suggested a diagnostic L5 nerve block. He ended up receiving bilateral L5-S1 TFESI  which provided 50% pain relief with less stabbing pain in the back but still has an achy pain with extension. Reports he has not had left lower extremity symptoms. If these had not provided significant relief, facet blocks had been suggested.     - Today he reports possibly some improvement in tightness of the right calf immediately after his injection but after those initial days there was no appreciable difference. He didn't notice any improvement in gait. He's had no falls or near falls. He does not use an assistive device. Given that was his first cycle, he's interested in repeat injections given the additive effect botulinum toxin can have.     PHYSICAL EXAM  VS: BP (!) 167/78   Pulse 69   SpO2 96%    GEN: Pleasant and cooperative, in no acute distress  HEENT: No facial  asymmetry  EXT: 1+ pitting edema in RLE below knee, pt notes this is stable. 2-3 beats of clonus at right ankle, none at left ankle. 5/5 strength in bilateral UE and LE. No tone noted in bilateral UE. MAS 1/4 in right hamstring,  2/4 gastrocnemius, and 2/4 tibialis posterior. Can achieve full DF range at the ankle with moderate amount of force  GAIT: Clearing right foot during ambulation with circumduction and intermittent steppage gait of RLE.     ALLERGIES  No Known Allergies      CURRENT MEDICATIONS    Current Outpatient Medications:     acetaminophen (TYLENOL) 500 MG tablet, Take 1,000 mg by mouth daily, Disp: , Rfl:     allopurinol (ZYLOPRIM) 100 MG tablet, Take 3 tablets (300 mg) by mouth daily, Disp: 30 tablet, Rfl:     aspirin 81 MG EC tablet, Take 81 mg by mouth daily, Disp: , Rfl:     atorvastatin (LIPITOR) 20 MG tablet, Take 1 tablet by mouth, Disp: , Rfl:     Calcium 500-2.5 MG-MCG CHEW, Take 1 tablet by mouth daily, Disp: 90 tablet, Rfl: 0    carvedilol 12.5 MG PO tablet, Take 1 tablet (12.5 mg) by mouth 2 times daily (with meals), Disp: 90 tablet, Rfl: 3    cholecalciferol (VITAMIN D) 1000 UNIT tablet, Take 1 tablet (1,000 Units) by mouth daily, Disp: 90 tablet, Rfl: 3    clonazePAM (KLONOPIN) 0.5 MG tablet, Take 0.25 mg by mouth every evening, Disp: , Rfl:     ferrous sulfate (FEROSUL) 325 (65 Fe) MG tablet, TAKE 1 TABLET DAILY (WITH BREAKFAST) PLEASE REQUEST FUTURE FERROUS SULFATE REFILLS FROM YOUR PRIMARY CARE PROVIDER, OR OK TO BUY OVER THE COUNTER., Disp: 90 tablet, Rfl: 0    Fexofenadine HCl (ALLEGRA PO), Take 1 tablet by mouth daily. , Disp: , Rfl:     furosemide (LASIX) 40 MG tablet, Take 40 mg by mouth 2 times daily, Disp: , Rfl:     gabapentin (NEURONTIN) 300 MG capsule, Take 1 capsule (300 mg) by mouth At Bedtime, Disp: 270 capsule, Rfl: 3    losartan (COZAAR) 25 MG tablet, Take 1 tablet (25 mg) by mouth daily, Disp: 90 tablet, Rfl: 3    Multiple Vitamins-Minerals (ONE-A-DAY MENS 50+  ADVANTAGE PO), Take 1 tablet by mouth every evening, Disp: , Rfl:     mycophenolate (GENERIC EQUIVALENT) 250 MG capsule, Take 3 capsules (750 mg) by mouth 2 times daily, Disp: 180 capsule, Rfl: 11    omega-3 fatty acids (FISH OIL) 1200 MG capsule, Take 1 capsule by mouth 3 times daily., Disp: 180 capsule, Rfl: 11    pantoprazole (PROTONIX) 40 MG EC tablet, TAKE 1 TABLET BY MOUTH DAILY, Disp: 30 tablet, Rfl: 1    predniSONE (DELTASONE) 5 MG tablet, Take 1 tablet (5 mg) by mouth daily, Disp: 90 tablet, Rfl: 3    sodium bicarbonate 650 MG tablet, Take 2 tablets (1,300 mg) by mouth 2 times daily, Disp: 360 tablet, Rfl: 3    sulfamethoxazole-trimethoprim (BACTRIM) 400-80 MG tablet, Take 1 tablet by mouth Every Mon, Wed, Fri Morning, Disp: 13 tablet, Rfl: 11    topiramate (TOPAMAX) 25 MG tablet, 25mg at bedtime for week 1, 50mg at bedtime thereafter, Disp: 60 tablet, Rfl: 3    verapamil ER (CALAN-SR) 240 MG CR tablet, Take 240 mg by mouth daily, Disp: , Rfl:     Semaglutide-Weight Management (WEGOVY) 0.25 MG/0.5ML pen, Inject 0.25 mg Subcutaneous once a week For 4 weeks, Disp: 2 mL, Rfl: 0    [START ON 2023] Semaglutide-Weight Management (WEGOVY) 0.5 MG/0.5ML pen, Inject 0.5 mg Subcutaneous once a week After completing 4 weeks of 0.25mg, Disp: 2 mL, Rfl: 2    Current Facility-Administered Medications:     botulinum toxin type A (BOTOX) 100 units injection 400 Units, 400 Units, Intramuscular, Q90 Days, Aylin Owens MD, 100 Units at 10/30/23 1229       BOTULINUM NEUROTOXIN INJECTION PROCEDURES    VERIFICATION OF PATIENT IDENTIFICATION AND PROCEDURE     Initials   Patient Name PS   Patient  PS   Procedure Verified by: PS     Prior to the start of the procedure and with procedural staff participation, I verbally confirmed the patient s identity using two indicators, relevant allergies, that the procedure was appropriate and matched the consent or emergent situation, and that the correct equipment/implants were  available. Immediately prior to starting the procedure I conducted the Time Out with the procedural staff and re-confirmed the patient s name, procedure, and site/side. (The Joint Commission universal protocol was followed.)  Yes    Sedation (Moderate or Deep): None    ABOVE ASSESSMENTS PERFORMED BY  Resident/Fellow: Leoncio Webb   The attending provider was present for the entire procedure documented below.    Aylin Owens MD      INDICATIONS FOR PROCEDURES  Gurjit Squires is a 68 year old patient with right lower extremity spasticity secondary to the diagnosis of previous CVA. His baseline symptoms have been recalcitrant to oral medications and conservative therapy. He is here today for injection with Botox.    GOAL OF PROCEDURE  The goal of this procedure is to increase active range of motion, improve volitional motor control, decrease pain , and enhance functional independence.      TOTAL DOSE ADMINISTERED  Dose Administered:  200 units  Botox (Botulinum Toxin Type A)       2:1 Dilution and 1:1 dilution (see below) -we will do all 1-1 at next visit  Unavoidable Drug Waste: No  Diluent Used:  Preservative Free Normal Saline  Total Volume of Diluent Used:  5 ml  NDC #: Botox 100u ([id number])    CONSENT  The risks, benefits, and treatment options were discussed with Gurjit Squires and he agreed to proceed.    Written consent was obtained by PS.     EQUIPMENT USED  Needle-50mm stimulating/recording  EMG machine    SKIN PREPARATION  Skin preparation was performed using an alcohol wipe.    GUIDANCE DESCRIPTION  Electro-myographic guidance was necessary throughout the procedure to accurately identify all areas of spastic muscles while avoiding injection of non-spastic muscles, neighboring nerves and nearby vascular structures.     AREA/MUSCLE INJECTED   1.  RIGHT LOWER EXTREMITY- total of 200 units Botox = Total Dose, 2:1 vs 1:1 Dilution (see below)     Right gastrocnemius - 80 units of Botox at  "2 site/s in a 2:1 dilution      Right hamstring - 60 units of Botox at 2 site/s in a 1:1 dilution       Right tibialis posterior - 40 units of Botox at 1 site/s in a 1:1 dilution      Right EHL -20 units of Botox at 1 site/s in a 2:1 dilution      RESPONSE TO PROCEDURE   Gurjit Squires tolerated the procedure well and there were no immediate complications. He was allowed to recover for an appropriate period of time and was discharged home in stable condition.    ASSESSMENT AND PLAN  Spasticity: Spasticity in the RLE. Previously tried baclofen, but did not tolerate, reported feeling \"foggy,\" and was discontinued. Would not recommend trialing further PO medications to address localized spasticity in the RLE. First round of botulinum toxin injections provided some short term relief. He returns today to have a second cycle to determine if there is any additive effect along with improvement benefit at an increased dose of 200 units in the right lower extremity. Discussed trying at least 3 rounds of injections to optimize dose. Will have him monitor his foot drop, gait, RLE cramps and tightness/tone and report back at next follow-up.   Botulinum toxin injections: Second round of Botox injections today to right gastrocnemius, hamstring, tibialis posterior, and EHL, total of 100 units (see above). Patient will continue to monitor response and report at next appointment.   Therapies: Continue with pool therapy. Continue with HEP for stretching and ROM of RLE. Placed order for PT for trial of bracing for AFO. May be helpful to work with PT to trial some off the shelf AFOs for right foot drop. Will review benefit of PT with patient at follow up.   Referrals: None.  Follow up: Gurjit Squires was rescheduled for the next series of injections in 12 weeks, at which time we will evaluate response to today's injections. he may call the clinic prior with any questions or concerns prior to the next appointment. "     Patient was discussed with attending physician, Dr. Owens.     Leoncio Webb DO  PM&R Resident  [facility]      Physician Attestation   I, Aylin Owens MD, saw this patient and agree with the findings and plan of care as documented in the note.      Items personally reviewed/procedural attestation: I was present for and supervised the entire procedure.  Increase the dose from 100 to 200 units and will monitor his response.  We also used 50 mm needle for more accuracy.  Encouraged him to monitor his response very closely to help us with the decision making process at next visit        Again, thank you for allowing me to participate in the care of your patient.      Sincerely,    Aylin Owens MD

## 2023-11-01 ENCOUNTER — VIRTUAL VISIT (OUTPATIENT)
Dept: PHARMACY | Facility: CLINIC | Age: 68
End: 2023-11-01
Payer: COMMERCIAL

## 2023-11-01 VITALS — WEIGHT: 255 LBS | BODY MASS INDEX: 38.65 KG/M2 | HEIGHT: 68 IN

## 2023-11-01 DIAGNOSIS — E66.01 CLASS 2 SEVERE OBESITY WITH SERIOUS COMORBIDITY AND BODY MASS INDEX (BMI) OF 38.0 TO 38.9 IN ADULT, UNSPECIFIED OBESITY TYPE (H): Primary | ICD-10-CM

## 2023-11-01 DIAGNOSIS — E66.812 CLASS 2 SEVERE OBESITY WITH SERIOUS COMORBIDITY AND BODY MASS INDEX (BMI) OF 38.0 TO 38.9 IN ADULT, UNSPECIFIED OBESITY TYPE (H): Primary | ICD-10-CM

## 2023-11-01 PROCEDURE — 99207 PR NO CHARGE LOS: CPT | Mod: VID | Performed by: PHARMACIST

## 2023-11-01 ASSESSMENT — PAIN SCALES - GENERAL: PAINLEVEL: MILD PAIN (3)

## 2023-11-01 NOTE — NURSING NOTE
Is the patient currently in the state of [place]? YES    Visit mode:VIDEO    If the visit is dropped, the patient can be reconnected by: VIDEO VISIT: Send to e-mail at: [email or web address]    Will anyone else be joining the visit? NO  (If patient encounters technical issues they should call [phone]    How would you like to obtain your AVS? MyChart    Are changes needed to the allergy or medication list? No    Reason for visit: Consult    Jenna MERCEDES

## 2023-11-01 NOTE — PROGRESS NOTES
Virtual Visit Details    Type of service:  Video Visit     Originating Location (pt. Location): Home    Distant Location (provider location):  Off-site  Platform used for Video Visit: Leticia

## 2023-11-01 NOTE — PROGRESS NOTES
"Disease State Management Encounter:                          Carlos Squires is a 68 year old male contacted via secure video for an initial visit. He was referred to me from Gale Moyer PA-C. Insurance does not cover comprehensive medication review with Medication Therapy Management (MTM). Patient declines private pay. Therefore, completed one time consult today.    Reason for visit: weight loss medication(s) - topiramate start follow up.    Weight Management:   Topiramate 25 mg once daily, started 3 days ago    Followed by Gale Moyer PA-C, seen 10/04/2023 for New Medical Weight Management. Started a couple days ago. Weight loss medication(s) were not covered by insurance, Wegovy not covered. Also tried to get Ozempic covered and not successful. Patient is experiencing the follow side effects: None. Working to get to goal weight for kidney transplant, BMI < 35. First kidney transplant 19 years ago.   Diet/Eating Habits: Patient reports breakfast: eggs, toast, turkey sausage britta, lunch: skips or snack like (apple), rarely sandwich; dinner: works to have a healthful meal. Used to snack more but trying to minimize. No alcohol anymore. Drinks water.   Exercise/Activity: Patient reports golfing was main activity along with swimming. Reports since stroke ~4 years ago affected right leg causing issues of mobility to point of exercising.      Current weight today: 255 lbs 0 oz    Wt Readings from Last 4 Encounters:   11/01/23 255 lb (115.7 kg)   10/24/23 258 lb (117 kg)   10/04/23 258 lb (117 kg)   09/22/23 261 lb (118.4 kg)     Estimated body mass index is 38.77 kg/m  as calculated from the following:    Height as of this encounter: 5' 8\" (1.727 m).    Weight as of this encounter: 255 lb (115.7 kg).    Today's Vitals: Ht 5' 8\" (1.727 m)   Wt 255 lb (115.7 kg)   BMI 38.77 kg/m      Assessment/Plan:    Continue topiramate titration as planned.     I spent 20 minutes with this patient today. All changes were made " via collaborative practice agreement with Gale Moyer PA-C. A copy of the visit note was provided to the patient's provider(s).    A summary of these recommendations was sent via Platial.    Lauren Bloch, PharmD, BCACP   Medication Therapy Management Pharmacist   [facility]     Medication Therapy Recommendations  No medication therapy recommendations to display

## 2023-11-07 ENCOUNTER — TELEPHONE (OUTPATIENT)
Dept: TRANSPLANT | Facility: CLINIC | Age: 68
End: 2023-11-07
Payer: MEDICARE

## 2023-11-07 NOTE — TELEPHONE ENCOUNTER
Calling to discuss recent labs.  Asked for CB    Shari Talley RN   Transplant Coordinator  [phone]

## 2023-11-25 ENCOUNTER — HEALTH MAINTENANCE LETTER (OUTPATIENT)
Age: 68
End: 2023-11-25

## 2023-11-29 DIAGNOSIS — E87.20 ACIDOSIS: ICD-10-CM

## 2023-11-29 RX ORDER — SODIUM BICARBONATE 650 MG/1
1950 TABLET ORAL 2 TIMES DAILY
Qty: 540 TABLET | Refills: 3 | Status: SHIPPED | OUTPATIENT
Start: 2023-11-29 | End: 2024-02-21

## 2023-12-23 DIAGNOSIS — E61.1 IRON DEFICIENCY: Primary | ICD-10-CM

## 2023-12-26 RX ORDER — FERROUS SULFATE 325(65) MG
325 TABLET ORAL
Qty: 90 TABLET | Refills: 0 | Status: SHIPPED | OUTPATIENT
Start: 2023-12-26 | End: 2024-03-13

## 2024-01-02 ENCOUNTER — TELEPHONE (OUTPATIENT)
Dept: ANESTHESIOLOGY | Facility: CLINIC | Age: 69
End: 2024-01-02
Payer: MEDICARE

## 2024-01-05 ENCOUNTER — TRANSFERRED RECORDS (OUTPATIENT)
Dept: HEALTH INFORMATION MANAGEMENT | Facility: CLINIC | Age: 69
End: 2024-01-05
Payer: MEDICARE

## 2024-01-10 ENCOUNTER — TELEPHONE (OUTPATIENT)
Dept: TRANSPLANT | Facility: CLINIC | Age: 69
End: 2024-01-10
Payer: MEDICARE

## 2024-01-10 NOTE — TELEPHONE ENCOUNTER
AV fistula placement on 1/17- 6-8 weeks before   Currently has a HD chair on hold at [place],  on a wait list for HD center in [place]    Thoracentesis yesterday locally    PNA recently and sinus infection- on antibiotics     UOP has decreased though he is voiding multiple times throughout the day.    Will continue to follow    Shari Talley RN   Transplant Coordinator  [phone]

## 2024-01-27 ENCOUNTER — MYC MEDICAL ADVICE (OUTPATIENT)
Dept: NEPHROLOGY | Facility: CLINIC | Age: 69
End: 2024-01-27
Payer: MEDICARE

## 2024-01-27 DIAGNOSIS — Z94.0 KIDNEY REPLACED BY TRANSPLANT: ICD-10-CM

## 2024-01-27 DIAGNOSIS — Z94.0 KIDNEY TRANSPLANTED: ICD-10-CM

## 2024-01-29 DIAGNOSIS — N18.6 ESRD (END STAGE RENAL DISEASE) (H): Primary | ICD-10-CM

## 2024-01-29 DIAGNOSIS — Z76.82 ORGAN TRANSPLANT CANDIDATE: ICD-10-CM

## 2024-01-29 DIAGNOSIS — Z01.810 PRE-OPERATIVE CARDIOVASCULAR EXAMINATION: ICD-10-CM

## 2024-01-29 DIAGNOSIS — Z12.5 PROSTATE CANCER SCREENING: ICD-10-CM

## 2024-01-29 DIAGNOSIS — I10 HYPERTENSION: ICD-10-CM

## 2024-01-29 RX ORDER — SULFAMETHOXAZOLE AND TRIMETHOPRIM 400; 80 MG/1; MG/1
1 TABLET ORAL
Qty: 13 TABLET | Refills: 11 | Status: SHIPPED | OUTPATIENT
Start: 2024-01-29

## 2024-02-06 ENCOUNTER — TELEPHONE (OUTPATIENT)
Dept: TRANSPLANT | Facility: CLINIC | Age: 69
End: 2024-02-06
Payer: MEDICARE

## 2024-02-06 NOTE — LETTER
PHYSICIAN ORDERS    DATE & TIME ISSUED: 2024 1220  PATIENT NAME: Gurjit Squires   : 1955     [facility] MR# [if applicable]: [id number]     DIAGNOSIS / ICD - 10 CODES  Kidney Transplanted (Z94.0)  After Care Following Organ Transplant (Z48.298)  Long Term Use of Medication (Z79.899)  Immunosuppressed Status (Z92.25)    -Cosyntropin Stimulation Test               1) Check baseline serum cortisol, adrenal corticotropin and renin activity levels at time zero.               2) Administer cosyntropin 250 mcg IV once over 2 minutes.               3. Check serum cortisol levels 30 minutes and 60 minutes after cosyntropin injection.    Please ensure this is completed prior to 0900        Patient should release information to the [facility].   Please fax results to the Transplant Center at [phone].  Any questions please call [phone] or [phone].      .

## 2024-02-06 NOTE — TELEPHONE ENCOUNTER
Radu Bunch MD Sveiven, Sara, RN  Okay to do cosyntropin test.  After 3 months, we can discuss slightly lowering his mycophenolate, but will keep it going long term.    Jackson          Previous Messages       ----- Message -----  From: Shari Talley, RN  Sent: 2/6/2024   8:35 AM CST  To: MD Dr Alivia Donald,    HD was initiated while inpatient locally on 1/29    I have a message out to the patient to discuss his UOP, will update you when I know how much urine he is making.    He is currently on  mg bid and prednisone 5 mg daily    On wait list for re transplant.      Do we want to try to stop the pred?  Do cosyntropin?    Shari Talley RN  Transplant Coordinator  [phone]     OUTCOME    Spoke with infusion center staff at [facility] in [place] who are willing and able to do cosyntropin test.  Orders faxed.  Message left for Carlos with instruction to hold the prednisone for two days prior to the test then, then to restart the pred the day after test until results are available and decision is made on whether pred can be safely stopped or not.  Asked for CB with any questions    Shari Talley RN   Transplant Coordinator  [phone]

## 2024-02-08 ENCOUNTER — DOCUMENTATION ONLY (OUTPATIENT)
Dept: TRANSPLANT | Facility: CLINIC | Age: 69
End: 2024-02-08
Payer: MEDICARE

## 2024-02-08 ENCOUNTER — MYC MEDICAL ADVICE (OUTPATIENT)
Dept: TRANSPLANT | Facility: CLINIC | Age: 69
End: 2024-02-08
Payer: MEDICARE

## 2024-02-15 ENCOUNTER — TRANSFERRED RECORDS (OUTPATIENT)
Dept: HEALTH INFORMATION MANAGEMENT | Facility: CLINIC | Age: 69
End: 2024-02-15
Payer: MEDICARE

## 2024-02-21 DIAGNOSIS — E87.20 ACIDOSIS: ICD-10-CM

## 2024-02-21 RX ORDER — SODIUM BICARBONATE 650 MG/1
650 TABLET ORAL 2 TIMES DAILY
Qty: 180 TABLET | Refills: 3 | Status: SHIPPED | OUTPATIENT
Start: 2024-02-21 | End: 2024-03-13

## 2024-03-08 NOTE — TELEPHONE ENCOUNTER
RECORDS RECEIVED FROM:    DATE RECEIVED:    NOTES STATUS DETAILS   OFFICE NOTE from referring provider  Internal SOT   OFFICE NOTE from other cardiologists  Care Everywhere Dr. Harris 3-7-24 CC   RECORDS from hospital/ED Care Everywhere 1-16-24   MEDICATION LIST Internal    GENERAL CARDIO RECORDS   (ALL APPOINTMENT TYPES)     LABS (CBC,BMP,CMP, TSH) Internal    EKG (STRIPS & REPORTS) In process 2-15-24 Requested   MONITORS (STRIPS & REPORTS) Care Everywhere 9-1-23 in [facility]   ECHOS (IMAGES AND REPORTS) In process 2-8-24 Requested   STRESS TESTS (IMAGES AND REPORTS) N/A    cMRI (IMAGES AND REPORTS) N/A    Cardiac cath (IMAGES AND REPORTS) In process 2-14-24 Requested   CT/CTA (IMAGES AND REPORTS) In process 2-8-24 Requested     Action 3/8/24 CC  Fax #[phone]   Action Taken Requested EKGs 2-15-24, 2-13-24    Sent EKGs to scanning 3/14       Action 3/8/34 Counts include 234 beds at the [facility]  Fax #[phone]   Action Taken Requested:  Cardiac cath  CT Chest  Echo 2-14-24 2-8-24 2-8-24     Confirmed images are in PACS 3/14

## 2024-03-11 NOTE — PROGRESS NOTES
[facility] Hepatology    Assessment   68 year old male with PMHx of living donor renal transplant 7/29/2004 now with MAHNAZ on CKD, HTN, history of CVA, heart failure with diastolic dysfunction, Paroxysmal Atrial Fibrillation and class III obesity.    #. Metabolic dysfunction associated steatotic liver disease (MASLD; formerly NAFLD) + alcohol overuse  #. Hepatic Fibrosis  Multiple metabolic comorbid conditions including obesity, heart failure with diastolic dysfunction and hypertension; these factors together likely contribute to metabolic dysfunction associated steatotic liver disease.  During the COVID-19 pandemic he noticed that his alcohol use based on that he was drinking up to 2 drinks on weekdays intermittently and up to 2 drinks on weekends intermittently.  He has stopped drinking since July 2023.  The patient has lost about 55 pounds in the last year.  The patient has normal AST and ALT in the setting of weight loss and alcohol cessation.    MR elastography suggestive of F1 to F2 fibrosis. Platelets > 150 which suggests against portal HTN.     #. Iron deficiency  Patient getting Venofer infusions.  Patient required a unit of PRBCs during his recent admission.  He is currently on aspirin 81 mg/day but has not been on warfarin since April 2023.  He denies any hematochezia, hematemesis or hematemesis. He is up to date in terms of colonoscopy; last 2021.  The patient's etiology of their iron deficiency is likely multifactorial in the setting of renal dysfunction and bone marrow suppression given patient that he is on multiple immunosuppression agents.    Plan  -- Fibroscan to assess for advanced fibrosis  -- EGD for workup of DARREN; continue iron supplementation   -- Continue lifestyle and dietary changes to maintain a healthy weight   * Limit portion sizes, excluding MASLD promoting components - processed food, foods & beverages high in added fructose   * Engage in aerobic exercise and resistance  training  -- Metabolic syndrome management per primary care doctor  -- Counseled on complete alcohol abstinence    Colon cancer screening: repeat due 2028 given 1 tubular adenoma < 1cm    RTC: 6 months    Aditi Nicholson MD (Lizzie)  Advanced & Transplant Hepatology  [facility]    I spent 30 minutes on this encounter performing the following: reviewing the patient's medical record (clinic visits, hospital records, lab results, imaging and procedural documentation), history taking, physical exam and documentation on the date of the encounter. I also spent part of the time in coordination of care and counseling.    HPI:  Patient presents alone via virtual appointment.    The patient was recently admitted from February 7 to February 16, 2024 for acute hypoxic respiratory failure thought to be related to pneumonia and pleural effusions.  He completed antibiotics.  He was also noted to have a large right transudative pleural effusion.  Given his anemia he required 1 unit of PRBCs.  He was also diagnosed with C. difficile colitis and treated with oral vancomycin.    For his atrial fibrillation he has 2 separate left atrial appendages.  The plan is both of his appendages using an amulet. He is on ASA 81mg daily, no warfarin since Spring 2023.    He recently started dialysis 3 times per week.  He is getting it on Monday, Wednesday and Friday.  He has a port in his chest that they are using but he has a fistula, but it has not been approved for use yet.  He continues to urinate but very little.    His energy level is okay.  His appetite is stable.  He has lost weight since this whole process started with his hematomas.  His weight is 196 pounds at this time.  He was previously on topiramate and other medications for weight loss but that these have been stopped.  He no longer follows with the weight management clinic given his weight loss.    With regards to walking he denies any chest pain or  shortness of breath but he gets fatigued over time and cannot walk much.    Denies any alcohol use.    Medical hx Surgical hx   Past Medical History:   Diagnosis Date    BK viremia     Depression     End stage kidney disease (H)     Gout     History of blood transfusion     2004    Hypertension secondary to other renal disorders     IgA nephropathy     Immunosuppressed status (H24)     Kidney replaced by transplant     Kidney transplant complication     Metabolic acidosis     VANESA (obstructive sleep apnea)     CPAP    Overweight     Rotator cuff dysfunction     Sepsis due to cellulitis (H) 05/17/2018    Vitamin D deficiency       Past Surgical History:   Procedure Laterality Date    IMPLANT PROSTHESIS PENIS INFLATABLE N/A 10/4/2016    Procedure: IMPLANT PROSTHESIS PENIS INFLATABLE;  Surgeon: Jaquan Matthew MD;  Location: [facility] OR    INJECT EPIDURAL LUMBAR N/A 7/13/2023    Procedure: L5-S1 epidural steroid injection with fluoroscopy (right> left);  Surgeon: Maryse Evans MD;  Location: [facility] OR    INJECT EPIDURAL TRANSFORAMINAL Right 10/24/2023    Procedure: Right L5 transforaminal epidural steroid injection with fluoroscopy;  Surgeon: Maryse Evans MD;  Location: [facility] OR    IR RENAL BIOPSY RIGHT  6/8/2021    ORTHOPEDIC SURGERY      RTK 09/15    PERCUTANEOUS BIOPSY KIDNEY Right 3/12/2019    Procedure: Right Kidney Biopsy;  Surgeon: Ash Benitez MD;  Location: [facility] OR    TRANSPLANT KIDNEY RECIPIENT LIVING UNRELATED          Medications  Current Outpatient Medications   Medication Sig Dispense Refill    acetaminophen (TYLENOL) 500 MG tablet Take 1,000 mg by mouth daily      allopurinol (ZYLOPRIM) 100 MG tablet Take 3 tablets (300 mg) by mouth daily 30 tablet     aspirin 81 MG EC tablet Take 81 mg by mouth daily      atorvastatin (LIPITOR) 20 MG tablet Take 1 tablet by mouth      Calcium 500-2.5 MG-MCG CHEW Take 1 tablet by mouth daily 90 tablet 0    carvedilol 12.5 MG PO tablet Take 1 tablet (12.5 mg) by mouth 2  times daily (with meals) 90 tablet 3    cholecalciferol (VITAMIN D) 1000 UNIT tablet Take 1 tablet (1,000 Units) by mouth daily 90 tablet 3    clonazePAM (KLONOPIN) 0.5 MG tablet Take 0.25 mg by mouth every evening      ferrous sulfate (FEROSUL) 325 (65 Fe) MG tablet Take 1 tablet (325 mg) by mouth daily (with breakfast) 90 tablet 0    Fexofenadine HCl (ALLEGRA PO) Take 1 tablet by mouth daily.       furosemide (LASIX) 40 MG tablet Take 40 mg by mouth 2 times daily      gabapentin (NEURONTIN) 300 MG capsule Take 1 capsule (300 mg) by mouth At Bedtime 270 capsule 3    losartan (COZAAR) 25 MG tablet Take 1 tablet (25 mg) by mouth daily 90 tablet 3    Multiple Vitamins-Minerals (ONE-A-DAY MENS 50+ ADVANTAGE PO) Take 1 tablet by mouth every evening      mycophenolate (GENERIC EQUIVALENT) 250 MG capsule Take 3 capsules (750 mg) by mouth 2 times daily 180 capsule 11    omega-3 fatty acids (FISH OIL) 1200 MG capsule Take 1 capsule by mouth 3 times daily. 180 capsule 11    pantoprazole (PROTONIX) 40 MG EC tablet TAKE 1 TABLET BY MOUTH DAILY 30 tablet 1    predniSONE (DELTASONE) 5 MG tablet Take 1 tablet (5 mg) by mouth daily 90 tablet 3    Semaglutide-Weight Management (WEGOVY) 0.25 MG/0.5ML pen Inject 0.25 mg Subcutaneous once a week For 4 weeks 2 mL 0    Semaglutide-Weight Management (WEGOVY) 0.5 MG/0.5ML pen Inject 0.5 mg Subcutaneous once a week After completing 4 weeks of 0.25mg 2 mL 2    sodium bicarbonate 650 MG tablet TAKE 1 TABLET (650 MG) BY MOUTH 2 TIMES DAILY 180 tablet 3    sulfamethoxazole-trimethoprim (BACTRIM) 400-80 MG tablet Take 1 tablet by mouth Every Mon, Wed, Fri Morning 13 tablet 11    topiramate (TOPAMAX) 25 MG tablet 25mg at bedtime for week 1, 50mg at bedtime thereafter 60 tablet 3    verapamil ER (CALAN-SR) 240 MG CR tablet Take 240 mg by mouth daily         Allergies  No Known Allergies    Family hx Social hx   Family History   Problem Relation Age of Onset    Kidney Disease No family hx of      Liver Cancer No family hx of     Liver Disease No family hx of     Colon Cancer No family hx of       - Lives with wife Jenny  - Alcohol: prior to July 3 2023 - up to 2 drinks on weekdays and 2 drinks on weekends.  - Tobacco: former, quit 2004     Review of systems  A 10-point review of systems was negative.    Examination  No vital given virtual visit     Gen-NAD  Eye-glasses in place  ENT- MMM  CVS- RRR, no murmurs  RS-breathing comfortably on ambient air  Neuro- A+Ox3  Skin- no jaundice  Psych- normal mood    Laboratory  BMP  Recent Labs   Lab Test 02/02/24  0733 01/04/24  0814 12/27/23  1351 12/20/23  0748 05/23/23  0735 05/10/23  1159 08/16/21  0828 06/08/21  1007 11/11/19  0743 03/12/19  0537 12/16/16  0740 10/04/16  1038   NA  --   --   --   --   --  146*  --  140  --  142  --   --    POTASSIUM  --   --   --   --   --  4.1  --  4.5  --  4.1  --  4.2   CHLORIDE 99 108* 110* 107   < > 108*   < > 107   < > 108   < >  --    GEGE  --   --   --   --   --  8.3*  --  9.1  --  8.0*  --   --    CO2  --   --   --   --   --  26  --  28  --  26  --   --    BUN  --   --   --   --   --  40.2*  --  53*  --  48*  --   --    CR  --   --   --   --   --  3.35*  --  2.92*  --  2.35*  --   --    GLC  --   --   --   --   --  119*  --  131*  --  96  --   --     < > = values in this interval not displayed.     CBC  Recent Labs   Lab Test 05/10/23  1159 06/08/21  1007 03/12/19  1220 03/12/19  0537   WBC 11.0 9.1  --  8.5   RBC 3.55* 3.87*  --  4.22*   HGB 10.6* 12.2*   < > 13.2*   HCT 33.9* 38.6*  --  42.4   MCV 96 100  --  101*   MCH 29.9 31.5  --  31.3   MCHC 31.3* 31.6  --  31.1*   RDW 15.7* 13.4  --  13.2    167  --  152    < > = values in this interval not displayed.     Liver Enzymes   Recent Labs   Lab Test 05/10/23  1159   PROTTOTAL 6.7   ALBUMIN 4.3   BILITOTAL 0.3   ALKPHOS 123   AST 21   ALT 16      INR   INR   Date Value Ref Range Status   05/10/2023 1.83 (H) 0.85 - 1.15 Final   06/08/2021 1.02 0.86 - 1.14 Final          Hep B s Ag non-reactive  Hep B c AB non-reactive  Hep B s Ab 10.18  Hep C Ab non-reactive    A1AT: MM phenotype  TSH 1.2 (wnl)  ROSANNE 0.2  Iron sat 62%    Radiology  MR Elastography 6/2023  1. MR Elastography: The averaged liver stiffness index (kPa) measures  2. This is compatible with stage1-2 fibrosis.  3. Atrophic appearing kidneys compatible with chronic renal disease.    Abdominal US 9/2023  1. Mild hepatomegaly. Coarse echotexture the liver may be seen with hepatocellular disease. No focal lesion.   2. Mild splenomegaly.   3. Cholelithiasis without evidence of acute cholecystitis. No biliary duct dilation.   4. Atrophy of bilateral kidneys. Transplant kidney in the right lower quadrant without significant hydronephrosis.     Endoscopy  Colonoscopy 7/2021  The patient was placed in left lateral decubitus position and given intravenous sedation.  Rectal exam was performed and was normal.  The colonoscope was introduced and passed relatively easily through the colon.  The cecum was visualized by clear internal and external landmarks.  The prep was excellent.  The scope was withdrawn and circumferential view obtained.  7 mm transverse polyp removed with snare polypectomy.  Sigmoid diverticulosis  The scope was retroflexed in the rectal vault, normal.  Withdrawal time was 13 min.  The patient tolerated the procedure well.  Repeat colonoscopy based on pathology.    Pathology: Tubular adenoma      Virtual Visit Details    Type of service:  Video Visit   Video Start Time: 10:30 pm    Video End Time: 10:41 pm    Originating Location (pt. Location): Home  Distant Location (provider location):  On-site  Platform used for Video Visit: [employer]

## 2024-03-13 ENCOUNTER — VIRTUAL VISIT (OUTPATIENT)
Dept: GASTROENTEROLOGY | Facility: CLINIC | Age: 69
End: 2024-03-13
Attending: INTERNAL MEDICINE
Payer: MEDICARE

## 2024-03-13 VITALS — HEIGHT: 68 IN | WEIGHT: 196 LBS | BODY MASS INDEX: 29.7 KG/M2

## 2024-03-13 DIAGNOSIS — E61.1 IRON DEFICIENCY: ICD-10-CM

## 2024-03-13 DIAGNOSIS — K76.0 NAFLD (NONALCOHOLIC FATTY LIVER DISEASE): ICD-10-CM

## 2024-03-13 DIAGNOSIS — K74.00 LIVER FIBROSIS: Primary | ICD-10-CM

## 2024-03-13 PROCEDURE — 99214 OFFICE O/P EST MOD 30 MIN: CPT | Mod: 95 | Performed by: INTERNAL MEDICINE

## 2024-03-13 RX ORDER — TORSEMIDE 20 MG/1
40 TABLET ORAL DAILY
COMMUNITY
Start: 2024-01-02 | End: 2025-01-06

## 2024-03-13 ASSESSMENT — PAIN SCALES - GENERAL: PAINLEVEL: NO PAIN (0)

## 2024-03-13 NOTE — PROGRESS NOTES
"Virtual Visit Details    Type of service:  Video Visit   Video Start Time: {video visit start/end time for provider to select:869319}  Video End Time:{video visit start/end time for provider to select:410199}    Originating Location (pt. Location): {video visit patient location:833893::\"Home\"}  {PROVIDER LOCATION On-site should be selected for visits conducted from your clinic location or adjoining [facility] hospital, academic office, or other nearby [facility] building. Off-site should be selected for all other provider locations, including home:829436}  Distant Location (provider location):  {virtual location provider:462938}  Platform used for Video Visit: {Virtual Visit Platforms:657403::\"[employer]\"}    "

## 2024-03-13 NOTE — LETTER
3/13/2024         RE: Gurjit Squires  [address]  [address]  [facility] [address]        Dear Colleague,    Thank you for referring your patient, Gurjit Squires, to the [facility] [place]. Please see a copy of my visit note below.    [facility] Hepatology    Assessment   68 year old male with PMHx of living donor renal transplant 7/29/2004 now with MAHNAZ on CKD, HTN, history of CVA, heart failure with diastolic dysfunction, Paroxysmal Atrial Fibrillation and class III obesity.    #. Metabolic dysfunction associated steatotic liver disease (MASLD; formerly NAFLD) + alcohol overuse  #. Hepatic Fibrosis  Multiple metabolic comorbid conditions including obesity, heart failure with diastolic dysfunction and hypertension; these factors together likely contribute to metabolic dysfunction associated steatotic liver disease.  During the COVID-19 pandemic he noticed that his alcohol use based on that he was drinking up to 2 drinks on weekdays intermittently and up to 2 drinks on weekends intermittently.  He has stopped drinking since July 2023.  The patient has lost about 55 pounds in the last year.  The patient has normal AST and ALT in the setting of weight loss and alcohol cessation.    MR elastography suggestive of F1 to F2 fibrosis. Platelets > 150 which suggests against portal HTN.     #. Iron deficiency  Patient getting Venofer infusions.  Patient required a unit of PRBCs during his recent admission.  He is currently on aspirin 81 mg/day but has not been on warfarin since April 2023.  He denies any hematochezia, hematemesis or hematemesis. He is up to date in terms of colonoscopy; last 2021.  The patient's etiology of their iron deficiency is likely multifactorial in the setting of renal dysfunction and bone marrow suppression given patient that he is on multiple immunosuppression agents.    Plan  -- Fibroscan to assess for advanced fibrosis  -- EGD for workup of DARREN;  continue iron supplementation   -- Continue lifestyle and dietary changes to maintain a healthy weight   * Limit portion sizes, excluding MASLD promoting components - processed food, foods & beverages high in added fructose   * Engage in aerobic exercise and resistance training  -- Metabolic syndrome management per primary care doctor  -- Counseled on complete alcohol abstinence    Colon cancer screening: repeat due 2028 given 1 tubular adenoma < 1cm    RTC: 6 months    Aditi Nicholson MD (Lizzie)  Advanced & Transplant Hepatology  [facility]    I spent 30 minutes on this encounter performing the following: reviewing the patient's medical record (clinic visits, hospital records, lab results, imaging and procedural documentation), history taking, physical exam and documentation on the date of the encounter. I also spent part of the time in coordination of care and counseling.    HPI:  Patient presents alone via virtual appointment.    The patient was recently admitted from February 7 to February 16, 2024 for acute hypoxic respiratory failure thought to be related to pneumonia and pleural effusions.  He completed antibiotics.  He was also noted to have a large right transudative pleural effusion.  Given his anemia he required 1 unit of PRBCs.  He was also diagnosed with C. difficile colitis and treated with oral vancomycin.    For his atrial fibrillation he has 2 separate left atrial appendages.  The plan is both of his appendages using an amulet. He is on ASA 81mg daily, no warfarin since Spring 2023.    He recently started dialysis 3 times per week.  He is getting it on Monday, Wednesday and Friday.  He has a port in his chest that they are using but he has a fistula, but it has not been approved for use yet.  He continues to urinate but very little.    His energy level is okay.  His appetite is stable.  He has lost weight since this whole process started with his hematomas.  His weight is  196 pounds at this time.  He was previously on topiramate and other medications for weight loss but that these have been stopped.  He no longer follows with the weight management clinic given his weight loss.    With regards to walking he denies any chest pain or shortness of breath but he gets fatigued over time and cannot walk much.    Denies any alcohol use.    Medical hx Surgical hx   Past Medical History:   Diagnosis Date     BK viremia      Depression      End stage kidney disease (H)      Gout      History of blood transfusion     2004     Hypertension secondary to other renal disorders      IgA nephropathy      Immunosuppressed status (H24)      Kidney replaced by transplant      Kidney transplant complication      Metabolic acidosis      VANESA (obstructive sleep apnea)     CPAP     Overweight      Rotator cuff dysfunction      Sepsis due to cellulitis (H) 05/17/2018     Vitamin D deficiency       Past Surgical History:   Procedure Laterality Date     IMPLANT PROSTHESIS PENIS INFLATABLE N/A 10/4/2016    Procedure: IMPLANT PROSTHESIS PENIS INFLATABLE;  Surgeon: Jaquan Matthew MD;  Location: [facility] OR     INJECT EPIDURAL LUMBAR N/A 7/13/2023    Procedure: L5-S1 epidural steroid injection with fluoroscopy (right> left);  Surgeon: Maryse Evans MD;  Location: [facility] OR     INJECT EPIDURAL TRANSFORAMINAL Right 10/24/2023    Procedure: Right L5 transforaminal epidural steroid injection with fluoroscopy;  Surgeon: Maryse Evans MD;  Location: [facility] OR     IR RENAL BIOPSY RIGHT  6/8/2021     ORTHOPEDIC SURGERY      RTK 09/15     PERCUTANEOUS BIOPSY KIDNEY Right 3/12/2019    Procedure: Right Kidney Biopsy;  Surgeon: Ash Benitez MD;  Location: [facility] OR     TRANSPLANT KIDNEY RECIPIENT LIVING UNRELATED          Medications  Current Outpatient Medications   Medication Sig Dispense Refill     acetaminophen (TYLENOL) 500 MG tablet Take 1,000 mg by mouth daily       allopurinol (ZYLOPRIM) 100 MG tablet Take 3 tablets  (300 mg) by mouth daily 30 tablet      aspirin 81 MG EC tablet Take 81 mg by mouth daily       atorvastatin (LIPITOR) 20 MG tablet Take 1 tablet by mouth       Calcium 500-2.5 MG-MCG CHEW Take 1 tablet by mouth daily 90 tablet 0     carvedilol 12.5 MG PO tablet Take 1 tablet (12.5 mg) by mouth 2 times daily (with meals) 90 tablet 3     cholecalciferol (VITAMIN D) 1000 UNIT tablet Take 1 tablet (1,000 Units) by mouth daily 90 tablet 3     clonazePAM (KLONOPIN) 0.5 MG tablet Take 0.25 mg by mouth every evening       ferrous sulfate (FEROSUL) 325 (65 Fe) MG tablet Take 1 tablet (325 mg) by mouth daily (with breakfast) 90 tablet 0     Fexofenadine HCl (ALLEGRA PO) Take 1 tablet by mouth daily.        furosemide (LASIX) 40 MG tablet Take 40 mg by mouth 2 times daily       gabapentin (NEURONTIN) 300 MG capsule Take 1 capsule (300 mg) by mouth At Bedtime 270 capsule 3     losartan (COZAAR) 25 MG tablet Take 1 tablet (25 mg) by mouth daily 90 tablet 3     Multiple Vitamins-Minerals (ONE-A-DAY MENS 50+ ADVANTAGE PO) Take 1 tablet by mouth every evening       mycophenolate (GENERIC EQUIVALENT) 250 MG capsule Take 3 capsules (750 mg) by mouth 2 times daily 180 capsule 11     omega-3 fatty acids (FISH OIL) 1200 MG capsule Take 1 capsule by mouth 3 times daily. 180 capsule 11     pantoprazole (PROTONIX) 40 MG EC tablet TAKE 1 TABLET BY MOUTH DAILY 30 tablet 1     predniSONE (DELTASONE) 5 MG tablet Take 1 tablet (5 mg) by mouth daily 90 tablet 3     Semaglutide-Weight Management (WEGOVY) 0.25 MG/0.5ML pen Inject 0.25 mg Subcutaneous once a week For 4 weeks 2 mL 0     Semaglutide-Weight Management (WEGOVY) 0.5 MG/0.5ML pen Inject 0.5 mg Subcutaneous once a week After completing 4 weeks of 0.25mg 2 mL 2     sodium bicarbonate 650 MG tablet TAKE 1 TABLET (650 MG) BY MOUTH 2 TIMES DAILY 180 tablet 3     sulfamethoxazole-trimethoprim (BACTRIM) 400-80 MG tablet Take 1 tablet by mouth Every Mon, Wed, Fri Morning 13 tablet 11      topiramate (TOPAMAX) 25 MG tablet 25mg at bedtime for week 1, 50mg at bedtime thereafter 60 tablet 3     verapamil ER (CALAN-SR) 240 MG CR tablet Take 240 mg by mouth daily         Allergies  No Known Allergies    Family hx Social hx   Family History   Problem Relation Age of Onset     Kidney Disease No family hx of      Liver Cancer No family hx of      Liver Disease No family hx of      Colon Cancer No family hx of       - Lives with wife Jenny  - Alcohol: prior to July 3 2023 - up to 2 drinks on weekdays and 2 drinks on weekends.  - Tobacco: former, quit 2004     Review of systems  A 10-point review of systems was negative.    Examination  No vital given virtual visit     Gen-NAD  Eye-glasses in place  ENT- MMM  CVS- RRR, no murmurs  RS-breathing comfortably on ambient air  Neuro- A+Ox3  Skin- no jaundice  Psych- normal mood    Laboratory  BMP  Recent Labs   Lab Test 02/02/24  0733 01/04/24  0814 12/27/23  1351 12/20/23  0748 05/23/23  0735 05/10/23  1159 08/16/21  0828 06/08/21  1007 11/11/19  0743 03/12/19  0537 12/16/16  0740 10/04/16  1038   NA  --   --   --   --   --  146*  --  140  --  142  --   --    POTASSIUM  --   --   --   --   --  4.1  --  4.5  --  4.1  --  4.2   CHLORIDE 99 108* 110* 107   < > 108*   < > 107   < > 108   < >  --    GEGE  --   --   --   --   --  8.3*  --  9.1  --  8.0*  --   --    CO2  --   --   --   --   --  26  --  28  --  26  --   --    BUN  --   --   --   --   --  40.2*  --  53*  --  48*  --   --    CR  --   --   --   --   --  3.35*  --  2.92*  --  2.35*  --   --    GLC  --   --   --   --   --  119*  --  131*  --  96  --   --     < > = values in this interval not displayed.     CBC  Recent Labs   Lab Test 05/10/23  1159 06/08/21  1007 03/12/19  1220 03/12/19  0537   WBC 11.0 9.1  --  8.5   RBC 3.55* 3.87*  --  4.22*   HGB 10.6* 12.2*   < > 13.2*   HCT 33.9* 38.6*  --  42.4   MCV 96 100  --  101*   MCH 29.9 31.5  --  31.3   MCHC 31.3* 31.6  --  31.1*   RDW 15.7* 13.4  --  13.2     167  --  152    < > = values in this interval not displayed.     Liver Enzymes   Recent Labs   Lab Test 05/10/23  1159   PROTTOTAL 6.7   ALBUMIN 4.3   BILITOTAL 0.3   ALKPHOS 123   AST 21   ALT 16      INR   INR   Date Value Ref Range Status   05/10/2023 1.83 (H) 0.85 - 1.15 Final   06/08/2021 1.02 0.86 - 1.14 Final         Hep B s Ag non-reactive  Hep B c AB non-reactive  Hep B s Ab 10.18  Hep C Ab non-reactive    A1AT: MM phenotype  TSH 1.2 (wnl)  ROSANNE 0.2  Iron sat 62%    Radiology  MR Elastography 6/2023  1. MR Elastography: The averaged liver stiffness index (kPa) measures  2. This is compatible with stage1-2 fibrosis.  3. Atrophic appearing kidneys compatible with chronic renal disease.    Abdominal US 9/2023  1. Mild hepatomegaly. Coarse echotexture the liver may be seen with hepatocellular disease. No focal lesion.   2. Mild splenomegaly.   3. Cholelithiasis without evidence of acute cholecystitis. No biliary duct dilation.   4. Atrophy of bilateral kidneys. Transplant kidney in the right lower quadrant without significant hydronephrosis.     Endoscopy  Colonoscopy 7/2021  The patient was placed in left lateral decubitus position and given intravenous sedation.  Rectal exam was performed and was normal.  The colonoscope was introduced and passed relatively easily through the colon.  The cecum was visualized by clear internal and external landmarks.  The prep was excellent.  The scope was withdrawn and circumferential view obtained.  7 mm transverse polyp removed with snare polypectomy.  Sigmoid diverticulosis  The scope was retroflexed in the rectal vault, normal.  Withdrawal time was 13 min.  The patient tolerated the procedure well.  Repeat colonoscopy based on pathology.    Pathology: Tubular adenoma      Virtual Visit Details    Type of service:  Video Visit   Video Start Time: 10:30 pm    Video End Time: 10:41 pm    Originating Location (pt. Location): Home  Distant Location (provider location):   On-site  Platform used for Video Visit: Leticia      Again, thank you for allowing me to participate in the care of your patient.        Sincerely,        Aditi Nicholson MD

## 2024-03-13 NOTE — NURSING NOTE
Is the patient currently in the state of [place]? YES    Visit mode:VIDEO    If the visit is dropped, the patient can be reconnected by: VIDEO VISIT: Send to e-mail at: [email or web address]    Will anyone else be joining the visit? NO  (If patient encounters technical issues they should call [phone]    How would you like to obtain your AVS? MyChart    Are changes needed to the allergy or medication list? No    Reason for visit: MARY MERCEDES

## 2024-03-14 ENCOUNTER — TELEPHONE (OUTPATIENT)
Dept: GASTROENTEROLOGY | Facility: CLINIC | Age: 69
End: 2024-03-14
Payer: MEDICARE

## 2024-03-21 ENCOUNTER — TELEPHONE (OUTPATIENT)
Dept: GASTROENTEROLOGY | Facility: CLINIC | Age: 69
End: 2024-03-21
Payer: MEDICARE

## 2024-03-21 NOTE — TELEPHONE ENCOUNTER
"Patient unable to hold any longer, writer to call patient back to complete scheduling after RN review.    Endoscopy Scheduling Screen    Have you had a positive Covid test in the last 14 days?  No    What is your communication preference for Instructions and/or Bowel Prep?   MyChart    What insurance is in the chart?  Other:  MEDICARE /MEDICA    Ordering/Referring Provider: Aditi Nicholson MD     (If ordering provider performs procedure, schedule with ordering provider unless otherwise instructed. )    BMI: Estimated body mass index is 29.8 kg/m  as calculated from the following:    Height as of 3/13/24: 1.727 m (5' 8\").    Weight as of 3/13/24: 88.9 kg (196 lb).     Sedation Ordered  MAC/deep sedation.   BMI<= 45 45 < BMI <= 48 48 < BMI < = 50  BMI > 50   No Restrictions No [facility]  No [facility]  [facility] with exceptions Hospital Only OR Only       Do you have a history of malignant hyperthermia?  No    (Females) Are you currently pregnant?   No     Have you been diagnosed or told you have pulmonary hypertension?   No    Do you have an LVAD?  No    Have you been told you have moderate to severe sleep apnea?  Yes (RN Review required for scheduling unless scheduling in Hospital.)    Have you been told you have COPD, asthma, or any other lung disease?  No    Do you have any heart conditions?  Yes     In the past year, have you had any hospitalizations for heart related issues including cardiomyopathy, heart attack, or stent placement?  Yes (RN Review required for scheduling.)    Do you have any implantable devices in your body (pacemaker, ICD)?  No    Do you take nitroglycerine?  No    Have you ever had or are you waiting for an organ transplant?  Yes. Have you had or are on on the wait list for a heart/lung transplant? No, may schedule at all facilities except [facility]    Have you had a stroke or transient ischemic attack (TIA aka \"mini stroke\" in the last 6 months?   No    Have you been diagnosed with or " "been told you have cirrhosis of the liver?   No    Are you currently on dialysis?   Yes (Hospital Only)    Do you need assistance transferring?   No    BMI: Estimated body mass index is 29.8 kg/m  as calculated from the following:    Height as of 3/13/24: 1.727 m (5' 8\").    Weight as of 3/13/24: 88.9 kg (196 lb).     Is patients BMI > 40 and scheduling location [facility]?  No    Do you take an injectable medication for weight loss or diabetes (excluding insulin)?  No    Do you take the medication Naltrexone?  No    Do you take blood thinners?  No       Prep   Are you currently on dialysis or do you have chronic kidney disease?  Yes ([facility] Prep)    Do you have a diagnosis of diabetes?  No    Do you have a diagnosis of cystic fibrosis (CF)?  No    On a regular basis do you go 3 -5 days between bowel movements?  No    BMI > 40?  No    Preferred Pharmacy:      [facility] [id number] [address] [address]  Phone: [phone] Fax: [phone]      Final Scheduling Details     Procedure scheduled  Upper endoscopy (EGD)    Surgeon:  TBD     Date of procedure:  TBD      Pre-OP / PAC:   TBD     Location  [facility] - Per exclusion criteria.    Sedation   MAC/Deep Sedation - Per order.      Patient Reminders:   You will receive a call from a Nurse to review instructions and health history.  This assessment must be completed prior to your procedure.  Failure to complete the Nurse assessment may result in the procedure being cancelled.      On the day of your procedure, please designate an adult(s) who can drive you home stay with you for the next 24 hours. The medicines used in the exam will make you sleepy. You will not be able to drive.      You cannot take public transportation, ride share services, or non-medical taxi service without a responsible caregiver.  Medical transport services are allowed with the requirement that a responsible caregiver will receive you at your " destination.  We require that drivers and caregivers are confirmed prior to your procedure.

## 2024-03-21 NOTE — TELEPHONE ENCOUNTER
"Pre Assessment RN Review    Focused Assessments      VANESA Hx  Estimated body mass index is 29.8 kg/m  as calculated from the following:    Height as of 3/13/24: 1.727 m (5' 8\").    Weight as of 3/13/24: 88.9 kg (196 lb).     Patient has reported / documented history VANESA and reports he does use a a device for sleep.     Device: CPAP    Severity Assessment    No sleep study available for review.  Going to [facility] for dialysis already    Cirrhosis Assessment    No results found for requested labs within last 90 days.       MELD 3.0: 24 at 5/10/2023 11:59 AM  MELD-Na: 25 at 5/10/2023 11:59 AM  Calculated from:  Serum Creatinine: 3.35 mg/dL (Using max of 3 mg/dL) at 5/10/2023 11:59 AM  Serum Sodium: 146 mmol/L (Using max of 137 mmol/L) at 5/10/2023 11:59 AM  Total Bilirubin: 0.3 mg/dL (Using min of 1 mg/dL) at 5/10/2023 11:59 AM  Serum Albumin: 4.3 g/dL (Using max of 3.5 g/dL) at 5/10/2023 11:59 AM  INR(ratio): 1.83 at 5/10/2023 11:59 AM  Age at listing (hypothetical): 68 years  Sex: Male at 5/10/2023 11:59 AM      INR <= 2.0*  and  Hgb >= 9 g/dL  and  Plt >= 50 10^9/L INR > 2.0   OR  Hgb < 9 g/dL   OR  Plt < 50 10^9/L   No Scheduling Restrictions Hospital Only   *Exception for patients on anticoagulation therapy.    Hx of variceal bleeding? No. (no scheduling restrictions)    Paracentesis in the last month? No      Cardiac Review    Patient has hx of hospitalization for a pleural effusion, acute hypoxic respiratory failure, Afib with RVR, ESRD, hypovolemia, C-diff, elevated troponin without MI. This was 2/7-2/16/24.      Scheduling Status & Recommendations    Sedation: MAC/Deep Sedation - Per order.  Location Type: Hospital - Per exclusion criteria. [facility] only (dialysis)  "

## 2024-03-31 DIAGNOSIS — Z94.0 KIDNEY REPLACED BY TRANSPLANT: ICD-10-CM

## 2024-03-31 DIAGNOSIS — Z94.0 KIDNEY TRANSPLANTED: ICD-10-CM

## 2024-04-01 RX ORDER — MYCOPHENOLATE MOFETIL 250 MG/1
750 CAPSULE ORAL 2 TIMES DAILY
Qty: 180 CAPSULE | Refills: 11 | Status: SHIPPED | OUTPATIENT
Start: 2024-04-01

## 2024-04-08 ENCOUNTER — OFFICE VISIT (OUTPATIENT)
Dept: TRANSPLANT | Facility: CLINIC | Age: 69
End: 2024-04-08
Attending: NURSE PRACTITIONER
Payer: MEDICARE

## 2024-04-08 ENCOUNTER — ANCILLARY PROCEDURE (OUTPATIENT)
Dept: CT IMAGING | Facility: CLINIC | Age: 69
End: 2024-04-08
Attending: NURSE PRACTITIONER
Payer: MEDICARE

## 2024-04-08 ENCOUNTER — ANCILLARY PROCEDURE (OUTPATIENT)
Dept: ULTRASOUND IMAGING | Facility: CLINIC | Age: 69
End: 2024-04-08
Attending: NURSE PRACTITIONER
Payer: MEDICARE

## 2024-04-08 ENCOUNTER — OFFICE VISIT (OUTPATIENT)
Dept: CARDIOLOGY | Facility: CLINIC | Age: 69
End: 2024-04-08
Attending: NURSE PRACTITIONER
Payer: MEDICARE

## 2024-04-08 ENCOUNTER — PRE VISIT (OUTPATIENT)
Dept: CARDIOLOGY | Facility: CLINIC | Age: 69
End: 2024-04-08

## 2024-04-08 ENCOUNTER — LAB (OUTPATIENT)
Dept: LAB | Facility: CLINIC | Age: 69
End: 2024-04-08
Attending: NURSE PRACTITIONER
Payer: MEDICARE

## 2024-04-08 VITALS
RESPIRATION RATE: 18 BRPM | OXYGEN SATURATION: 100 % | BODY MASS INDEX: 30.92 KG/M2 | HEART RATE: 64 BPM | WEIGHT: 204 LBS | DIASTOLIC BLOOD PRESSURE: 82 MMHG | HEIGHT: 68 IN | SYSTOLIC BLOOD PRESSURE: 132 MMHG | TEMPERATURE: 97.9 F

## 2024-04-08 VITALS
DIASTOLIC BLOOD PRESSURE: 83 MMHG | WEIGHT: 204.2 LBS | OXYGEN SATURATION: 100 % | HEART RATE: 60 BPM | BODY MASS INDEX: 31.05 KG/M2 | SYSTOLIC BLOOD PRESSURE: 135 MMHG

## 2024-04-08 DIAGNOSIS — Z01.810 PRE-OPERATIVE CARDIOVASCULAR EXAMINATION: ICD-10-CM

## 2024-04-08 DIAGNOSIS — N18.6 ESRD (END STAGE RENAL DISEASE) (H): ICD-10-CM

## 2024-04-08 DIAGNOSIS — I10 HYPERTENSION: ICD-10-CM

## 2024-04-08 DIAGNOSIS — Z76.82 ORGAN TRANSPLANT CANDIDATE: ICD-10-CM

## 2024-04-08 DIAGNOSIS — Z12.5 PROSTATE CANCER SCREENING: ICD-10-CM

## 2024-04-08 DIAGNOSIS — Z01.810 PRE-OPERATIVE CARDIOVASCULAR EXAMINATION: Primary | ICD-10-CM

## 2024-04-08 DIAGNOSIS — I25.10 CORONARY ARTERY DISEASE INVOLVING NATIVE CORONARY ARTERY OF NATIVE HEART WITHOUT ANGINA PECTORIS: ICD-10-CM

## 2024-04-08 DIAGNOSIS — N28.9 LESION IN TRANSPLANTED KIDNEY: Primary | ICD-10-CM

## 2024-04-08 DIAGNOSIS — K74.00 LIVER FIBROSIS: ICD-10-CM

## 2024-04-08 DIAGNOSIS — T86.19 LESION IN TRANSPLANTED KIDNEY: Primary | ICD-10-CM

## 2024-04-08 DIAGNOSIS — K76.0 NAFLD (NONALCOHOLIC FATTY LIVER DISEASE): ICD-10-CM

## 2024-04-08 LAB
DRVVT CONFIRM NORMALIZED RATIO: 1.14
DRVVT SCREEN MIX RATIO: 1.05
DRVVT SCREEN RATIO: 1.29
INR PPP: 1.22 (ref 0.85–1.15)
LA PPP-IMP: NEGATIVE
LUPUS INTERPRETATION: ABNORMAL
PLATELET NEUTRALIZATION: 0 SECONDS
PSA SERPL DL<=0.01 NG/ML-MCNC: 0.55 NG/ML (ref 0–4.5)
PTT 1:2 MIX: 42 SECONDS (ref 31–45)
PTT RATIO: 1.35
THROMBIN TIME: 18.1 SECONDS (ref 13–19)

## 2024-04-08 PROCEDURE — 99204 OFFICE O/P NEW MOD 45 MIN: CPT | Performed by: INTERNAL MEDICINE

## 2024-04-08 PROCEDURE — 99213 OFFICE O/P EST LOW 20 MIN: CPT | Performed by: SURGERY

## 2024-04-08 PROCEDURE — 93005 ELECTROCARDIOGRAM TRACING: CPT

## 2024-04-08 PROCEDURE — 86833 HLA CLASS II HIGH DEFIN QUAL: CPT | Performed by: NURSE PRACTITIONER

## 2024-04-08 PROCEDURE — 86832 HLA CLASS I HIGH DEFIN QUAL: CPT | Performed by: NURSE PRACTITIONER

## 2024-04-08 PROCEDURE — 91200 LIVER ELASTOGRAPHY: CPT | Mod: 26 | Performed by: PHYSICIAN ASSISTANT

## 2024-04-08 PROCEDURE — 74176 CT ABD & PELVIS W/O CONTRAST: CPT | Mod: MG | Performed by: RADIOLOGY

## 2024-04-08 PROCEDURE — 99000 SPECIMEN HANDLING OFFICE-LAB: CPT | Performed by: PATHOLOGY

## 2024-04-08 PROCEDURE — G0463 HOSPITAL OUTPT CLINIC VISIT: HCPCS | Performed by: SURGERY

## 2024-04-08 PROCEDURE — G0103 PSA SCREENING: HCPCS | Performed by: PATHOLOGY

## 2024-04-08 PROCEDURE — G0463 HOSPITAL OUTPT CLINIC VISIT: HCPCS | Mod: 27 | Performed by: INTERNAL MEDICINE

## 2024-04-08 PROCEDURE — 85390 FIBRINOLYSINS SCREEN I&R: CPT | Mod: 26 | Performed by: PATHOLOGY

## 2024-04-08 PROCEDURE — 86828 HLA CLASS I&II ANTIBODY QUAL: CPT | Performed by: NURSE PRACTITIONER

## 2024-04-08 PROCEDURE — 93978 VASCULAR STUDY: CPT | Performed by: RADIOLOGY

## 2024-04-08 PROCEDURE — G1010 CDSM STANSON: HCPCS | Performed by: RADIOLOGY

## 2024-04-08 PROCEDURE — 36415 COLL VENOUS BLD VENIPUNCTURE: CPT | Performed by: PATHOLOGY

## 2024-04-08 PROCEDURE — 85613 RUSSELL VIPER VENOM DILUTED: CPT | Mod: 59 | Performed by: NURSE PRACTITIONER

## 2024-04-08 RX ORDER — AMOXICILLIN 500 MG/1
500 CAPSULE ORAL
COMMUNITY

## 2024-04-08 ASSESSMENT — PAIN SCALES - GENERAL
PAINLEVEL: NO PAIN (0)
PAINLEVEL: NO PAIN (0)

## 2024-04-08 NOTE — PROGRESS NOTES
History:    Gurjit Squires is a very pleasant 68 year old man who is referred for cardiac evaluation prior to his second renal transplantation.    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 He underwent cardiac testing including echo, CTA     Patient reports stable dyspnea on exertion. Can walk 2 blocks. Denies chest pain,  orthopnea, paroxysmal nocturnal dyspnea, palpitations or syncope.      Current Outpatient Medications   Medication Sig Dispense Refill    acetaminophen (TYLENOL) 500 MG tablet Take 1,000 mg by mouth daily      allopurinol (ZYLOPRIM) 100 MG tablet Take 3 tablets (300 mg) by mouth daily 30 tablet     amoxicillin (AMOXIL) 500 MG capsule Take 500 mg by mouth TAKE 2 CAPS AS NEEDED (DENTAL) TAKE 4 CAPSULES BY MOUTH 1 HOUR PRIOR TO DENTAL APPOINTMENTS      aspirin 81 MG EC tablet Take 81 mg by mouth daily      atorvastatin (LIPITOR) 20 MG tablet Take 1 tablet by mouth      Calcium 500-2.5 MG-MCG CHEW Take 1 tablet by mouth daily 90 tablet 0    carvedilol 12.5 MG PO tablet Take 1 tablet (12.5 mg) by mouth 2 times daily (with meals) 90 tablet 3    cholecalciferol (VITAMIN D) 1000 UNIT tablet Take 1 tablet (1,000 Units) by mouth daily 90 tablet 3    clonazePAM (KLONOPIN) 0.5 MG tablet Take 0.25 mg by mouth every evening      Fexofenadine HCl (ALLEGRA PO) Take 1 tablet by mouth daily.       gabapentin (NEURONTIN) 300 MG capsule Take 1 capsule (300 mg) by mouth At Bedtime 270 capsule 3    Multiple Vitamins-Minerals (ONE-A-DAY MENS 50+ ADVANTAGE PO) Take 1 tablet by mouth every evening      mycophenolate (GENERIC EQUIVALENT) 250 MG capsule TAKE 3 CAPSULES (750 MG) BY MOUTH 2 TIMES DAILY 180 capsule 11    pantoprazole (PROTONIX) 40 MG EC  tablet TAKE 1 TABLET BY MOUTH DAILY 30 tablet 1    predniSONE (DELTASONE) 5 MG tablet Take 1 tablet (5 mg) by mouth daily 90 tablet 3    sulfamethoxazole-trimethoprim (BACTRIM) 400-80 MG tablet Take 1 tablet by mouth Every Mon, Wed, Fri Morning 13 tablet 11    torsemide (DEMADEX) 20 MG tablet Take 40 mg by mouth daily         Allergies - reviewed   No Known Allergies    Past history -reviewed  Past Medical History:   Diagnosis Date    BK viremia     Depression     End stage kidney disease (H)     Gout     History of blood transfusion     2004    Hypertension secondary to other renal disorders     IgA nephropathy     Immunosuppressed status (H24)     Kidney replaced by transplant     Kidney transplant complication     Metabolic acidosis     VANESA (obstructive sleep apnea)     CPAP    Overweight     Rotator cuff dysfunction     Sepsis due to cellulitis (H) 05/17/2018    Vitamin D deficiency         Social history - reviewed  Social History     Tobacco Use    Smoking status: Former     Types: Cigarettes    Smokeless tobacco: Never    Tobacco comments:     Quit 2004   Vaping Use    Vaping Use: Never used   Substance Use Topics    Alcohol use: Yes     Comment: social    Drug use: No       Family history -reviewed  Family History   Problem Relation Age of Onset    Kidney Disease No family hx of     Liver Cancer No family hx of     Liver Disease No family hx of     Colon Cancer No family hx of        ROS: non contributory on the 10-point review of system    Exam:     /83 (BP Location: Right arm, Patient Position: Sitting, Cuff Size: Adult Regular)   Pulse 60   Wt 92.6 kg (204 lb 3.2 oz)   SpO2 100%   BMI 31.05 kg/m    In general, the patient is in no apparent distress.      HEENT: Sclerae white, not injected.    Neck: No JVD. No thyromegaly  Heart: RRR. Normal S1, S2. No murmur, rub, click, or gallop.    Lungs: Clear bilaterally.  No rhonchi, wheezes, rales.   Extremities: No edema.  The pulses are 2+at the radial  CHARLENE.      I have independently reviewed this patient's relevant laboratory and cardiac data :    Feb 2024 - Tchol 97, trig 168, HDL 21, LDL 42  K 3.7 , cr 3.27  Hb 9.5    ECG today - sinus bradycardia with non specific T wave inversions      Echocardiogram Feb 2024    The left ventricle is mildly dilated (LVEDD 6.3 cm) with moderate  concentric hypertrophy and low normal systolic function, LVEF 50-55%. Normal right ventricular function.     Coronary angiogram Feb 2024    1. Moderate nonobstructive coronary artery disease of the LAD and distal  RCA nonsignificant by IFR as above.     2. LVEDP was 8 mmHg.     Diagnostic Summary     * Left main is normal.     * LAD has mild disease proximally then bifurcates into a dual LAD system.  The smaller LAD which essentially is a large septal and appears to be mostly intramyocardial has moderate to severe 66% stenosis proximally with  ulcerated  plaque. This was not hemodynamically significant with an IFR of 0.98.  The larger LAD/diagonal serving as the main LAD had moderate 50% disease proximally and 50% disease in its mid segment IFR was also not hemodynamically significant at 0.92 distally and 0.96 proximally.     * Ramus intermedius is small.     * Left circumflex gives rise to a small first obtuse marginal and a   larger second obtuse marginal which has mild to moderate 30 to 40% disease distally.    * RCA is dominant giving rise to the PDA and MASSIEL branches there is mild   diffuse disease throughout the RCA.  The distal RCA has several napkin ring type lesions which were angiographic indeterminant at 50%.  IFR was nonsignificant at 0.98.     * LVEDP was 8 mmHg.     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 His volume status is being managed by 3 x week dialysis. Patient is without  symptoms concerning for angina or heart failure. Patient is euvolemic on exam today with a normal heart rate and blood pressure. Patient is in sinus bradycardia today. No further cardiac testing at this point.     Recommendations:   Continue medical management and risk factor modification.  Follow up in 2 years if active on the list    Krista Goodrich MD, MS  Professor of Medicine  Cardiovascular Medicine

## 2024-04-08 NOTE — PROGRESS NOTES
"Optimal Vascular Metrics    Blood Pressure   {BP < 140/90:8740314::\"BP < 140/90 Yes\"}    On Aspirin  {On Aspirin Yes/No:7232596::\"Yes\"}    On Statin  {On Statin Yes/No:8609350::\"Yes\"}    Tobacco use  {Tobacco use Yes/No:5834394::\"No\"}    "

## 2024-04-08 NOTE — NURSING NOTE
"Chief Complaint   Patient presents with    RECHECK     Kidney wait list      Vital signs:  Temp: 97.9  F (36.6  C) Temp src: Oral BP: 132/82 Pulse: 64   Resp: 18 SpO2: 100 %     Height: 172.7 cm (5' 7.99\") Weight: 92.5 kg (204 lb)  Estimated body mass index is 31.03 kg/m  as calculated from the following:    Height as of this encounter: 1.727 m (5' 7.99\").    Weight as of this encounter: 92.5 kg (204 lb).      Neeta Gaona, [facility]  4/8/2024 12:54 PM      "

## 2024-04-08 NOTE — LETTER
"    4/8/2024         RE: Gurjit Squires  [address]  [address]  [place] [address]        Dear Colleague,    Thank you for referring your patient, Gurjit Squires, to the [facility]. Please see a copy of my visit note below.      Transplant Surgery      Reason For Visit: Kidney pre eval    History of Present Illness:  Prior kidney transplant.  Now with worsening renal function, started dialysis in January. Has a CVL, fistula maturing. Wants another transplant- was advised to lose 20 lbs at last visit with Dr. Hobson. Has actually lost 65 lbs. Presents for evaluation. Has had multiple donors express interest.      Exam:   /82 (BP Location: Right arm, Patient Position: Sitting, Cuff Size: Adult Large)   Pulse 64   Temp 97.9  F (36.6  C) (Oral)   Resp 18   Ht 1.727 m (5' 7.99\")   Wt 92.5 kg (204 lb)   SpO2 100%   BMI 31.03 kg/m      Gen: NAD  HEENT: NCAT  Cardio: RRR  Pulm: LCTAB  Abd: soft, rounded abd but flattens when lying down. Minor overhanging pannus. Strong femoral pulses bilaterally. Incision well-healed without hernia  Ext: wwp, palpable pulses. Minimal edema    Plan: BMI OK for active status.   -needs to complete PT prior to activation  -needs to undergo Amulet procedure (for A fib, scheduled in May) and recover prior to activation  -expressed interest in donating body for medical research to [place]/. Advised that we hope this will not happen for a very very long time but will ask Elvia Jack to investigate what the necessary steps for this are.    Total time: 25 minutes  Counseling time: 20 minutes  Giovani Zuniga MD      Again, thank you for allowing me to participate in the care of your patient.        Sincerely,        Giovani Zuniga MD  "

## 2024-04-08 NOTE — PATIENT INSTRUCTIONS
You were seen in the Cardiology Clinic today by: Dr. Goodrich    Plan:   Medication Changes:   None.     Follow up Visit:  With Dr. Goodrich in 2 years or sooner if needed.     If you have further questions, please utilize [employer] to contact us.     Your Care Team:    Cardiology   Telephone Number     Nurse Line  Iftikhar Otero RN    [phone]     For scheduling appointments:  [phone]           On-call cardiologist for after hours or on weekends:   [phone], option #4, and ask to speak to the on-call cardiologist.     Cardiovascular Clinic:   [address] [place] [address]. [place] [address]      As always, Thank you for trusting us with your health care needs!

## 2024-04-08 NOTE — NURSING NOTE
Chief Complaint   Patient presents with    New Patient     NEW pre kidney transplant evaluation     Vitals were taken, medications reconciled, and EKG was performed.    Maggie Talbot CNA  11:49 AM

## 2024-04-08 NOTE — PROGRESS NOTES
"  Transplant Surgery      Reason For Visit: Kidney pre eval    History of Present Illness:  Prior kidney transplant.  Now with worsening renal function, started dialysis in January. Has a CVL, fistula maturing. Wants another transplant- was advised to lose 20 lbs at last visit with Dr. Hobson. Has actually lost 65 lbs. Presents for evaluation. Has had multiple donors express interest.      Exam:   /82 (BP Location: Right arm, Patient Position: Sitting, Cuff Size: Adult Large)   Pulse 64   Temp 97.9  F (36.6  C) (Oral)   Resp 18   Ht 1.727 m (5' 7.99\")   Wt 92.5 kg (204 lb)   SpO2 100%   BMI 31.03 kg/m      Gen: NAD  HEENT: NCAT  Cardio: RRR  Pulm: LCTAB  Abd: soft, rounded abd but flattens when lying down. Minor overhanging pannus. Strong femoral pulses bilaterally. Incision well-healed without hernia  Ext: wwp, palpable pulses. Minimal edema    Plan: BMI OK for active status.   -needs to complete PT prior to activation  -needs to undergo Amulet procedure (for A fib, scheduled in May) and recover prior to activation  -expressed interest in donating body for medical research to [place]/. Advised that we hope this will not happen for a very very long time but will ask Elvia Jack to investigate what the necessary steps for this are.    Total time: 25 minutes  Counseling time: 20 minutes  Giovani Zuniga MD    "

## 2024-04-08 NOTE — LETTER
4/8/2024      RE: Gurjit Squires  [address]  [address]  [facility] [address]       Dear Colleague,    Thank you for the opportunity to participate in the care of your patient, Gurjit Squires, at the [facility] [place] [facility]. Please see a copy of my visit note below.    History:    Gurjit Squires is a very pleasant 68 year old man who is referred for cardiac evaluation prior to his second renal transplantation.    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 He underwent cardiac testing including echo, CTA     Patient reports stable dyspnea on exertion. Can walk 2 blocks. Denies chest pain,  orthopnea, paroxysmal nocturnal dyspnea, palpitations or syncope.      Current Outpatient Medications   Medication Sig Dispense Refill     acetaminophen (TYLENOL) 500 MG tablet Take 1,000 mg by mouth daily       allopurinol (ZYLOPRIM) 100 MG tablet Take 3 tablets (300 mg) by mouth daily 30 tablet      amoxicillin (AMOXIL) 500 MG capsule Take 500 mg by mouth TAKE 2 CAPS AS NEEDED (DENTAL) TAKE 4 CAPSULES BY MOUTH 1 HOUR PRIOR TO DENTAL APPOINTMENTS       aspirin 81 MG EC tablet Take 81 mg by mouth daily       atorvastatin (LIPITOR) 20 MG tablet Take 1 tablet by mouth       Calcium 500-2.5 MG-MCG CHEW Take 1 tablet by mouth daily 90 tablet 0     carvedilol 12.5 MG PO tablet Take 1 tablet (12.5 mg) by mouth 2 times daily (with meals) 90 tablet 3     cholecalciferol (VITAMIN D) 1000 UNIT tablet Take 1 tablet (1,000 Units) by mouth daily 90 tablet 3     clonazePAM (KLONOPIN) 0.5 MG tablet Take 0.25 mg by mouth every evening       Fexofenadine HCl (ALLEGRA PO) Take 1 tablet by mouth  daily.        gabapentin (NEURONTIN) 300 MG capsule Take 1 capsule (300 mg) by mouth At Bedtime 270 capsule 3     Multiple Vitamins-Minerals (ONE-A-DAY MENS 50+ ADVANTAGE PO) Take 1 tablet by mouth every evening       mycophenolate (GENERIC EQUIVALENT) 250 MG capsule TAKE 3 CAPSULES (750 MG) BY MOUTH 2 TIMES DAILY 180 capsule 11     pantoprazole (PROTONIX) 40 MG EC tablet TAKE 1 TABLET BY MOUTH DAILY 30 tablet 1     predniSONE (DELTASONE) 5 MG tablet Take 1 tablet (5 mg) by mouth daily 90 tablet 3     sulfamethoxazole-trimethoprim (BACTRIM) 400-80 MG tablet Take 1 tablet by mouth Every Mon, Wed, Fri Morning 13 tablet 11     torsemide (DEMADEX) 20 MG tablet Take 40 mg by mouth daily         Allergies - reviewed   No Known Allergies    Past history -reviewed  Past Medical History:   Diagnosis Date     BK viremia      Depression      End stage kidney disease (H)      Gout      History of blood transfusion     2004     Hypertension secondary to other renal disorders      IgA nephropathy      Immunosuppressed status (H24)      Kidney replaced by transplant      Kidney transplant complication      Metabolic acidosis      VANESA (obstructive sleep apnea)     CPAP     Overweight      Rotator cuff dysfunction      Sepsis due to cellulitis (H) 05/17/2018     Vitamin D deficiency         Social history - reviewed  Social History     Tobacco Use     Smoking status: Former     Types: Cigarettes     Smokeless tobacco: Never     Tobacco comments:     Quit 2004   Vaping Use     Vaping Use: Never used   Substance Use Topics     Alcohol use: Yes     Comment: social     Drug use: No       Family history -reviewed  Family History   Problem Relation Age of Onset     Kidney Disease No family hx of      Liver Cancer No family hx of      Liver Disease No family hx of      Colon Cancer No family hx of        ROS: non contributory on the 10-point review of system    Exam:     /83 (BP Location: Right arm, Patient Position: Sitting, Cuff  Size: Adult Regular)   Pulse 60   Wt 92.6 kg (204 lb 3.2 oz)   SpO2 100%   BMI 31.05 kg/m    In general, the patient is in no apparent distress.      HEENT: Sclerae white, not injected.    Neck: No JVD. No thyromegaly  Heart: RRR. Normal S1, S2. No murmur, rub, click, or gallop.    Lungs: Clear bilaterally.  No rhonchi, wheezes, rales.   Extremities: No edema.  The pulses are 2+at the radial R.      I have independently reviewed this patient's relevant laboratory and cardiac data :    Feb 2024 - Tchol 97, trig 168, HDL 21, LDL 42  K 3.7 , cr 3.27  Hb 9.5    ECG today - sinus bradycardia with non specific T wave inversions      Echocardiogram Feb 2024    The left ventricle is mildly dilated (LVEDD 6.3 cm) with moderate  concentric hypertrophy and low normal systolic function, LVEF 50-55%. Normal right ventricular function.     Coronary angiogram Feb 2024    1. Moderate nonobstructive coronary artery disease of the LAD and distal  RCA nonsignificant by IFR as above.     2. LVEDP was 8 mmHg.     Diagnostic Summary     * Left main is normal.     * LAD has mild disease proximally then bifurcates into a dual LAD system.  The smaller LAD which essentially is a large septal and appears to be mostly intramyocardial has moderate to severe 66% stenosis proximally with  ulcerated  plaque. This was not hemodynamically significant with an IFR of 0.98.  The larger LAD/diagonal serving as the main LAD had moderate 50% disease proximally and 50% disease in its mid segment IFR was also not hemodynamically significant at 0.92 distally and 0.96 proximally.     * Ramus intermedius is small.     * Left circumflex gives rise to a small first obtuse marginal and a   larger second obtuse marginal which has mild to moderate 30 to 40% disease distally.    * RCA is dominant giving rise to the PDA and MASSIEL branches there is mild   diffuse disease throughout the RCA.  The distal RCA has several napkin ring type lesions which were angiographic  indeterminant at 50%.  IFR was nonsignificant at 0.98.     * LVEDP was 8 mmHg.     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 His volume status is being managed by 3 x week dialysis. Patient is without symptoms concerning for angina or heart failure. Patient is euvolemic on exam today with a normal heart rate and blood pressure. Patient is in sinus bradycardia today. No further cardiac testing at this point.     Recommendations:   Continue medical management and risk factor modification.  Follow up in 2 years if active on the list    Krista Goodrich MD, MS  Professor of Medicine  Cardiovascular Medicine    Please do not hesitate to contact me if you have any questions/concerns.     Sincerely,     Krista Goodrich MD

## 2024-04-09 ENCOUNTER — DOCUMENTATION ONLY (OUTPATIENT)
Dept: TRANSPLANT | Facility: CLINIC | Age: 69
End: 2024-04-09
Payer: MEDICARE

## 2024-04-09 ENCOUNTER — TEAM CONFERENCE (OUTPATIENT)
Dept: TRANSPLANT | Facility: CLINIC | Age: 69
End: 2024-04-09
Payer: MEDICARE

## 2024-04-09 ENCOUNTER — TELEPHONE (OUTPATIENT)
Dept: TRANSPLANT | Facility: CLINIC | Age: 69
End: 2024-04-09
Payer: MEDICARE

## 2024-04-09 DIAGNOSIS — Z76.82 ORGAN TRANSPLANT CANDIDATE: ICD-10-CM

## 2024-04-09 DIAGNOSIS — N18.6 ESRD (END STAGE RENAL DISEASE) (H): Primary | ICD-10-CM

## 2024-04-09 LAB
ATRIAL RATE - MUSE: 55 BPM
DIASTOLIC BLOOD PRESSURE - MUSE: NORMAL MMHG
INTERPRETATION ECG - MUSE: NORMAL
P AXIS - MUSE: 88 DEGREES
PR INTERVAL - MUSE: 152 MS
QRS DURATION - MUSE: 132 MS
QT - MUSE: 518 MS
QTC - MUSE: 495 MS
R AXIS - MUSE: -12 DEGREES
SYSTOLIC BLOOD PRESSURE - MUSE: NORMAL MMHG
T AXIS - MUSE: -4 DEGREES
VENTRICULAR RATE- MUSE: 55 BPM

## 2024-04-09 NOTE — TELEPHONE ENCOUNTER
Returned call to pt to discuss renal US.  message sent earlier with list of to dos for transplant. Can schedule locally if pt would like. Left  with direct line for return call.     4/9/24 addendum: Received call back from pt. Reviewed additional items needed for transplant, please see [facility] message in 4/5/24 encounter for current items to do. Pt would like renal US scheduled locally, will reach out to PCP. Pt reports he just missed derm follow up last month. He will send provider name and clinic so we can request updated records. Reviewed vascular consult placed and their clinic should be reaching out to arrange. Pt verbalized understanding of information and has no further questions. Encouraged to reach out if questions arise.

## 2024-04-09 NOTE — TELEPHONE ENCOUNTER
Health Call Center    Phone Message    May a detailed message be left on voicemail: yes     Reason for Call: Other: call placed to patient to schedule ultrasound renal. Patient is frustrated and would like to know why this wasn't completed when he was here yesterday. Please call patient back to discuss this further. Patient can be transferred to [facility] at [phone] to schedule.     Action Taken: Message routed to:  Other: RNCC    Travel Screening: Not Applicable

## 2024-04-09 NOTE — TELEPHONE ENCOUNTER
Image Review Meeting    ATTENDEES: Dr. Roblero and coordinators    IMAGES REVIEWED: 4/8/24 CT a/p and iliac US    DECISION: questionable targets. Would need to be living donor only. Will need to re-review images once donor is approved. Pt may need to be opened first to see if there is space. See below for incidentals.     INCIDENTALS: Yes:     4/8/24 CT a/p impression:   IMPRESSION:   1. Moderate to extensive atherosclerotic calcifications of both common  iliac, internal and external iliac arteries.   2. Multifocal indeterminate density cysts including  hyperdense/proteinaceous/hemorrhagic cysts involving the right lower  quadrant renal transplant, which are not characterized by non-contrast  technique. Consider renal ultrasound for further evaluation. Renal US ordered, pt to complete locally   3. Colonic diverticulosis without diverticulitis.  4. Cholelithiasis without cholecystitis.  5. Similar partially visualized small volume right pleural effusion  with overlying subsegmental atelectasis. Needs to be addressed prior to transplant.     4/8/24 iliac Ultrasound impression:   1. 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 Aneurysmal left  common iliac artery, 1.9 cm. Aneurysmal right common femoral artery,  1.9 cm. Aneurysmal left common iliac artery, 1.6 cm. Otherwise  negative aorta to femoral arterial ultrasound. Measurements as in the  report. Pt to establish care with vascular surgery.   2. Negative caval-femoral venous ultrasound. Measurements as in the  report.  3. Left lower quadrant 3.1 x 3.3 x 7.5 collection in the location of  the penile implant reservoir does not have the expected appearance of  the reservoir. Question contained rupture of the reservoir or non  distended reservoir. Correlate clinically. Nothing further needed on this.

## 2024-04-10 ENCOUNTER — TELEPHONE (OUTPATIENT)
Dept: TRANSPLANT | Facility: CLINIC | Age: 69
End: 2024-04-10
Payer: MEDICARE

## 2024-04-10 ENCOUNTER — COMMITTEE REVIEW (OUTPATIENT)
Dept: TRANSPLANT | Facility: CLINIC | Age: 69
End: 2024-04-10
Payer: MEDICARE

## 2024-04-10 NOTE — COMMITTEE REVIEW
Abdominal Committee Review Note     Evaluation Date: 9/14/2021  Committee Review Date: 4/10/2024    Organ being evaluated for: Kidney    Transplant Phase: Waitlist  Transplant Status: Inactive    Transplant Coordinator: Elvia Wetzel  Transplant Surgeon:        Referring Physician: Radu Bunch    Primary Diagnosis: Malignant Hypertension  Secondary Diagnosis:     Committee Review Members:  Nephrology Emerson Gonzalez MD, Aurea Lake MD, Singh Beltran, APRN CNP   Nurse Jerica Hurst, RN, Meenakshi Lafleur, APRN CAIN, LEVI TURNER RN   Nutrition Julia Keyes, RD   Pharmacist Carisa Soria, [facility]    - Clinical Elvia Moore, [facility], Orlando Padilla, MSW   Transplant REGINA DAILY, RN, Shona Mane, HERRERA, Nora Kovacs, RN, Kath Elliott, RN, Elvia Jack, RN, Pratibha Banks, NP, Pratibha Pavon, RN, Shauna Barker, RN, Sylvie Kennedy, RN, Leann Jimenez, RN   Transplant Surgery Giovani Zuniga MD       Transplant Eligibility: Irreversible chronic kidney disease treated w/dialysis or expected need for dialysis    Committee Review Decision: Remain Inactive    Relative Contraindications:     Absolute Contraindications:     Committee Chair Giovani Zuniga MD verbally attested to the committee's decision.    Committee Discussion Details:     ESKD from IgA nephropathy s/p LDKT 2004 now failed on HD since 1/2024     Pt has been on hold since 4/2023 due to BMI. Has recently started HD and lost weight recommended. BMI cleared by Dr. Zuniga. Imaging reviewed yesterday with Dr. Roblero. Pt has questionable targerts and should be living donor only. Will need to review if donor becomes approved. Pt may need to be opened first to see if there is space.     Committee in agreement status will need to be changed to living donor only due to vessels. For pleural effusion on CT a/p, pt should have a CXR w/ his PEP and if still present should have dry weight  challenge with nephrology at HD.

## 2024-04-10 NOTE — TELEPHONE ENCOUNTER
Called pt to update on image review and committee decision. Status has been changed to living donor only due to calcifications on abdominal vessels. Pt may need to be opened to see if there is a space. Pt would like to complete work up and see if a donor gets approved for transplant. He would like to talk to his wife and will update writer if there are any other questions. Reviewed pt should follow up with PCP regarding pleural effusion. If still present on repeat CXR, would need dry weight challenge with primary nephrologist.     Called Dr. Grimm's office. Message left with clinic staff that pt needs CXR after pleural effusion noted on CT a/p. Should work with PCP for this. Left direct line for return call.    Estuardo Boyce   [address] [place] [address]           Dear Estuardo Boyce     Our records indicate that you have outstanding lab work and or testing that was ordered for you and has not yet been completed:  Lab Frequency Next Occurrence   STEVEN WAN 2D+3D DIAGNOSTIC ADDL VWS BILAT (AXJ=[id number]) Once 07/25/2024      To provide you with the best possible care, please complete these orders at your earliest convenience. If you have recently completed these orders please disregard this letter.     If you have any questions please call the office at [phone].     Thank you,     [facility]

## 2024-04-17 ENCOUNTER — DOCUMENTATION ONLY (OUTPATIENT)
Dept: TRANSPLANT | Facility: CLINIC | Age: 69
End: 2024-04-17
Payer: MEDICARE

## 2024-04-17 NOTE — PROGRESS NOTES
"Reviewed renal US with neph PAOLO. \"No further follow up. He has been on dialysis since Jan 2024. Cysts are benign.\"   "

## 2024-04-23 ENCOUNTER — DOCUMENTATION ONLY (OUTPATIENT)
Dept: TRANSPLANT | Facility: CLINIC | Age: 69
End: 2024-04-23
Payer: MEDICARE

## 2024-05-14 ENCOUNTER — TELEPHONE (OUTPATIENT)
Dept: VASCULAR SURGERY | Facility: CLINIC | Age: 69
End: 2024-05-14
Payer: MEDICARE

## 2024-05-14 NOTE — TELEPHONE ENCOUNTER
----- Message from Mattie Harris LPN sent at 5/10/2024 11:56 AM CDT -----  Regarding: FW: checking in on referral  Ok to coordinate new vascular consult in person with GERALD/Dora. No testing.    Thanks!    Mattie  ----- Message -----  From: Elvia Jack RN  Sent: 5/10/2024  11:11 AM CDT  To: Mattie Harris LPN; #  Subject: RE: checking in on referral                      Pam Mercer,     I will reach out to our supervisors to get our smartset updated as we have been utilizing the  referral. I will be sure to clarify in the future if vascular medicine or surgery would be the referral recommended.     When I reviewed his imaging with the surgeons there is a concern for his vessels for transplant but they only recommended aneurysm monitoring at this time.     Thank you for helping get this set up!   Elvia  ----- Message -----  From: Mattie Harris LPN  Sent: 5/10/2024  11:02 AM CDT  To: Elvia Jack RN; #  Subject: RE: checking in on referral                      Saturnino Gan,    So sorry about this. It appears the referral did not fall into our workqueue. I wonder if it is due to the type of referral. We typically have providers use the Vascular Surgery Referral or Vascular Medicine Referral.     Regardless, I will make sure this is taken care of. Just to confirm, this is just for management of the aneurysm. No other concerns we need to address pertaining to transplant, correct?    Thank you!    Mattie MUSE LPN  ----- Message -----  From: Elvia Jack RN  Sent: 5/10/2024  10:42 AM CDT  To: Clinic Coordinators-[facility]-; #  Subject: checking in on referral                          Hi there,     I had placed a referral for vascular on 4/9/24 to establish care to monitor aneurysm that was noted on his iliac US from 4/8/24. I do not see that the visit is scheduled and just wanted to check in if someone can reach out to Carlos to get this arranged?     Thank you so much!   Elvia

## 2024-05-14 NOTE — TELEPHONE ENCOUNTER
Left Voicemail (1st Attempt) and Sent Mychart (1st Attempt) for the patient to call back and schedule the following:Kidney waitlist. multiple aneurysms noted on 4/8/24 iliac US. Needs to establish care for monitoring.     [facility]  Provider:   Appointment type: Ernesto [facility] Pt  Appointment mode In Person  Return date: Next Available    Specialty phone number: [phone] or  [phone]       Is Imaging Needed: No  Imaging Phone Number to provide to patient: None    Additional Notes: VINNIE Smart on 5/14/2024 at 12:11 PM

## 2024-05-15 NOTE — TELEPHONE ENCOUNTER
The patient called back to schedule the following:Kidney waitlist. multiple aneurysms noted on 4/8/24 iliac US. Needs to establish care for monitoring.     [facility] [facility]  Provider:   Appointment type: [facility] Pt  Appointment mode In Person  Return date: Next Available    Specialty phone number: [phone] or  [phone]       Is Imaging Needed: No  Imaging Phone Number to provide to patient: None    Additional Notes: The pt states that he has dialysis on Mon,Wed and Fri he is unable to schedule with  being that she is in clinic on Fridays only.  Lopez Smart on 5/15/2024 at 11:39 AM

## 2024-05-21 NOTE — TELEPHONE ENCOUNTER
I called and left a voicemail because the pt sent a Mychart message stating he kept getting put on hold and could to schedule the following:Kidney waitlist. multiple aneurysms noted on 4/8/24 iliac US. Needs to establish care for monitoring.     Location: [facility]  Provider:   Appointment type: [facility] Pt  Appointment mode In Person  Return date: Next Available    Specialty phone number: [phone] or  [phone]       Is Imaging Needed: No  Imaging Phone Number to provide to patient: None    Additional Notes: I told the pt to call back to my direct phone number but the pt never did.  The pt initially stated that he has dialysis on Mon,Wed and Fri he is unable to schedule with  being that she is in clinic on Fridays only.  Lopez Smart on 5/21/2024 at 2:20 PM

## 2024-05-22 ENCOUNTER — MYC MEDICAL ADVICE (OUTPATIENT)
Dept: TRANSPLANT | Facility: CLINIC | Age: 69
End: 2024-05-22
Payer: MEDICARE

## 2024-05-22 ENCOUNTER — TELEPHONE (OUTPATIENT)
Dept: VASCULAR SURGERY | Facility: CLINIC | Age: 69
End: 2024-05-22
Payer: MEDICARE

## 2024-05-22 NOTE — TELEPHONE ENCOUNTER
Concerning referral ordered by Singh Beltran on 4/23/24. Please review previous telephone encounters.  Mattie I am at a lost with what to do with this patient I spoke with him this morning it is confirmed that the pt does not want to be seen at the [facility] with  do to dialysis being done on Fridays. I have given the pt the [facility] phone number two times and I have also called over to [facility] with the pt on the line to make sure that the pt was connected to the correct department too schedule.  I honestly do not know what else I can say or do for the pt Maybe you can be of further assistance to the pt.  Lopez Smart on 5/22/2024 at 3:10 PM

## 2024-05-22 NOTE — TELEPHONE ENCOUNTER
Pt scheduled w/ Dr John at [facility].    HALEIGH BermeoN, RN  RN Care Coordinator  [facility] Vascular Surgery - [facility] phone: [phone]  Fax: [phone]

## 2024-05-22 NOTE — TELEPHONE ENCOUNTER
Caller: renuka Gonzalez    Provider: None    Detailed reason for call: The pt has a vascular referral for a transplant workup. Please review and advise who the pt should see, along with any imaging.     Best phone number to contact: [phone]     Best time to contact: anytime    Ok to leave a detailed message: Yes    Ok to speak to authorized person if needed: unknown      (Noted to patient if reason is related to wound or incision, to please send a photo via email or [employer].)

## 2024-05-22 NOTE — TELEPHONE ENCOUNTER
----- Message from Elvia Jack RN sent at 5/22/2024  2:29 PM CDT -----  Regarding: RE: checking in on referral  Mumtaz Minorsay,     Could someone please contact Carlos to set up this visit? I am unsure if he needs vascular medicine or vascular surgery for the aneurysm monitoring but the pt reports he's been calling to arrange this but is being told they are not sure which clinic to put him in. I will also forward the  message I received from him to the vascular adult pool.     He just needs to establish care for the monitoring of his aneurysms and if there are any concerns with these for kidney transplant.     Thank you,   Elvia  ----- Message -----  From: Mattie Harris LPN  Sent: 5/10/2024  11:02 AM CDT  To: Elvia Jack RN; #  Subject: RE: checking in on referral                      Saturnino Elvia,    So sorry about this. It appears the referral did not fall into our workqueue. I wonder if it is due to the type of referral. We typically have providers use the Vascular Surgery Referral or Vascular Medicine Referral.     Regardless, I will make sure this is taken care of. Just to confirm, this is just for management of the aneurysm. No other concerns we need to address pertaining to transplant, correct?    Thank you!    Mattie MUSE LPN  ----- Message -----  From: Elvia Jack RN  Sent: 5/10/2024  10:42 AM CDT  To: Clinic Coordinators-[facility]-; #  Subject: checking in on referral                          Hi there,     I had placed a referral for vascular on 4/9/24 to establish care to monitor aneurysm that was noted on his iliac US from 4/8/24. I do not see that the visit is scheduled and just wanted to check in if someone can reach out to Carlos to get this arranged?     Thank you so much!   Elvia

## 2024-07-24 NOTE — PROGRESS NOTES
[facility]        Patient is here for a consult to discuss iliac and femoral artery aneurysms.  Transplant team would like to know if there would be any issues moving forward with kidney transplant in relation to aneurysms.     Pt is currently taking Aspirin, Statin, and Plavix.    /64   Pulse 73   SpO2 97%     The provider has been notified that the patient has concerns of being on plavix, pt is on kidney transplant list and unsure if they are able to be on plavix to receive transplant while on it.     Questions patient would like addressed today are: see above.    Refills are needed: N/A    Has homecare services and agency name:  No

## 2024-07-24 NOTE — PATIENT INSTRUCTIONS
Saturnino Cagle,    Thank you for entrusting your care with us today. After your visit today with MD Liliya John this is the plan that was discussed at your appointment.    Please complete Ultrasounds around October 8th, 2024. The ultrasound orders will be faxed to [facility] in [place]    You can schedule a virtual visit with Carisa Douglas PA-C for a follow up while Dr. John is out. If Carisa Douglas PA-C does not have any openings, ok to see Dr. Govea, or Dr. Anthony in vascular surgery. Please schedule this once you have the ultrasounds scheduled    3. Follow up with Cardiology regarding Plavix    I am including additional information on these things and our contact information if you have any questions or concerns.   Please do not hesitate to reach out to us if you felt we did not answer your questions or you are unsure of the treatment plan after your visit today. Our number is [phone].Thank you for trusting us with your care.         Again thank you for your time.

## 2024-07-30 ENCOUNTER — OFFICE VISIT (OUTPATIENT)
Dept: VASCULAR SURGERY | Facility: CLINIC | Age: 69
End: 2024-07-30
Attending: HOSPITALIST
Payer: MEDICARE

## 2024-07-30 VITALS — OXYGEN SATURATION: 97 % | DIASTOLIC BLOOD PRESSURE: 64 MMHG | SYSTOLIC BLOOD PRESSURE: 101 MMHG | HEART RATE: 73 BPM

## 2024-07-30 DIAGNOSIS — N18.6 ESRD (END STAGE RENAL DISEASE) (H): ICD-10-CM

## 2024-07-30 DIAGNOSIS — R09.89 OTHER SPECIFIED SYMPTOMS AND SIGNS INVOLVING THE CIRCULATORY AND RESPIRATORY SYSTEMS: ICD-10-CM

## 2024-07-30 DIAGNOSIS — I72.4 FEMORAL ARTERY ANEURYSM, RIGHT (H): ICD-10-CM

## 2024-07-30 DIAGNOSIS — Z76.82 ORGAN TRANSPLANT CANDIDATE: ICD-10-CM

## 2024-07-30 DIAGNOSIS — I72.3 ILIAC ARTERY ANEURYSM, BILATERAL (H): Primary | ICD-10-CM

## 2024-07-30 PROCEDURE — G0463 HOSPITAL OUTPT CLINIC VISIT: HCPCS | Performed by: HOSPITALIST

## 2024-07-30 PROCEDURE — 99204 OFFICE O/P NEW MOD 45 MIN: CPT | Performed by: HOSPITALIST

## 2024-07-30 RX ORDER — FOLIC ACID/VIT B COMPLEX AND C 0.8 MG
1 TABLET ORAL
COMMUNITY
Start: 2024-02-02

## 2024-07-30 RX ORDER — CALCIUM ACETATE 667 MG/1
2 TABLET ORAL
COMMUNITY
Start: 2024-06-24

## 2024-07-30 RX ORDER — CLOPIDOGREL BISULFATE 75 MG/1
75 TABLET ORAL
COMMUNITY
Start: 2024-03-25

## 2024-07-30 RX ORDER — ERGOCALCIFEROL 1.25 MG/1
CAPSULE, LIQUID FILLED ORAL
COMMUNITY
Start: 2024-07-26

## 2024-07-30 NOTE — PROGRESS NOTES
VASCULAR MEDICINE CONSULT NOTE          LOCATION:  [facility]       Date of Service: 7/30/2024      Primary Care Provider: Jaquan Narvaez  Referring provider;  Singh Beltran      Reason for the visit/chief complaint:   Bilateral common iliac artery ectasia  Right common femoral artery ectasia  Plan for kidney transplant      HPI:  Gurjit Squires is a pleasant 69 year old male who presents to our Vascular Medicine clinic for the above mentioned reason. He is accompanied his spouse Jenny.    Mr. Squires has medical history significant for IgA nephropathy with prior kidney transplant from a living donor 2004, now with ESRD on HD since 1/27/2024 being considered for a new transplant, history of CVA in 2020 (thought to be related to be cardioembolic in the setting of atrial fibrillation), atrial fibrillation s/p left atrial appendage occlusion with Watchman device, VANESA and MGUS.    Recently and as part of his pretransplant workup, he underwent CT abdomen pelvis on/8/2024.  This showed scattered atherosclerotic calcification of abdominal aorta with no aneurysmal dilation with more extensive atherosclerosis of both common iliac, internal and external iliac arteries in addition to ectatic right common iliac artery measuring 2 cm.  Ultrasound aorta the same day confirmed no abdominal aortic aneurysm, right common iliac artery ectasia 2.0 cm, common femoral artery 1.9 cm and ectatic left common iliac artery 1.9 cm.    He was subsequently referred to our clinic to discuss this finding, establish follow-up and determine if this affects his candidacy ar the surgical plan for kidney transplant.    Mr. Squires denies any intermittent claudication.    Of note, reviewing prior echocardiogram from May 2023: He had no aneurysm or dilatation of the ascending thoracic aorta with diameter 3.8 cm and aortic root diameter 3.5 cm.      Cardiovascular risk factors:  Smoking: Smoked since age 17 for 30 years,  average of 0.5 PPD.  Quit at the time of his first transplant 2004  Hypertension: Has longstanding hypertension since high school.  Family history: Denies family history of aneurysms or sudden death.  Dyslipidemia: He has been on a statin that was added after his CVA in 2020.  LDL was 42 from February of this year 2024.  No history of diabetes.  Denies history of MI.        REVIEW OF SYSTEMS:    A 12 point ROS was reviewed and is negative except what is mentioned in HPI.       Past medical history, surgical history, medications, family history, social history and allergies were reviewed. Pertinent points mentioned under HPI.             OBJECTIVE:    Vital signs:  /64   Pulse 73   SpO2 97%   Wt Readings from Last 1 Encounters:   04/08/24 204 lb (92.5 kg)     There is no height or weight on file to calculate BMI.    Physical exam:  General appearance: Pleasant male in no apparent distress.    HEENT: NC/AT.    Neck: Carotids +2/2 bilaterally without bruits.  No jugular venous distension.   Heart: RRR. Normal S1, S2. No murmur, rub, click, or gallop.   Chest: Clear to auscultation bilaterally.  Abdomen: Soft, nontender, nondistended. No pulsatile mass.  No bruits.   Extremities: No swelling.  Lower extremity vascular pulse exam:  RIGHT: Femoral 3/2 with no bruit, popliteal, DP and PT 0/2.  LEFT: Femoral 2/2 with no bruit, popliteal 3/2, DP and PT 0/2.  Skin: No wounds  Neurological: Alert, awake and oriented       DIAGNOSTIC STUDIES:   Labs and diagnostics reviewed including outside records. Pertinent points are mentioned under HPI and assessment and plan sections.        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 Squires CTA.  None of his current ectasia is at the surgical cutoff limit.  For common iliac arteries we discussed the surgical cutoff limit for asymptomatic ectasia or aneurysm to be > 3 cm.  For the common femoral artery, the cutoff is >2.5 cm.  Given the asymptomatic status, these ectasia only need active surveillance and risk factor control.  Will plan on repeating imaging in 6 months given this is the first time we are diagnosing these to establish rate of growth assessment.    On exam, he did have generalized left popliteal artery pulse exam which could be suggestive of underlying popliteal aneurysm as well.  No evidence of ascending thoracic aorta or aortic root aneurysm on recent imaging.  With his planned transplant in the near future, there is no indication to repair these ectasia at this current size before or at the time of surgery.  I did also review this with one of my vascular surgery colleagues.    For risk factor control, Mr. Godoy had already quit smoking.  Blood pressure has been very well-controlled with a goal less than 130/80.  He is on appropriate medications given  atherosclerosis that is aspirin 81 mg (now on DAPT with aspirin and clopidogrel after recent IVANA closure, for 6 months post device placement 5/30/24) and atorvastatin 20 mg.  Although high intensity statin is not typically recommended, his LDL is within goal at 42, could certainly discuss high intensity in the future.      Recommendations:  Indication for intervention regarding his arterial ectasia before or at the time of his anticipated kidney transplant.  Ultrasound aorta iliac in 6 months from prior imaging expected around October 8, 2024.  This can be arranged  through vascular surgery JILLIAN Douglas or any of our vascular surgery colleagues while I am out of office at that time.  Obtain bilateral lower extremity arterial duplex ultrasound to rule out left popliteal artery aneurysm prior to follow-up.  Follow-up in 6 months after imaging completed virtually per patient's request.  Continue aspirin 81 mg.  Continue atorvastatin 20 mg.  Blood pressure under very good control.  More on the lower side.      It was a pleasure meeting Mr. Squires and his spouse Jenny in our clinic today.      Liliya John MD  Vascular Medicine  July 30, 2024

## 2024-07-30 NOTE — Clinical Note
Just an FYI: US orders: Aorta/Ivc/Iliac Duplex, and LE Arterial Duplex bilateral were sent to [facility] in [place]. Pt will schedule these for sometime around October 8th. They will call clinic to schedule a virtual visit (after they know date of ultrasounds) with RW, or ET/AM if [facility] does not have any openings.

## 2024-09-09 ENCOUNTER — TELEPHONE (OUTPATIENT)
Dept: TRANSPLANT | Facility: CLINIC | Age: 69
End: 2024-09-09
Payer: MEDICARE

## 2024-09-09 NOTE — TELEPHONE ENCOUNTER
Called pt to touch base regarding plavix question via . Reviewed pt is currently living donor only and so he is not eligible for the  donor list. Pt does not currently have living donor signed up for evaluation. If a donor does become approved, would need approval from cardiologist that plavix could be held. Reviewed donor sign up website and process.  message sent with sign up info per pt and wife request. They are going to start a caring bridge and see if anyone will sign up to be a donor.

## 2024-09-24 ENCOUNTER — DOCUMENTATION ONLY (OUTPATIENT)
Dept: TRANSPLANT | Facility: CLINIC | Age: 69
End: 2024-09-24
Payer: MEDICARE

## 2024-09-25 ENCOUNTER — TELEPHONE (OUTPATIENT)
Dept: TRANSPLANT | Facility: CLINIC | Age: 69
End: 2024-09-25
Payer: MEDICARE

## 2024-09-25 NOTE — TELEPHONE ENCOUNTER
Patient Call: General  Route to LPN    Reason for call: Breia from Dialysis stated patient didn't bring a kit, and would like to discuss how to draw for PRA    Call back needed? Yes    Return Call Needed  Same as documented in contacts section  When to return call?: Same day: Route High Priority

## 2024-09-30 ENCOUNTER — APPOINTMENT (OUTPATIENT)
Dept: LAB | Facility: CLINIC | Age: 69
End: 2024-09-30
Payer: MEDICARE

## 2024-10-10 ENCOUNTER — DOCUMENTATION ONLY (OUTPATIENT)
Dept: TRANSPLANT | Facility: CLINIC | Age: 69
End: 2024-10-10
Payer: MEDICARE

## 2024-10-11 PROCEDURE — 86832 HLA CLASS I HIGH DEFIN QUAL: CPT | Performed by: SURGERY

## 2024-10-11 PROCEDURE — 86833 HLA CLASS II HIGH DEFIN QUAL: CPT | Performed by: SURGERY

## 2024-10-16 DIAGNOSIS — Z76.82 ORGAN TRANSPLANT CANDIDATE: ICD-10-CM

## 2024-10-16 DIAGNOSIS — N18.6 ESRD (END STAGE RENAL DISEASE) (H): Primary | ICD-10-CM

## 2024-10-17 ENCOUNTER — DOCUMENTATION ONLY (OUTPATIENT)
Dept: TRANSPLANT | Facility: CLINIC | Age: 69
End: 2024-10-17
Payer: MEDICARE

## 2024-10-17 NOTE — PROGRESS NOTES
Received notification of failed kidney graft from Care Everywhere.  Fail is indicated by resumption of dialysis.  Date of organ failure was reported as 1/22/2024.  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  TIS verfication is complete.

## 2024-10-25 ENCOUNTER — VIRTUAL VISIT (OUTPATIENT)
Dept: VASCULAR SURGERY | Facility: CLINIC | Age: 69
End: 2024-10-25
Attending: SPECIALIST
Payer: MEDICARE

## 2024-10-25 VITALS
HEIGHT: 68 IN | BODY MASS INDEX: 31.07 KG/M2 | SYSTOLIC BLOOD PRESSURE: 112 MMHG | DIASTOLIC BLOOD PRESSURE: 60 MMHG | WEIGHT: 205 LBS

## 2024-10-25 DIAGNOSIS — I72.3 ILIAC ARTERY ANEURYSM, BILATERAL (H): Primary | ICD-10-CM

## 2024-10-25 PROCEDURE — 99213 OFFICE O/P EST LOW 20 MIN: CPT | Mod: 95 | Performed by: STUDENT IN AN ORGANIZED HEALTH CARE EDUCATION/TRAINING PROGRAM

## 2024-10-25 RX ORDER — ALLOPURINOL 300 MG/1
1 TABLET ORAL
COMMUNITY
Start: 2024-09-20

## 2024-10-25 RX ORDER — CARVEDILOL 3.12 MG/1
TABLET ORAL
COMMUNITY
Start: 2024-09-04

## 2024-10-25 ASSESSMENT — PAIN SCALES - GENERAL: PAINLEVEL_OUTOF10: MODERATE PAIN (4)

## 2024-10-25 NOTE — Clinical Note
Please call to schedule CTA and follow-up with Dora in 6 months. OK for virtual and if she gets her CTA done at [facility].

## 2024-10-25 NOTE — PATIENT INSTRUCTIONS
Saturnino Cagle,    Thank you for entrusting your care with us today. After your visit today with MD Kala Govea this is the plan that was discussed at your appointment.    You will be contacted to schedule 6 month follow-up with Dr. John with CTA, if you are no longer on dialysis please contact us because we won't order with contrast     I am including additional information on these things and our contact information if you have any questions or concerns.   Please do not hesitate to reach out to us if you felt we did not answer your questions or you are unsure of the treatment plan after your visit today. Our number is [phone].Thank you for trusting us with your care.         Again thank you for your time.      Computed Tomography (CT) Scan: About This Test    What is it?  A computed tomography (CT) scan uses X-rays to make detailed pictures of parts of your body and the structures inside your body. During the test, you will lie on a table that is attached to the CT scanner. The CT scanner is a large doughnut-shaped machine.    Why is this test done?  Doctors use CT scans to study areas of the body, such as the brain, chest, belly, spine, bones, or joints. CT scans are also used to assist with or check on the success of a procedure or surgery.    How do you prepare for the test?  In general, there's nothing you have to do before this test, unless your doctor tells you to.    Tell your doctor if you get nervous in tight spaces. You may get a medicine to help you relax. If you think you'll get this medicine, be sure you have someone to take you home.    How is the test done?  Before the test  You may have to take off jewelry.  You will take off all or most of your clothes and change into a gown. If you do leave some clothes on, make sure you take everything out of your pockets.  You may have contrast material (dye) put into your arm through a tube called an IV.    During the test  You will lie on a table  that is attached to the CT scanner.  The table slides into the round opening of the scanner. The table will move during the scan. The scanner moves within the doughnut-shaped casing around your body.  You will be asked to hold still during the scan. You may be asked to hold your breath for short periods.  You may be alone in the scanning room. But a technologist will watch you through a window and talk with you during the test.    How does the test feel?  The test will not cause pain, but some people feel nervous inside the CT scanner.    If a medicine to help you relax (sedative) or dye is used, you may feel a quick sting or pinch when the IV is started. The dye may make you feel warm and flushed and give you a metallic taste in your mouth. Some people feel sick to their stomach or get a headache. Tell the technologist or your doctor how you are feeling.    How long does the test take?  The test will take about 30 to 60 minutes. Most of this time is spent getting ready for the scan. The actual test takes a few minutes.    What happens after the test?  You will probably be able to go home right away.  You can go back to your usual activities right away.  If dye was used, drink plenty of fluids for 24 hours after the test, unless your doctor tells you not to.  Follow-up care is a key part of your treatment and safety. Be sure to make and go to all appointments, and call your doctor if you are having problems. It's also a good idea to keep a list of the medicines you take. Ask your doctor when you can expect to have your test results.    Current as of: December 19, 2022  Author: [employer] Staff  Medical Review:Car Anglin MD - Family Medicine & JESSICA Armando MD - Internal Medicine & Emerson Lo MD - Family Medicine & Michael Clinton MD - Family Medicine & Yariel Rao MD - Diagnostic Radiology

## 2024-10-25 NOTE — PROGRESS NOTES
VASCULAR SURGERY PROGRESS NOTE   VASCULAR SURGEON: Hoda Anthony MD, RPVI     LOCATION:  [facility] (formerly [facility])     Virtual Visit Details:    Type of service:  Video visit clinic follow up     Length of call: 8 minutes    Originating Location (Pt. Location): Home     Distant Location (Provider location):  [facility]     Platform used for visit:  Leticia     Received verbal consent for virtual visit.        Gurjit Squires   Medical Record #:  [id number]  YOB: 1955  Age:  69 year old     Date of Service: 10/25/2024    PRIMARY CARE PROVIDER: Jaquan Narvaez      Reason for visit:  LISA and right CFA surveillance    IMPRESSION: Patient with history of aneurysms as noted below not entirely visualized on outside images which I reviewed today.  His iliac artery is measuring less than 2 cm bilaterally and right common femoral artery measuring 2 cm which appears stable.  He continues to have multiphasic waveforms down to infrapopliteal arteries with transition to monophasic waveform at that level.    RECOMMENDATION/RISKS: No significant changes noted on ultrasound images today although difficult to determine based on decreased size of common iliac artery compared to previous report.  Plan to obtain CTA abdomen pelvis in 6 months if patient continues on hemodialysis, advised him to notify us if he were to progress to transplant as we would change this imaging to a noncontrast exam.  Continue best medical therapy with ASA 81 mg daily and high-dose statin therapy.  Ambulate as tolerated.  Advised patient to reach out to our clinic if his wound failed to heal or he were to develop new wounds and either lower extremity.  Also discussed the warning signs of symptomatic aneurysm and advised patient to present to the emergency department if he were to experience any of these.  Patient voiced understanding.    HPI:  Gurjit Squires is a 69 year old male who was seen today  in consultation for right LISA and right CFA aneurysm surveillance. 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  Patient has been asymptomatic from all of these.  He does also report wound on his left great toe which is being seen by podiatry and he notes improvement.  He is on a living donor transplant list but continues on hemodialysis at this time.     No complaints today other than overall fatigue secondary to dialysis.    REVIEW OF SYSTEMS:    A 12 point ROS was reviewed and is negative except for noted in HPI.     PHH:    Past Medical History:   Diagnosis Date    BK viremia     Depression     End stage kidney disease (H)     Gout     History of blood transfusion     2004    Hypertension secondary to other renal disorders     IgA nephropathy     Immunosuppressed status (H)     Kidney replaced by transplant     Kidney transplant complication     Metabolic acidosis     VANESA (obstructive sleep apnea)     CPAP    Overweight     Rotator cuff dysfunction     Sepsis due to cellulitis (H) 05/17/2018    Vitamin D deficiency           Past Surgical History:   Procedure Laterality Date    IMPLANT PROSTHESIS PENIS INFLATABLE N/A 10/4/2016    Procedure: IMPLANT PROSTHESIS PENIS INFLATABLE;  Surgeon: Jaquan Matthew MD;  Location: [facility] OR    INJECT EPIDURAL LUMBAR N/A 7/13/2023    Procedure: L5-S1 epidural steroid injection with fluoroscopy (right> left);  Surgeon:  Maryse Evans MD;  Location: [facility] OR    INJECT EPIDURAL TRANSFORAMINAL Right 10/24/2023    Procedure: Right L5 transforaminal epidural steroid injection with fluoroscopy;  Surgeon: Maryse Evans MD;  Location: [facility] OR    IR CVC TUNNEL W2 CATH W/O PORT  1/22/2024    IR RENAL BIOPSY RIGHT  6/8/2021    ORTHOPEDIC SURGERY      RTK 09/15    PERCUTANEOUS BIOPSY KIDNEY Right 3/12/2019    Procedure: Right Kidney Biopsy;  Surgeon: Ash Benitez MD;  Location: [facility] OR    TRANSPLANT KIDNEY RECIPIENT LIVING UNRELATED         ALLERGIES:  Nsaids and Baclofen    MEDS:    Current Outpatient Medications:     acetaminophen (TYLENOL) 500 MG tablet, Take 1,000 mg by mouth daily, Disp: , Rfl:     allopurinol (ZYLOPRIM) 300 MG tablet, Take 1 tablet by mouth daily at 2 pm., Disp: , Rfl:     amoxicillin (AMOXIL) 500 MG capsule, Take 500 mg by mouth. TAKE 2 CAPS AS NEEDED (DENTAL) TAKE 4 CAPSULES BY MOUTH 1 HOUR PRIOR TO DENTAL APPOINTMENTS, Disp: , Rfl:     aspirin 81 MG EC tablet, Take 81 mg by mouth daily, Disp: , Rfl:     atorvastatin (LIPITOR) 20 MG tablet, Take 1 tablet by mouth at bedtime., Disp: , Rfl:     calcium acetate (CALPHRON) 667 MG TABS tablet, Take 3 tablets by mouth 3 times daily., Disp: , Rfl:     carvedilol (COREG) 3.125 MG tablet, On Monday, Wednesday and Friday take 1 tablet daily and all other days take 1 tablet twice daily., Disp: , Rfl:     cholecalciferol (VITAMIN D) 1000 UNIT tablet, Take 1 tablet (1,000 Units) by mouth daily, Disp: 90 tablet, Rfl: 3    clonazePAM (KLONOPIN) 0.5 MG tablet, Take 0.25 mg by mouth every evening, Disp: , Rfl:     clopidogrel (PLAVIX) 75 MG tablet, Take 75 mg by mouth every morning., Disp: , Rfl:     Fexofenadine HCl (ALLEGRA PO), Take 1 tablet by mouth daily. , Disp: , Rfl:     gabapentin (NEURONTIN) 300 MG capsule, Take 1 capsule (300 mg) by mouth At Bedtime, Disp: 270 capsule, Rfl: 3    multivitamin RENAL (NEPHROVITE) 0.8 MG tablet, Take 1 tablet by mouth daily., Disp: , Rfl:  "    mycophenolate (GENERIC EQUIVALENT) 250 MG capsule, TAKE 3 CAPSULES (750 MG) BY MOUTH 2 TIMES DAILY, Disp: 180 capsule, Rfl: 11    pantoprazole (PROTONIX) 40 MG EC tablet, TAKE 1 TABLET BY MOUTH DAILY, Disp: 30 tablet, Rfl: 1    predniSONE (DELTASONE) 5 MG tablet, Take 1 tablet (5 mg) by mouth daily, Disp: 90 tablet, Rfl: 3    sertraline (ZOLOFT) 50 MG tablet, Take 1 mg by mouth at bedtime, Disp: , Rfl:     sulfamethoxazole-trimethoprim (BACTRIM) 400-80 MG tablet, Take 1 tablet by mouth Every Mon, Wed, Fri Morning, Disp: 13 tablet, Rfl: 11    torsemide (DEMADEX) 20 MG tablet, Take 40 mg by mouth daily, Disp: , Rfl:     vitamin D2 (ERGOCALCIFEROL) 64984 units (1250 mcg) capsule, Take 50,000 Units by mouth once a week., Disp: , Rfl:     allopurinol (ZYLOPRIM) 100 MG tablet, Take 3 tablets (300 mg) by mouth daily, Disp: 30 tablet, Rfl:     Calcium 500-2.5 MG-MCG CHEW, Take 1 tablet by mouth daily, Disp: 90 tablet, Rfl: 0    carvedilol 12.5 MG PO tablet, Take 1 tablet (12.5 mg) by mouth 2 times daily (with meals), Disp: 90 tablet, Rfl: 3    Multiple Vitamins-Minerals (ONE-A-DAY MENS 50+ ADVANTAGE PO), Take 1 tablet by mouth every evening (Patient not taking: Reported on 10/25/2024), Disp: , Rfl:     SOCIAL HABITS:    History   Smoking Status    Former    Types: Cigarettes   Smokeless Tobacco    Never     Social History    Substance and Sexual Activity      Alcohol use: Yes        Comment: social      History   Drug Use No       FAMILY HISTORY:    Family History   Problem Relation Age of Onset    Kidney Disease No family hx of     Liver Cancer No family hx of     Liver Disease No family hx of     Colon Cancer No family hx of        PE:  /60   Ht 1.727 m (5' 8\")   Wt 93 kg (205 lb)   BMI 31.17 kg/m    Wt Readings from Last 1 Encounters:   10/25/24 93 kg (205 lb)     Body mass index is 31.17 kg/m .    EXAM:  GENERAL: This is a well-developed 69 year old male who appears his stated age  Not performed due to " video visit          DIAGNOSTIC STUDIES:     Images:  US DOP ARTERY AORTA ILIAC LTD    Result Date: 10/9/2024  -------------------------------- Original Report ------------------------------- EXAM:  DUPLEX AORTA, IVC AND ILIAC VASCULARITY ULTRASOUND, LIMITED CLINICAL INFORMATION:  69-year-old male presents with end-stage renal disease, organ transplant candidate.     COMPARISON:  None. TECHNICAL INFORMATION:  Transabdominal grayscale evaluation of the abdominal aorta. Duplex scan of the aorta and iliac vessels was performed using B-Mode/gray scale imaging and Doppler spectral analysis and color flow. FINDINGS:   Proximal aorta is not well seen. Mid aorta measures 1.9 x 2.4 cm. Distal aorta measures 1.7 x 1.6 cm. Right common iliac artery measures 0.9 cm and left common iliac artery measures 1.4 cm There is moderate amount of hard and soft plaque of the aorta. Limited evaluation of the periaortic structures show no abnormalities. Doppler evaluation: Peak systolic velocity of the aorta measures up to 105 cm/s with high resistant waveforms.  Iliac vessels show peak systolic velocity of the right of 78 cm/s and 120 cm/s on the left. Incidentally noted marginated anechoic avascular mass with through transmission in the left iliac region measuring 5.9 x 2.8 cm. IMPRESSION: 1.  No AAA. 2.  Grossly normal Doppler evaluation of the iliac vessels and aorta. 3.  Indeterminate fluid collection in the left hemipelvis measuring 5.9 x 2.8 cm. Read by: Álvaro Hampton M.D. Reviewed and Electronically Signed by: Álvaro Hampton M.D.    US Lower Extremity Arterial Duplex Bilateral    Result Date: 10/9/2024  -------------------------------- Original Report ------------------------------- EXAM:  LOWER EXTREMITY ARTERIAL ULTRASOUND, BILATERAL BILATERAL   CLINICAL INFORMATION:  69-year-old male presents with end-stage renal disease, organ transplant candidate.  Evaluate for popliteal artery aneurysm. TECHNICAL INFORMATION: Duplex scan of the  bilateral  lower extremity arteries was performed using B-Mode/gray scale imaging and Doppler spectral analysis and color flow. Terminology: Degrees of stenosis are graded as mild, moderate and severe. (Semin Vasc Surg. 2013 Jun-Sep;26(2-3):) Normal (< 20%): Peak systolic velocity < 150 cm/s, ratio <1.5 Mild (20 - 49%): Peak systolic velocity < 200 cm/s, ratio 1.5 - 2.0 Moderate (50 - 75%): Peak systolic velocity 200-300 cm/s, ratio 2.0 - 4.0 Severe (> 75%): Peak systolic velocity > 300 cm/s, ratio > 4.0 Waveforms: Normal (Triphasic, Biphasic, Sharp monophasic): No significant proximal stenosis Moderately monophasic: Moderate-severe proximal stenosis Severely monophasic: Severe proximal stenosis or occlusion Comparison:  None. INTERPRETATION:   Right lower extremity peak velocities and waveforms: Right EIA:                             78 cm/sec.  Triphasic Right CFA:                     63 cm/sec.  Triphasic   Right PFA:                      40 cm/sec.  Biphasic   Right SFA proximal thigh:  44 cm/sec.   Biphasic   Right SFA mid thigh:   43 cm/sec.   Triphasic   Right SFA distal thigh:    47 cm/sec.   Triphasic   Right popliteal artery:   43 cm/sec.   Triphasic   The peak systolic velocities in the right PTA, KAREEN, peroneal, and dorsalis pedis arteries are 63, 40, 47 and 12 cm/sec with monophasic waveforms.   Plaque:  Moderate amount of scattered plaque.  No evidence of popliteal artery aneurysm.  No occlusion, stenosis or post stenotic waveforms are identified.   Left lower extremity peak velocities and waveforms: Left CFA:     74 cm/sec.   Triphasic   Left PFA:     47 cm/sec.   Monophasic   Left SFA proximal thigh:  47 cm/sec.   Triphasic   Left SFA mid thigh:   45 cm/sec.   Triphasic   Left SFA distal thigh:   42 cm/sec.   Triphasic   Left popliteal artery:   55 cm/sec.   Triphasic   The peak systolic velocities in the left PTA, KAREEN, peroneal and dorsalis pedis arteries are 37, 11, 76 and 39 cm/sec with  monophasic waveforms. Plaque:  Moderate scattered plaque.  There is a small popliteal aneurysm measuring 1.4 x 1.0 cm.  No occlusion, stenosis or post stenotic waveforms are identified.   IMPRESSION: 1.  Normal waveforms and velocities in the right lower extremity arterial system without stenosis nor occlusion. Monophasic waveforms of the calf arteries without poststenotic morphology.  2.  Normal waveforms and velocities the left lower extremity arterial system without stenosis nor occlusion.  Monophasic waveforms of the calf arteries without poststenotic morphology. 3.  Small left popliteal artery aneurysm measuring 1.4 x 1.0 cm. Read by: Álvaro Hampton M.D. Reviewed and Electronically Signed by: Álvaro Hampton M.D.      I personally reviewed the images and my interpretation is stable aneurysm of right LISA, right CFA, left LISA aneurysm not identified on this exam.      20 minutes spent on the day of encounter doing chart review, history and exam, documentation, and further activities as noted.     54533 (15-29 minutes)   96071 (30-40 minutes)   41475 (45-59 minutes)   52986 (60-74 minutes)     Established Patients:   74593 (10-19 minutes)   72183 (20-29 minutes)   90189 (30-39 minutes)   16508 (40-54 minutes)     Kala Govea DO, ROLANDO  VASCULAR SURGERY

## 2024-10-28 LAB
PROTOCOL CUTOFF: NORMAL
SA 1  COMMENTS: NORMAL
SA 1 CELL: NORMAL
SA 1 TEST METHOD: NORMAL
SA 2 CELL: NORMAL
SA 2 COMMENTS: NORMAL
SA 2 TEST METHOD: NORMAL
SA1 HI RISK ABY: NORMAL
SA1 MOD RISK ABY: NORMAL
SA2 HI RISK ABY: NORMAL
SA2 MOD RISK ABY: NORMAL
UNACCEPTABLE ANTIGENS: NORMAL
UNOS CPRA: 33

## 2024-11-09 ENCOUNTER — HEALTH MAINTENANCE LETTER (OUTPATIENT)
Age: 69
End: 2024-11-09

## 2024-12-18 ENCOUNTER — TRANSFERRED RECORDS (OUTPATIENT)
Dept: HEALTH INFORMATION MANAGEMENT | Facility: CLINIC | Age: 69
End: 2024-12-18
Payer: MEDICARE

## 2025-02-07 PROCEDURE — 999N001093 HLA VIRTUAL CROSSMATCH (VXM), LIVING DONOR: Performed by: SURGERY

## 2025-02-23 DIAGNOSIS — Z94.0 KIDNEY REPLACED BY TRANSPLANT: ICD-10-CM

## 2025-02-23 DIAGNOSIS — Z94.0 KIDNEY TRANSPLANTED: ICD-10-CM

## 2025-02-24 RX ORDER — SULFAMETHOXAZOLE AND TRIMETHOPRIM 400; 80 MG/1; MG/1
1 TABLET ORAL
Qty: 13 TABLET | Refills: 11 | Status: SHIPPED | OUTPATIENT
Start: 2025-02-24

## 2025-03-17 DIAGNOSIS — I10 HYPERTENSION: ICD-10-CM

## 2025-03-17 DIAGNOSIS — Z12.5 SCREENING FOR PROSTATE CANCER: ICD-10-CM

## 2025-03-17 DIAGNOSIS — N18.6 ESRD (END STAGE RENAL DISEASE) (H): Primary | ICD-10-CM

## 2025-03-17 DIAGNOSIS — Z76.82 ORGAN TRANSPLANT CANDIDATE: ICD-10-CM

## 2025-04-29 DIAGNOSIS — Z94.0 KIDNEY TRANSPLANTED: ICD-10-CM

## 2025-04-29 DIAGNOSIS — Z94.0 KIDNEY REPLACED BY TRANSPLANT: ICD-10-CM

## 2025-04-29 RX ORDER — MYCOPHENOLATE MOFETIL 250 MG/1
750 CAPSULE ORAL 2 TIMES DAILY
Qty: 540 CAPSULE | Refills: 3 | Status: SHIPPED | OUTPATIENT
Start: 2025-04-29

## 2025-05-08 ENCOUNTER — ALLIED HEALTH/NURSE VISIT (OUTPATIENT)
Dept: TRANSPLANT | Facility: CLINIC | Age: 70
End: 2025-05-08
Attending: NURSE PRACTITIONER
Payer: MEDICARE

## 2025-05-08 ENCOUNTER — ANCILLARY PROCEDURE (OUTPATIENT)
Dept: CT IMAGING | Facility: CLINIC | Age: 70
End: 2025-05-08
Attending: NURSE PRACTITIONER
Payer: MEDICARE

## 2025-05-08 ENCOUNTER — RESULTS FOLLOW-UP (OUTPATIENT)
Dept: TRANSPLANT | Facility: CLINIC | Age: 70
End: 2025-05-08

## 2025-05-08 ENCOUNTER — LAB (OUTPATIENT)
Dept: LAB | Facility: CLINIC | Age: 70
End: 2025-05-08
Attending: NURSE PRACTITIONER
Payer: MEDICARE

## 2025-05-08 ENCOUNTER — ANCILLARY PROCEDURE (OUTPATIENT)
Dept: ULTRASOUND IMAGING | Facility: CLINIC | Age: 70
End: 2025-05-08
Attending: NURSE PRACTITIONER
Payer: MEDICARE

## 2025-05-08 VITALS
SYSTOLIC BLOOD PRESSURE: 98 MMHG | BODY MASS INDEX: 31.63 KG/M2 | TEMPERATURE: 98.1 F | HEIGHT: 67 IN | OXYGEN SATURATION: 97 % | RESPIRATION RATE: 18 BRPM | DIASTOLIC BLOOD PRESSURE: 61 MMHG | WEIGHT: 201.5 LBS | HEART RATE: 72 BPM

## 2025-05-08 VITALS
HEART RATE: 72 BPM | HEIGHT: 67 IN | TEMPERATURE: 98.1 F | WEIGHT: 201.5 LBS | DIASTOLIC BLOOD PRESSURE: 61 MMHG | BODY MASS INDEX: 31.63 KG/M2 | SYSTOLIC BLOOD PRESSURE: 98 MMHG | OXYGEN SATURATION: 97 %

## 2025-05-08 DIAGNOSIS — N18.6 ESRD (END STAGE RENAL DISEASE) (H): ICD-10-CM

## 2025-05-08 DIAGNOSIS — N18.6 ESRD (END STAGE RENAL DISEASE) (H): Primary | ICD-10-CM

## 2025-05-08 DIAGNOSIS — Z76.82 ORGAN TRANSPLANT CANDIDATE: ICD-10-CM

## 2025-05-08 DIAGNOSIS — Z12.5 SCREENING FOR PROSTATE CANCER: ICD-10-CM

## 2025-05-08 DIAGNOSIS — I48.0 PAROXYSMAL ATRIAL FIBRILLATION (H): ICD-10-CM

## 2025-05-08 DIAGNOSIS — I10 HYPERTENSION: ICD-10-CM

## 2025-05-08 DIAGNOSIS — Z94.0 KIDNEY TRANSPLANTED: ICD-10-CM

## 2025-05-08 DIAGNOSIS — I95.9 HYPOTENSION, UNSPECIFIED HYPOTENSION TYPE: ICD-10-CM

## 2025-05-08 DIAGNOSIS — I63.9 ISCHEMIC STROKE (H): ICD-10-CM

## 2025-05-08 DIAGNOSIS — Z01.818 ENCOUNTER FOR PRE-TRANSPLANT EVALUATION FOR KIDNEY TRANSPLANT: Primary | ICD-10-CM

## 2025-05-08 DIAGNOSIS — T87.89 DELAYED SURGICAL WOUND HEALING OF TOE AMPUTATION STUMP (H): ICD-10-CM

## 2025-05-08 LAB — PSA SERPL DL<=0.01 NG/ML-MCNC: 1.08 NG/ML (ref 0–6.5)

## 2025-05-08 PROCEDURE — 99000 SPECIMEN HANDLING OFFICE-LAB: CPT | Performed by: PATHOLOGY

## 2025-05-08 PROCEDURE — 86828 HLA CLASS I&II ANTIBODY QUAL: CPT | Performed by: NURSE PRACTITIONER

## 2025-05-08 PROCEDURE — 93978 VASCULAR STUDY: CPT | Performed by: STUDENT IN AN ORGANIZED HEALTH CARE EDUCATION/TRAINING PROGRAM

## 2025-05-08 PROCEDURE — G0463 HOSPITAL OUTPT CLINIC VISIT: HCPCS | Performed by: TRANSPLANT SURGERY

## 2025-05-08 PROCEDURE — 74176 CT ABD & PELVIS W/O CONTRAST: CPT | Mod: GC | Performed by: STUDENT IN AN ORGANIZED HEALTH CARE EDUCATION/TRAINING PROGRAM

## 2025-05-08 PROCEDURE — G0463 HOSPITAL OUTPT CLINIC VISIT: HCPCS | Performed by: NURSE PRACTITIONER

## 2025-05-08 RX ORDER — CILOSTAZOL 50 MG/1
50 TABLET ORAL 2 TIMES DAILY
COMMUNITY

## 2025-05-08 ASSESSMENT — PAIN SCALES - GENERAL: PAINLEVEL_OUTOF10: NO PAIN (0)

## 2025-05-08 NOTE — LETTER
5/8/2025      Gurjit Squires  [address]   [address]  [facility] [address]      Dear Colleague,    Thank you for referring your patient, Gurjit Squires, to the [facility]. Please see a copy of my visit note below.    TRANSPLANT NEPHROLOGY WAITLIST VISIT      Assessment and Plan:  # Kidney Transplant Wait List Evaluation: Patient is a fair candidate overall. Patient has been approved for living donor only.     Recommendations:  - CT a/p and iliac US review at complex imaging.    - Cont PT/OT    # LDKT: history of ESKD from IgA nephropathy s/p LDKT 7/29/2004, complicated by acute cellular mediated rejection 2007. Doing OK on hemodialysis since 1/2024, but may benefit from another kidney transplant.  Taking MMF and pred 5 mg daily for IS.     # Cardiac Risk:   - A fib previously on coumadin s/p LAAO device 5/2024, Off Plavix now, on Pletal (for PAD)  - CAD with last coronary angiogram in 2/2024 showed nonocclusive disease. No PCI. Medical management.     Last echo from 7/2024 with LVEF ~ 55 to 60%. Would need update risk assessment, if donor identified.     # Hypotension: using  Midodrine 10 mg prn once on dialysis days only and has not been needing it for every run. BPs averaging 100-120/60-70.       # PAD: S/p right toe amp (2/2025, healed ) and left toe amp (4/2025). Still wearing left walking boot.  On Pletal for anti-platelet therapy. CT and iliacs done today that showed severe disease and will need to be reviewed by surgery.     # Obesity: BMI 31 with 65 lbs of weight loss since starting dialysis (r/t both fluid loss and intentional weight loss). Seen by surgery today.     # MASLD: 4/2024 Fibrosis scan showed Stage 0-1 fibrosis.  Platelets recently ~ 422 K. No further follow per Dr. Nicholson (inbasket message noted in checklist).     # History of CVA: within the last 5 years with some residual right sided weakness.     # Spontaneous Rectus Sheath Hematoma: unclear etiology but in  setting of supratherapeutic INR requiring admission and blood transfusion March 2023. Measured 7.5 x 11.7 x 17.9 cm. Resolved on imaging today.     # Iron deficiency anemia: likely multifactorial in the setting of renal dysfunction and bone marrow suppression given patient that he is on multiple immunosuppression agents. Hgb stable ~ 8-10 range. EGD recommended by hepatology and not yet completed.      # Non-Melanotic Skin Cancer: SCC 2016. Derm appt schedueld 6/2025      # Penile Implant    # Physical function:  limited right now with recent toe amputations, still in left walking boot. Also has foot drag from stroke. Able to walk one city block with walker slowly. Doing PT/ OT.      # Health Maintenance: Colonoscopy: Up to date (due in 2026) and Dental: Up to date     Discussed the risks and benefits of a transplant, including the risk of surgery and immunosuppression medications.        Patient presently appears to be enough of an acceptable kidney transplant recipient candidate to have any potential kidney donors start the evaluation process.  Patient s overall re-evaluation may require further discussion in the Transplant Program s multidisciplinary selection committee for a final recommendation on the patient s suitability for transplant.     Reason for Visit:  Gurjit Squires is a 70 year old male with ESKD from failed LDKT, who presents for kidney transplant wait list evaluation.     Date of Initial Transplant Evaluation:  10/2023         Current Transplant Phase: Waitlist: Inactive  Official [facility]OS Listing Date: 4/11/2023  Blood Type: O        cPRA: 33%       Date of cPRA: 10/2204   Transplant Coordinator: Elvia Wetzel Transplant Office phone number [phone]          History of Present Illness:            Interim Events:   12/2023: pneumonia, fluid overload with pleural effusions S/p thoracentesis     1/2024: started HD    12/2024: with multifocal pneumonia at which time he was found to have  large right transudative pleural effusion. He had thoracentesis during that hospitalization     2/2024: needed repeat thoracentesis      8/2024  ED visit for dizziness. MRA with no acute infarcts. Not dizzy while in ER. Found to be hypotensive. Later started midodrine with PCP.      5/30/24: LAAO placement procedure. Stopped AC.     2/2025:   S/p right second toe amputation 2/2/2025 (healed)    4/2025:  S/p left toe amputation on 4/18/25. Still healing in boot.              Kidney Disease:        Kidney Disease Dx: S/p failed LDKT         On Dialysis: Yes, Date initiated: 1/2024  and Dialysis Type: Incenter HD;, down 65 lbs since starting (both fluid and intentional weight loss), no /v/d, prn midrodrine 10 mg prn on dialysis days only with BPs averaging 100-120/60-70 , anuric, MMF and pred 5 mg daily.        Primary Nephrologist: [facility]         Cardiac/Vascular Disease History:       Known CAD: Yes nonobstructive disease on 2/2024 [facility]          Known PAD/Claudication Symptoms: Yes; severe disease       Known Heart Failure: No       Arrhythmia:  Yes; a fib S/p LAAO device 5/2024, now off AC.       Pulmonary Hypertension: No        Valvular Disease: No       Other: None       New Cardiac/Vascular Events: No         Functional Capacity/Frailty:        Limited right now with recent toe amputations, still in left walking boot. Also has foot drag from stroke.  Able to walk one city block with walker slowly. Doing PT/ OT. No SOB or CP.         Other Pertinent Transplant Surgical Issues:  Recent Blood Transfusion: No  Previous Abdominal Transplant: No  Bladder Dysfunction: No  Chronic/Recurrent Infections: No  Chronic Anticoagulation: No  Jehovah s Witness: No       Active Problem List:   Patient Active Problem List   Diagnosis     Testicular hypofunction     Kidney replaced by transplant     Erectile dysfunction     HTN, kidney transplant related     Immunosuppressed status     Metabolic acidosis      Vitamin D deficiency     VANESA (obstructive sleep apnea)     Aftercare following organ transplant     Abrasion of anterior lower leg, left, subsequent encounter     Arthritis of knee, right     Paroxysmal atrial fibrillation (H)     Chronic bilateral low back pain without sciatica     Chronic gout     S/P total knee replacement using cement     Ischemic stroke (H)     Hypertriglyceridemia     Class 2 severe obesity with serious comorbidity and body mass index (BMI) of 38.0 to 38.9 in adult (H)     Long term current use of anticoagulant     Osteoarthritis of carpometacarpal joint of thumb     CKD (chronic kidney disease) stage 4, GFR 15-29 ml/min (H)     Need for pneumocystis prophylaxis     Anemia in chronic renal disease     Spinal stenosis of lumbar region, unspecified whether neurogenic claudication present     Lumbar radiculopathy     Left shoulder pain     Liver fibrosis     MGUS (monoclonal gammopathy of unknown significance)     Hematoma     Pre-diabetes       Personal History:  Former smoker      Allergies:  Allergies   Allergen Reactions     Nsaids Other (See Comments)     Kidney transplant patient.  NSAIDs contraindicated     Baclofen Other (See Comments)     Foggy, not clear headed.        Medications:  Current Outpatient Medications   Medication Sig Dispense Refill     acetaminophen (TYLENOL) 500 MG tablet Take 1,000 mg by mouth daily       allopurinol (ZYLOPRIM) 100 MG tablet Take 3 tablets (300 mg) by mouth daily 30 tablet      allopurinol (ZYLOPRIM) 300 MG tablet Take 1 tablet by mouth daily at 2 pm.       amoxicillin (AMOXIL) 500 MG capsule Take 500 mg by mouth. TAKE 2 CAPS AS NEEDED (DENTAL) TAKE 4 CAPSULES BY MOUTH 1 HOUR PRIOR TO DENTAL APPOINTMENTS       aspirin 81 MG EC tablet Take 81 mg by mouth daily       atorvastatin (LIPITOR) 20 MG tablet Take 1 tablet by mouth at bedtime.       Calcium 500-2.5 MG-MCG CHEW Take 1 tablet by mouth daily 90 tablet 0     calcium acetate (CALPHRON) 667 MG TABS  tablet Take 3 tablets by mouth 3 times daily.       carvedilol (COREG) 3.125 MG tablet On Monday, Wednesday and Friday take 1 tablet daily and all other days take 1 tablet twice daily.       carvedilol 12.5 MG PO tablet Take 1 tablet (12.5 mg) by mouth 2 times daily (with meals) 90 tablet 3     cholecalciferol (VITAMIN D) 1000 UNIT tablet Take 1 tablet (1,000 Units) by mouth daily (Patient not taking: Reported on 5/8/2025) 90 tablet 3     cilostazol (PLETAL) 50 MG tablet Take 50 mg by mouth 2 times daily.       clonazePAM (KLONOPIN) 0.5 MG tablet Take 0.25 mg by mouth every evening       clopidogrel (PLAVIX) 75 MG tablet Take 75 mg by mouth every morning. (Patient not taking: Reported on 5/8/2025)       Fexofenadine HCl (ALLEGRA PO) Take 1 tablet by mouth daily.  (Patient not taking: Reported on 5/8/2025)       gabapentin (NEURONTIN) 300 MG capsule Take 1 capsule (300 mg) by mouth At Bedtime 270 capsule 3     Multiple Vitamins-Minerals (ONE-A-DAY MENS 50+ ADVANTAGE PO) Take 1 tablet by mouth every evening (Patient not taking: Reported on 10/25/2024)       multivitamin RENAL (NEPHROVITE) 0.8 MG tablet Take 1 tablet by mouth daily.       mycophenolate (GENERIC EQUIVALENT) 250 MG capsule TAKE 3 CAPSULES (750 MG) BY MOUTH 2 TIMES DAILY 540 capsule 3     pantoprazole (PROTONIX) 40 MG EC tablet TAKE 1 TABLET BY MOUTH DAILY 30 tablet 1     predniSONE (DELTASONE) 5 MG tablet Take 1 tablet (5 mg) by mouth daily 90 tablet 3     sertraline (ZOLOFT) 50 MG tablet Take 1 mg by mouth at bedtime       sulfamethoxazole-trimethoprim (BACTRIM) 400-80 MG tablet TAKE 1 TABLET BY MOUTH EVERY MON, WED, FRI MORNING 13 tablet 11     torsemide (DEMADEX) 20 MG tablet Take 40 mg by mouth daily       vitamin D2 (ERGOCALCIFEROL) 35974 units (1250 mcg) capsule Take 50,000 Units by mouth once a week.       No current facility-administered medications for this visit.        Vitals:  There were no vitals taken for this visit.     Exam:  GENERAL  APPEARANCE: alert and no distress  HENT: mouth without ulcers or lesions  LYMPHATICS: no cervical or supraclavicular nodes  RESP: lungs clear to auscultation - no rales, rhonchi or wheezes  CV: regular rhythm, normal rate, no rub, no murmur  EDEMA: no LE edema bilaterally  ABDOMEN: soft, nondistended, nontender, bowel sounds normal  MS: extremities normal - no gross deformities noted, no evidence of inflammation in joints, no muscle tenderness  SKIN: no rash    40 minutes spent on the date of the encounter doing chart review, history and exam, documentation and further activities per the note.            Again, thank you for allowing me to participate in the care of your patient.        Sincerely,        JASMINA Luoise CNP    Electronically signed

## 2025-05-08 NOTE — PROGRESS NOTES
TRANSPLANT NEPHROLOGY WAITLIST VISIT      Assessment and Plan:  # Kidney Transplant Wait List Evaluation: Patient is a fair candidate overall. Patient has been approved for living donor only.     Recommendations:  - CT a/p and iliac US review at complex imaging.    - Cont PT/OT    # LDKT: history of ESKD from IgA nephropathy s/p LDKT 7/29/2004, complicated by acute cellular mediated rejection 2007. Doing OK on hemodialysis since 1/2024, but may benefit from another kidney transplant.  Taking MMF and pred 5 mg daily for IS.     # Cardiac Risk:   - A fib previously on coumadin s/p LAAO device 5/2024, Off Plavix now, on Pletal (for PAD)  - CAD with last coronary angiogram in 2/2024 showed nonocclusive disease. No PCI. Medical management.     Last echo from 7/2024 with LVEF ~ 55 to 60%. Would need update risk assessment, if donor identified.     # Hypotension: using  Midodrine 10 mg prn once on dialysis days only and has not been needing it for every run. BPs averaging 100-120/60-70.       # PAD: S/p right toe amp (2/2025, healed ) and left toe amp (4/2025). Still wearing left walking boot.  On Pletal for anti-platelet therapy. CT and iliacs done today that showed severe disease and will need to be reviewed by surgery.     # Obesity: BMI 31 with 65 lbs of weight loss since starting dialysis (r/t both fluid loss and intentional weight loss). Seen by surgery today.     # MASLD: 4/2024 Fibrosis scan showed Stage 0-1 fibrosis.  Platelets recently ~ 422 K. No further follow per Dr. Nicholson (inbasket message noted in checklist).     # History of CVA: within the last 5 years with some residual right sided weakness.     # Spontaneous Rectus Sheath Hematoma: unclear etiology but in setting of supratherapeutic INR requiring admission and blood transfusion March 2023. Measured 7.5 x 11.7 x 17.9 cm. Resolved on imaging today.     # Iron deficiency anemia: likely multifactorial in the setting of renal dysfunction and bone marrow  suppression given patient that he is on multiple immunosuppression agents. Hgb stable ~ 8-10 range. EGD recommended by hepatology and not yet completed.      # Non-Melanotic Skin Cancer: SCC 2016. Derm appt schedueld 6/2025      # Penile Implant    # Physical function:  limited right now with recent toe amputations, still in left walking boot. Also has foot drag from stroke. Able to walk one city block with walker slowly. Doing PT/ OT.      # Health Maintenance: Colonoscopy: Up to date (due in 2026) and Dental: Up to date     Discussed the risks and benefits of a transplant, including the risk of surgery and immunosuppression medications.        Patient presently appears to be enough of an acceptable kidney transplant recipient candidate to have any potential kidney donors start the evaluation process.  Patient s overall re-evaluation may require further discussion in the Transplant Program s multidisciplinary selection committee for a final recommendation on the patient s suitability for transplant.     Reason for Visit:  Gurjit Squires is a 70 year old male with ESKD from failed LDKT, who presents for kidney transplant wait list evaluation.     Date of Initial Transplant Evaluation:  10/2023         Current Transplant Phase: Waitlist: Inactive  Official [facility]OS Listing Date: 4/11/2023  Blood Type: O        cPRA: 33%       Date of cPRA: 10/2204   Transplant Coordinator: Elvia Wetzel Transplant Office phone number [phone]          History of Present Illness:            Interim Events:   12/2023: pneumonia, fluid overload with pleural effusions S/p thoracentesis     1/2024: started HD    12/2024: with multifocal pneumonia at which time he was found to have large right transudative pleural effusion. He had thoracentesis during that hospitalization     2/2024: needed repeat thoracentesis      8/2024  ED visit for dizziness. MRA with no acute infarcts. Not dizzy while in ER. Found to be hypotensive.  Later started midodrine with PCP.      5/30/24: LAAO placement procedure. Stopped AC.     2/2025:   S/p right second toe amputation 2/2/2025 (healed)    4/2025:  S/p left toe amputation on 4/18/25. Still healing in boot.              Kidney Disease:        Kidney Disease Dx: S/p failed LDKT         On Dialysis: Yes, Date initiated: 1/2024  and Dialysis Type: [facility];, down 65 lbs since starting (both fluid and intentional weight loss), no /v/d, prn midrodrine 10 mg prn on dialysis days only with BPs averaging 100-120/60-70 , anuric, MMF and pred 5 mg daily.        Primary Nephrologist: [facility]         Cardiac/Vascular Disease History:       Known CAD: Yes nonobstructive disease on 2/2024 [facility]          Known PAD/Claudication Symptoms: Yes; severe disease       Known Heart Failure: No       Arrhythmia:  Yes; a fib S/p LAAO device 5/2024, now off AC.       Pulmonary Hypertension: No        Valvular Disease: No       Other: None       New Cardiac/Vascular Events: No         Functional Capacity/Frailty:        Limited right now with recent toe amputations, still in left walking boot. Also has foot drag from stroke.  Able to walk one city block with walker slowly. Doing PT/ OT. No SOB or CP.         Other Pertinent Transplant Surgical Issues:  Recent Blood Transfusion: No  Previous Abdominal Transplant: No  Bladder Dysfunction: No  Chronic/Recurrent Infections: No  Chronic Anticoagulation: No  Jehovah s Witness: No       Active Problem List:   Patient Active Problem List   Diagnosis    Testicular hypofunction    Kidney replaced by transplant    Erectile dysfunction    HTN, kidney transplant related    Immunosuppressed status    Metabolic acidosis    Vitamin D deficiency    VANESA (obstructive sleep apnea)    Aftercare following organ transplant    Abrasion of anterior lower leg, left, subsequent encounter    Arthritis of knee, right    Paroxysmal atrial fibrillation (H)    Chronic bilateral low back pain  without sciatica    Chronic gout    S/P total knee replacement using cement    Ischemic stroke (H)    Hypertriglyceridemia    Class 2 severe obesity with serious comorbidity and body mass index (BMI) of 38.0 to 38.9 in adult (H)    Long term current use of anticoagulant    Osteoarthritis of carpometacarpal joint of thumb    CKD (chronic kidney disease) stage 4, GFR 15-29 ml/min (H)    Need for pneumocystis prophylaxis    Anemia in chronic renal disease    Spinal stenosis of lumbar region, unspecified whether neurogenic claudication present    Lumbar radiculopathy    Left shoulder pain    Liver fibrosis    MGUS (monoclonal gammopathy of unknown significance)    Hematoma    Pre-diabetes       Personal History:  Former smoker      Allergies:  Allergies   Allergen Reactions    Nsaids Other (See Comments)     Kidney transplant patient.  NSAIDs contraindicated    Baclofen Other (See Comments)     Foggy, not clear headed.        Medications:  Current Outpatient Medications   Medication Sig Dispense Refill    acetaminophen (TYLENOL) 500 MG tablet Take 1,000 mg by mouth daily      allopurinol (ZYLOPRIM) 100 MG tablet Take 3 tablets (300 mg) by mouth daily 30 tablet     allopurinol (ZYLOPRIM) 300 MG tablet Take 1 tablet by mouth daily at 2 pm.      amoxicillin (AMOXIL) 500 MG capsule Take 500 mg by mouth. TAKE 2 CAPS AS NEEDED (DENTAL) TAKE 4 CAPSULES BY MOUTH 1 HOUR PRIOR TO DENTAL APPOINTMENTS      aspirin 81 MG EC tablet Take 81 mg by mouth daily      atorvastatin (LIPITOR) 20 MG tablet Take 1 tablet by mouth at bedtime.      Calcium 500-2.5 MG-MCG CHEW Take 1 tablet by mouth daily 90 tablet 0    calcium acetate (CALPHRON) 667 MG TABS tablet Take 3 tablets by mouth 3 times daily.      carvedilol (COREG) 3.125 MG tablet On Monday, Wednesday and Friday take 1 tablet daily and all other days take 1 tablet twice daily.      carvedilol 12.5 MG PO tablet Take 1 tablet (12.5 mg) by mouth 2 times daily (with meals) 90 tablet 3     cholecalciferol (VITAMIN D) 1000 UNIT tablet Take 1 tablet (1,000 Units) by mouth daily (Patient not taking: Reported on 5/8/2025) 90 tablet 3    cilostazol (PLETAL) 50 MG tablet Take 50 mg by mouth 2 times daily.      clonazePAM (KLONOPIN) 0.5 MG tablet Take 0.25 mg by mouth every evening      clopidogrel (PLAVIX) 75 MG tablet Take 75 mg by mouth every morning. (Patient not taking: Reported on 5/8/2025)      Fexofenadine HCl (ALLEGRA PO) Take 1 tablet by mouth daily.  (Patient not taking: Reported on 5/8/2025)      gabapentin (NEURONTIN) 300 MG capsule Take 1 capsule (300 mg) by mouth At Bedtime 270 capsule 3    Multiple Vitamins-Minerals (ONE-A-DAY MENS 50+ ADVANTAGE PO) Take 1 tablet by mouth every evening (Patient not taking: Reported on 10/25/2024)      multivitamin RENAL (NEPHROVITE) 0.8 MG tablet Take 1 tablet by mouth daily.      mycophenolate (GENERIC EQUIVALENT) 250 MG capsule TAKE 3 CAPSULES (750 MG) BY MOUTH 2 TIMES DAILY 540 capsule 3    pantoprazole (PROTONIX) 40 MG EC tablet TAKE 1 TABLET BY MOUTH DAILY 30 tablet 1    predniSONE (DELTASONE) 5 MG tablet Take 1 tablet (5 mg) by mouth daily 90 tablet 3    sertraline (ZOLOFT) 50 MG tablet Take 1 mg by mouth at bedtime      sulfamethoxazole-trimethoprim (BACTRIM) 400-80 MG tablet TAKE 1 TABLET BY MOUTH EVERY MON, WED, FRI MORNING 13 tablet 11    torsemide (DEMADEX) 20 MG tablet Take 40 mg by mouth daily      vitamin D2 (ERGOCALCIFEROL) 19174 units (1250 mcg) capsule Take 50,000 Units by mouth once a week.       No current facility-administered medications for this visit.        Vitals:  There were no vitals taken for this visit.     Exam:  GENERAL APPEARANCE: alert and no distress  HENT: mouth without ulcers or lesions  LYMPHATICS: no cervical or supraclavicular nodes  RESP: lungs clear to auscultation - no rales, rhonchi or wheezes  CV: regular rhythm, normal rate, no rub, no murmur  EDEMA: no LE edema bilaterally  ABDOMEN: soft, nondistended, nontender,  bowel sounds normal  MS: extremities normal - no gross deformities noted, no evidence of inflammation in joints, no muscle tenderness  SKIN: no rash    40 minutes spent on the date of the encounter doing chart review, history and exam, documentation and further activities per the note.

## 2025-05-08 NOTE — PROGRESS NOTES
I/P  From a surgical perspective, he is known high risk, but old CT demonstrates there should be an implantation sites.  It was stressed that he has atherosclerotic arterial disease and there is an increased risk that there will be technical problems with a txp procedure.   He has increased risk of arterial injury during vascular control.  These factors increase his risk of technical loss of kidney.    We spoke about LDKT, not really technically easier, but does increase likelihood of immediate function and receive txp benefit.  DDKT likely to get only an older, high KDPI kidney with higher likelihood of diminished function and dGF  He is a vasculopath, make sure coronaries and carotids do not have critical lesions.  On the whole, despite telling him there is a significant risk (10%) that a kidney txp would not benefit  him, he is desirous of proceeding.  I see no major surgical absolute contraindications.  However, make sure medical eval minimizes risks.  Would wait until foot wounds healed prior to attempted txp.  Get updated labs/UA and immunologic screening into epic.    30 min: 5 review, 25 min reviewing films and discussion with patient and wife. PE and interval hx.  10 documentation.    HPI: 71 yo man: prior KT 2004. On HD for past year.  Still makes some urine, no infx, hematuria.  Still on IS.  Had a stroke a few yrs ago  Has had amputations, slow healing wounds (poor blood flow)    Current Outpatient Medications   Medication Sig Dispense Refill    acetaminophen (TYLENOL) 500 MG tablet Take 1,000 mg by mouth daily      allopurinol (ZYLOPRIM) 300 MG tablet Take 1 tablet by mouth daily at 2 pm.      amoxicillin (AMOXIL) 500 MG capsule Take 500 mg by mouth. TAKE 2 CAPS AS NEEDED (DENTAL) TAKE 4 CAPSULES BY MOUTH 1 HOUR PRIOR TO DENTAL APPOINTMENTS      aspirin 81 MG EC tablet Take 81 mg by mouth daily      atorvastatin (LIPITOR) 20 MG tablet Take 1 tablet by mouth at bedtime.      calcium acetate (CALPHRON)  667 MG TABS tablet Take 3 tablets by mouth 3 times daily.      carvedilol (COREG) 3.125 MG tablet On Monday, Wednesday and Friday take 1 tablet daily and all other days take 1 tablet twice daily.      clonazePAM (KLONOPIN) 0.5 MG tablet Take 0.25 mg by mouth every evening      gabapentin (NEURONTIN) 300 MG capsule Take 1 capsule (300 mg) by mouth At Bedtime 270 capsule 3    multivitamin RENAL (NEPHROVITE) 0.8 MG tablet Take 1 tablet by mouth daily.      mycophenolate (GENERIC EQUIVALENT) 250 MG capsule TAKE 3 CAPSULES (750 MG) BY MOUTH 2 TIMES DAILY 540 capsule 3    pantoprazole (PROTONIX) 40 MG EC tablet TAKE 1 TABLET BY MOUTH DAILY 30 tablet 1    predniSONE (DELTASONE) 5 MG tablet Take 1 tablet (5 mg) by mouth daily 90 tablet 3    sertraline (ZOLOFT) 50 MG tablet Take 1 mg by mouth at bedtime      sulfamethoxazole-trimethoprim (BACTRIM) 400-80 MG tablet TAKE 1 TABLET BY MOUTH EVERY MON, WED, FRI MORNING 13 tablet 11    torsemide (DEMADEX) 20 MG tablet Take 40 mg by mouth daily      vitamin D2 (ERGOCALCIFEROL) 95852 units (1250 mcg) capsule Take 50,000 Units by mouth once a week.      allopurinol (ZYLOPRIM) 100 MG tablet Take 3 tablets (300 mg) by mouth daily 30 tablet     Calcium 500-2.5 MG-MCG CHEW Take 1 tablet by mouth daily 90 tablet 0    carvedilol 12.5 MG PO tablet Take 1 tablet (12.5 mg) by mouth 2 times daily (with meals) 90 tablet 3    cholecalciferol (VITAMIN D) 1000 UNIT tablet Take 1 tablet (1,000 Units) by mouth daily (Patient not taking: Reported on 5/8/2025) 90 tablet 3    cilostazol (PLETAL) 50 MG tablet Take 50 mg by mouth 2 times daily.      clopidogrel (PLAVIX) 75 MG tablet Take 75 mg by mouth every morning. (Patient not taking: Reported on 5/8/2025)      Fexofenadine HCl (ALLEGRA PO) Take 1 tablet by mouth daily.  (Patient not taking: Reported on 5/8/2025)      Multiple Vitamins-Minerals (ONE-A-DAY MENS 50+ ADVANTAGE PO) Take 1 tablet by mouth every evening (Patient not taking: Reported on  "10/25/2024)       No current facility-administered medications for this visit.   PE  BP 98/61 (BP Location: Right arm, Patient Position: Sitting, Cuff Size: Adult Regular)   Pulse 72   Temp 98.1  F (36.7  C) (Oral)   Resp 18   Ht 1.691 m (5' 6.58\")   Wt 91.4 kg (201 lb 8 oz)   SpO2 97%   BMI 31.96 kg/m    LLQ ok for surgical procedure.  L groin ok pulse.  L foot boot after amputation    No recent labs      "

## 2025-05-08 NOTE — NURSING NOTE
"Chief Complaint   Patient presents with    Consult     Kidney transplant wait list      Vital signs:  Temp: 98.1  F (36.7  C) Temp src: Oral BP: 98/61 Pulse: 72   Resp: 18 SpO2: 97 %     Height: 169.1 cm (5' 6.58\") (shoe off) Weight: 91.4 kg (201 lb 8 oz) (shoe off and subtract 1.5 kg for boot)  Estimated body mass index is 31.96 kg/m  as calculated from the following:    Height as of this encounter: 1.691 m (5' 6.58\").    Weight as of this encounter: 91.4 kg (201 lb 8 oz).      Neeta Gaona, [facility]  5/8/2025 9:05 AM    "

## 2025-05-08 NOTE — LETTER
5/8/2025      Gurjit Squires  [address]   [address]  [facility] [address]      Dear Colleague,    Thank you for referring your patient, Gurjit Squires, to the [facility]. Please see a copy of my visit note below.    I/P  From a surgical perspective, he is known high risk, but old CT demonstrates there should be an implantation sites.  It was stressed that he has atherosclerotic arterial disease and there is an increased risk that there will be technical problems with a txp procedure.   He has increased risk of arterial injury during vascular control.  These factors increase his risk of technical loss of kidney.    We spoke about LDKT, not really technically easier, but does increase likelihood of immediate function and receive txp benefit.  DDKT likely to get only an older, high KDPI kidney with higher likelihood of diminished function and dGF  He is a vasculopath, make sure coronaries and carotids do not have critical lesions.  On the whole, despite telling him there is a significant risk (10%) that a kidney txp would not benefit  him, he is desirous of proceeding.  I see no major surgical absolute contraindications.  However, make sure medical eval minimizes risks.  Would wait until foot wounds healed prior to attempted txp.  Get updated labs/UA and immunologic screening into epic.    30 min: 5 review, 25 min reviewing films and discussion with patient and wife. PE and interval hx.  10 documentation.    HPI: 71 yo man: prior KT 2004. On HD for past year.  Still makes some urine, no infx, hematuria.  Still on IS.  Had a stroke a few yrs ago  Has had amputations, slow healing wounds (poor blood flow)    Current Outpatient Medications   Medication Sig Dispense Refill     acetaminophen (TYLENOL) 500 MG tablet Take 1,000 mg by mouth daily       allopurinol (ZYLOPRIM) 300 MG tablet Take 1 tablet by mouth daily at 2 pm.       amoxicillin (AMOXIL) 500 MG capsule Take 500 mg by mouth.  TAKE 2 CAPS AS NEEDED (DENTAL) TAKE 4 CAPSULES BY MOUTH 1 HOUR PRIOR TO DENTAL APPOINTMENTS       aspirin 81 MG EC tablet Take 81 mg by mouth daily       atorvastatin (LIPITOR) 20 MG tablet Take 1 tablet by mouth at bedtime.       calcium acetate (CALPHRON) 667 MG TABS tablet Take 3 tablets by mouth 3 times daily.       carvedilol (COREG) 3.125 MG tablet On Monday, Wednesday and Friday take 1 tablet daily and all other days take 1 tablet twice daily.       clonazePAM (KLONOPIN) 0.5 MG tablet Take 0.25 mg by mouth every evening       gabapentin (NEURONTIN) 300 MG capsule Take 1 capsule (300 mg) by mouth At Bedtime 270 capsule 3     multivitamin RENAL (NEPHROVITE) 0.8 MG tablet Take 1 tablet by mouth daily.       mycophenolate (GENERIC EQUIVALENT) 250 MG capsule TAKE 3 CAPSULES (750 MG) BY MOUTH 2 TIMES DAILY 540 capsule 3     pantoprazole (PROTONIX) 40 MG EC tablet TAKE 1 TABLET BY MOUTH DAILY 30 tablet 1     predniSONE (DELTASONE) 5 MG tablet Take 1 tablet (5 mg) by mouth daily 90 tablet 3     sertraline (ZOLOFT) 50 MG tablet Take 1 mg by mouth at bedtime       sulfamethoxazole-trimethoprim (BACTRIM) 400-80 MG tablet TAKE 1 TABLET BY MOUTH EVERY MON, WED, FRI MORNING 13 tablet 11     torsemide (DEMADEX) 20 MG tablet Take 40 mg by mouth daily       vitamin D2 (ERGOCALCIFEROL) 58411 units (1250 mcg) capsule Take 50,000 Units by mouth once a week.       allopurinol (ZYLOPRIM) 100 MG tablet Take 3 tablets (300 mg) by mouth daily 30 tablet      Calcium 500-2.5 MG-MCG CHEW Take 1 tablet by mouth daily 90 tablet 0     carvedilol 12.5 MG PO tablet Take 1 tablet (12.5 mg) by mouth 2 times daily (with meals) 90 tablet 3     cholecalciferol (VITAMIN D) 1000 UNIT tablet Take 1 tablet (1,000 Units) by mouth daily (Patient not taking: Reported on 5/8/2025) 90 tablet 3     cilostazol (PLETAL) 50 MG tablet Take 50 mg by mouth 2 times daily.       clopidogrel (PLAVIX) 75 MG tablet Take 75 mg by mouth every morning. (Patient not  "taking: Reported on 5/8/2025)       Fexofenadine HCl (ALLEGRA PO) Take 1 tablet by mouth daily.  (Patient not taking: Reported on 5/8/2025)       Multiple Vitamins-Minerals (ONE-A-DAY MENS 50+ ADVANTAGE PO) Take 1 tablet by mouth every evening (Patient not taking: Reported on 10/25/2024)       No current facility-administered medications for this visit.   PE  BP 98/61 (BP Location: Right arm, Patient Position: Sitting, Cuff Size: Adult Regular)   Pulse 72   Temp 98.1  F (36.7  C) (Oral)   Resp 18   Ht 1.691 m (5' 6.58\")   Wt 91.4 kg (201 lb 8 oz)   SpO2 97%   BMI 31.96 kg/m    LLQ ok for surgical procedure.  L groin ok pulse.  L foot boot after amputation    No recent labs        Again, thank you for allowing me to participate in the care of your patient.        Sincerely,        Lisandro Sanchez MD    Electronically signed"

## 2025-05-12 LAB
FLOWPRA1 CELL: NORMAL
FLOWPRA1 COMMENTS: NORMAL
FLOWPRA1 RESULT: NORMAL
FLOWPRA1 TEST METHOD: NORMAL
FLOWPRA2 CELL: NORMAL
FLOWPRA2 COMMENTS: NORMAL
FLOWPRA2 RESULT: NORMAL
FLOWPRA2 TEST METHOD: NORMAL

## 2025-05-13 ENCOUNTER — TEAM CONFERENCE (OUTPATIENT)
Dept: TRANSPLANT | Facility: CLINIC | Age: 70
End: 2025-05-13
Payer: MEDICARE

## 2025-05-13 DIAGNOSIS — I63.9 ISCHEMIC STROKE (H): ICD-10-CM

## 2025-05-13 NOTE — TELEPHONE ENCOUNTER
Image Review Meeting    ATTENDEES: Dr. Zuniga and coordinators    IMAGES REVIEWED: 5/8/25 CT a/p and iliac US    DECISION: pt already living donor only due to vessels. Needs to be reviewed at complex imaging. Donor is almost approved for PEP, unsure if pt will be able to receive a kidney through NKR if possibility of him being opened up and not finding a space.     INCIDENTALS: No

## 2025-07-08 ENCOUNTER — TEAM CONFERENCE (OUTPATIENT)
Dept: TRANSPLANT | Facility: CLINIC | Age: 70
End: 2025-07-08
Payer: MEDICARE

## 2025-07-08 ENCOUNTER — TELEPHONE (OUTPATIENT)
Dept: TRANSPLANT | Facility: CLINIC | Age: 70
End: 2025-07-08
Payer: MEDICARE

## 2025-07-08 DIAGNOSIS — I63.9 ISCHEMIC STROKE (H): ICD-10-CM

## 2025-07-08 NOTE — TELEPHONE ENCOUNTER
Complex Image Review Meeting    ATTENDEES: Dr. Roblero, Dr. Zuniga, Dr. Redman, Dr. Hobson    IMAGES REVIEWED: 71yo ESKD from IgA nephropathy s/p LDKT in  now failed on HD since 2024. Pt previously determined to be a living donor only candidate due to vessels from 2024 review with Dr. Roblero. Imaging recently updated as he has an almost approved donor for PEP, previous review possibility of opening and not finding a space, not sure if we can go into NKR. Dr. Zuniga recommended a complex imaging review. CT a/p and iliac US updated on 25. Wondering as well if we could consider  donor transplant? Pt was under the impression that he could be considered after his visit with Dr. Sanchez on 25.      DECISION: LD only.  No PEP.  Notable likelihood that we would abandon surgery and not complete the txp.

## 2025-07-08 NOTE — TELEPHONE ENCOUNTER
Called pt to update on complex imaging meeting. Pt is still living donor only and needs a direct donor approved. Left VM with direct line for return call.

## 2025-07-09 ENCOUNTER — TEAM CONFERENCE (OUTPATIENT)
Dept: TRANSPLANT | Facility: CLINIC | Age: 70
End: 2025-07-09
Payer: MEDICARE

## 2025-07-09 DIAGNOSIS — I63.9 ISCHEMIC STROKE (H): ICD-10-CM

## 2025-07-09 NOTE — TELEPHONE ENCOUNTER
[facility] review    Attendees: Dr. Chauhan, Dr. Koo, coordinators    Discussion: Pts PRA currently reflected in the chart shows 33. Most recent sample from 5/8/25 was tested via flow PRA and determined to be negative for HLA antibodies. Previous samples have been marked as invalid due to false positive reactivity. Pt PRA is 0. Could go direct with potential donor once approved.

## 2025-08-27 ENCOUNTER — DOCUMENTATION ONLY (OUTPATIENT)
Dept: TRANSPLANT | Facility: CLINIC | Age: 70
End: 2025-08-27
Payer: MEDICARE

## 2025-08-27 DIAGNOSIS — I63.9 ISCHEMIC STROKE (H): ICD-10-CM

## 2025-08-29 ENCOUNTER — LAB (OUTPATIENT)
Dept: LAB | Facility: CLINIC | Age: 70
End: 2025-08-29
Payer: MEDICARE

## 2025-08-29 DIAGNOSIS — N18.6 ESRD (END STAGE RENAL DISEASE) (H): ICD-10-CM

## 2025-08-29 DIAGNOSIS — Z76.82 ORGAN TRANSPLANT CANDIDATE: ICD-10-CM

## 2025-09-03 ENCOUNTER — TELEPHONE (OUTPATIENT)
Dept: TRANSPLANT | Facility: CLINIC | Age: 70
End: 2025-09-03
Payer: MEDICARE

## 2025-09-03 DIAGNOSIS — I63.9 ISCHEMIC STROKE (H): ICD-10-CM

## 2025-09-03 DIAGNOSIS — Z76.82 ORGAN TRANSPLANT CANDIDATE: ICD-10-CM

## 2025-09-03 DIAGNOSIS — N18.6 ESRD (END STAGE RENAL DISEASE) (H): Primary | ICD-10-CM

## (undated) DEVICE — TRAY PAIN INJECTION 97A 640

## (undated) DEVICE — GLOVE PROTEXIS POWDER FREE SMT 6.5  2D72PT65X

## (undated) DEVICE — PREP CHLORAPREP W/ORANGE TINT 10.5ML 260715

## (undated) DEVICE — Device

## (undated) DEVICE — BLADE KNIFE SURG 11 WITH HANDLE 4-411

## (undated) DEVICE — TUBING EXTENSION SET MICROBORE 6" LL 2N1194

## (undated) DEVICE — NDL 22GA 1.5"

## (undated) DEVICE — NDL SPINAL 22GA 5" QUINCKE 405148

## (undated) DEVICE — SYR 07ML EPIDURAL LOSS OF RESISTANCE PULSATOR 4905

## (undated) DEVICE — COVER ULTRASOUND PROBE W/GEL FLEXI-FEEL 6"X58" LF  25-FF658

## (undated) DEVICE — NDL EPIDURAL TUOHY 22GA 3.5" 4911-22

## (undated) RX ORDER — LIDOCAINE HYDROCHLORIDE 10 MG/ML
INJECTION, SOLUTION EPIDURAL; INFILTRATION; INTRACAUDAL; PERINEURAL
Status: DISPENSED
Start: 2021-06-08

## (undated) RX ORDER — FENTANYL CITRATE 50 UG/ML
INJECTION, SOLUTION INTRAMUSCULAR; INTRAVENOUS
Status: DISPENSED
Start: 2021-06-08

## (undated) RX ORDER — SODIUM CHLORIDE 9 MG/ML
INJECTION, SOLUTION INTRAVENOUS
Status: DISPENSED
Start: 2021-06-08